# Patient Record
Sex: FEMALE | Race: WHITE | Employment: OTHER | URBAN - METROPOLITAN AREA
[De-identification: names, ages, dates, MRNs, and addresses within clinical notes are randomized per-mention and may not be internally consistent; named-entity substitution may affect disease eponyms.]

---

## 2017-01-03 DIAGNOSIS — J44.1 COPD EXACERBATION (HCC): ICD-10-CM

## 2017-01-03 DIAGNOSIS — R05.9 COUGH: ICD-10-CM

## 2017-01-03 RX ORDER — BENZONATATE 100 MG/1
CAPSULE ORAL
Qty: 60 CAPSULE | Refills: 0 | Status: SHIPPED | OUTPATIENT
Start: 2017-01-03 | End: 2017-02-23 | Stop reason: SDUPTHER

## 2017-01-03 RX ORDER — HYDROXYZINE HYDROCHLORIDE 25 MG/1
TABLET, FILM COATED ORAL
Qty: 45 TABLET | Refills: 2 | Status: SHIPPED | OUTPATIENT
Start: 2017-01-03 | End: 2017-01-23 | Stop reason: ALTCHOICE

## 2017-01-23 ENCOUNTER — OFFICE VISIT (OUTPATIENT)
Dept: FAMILY MEDICINE CLINIC | Age: 47
End: 2017-01-23

## 2017-01-23 VITALS
BODY MASS INDEX: 34.39 KG/M2 | WEIGHT: 188 LBS | SYSTOLIC BLOOD PRESSURE: 110 MMHG | HEART RATE: 106 BPM | DIASTOLIC BLOOD PRESSURE: 68 MMHG | OXYGEN SATURATION: 94 %

## 2017-01-23 DIAGNOSIS — G43.111 INTRACTABLE MIGRAINE WITH AURA WITH STATUS MIGRAINOSUS: ICD-10-CM

## 2017-01-23 DIAGNOSIS — G47.419 PRIMARY NARCOLEPSY WITHOUT CATAPLEXY: ICD-10-CM

## 2017-01-23 DIAGNOSIS — F31.9 BIPOLAR 1 DISORDER (HCC): Primary | ICD-10-CM

## 2017-01-23 DIAGNOSIS — J43.9 PULMONARY EMPHYSEMA, UNSPECIFIED EMPHYSEMA TYPE (HCC): ICD-10-CM

## 2017-01-23 DIAGNOSIS — F41.9 ANXIETY: ICD-10-CM

## 2017-01-23 PROCEDURE — 99214 OFFICE O/P EST MOD 30 MIN: CPT | Performed by: FAMILY MEDICINE

## 2017-01-23 RX ORDER — BUTALBITAL, ACETAMINOPHEN AND CAFFEINE 50; 325; 40 MG/1; MG/1; MG/1
TABLET ORAL
Qty: 30 TABLET | Refills: 5 | Status: SHIPPED | OUTPATIENT
Start: 2017-01-23 | End: 2017-07-15 | Stop reason: SDUPTHER

## 2017-01-23 RX ORDER — CLONAZEPAM 1 MG/1
TABLET ORAL
Qty: 30 TABLET | Refills: 0 | Status: SHIPPED | OUTPATIENT
Start: 2017-01-23 | End: 2017-02-23 | Stop reason: SDUPTHER

## 2017-01-23 RX ORDER — TIOTROPIUM BROMIDE INHALATION SPRAY 3.12 UG/1
SPRAY, METERED RESPIRATORY (INHALATION)
Qty: 1 INHALER | Refills: 5 | Status: SHIPPED | OUTPATIENT
Start: 2017-01-23 | End: 2017-04-26

## 2017-01-23 RX ORDER — MIRTAZAPINE 45 MG/1
TABLET, FILM COATED ORAL
Qty: 30 TABLET | Refills: 5 | Status: SHIPPED | OUTPATIENT
Start: 2017-01-23 | End: 2017-07-15 | Stop reason: SDUPTHER

## 2017-01-23 RX ORDER — LORATADINE, PSEUDOEPHEDRINE SULFATE 5; 120 MG/1; MG/1
TABLET, FILM COATED, EXTENDED RELEASE ORAL
Qty: 30 TABLET | Refills: 5 | Status: SHIPPED | OUTPATIENT
Start: 2017-01-23 | End: 2017-07-15 | Stop reason: SDUPTHER

## 2017-02-20 ENCOUNTER — OFFICE VISIT (OUTPATIENT)
Dept: PULMONOLOGY | Age: 47
End: 2017-02-20

## 2017-02-20 VITALS
RESPIRATION RATE: 22 BRPM | SYSTOLIC BLOOD PRESSURE: 108 MMHG | DIASTOLIC BLOOD PRESSURE: 74 MMHG | HEART RATE: 84 BPM | HEIGHT: 62 IN | OXYGEN SATURATION: 97 % | WEIGHT: 188 LBS | TEMPERATURE: 98 F | BODY MASS INDEX: 34.6 KG/M2

## 2017-02-20 DIAGNOSIS — J96.11 CHRONIC RESPIRATORY FAILURE WITH HYPOXIA (HCC): ICD-10-CM

## 2017-02-20 DIAGNOSIS — Z72.0 TOBACCO ABUSE: ICD-10-CM

## 2017-02-20 DIAGNOSIS — J44.9 CHRONIC OBSTRUCTIVE PULMONARY DISEASE, UNSPECIFIED COPD TYPE (HCC): Primary | ICD-10-CM

## 2017-02-20 PROCEDURE — 99214 OFFICE O/P EST MOD 30 MIN: CPT | Performed by: INTERNAL MEDICINE

## 2017-02-23 DIAGNOSIS — R05.9 COUGH: ICD-10-CM

## 2017-02-23 DIAGNOSIS — J44.1 COPD EXACERBATION (HCC): ICD-10-CM

## 2017-02-23 DIAGNOSIS — F31.81 BIPOLAR 2 DISORDER (HCC): ICD-10-CM

## 2017-02-23 RX ORDER — BUPROPION HYDROCHLORIDE 300 MG/1
300 TABLET ORAL EVERY MORNING
Qty: 30 TABLET | Refills: 5 | Status: SHIPPED | OUTPATIENT
Start: 2017-02-23 | End: 2017-08-10 | Stop reason: SDUPTHER

## 2017-02-23 RX ORDER — LAMOTRIGINE 25 MG/1
TABLET ORAL
Qty: 120 TABLET | Refills: 2 | Status: SHIPPED | OUTPATIENT
Start: 2017-02-23 | End: 2017-05-24 | Stop reason: SDUPTHER

## 2017-02-23 RX ORDER — BENZONATATE 100 MG/1
CAPSULE ORAL
Qty: 60 CAPSULE | Refills: 2 | Status: SHIPPED | OUTPATIENT
Start: 2017-02-23 | End: 2017-05-16 | Stop reason: SDUPTHER

## 2017-02-23 RX ORDER — BUPROPION HYDROCHLORIDE 150 MG/1
TABLET ORAL
Qty: 60 TABLET | Refills: 2 | OUTPATIENT
Start: 2017-02-23

## 2017-02-23 RX ORDER — CLONAZEPAM 1 MG/1
TABLET ORAL
Qty: 30 TABLET | Refills: 0 | Status: SHIPPED | OUTPATIENT
Start: 2017-02-23 | End: 2017-03-20 | Stop reason: SDUPTHER

## 2017-02-23 RX ORDER — MONTELUKAST SODIUM 10 MG/1
TABLET ORAL
Qty: 30 TABLET | Refills: 2 | Status: SHIPPED | OUTPATIENT
Start: 2017-02-23 | End: 2017-05-16 | Stop reason: SDUPTHER

## 2017-02-24 ENCOUNTER — HOSPITAL ENCOUNTER (OUTPATIENT)
Dept: PULMONOLOGY | Age: 47
Discharge: HOME | End: 2017-02-24
Attending: INTERNAL MEDICINE | Admitting: INTERNAL MEDICINE

## 2017-03-20 RX ORDER — HYDROXYZINE HYDROCHLORIDE 25 MG/1
TABLET, FILM COATED ORAL
Qty: 45 TABLET | OUTPATIENT
Start: 2017-03-20

## 2017-03-20 RX ORDER — CLONAZEPAM 1 MG/1
TABLET ORAL
Qty: 30 TABLET | Refills: 0 | Status: SHIPPED | OUTPATIENT
Start: 2017-03-25 | End: 2017-04-24 | Stop reason: SDUPTHER

## 2017-03-27 ENCOUNTER — OFFICE VISIT (OUTPATIENT)
Dept: FAMILY MEDICINE CLINIC | Age: 47
End: 2017-03-27

## 2017-03-27 VITALS
DIASTOLIC BLOOD PRESSURE: 78 MMHG | HEIGHT: 62 IN | HEART RATE: 63 BPM | WEIGHT: 187 LBS | BODY MASS INDEX: 34.41 KG/M2 | OXYGEN SATURATION: 98 % | SYSTOLIC BLOOD PRESSURE: 110 MMHG

## 2017-03-27 DIAGNOSIS — F43.0 STRESS REACTION: ICD-10-CM

## 2017-03-27 DIAGNOSIS — J44.1 COPD EXACERBATION (HCC): ICD-10-CM

## 2017-03-27 DIAGNOSIS — M75.82 ROTATOR CUFF TENDONITIS, LEFT: Primary | ICD-10-CM

## 2017-03-27 PROCEDURE — 99214 OFFICE O/P EST MOD 30 MIN: CPT | Performed by: FAMILY MEDICINE

## 2017-04-05 ENCOUNTER — OFFICE VISIT (OUTPATIENT)
Dept: ORTHOPEDIC SURGERY | Age: 47
End: 2017-04-05

## 2017-04-05 VITALS
DIASTOLIC BLOOD PRESSURE: 75 MMHG | WEIGHT: 187 LBS | HEIGHT: 62 IN | HEART RATE: 86 BPM | BODY MASS INDEX: 34.41 KG/M2 | SYSTOLIC BLOOD PRESSURE: 111 MMHG

## 2017-04-05 DIAGNOSIS — M75.02 ADHESIVE CAPSULITIS OF LEFT SHOULDER: Primary | ICD-10-CM

## 2017-04-05 DIAGNOSIS — M25.512 LEFT SHOULDER PAIN, UNSPECIFIED CHRONICITY: ICD-10-CM

## 2017-04-05 PROCEDURE — 99203 OFFICE O/P NEW LOW 30 MIN: CPT | Performed by: ORTHOPAEDIC SURGERY

## 2017-04-05 PROCEDURE — 73030 X-RAY EXAM OF SHOULDER: CPT | Performed by: ORTHOPAEDIC SURGERY

## 2017-04-05 RX ORDER — MELOXICAM 15 MG/1
15 TABLET ORAL DAILY
Qty: 30 TABLET | Refills: 2 | Status: SHIPPED | OUTPATIENT
Start: 2017-04-05 | End: 2017-06-14 | Stop reason: ALTCHOICE

## 2017-04-18 RX ORDER — HYDROCHLOROTHIAZIDE 25 MG/1
TABLET ORAL
Qty: 30 TABLET | Refills: 5 | Status: SHIPPED | OUTPATIENT
Start: 2017-04-18 | End: 2017-06-29

## 2017-04-24 RX ORDER — CLONAZEPAM 1 MG/1
TABLET ORAL
Qty: 30 TABLET | Refills: 0 | Status: SHIPPED | OUTPATIENT
Start: 2017-04-24 | End: 2017-05-24 | Stop reason: SDUPTHER

## 2017-04-25 DIAGNOSIS — J44.1 COPD EXACERBATION (HCC): ICD-10-CM

## 2017-04-25 DIAGNOSIS — R11.0 NAUSEA: ICD-10-CM

## 2017-04-25 RX ORDER — BUDESONIDE AND FORMOTEROL FUMARATE DIHYDRATE 160; 4.5 UG/1; UG/1
AEROSOL RESPIRATORY (INHALATION)
Qty: 1 INHALER | Refills: 5 | Status: SHIPPED | OUTPATIENT
Start: 2017-04-25 | End: 2017-06-14 | Stop reason: ALTCHOICE

## 2017-04-25 RX ORDER — PROMETHAZINE HYDROCHLORIDE 25 MG/1
TABLET ORAL
Qty: 30 TABLET | Refills: 5 | Status: SHIPPED | OUTPATIENT
Start: 2017-04-25 | End: 2017-09-11 | Stop reason: SDUPTHER

## 2017-04-26 ENCOUNTER — TELEPHONE (OUTPATIENT)
Dept: PULMONOLOGY | Age: 47
End: 2017-04-26

## 2017-04-26 ENCOUNTER — TELEPHONE (OUTPATIENT)
Dept: FAMILY MEDICINE CLINIC | Age: 47
End: 2017-04-26

## 2017-04-26 DIAGNOSIS — J44.9 CHRONIC OBSTRUCTIVE PULMONARY DISEASE, UNSPECIFIED COPD TYPE (HCC): Primary | ICD-10-CM

## 2017-05-04 ENCOUNTER — HOSPITAL ENCOUNTER (OUTPATIENT)
Dept: PULMONOLOGY | Age: 47
Discharge: HOME | End: 2017-05-04
Attending: INTERNAL MEDICINE | Admitting: INTERNAL MEDICINE

## 2017-05-04 ENCOUNTER — OFFICE VISIT (OUTPATIENT)
Dept: PULMONOLOGY | Age: 47
End: 2017-05-04

## 2017-05-04 VITALS
RESPIRATION RATE: 20 BRPM | HEART RATE: 107 BPM | HEIGHT: 62 IN | SYSTOLIC BLOOD PRESSURE: 108 MMHG | DIASTOLIC BLOOD PRESSURE: 78 MMHG | TEMPERATURE: 98.8 F | OXYGEN SATURATION: 97 % | WEIGHT: 182.8 LBS | BODY MASS INDEX: 33.64 KG/M2

## 2017-05-04 DIAGNOSIS — J96.11 CHRONIC RESPIRATORY FAILURE WITH HYPOXIA (HCC): ICD-10-CM

## 2017-05-04 DIAGNOSIS — J44.9 CHRONIC OBSTRUCTIVE PULMONARY DISEASE (HCC): ICD-10-CM

## 2017-05-04 DIAGNOSIS — Z72.0 TOBACCO ABUSE: ICD-10-CM

## 2017-05-04 DIAGNOSIS — J44.9 CHRONIC OBSTRUCTIVE PULMONARY DISEASE, UNSPECIFIED COPD TYPE (HCC): Primary | ICD-10-CM

## 2017-05-04 PROCEDURE — G8926 SPIRO NO PERF OR DOC: HCPCS | Performed by: INTERNAL MEDICINE

## 2017-05-04 PROCEDURE — 3023F SPIROM DOC REV: CPT | Performed by: INTERNAL MEDICINE

## 2017-05-04 PROCEDURE — G8427 DOCREV CUR MEDS BY ELIG CLIN: HCPCS | Performed by: INTERNAL MEDICINE

## 2017-05-04 PROCEDURE — 99214 OFFICE O/P EST MOD 30 MIN: CPT | Performed by: INTERNAL MEDICINE

## 2017-05-04 PROCEDURE — G8417 CALC BMI ABV UP PARAM F/U: HCPCS | Performed by: INTERNAL MEDICINE

## 2017-05-04 PROCEDURE — 4004F PT TOBACCO SCREEN RCVD TLK: CPT | Performed by: INTERNAL MEDICINE

## 2017-05-05 ENCOUNTER — TELEPHONE (OUTPATIENT)
Dept: PULMONOLOGY | Age: 47
End: 2017-05-05

## 2017-05-10 ENCOUNTER — TELEPHONE (OUTPATIENT)
Dept: FAMILY MEDICINE CLINIC | Age: 47
End: 2017-05-10

## 2017-05-12 ENCOUNTER — TELEPHONE (OUTPATIENT)
Dept: FAMILY MEDICINE CLINIC | Age: 47
End: 2017-05-12

## 2017-05-16 DIAGNOSIS — J44.1 COPD EXACERBATION (HCC): ICD-10-CM

## 2017-05-16 DIAGNOSIS — G43.119 INTRACTABLE MIGRAINE WITH AURA WITHOUT STATUS MIGRAINOSUS: ICD-10-CM

## 2017-05-16 DIAGNOSIS — R05.9 COUGH: ICD-10-CM

## 2017-05-16 RX ORDER — RIZATRIPTAN BENZOATE 10 MG/1
10 TABLET ORAL
Qty: 18 TABLET | Refills: 5 | Status: SHIPPED | OUTPATIENT
Start: 2017-05-16 | End: 2018-09-14 | Stop reason: SDUPTHER

## 2017-05-17 ENCOUNTER — NURSE ONLY (OUTPATIENT)
Dept: FAMILY MEDICINE CLINIC | Age: 47
End: 2017-05-17

## 2017-05-17 DIAGNOSIS — Z79.899 ENCOUNTER FOR LONG-TERM (CURRENT) USE OF MEDICATIONS: Primary | ICD-10-CM

## 2017-05-17 RX ORDER — TIOTROPIUM BROMIDE 18 UG/1
CAPSULE ORAL; RESPIRATORY (INHALATION)
Qty: 30 CAPSULE | Refills: 5 | Status: SHIPPED | OUTPATIENT
Start: 2017-05-17 | End: 2017-06-20

## 2017-05-17 RX ORDER — BENZONATATE 100 MG/1
CAPSULE ORAL
Qty: 60 CAPSULE | Refills: 5 | Status: SHIPPED | OUTPATIENT
Start: 2017-05-17 | End: 2017-10-23 | Stop reason: SDUPTHER

## 2017-05-17 RX ORDER — TOPIRAMATE 50 MG/1
TABLET, FILM COATED ORAL
Qty: 60 TABLET | Refills: 5 | Status: SHIPPED | OUTPATIENT
Start: 2017-05-17 | End: 2017-11-07 | Stop reason: SDUPTHER

## 2017-05-17 RX ORDER — MONTELUKAST SODIUM 10 MG/1
TABLET ORAL
Qty: 30 TABLET | Refills: 5 | Status: SHIPPED | OUTPATIENT
Start: 2017-05-17 | End: 2017-11-21 | Stop reason: SDUPTHER

## 2017-05-24 DIAGNOSIS — F31.81 BIPOLAR 2 DISORDER (HCC): ICD-10-CM

## 2017-05-25 RX ORDER — LAMOTRIGINE 25 MG/1
TABLET ORAL
Qty: 120 TABLET | Refills: 2 | Status: SHIPPED | OUTPATIENT
Start: 2017-05-25 | End: 2017-08-10 | Stop reason: SDUPTHER

## 2017-05-25 RX ORDER — CLONAZEPAM 1 MG/1
TABLET ORAL
Qty: 30 TABLET | Refills: 0 | OUTPATIENT
Start: 2017-05-25 | End: 2017-06-21 | Stop reason: SDUPTHER

## 2017-06-02 ENCOUNTER — TELEPHONE (OUTPATIENT)
Dept: FAMILY MEDICINE CLINIC | Age: 47
End: 2017-06-02

## 2017-06-05 ENCOUNTER — TELEPHONE (OUTPATIENT)
Dept: FAMILY MEDICINE CLINIC | Age: 47
End: 2017-06-05

## 2017-06-21 ENCOUNTER — OFFICE VISIT (OUTPATIENT)
Dept: FAMILY MEDICINE CLINIC | Age: 47
End: 2017-06-21

## 2017-06-21 VITALS
SYSTOLIC BLOOD PRESSURE: 126 MMHG | BODY MASS INDEX: 33.47 KG/M2 | HEART RATE: 99 BPM | RESPIRATION RATE: 18 BRPM | OXYGEN SATURATION: 99 % | WEIGHT: 183 LBS | DIASTOLIC BLOOD PRESSURE: 70 MMHG

## 2017-06-21 DIAGNOSIS — F41.9 ANXIETY AND DEPRESSION: ICD-10-CM

## 2017-06-21 DIAGNOSIS — F32.A ANXIETY AND DEPRESSION: ICD-10-CM

## 2017-06-21 DIAGNOSIS — Z72.0 TOBACCO USE: ICD-10-CM

## 2017-06-21 DIAGNOSIS — J44.1 COPD EXACERBATION (HCC): Primary | ICD-10-CM

## 2017-06-21 DIAGNOSIS — F43.0 STRESS REACTION: ICD-10-CM

## 2017-06-21 PROCEDURE — 96372 THER/PROPH/DIAG INJ SC/IM: CPT | Performed by: FAMILY MEDICINE

## 2017-06-21 PROCEDURE — 4004F PT TOBACCO SCREEN RCVD TLK: CPT | Performed by: FAMILY MEDICINE

## 2017-06-21 PROCEDURE — G8427 DOCREV CUR MEDS BY ELIG CLIN: HCPCS | Performed by: FAMILY MEDICINE

## 2017-06-21 PROCEDURE — G8926 SPIRO NO PERF OR DOC: HCPCS | Performed by: FAMILY MEDICINE

## 2017-06-21 PROCEDURE — G8417 CALC BMI ABV UP PARAM F/U: HCPCS | Performed by: FAMILY MEDICINE

## 2017-06-21 PROCEDURE — 3023F SPIROM DOC REV: CPT | Performed by: FAMILY MEDICINE

## 2017-06-21 PROCEDURE — 99215 OFFICE O/P EST HI 40 MIN: CPT | Performed by: FAMILY MEDICINE

## 2017-06-21 RX ORDER — CLONAZEPAM 1 MG/1
TABLET ORAL
Qty: 30 TABLET | Refills: 0 | Status: SHIPPED | OUTPATIENT
Start: 2017-06-21 | End: 2017-07-17 | Stop reason: SDUPTHER

## 2017-06-21 RX ORDER — DEXTROAMPHETAMINE SACCHARATE, AMPHETAMINE ASPARTATE MONOHYDRATE, DEXTROAMPHETAMINE SULFATE AND AMPHETAMINE SULFATE 2.5; 2.5; 2.5; 2.5 MG/1; MG/1; MG/1; MG/1
CAPSULE, EXTENDED RELEASE ORAL
COMMUNITY
Start: 2017-06-06 | End: 2018-02-15

## 2017-06-21 RX ORDER — PREDNISONE 20 MG/1
TABLET ORAL
Qty: 20 TABLET | Refills: 0 | Status: SHIPPED | OUTPATIENT
Start: 2017-06-21 | End: 2018-02-06 | Stop reason: SDUPTHER

## 2017-06-21 RX ORDER — METHYLPREDNISOLONE ACETATE 80 MG/ML
80 INJECTION, SUSPENSION INTRA-ARTICULAR; INTRALESIONAL; INTRAMUSCULAR; SOFT TISSUE ONCE
Status: COMPLETED | OUTPATIENT
Start: 2017-06-21 | End: 2017-06-21

## 2017-06-21 RX ADMIN — METHYLPREDNISOLONE ACETATE 80 MG: 80 INJECTION, SUSPENSION INTRA-ARTICULAR; INTRALESIONAL; INTRAMUSCULAR; SOFT TISSUE at 14:44

## 2017-06-22 DIAGNOSIS — J44.1 COPD EXACERBATION (HCC): Primary | ICD-10-CM

## 2017-06-22 RX ORDER — ALBUTEROL SULFATE 2.5 MG/3ML
SOLUTION RESPIRATORY (INHALATION)
Qty: 120 ML | Refills: 5 | Status: SHIPPED | OUTPATIENT
Start: 2017-06-22 | End: 2017-07-17

## 2017-06-22 RX ORDER — ALBUTEROL SULFATE 2.5 MG/3ML
SOLUTION RESPIRATORY (INHALATION)
Qty: 120 ML | Refills: 5 | Status: SHIPPED | OUTPATIENT
Start: 2017-06-22 | End: 2017-06-22 | Stop reason: SDUPTHER

## 2017-06-29 ENCOUNTER — TELEPHONE (OUTPATIENT)
Dept: FAMILY MEDICINE CLINIC | Age: 47
End: 2017-06-29

## 2017-06-29 RX ORDER — FUROSEMIDE 40 MG/1
TABLET ORAL
Qty: 60 TABLET | Refills: 3 | Status: SHIPPED | OUTPATIENT
Start: 2017-06-29 | End: 2017-10-23 | Stop reason: SDUPTHER

## 2017-06-29 RX ORDER — POTASSIUM CHLORIDE 750 MG/1
10 TABLET, FILM COATED, EXTENDED RELEASE ORAL DAILY
Qty: 30 TABLET | Refills: 3 | Status: SHIPPED | OUTPATIENT
Start: 2017-06-29 | End: 2017-10-23 | Stop reason: SDUPTHER

## 2017-07-05 ENCOUNTER — OFFICE VISIT (OUTPATIENT)
Dept: PSYCHOLOGY | Age: 47
End: 2017-07-05

## 2017-07-05 DIAGNOSIS — F32.A DEPRESSION, UNSPECIFIED DEPRESSION TYPE: ICD-10-CM

## 2017-07-05 DIAGNOSIS — F41.9 ANXIETY: Primary | ICD-10-CM

## 2017-07-05 PROCEDURE — 90791 PSYCH DIAGNOSTIC EVALUATION: CPT | Performed by: PSYCHOLOGIST

## 2017-07-14 ENCOUNTER — OFFICE VISIT (OUTPATIENT)
Dept: PSYCHOLOGY | Age: 47
End: 2017-07-14

## 2017-07-14 DIAGNOSIS — F32.A DEPRESSION, UNSPECIFIED DEPRESSION TYPE: Primary | ICD-10-CM

## 2017-07-14 DIAGNOSIS — F41.9 ANXIETY: ICD-10-CM

## 2017-07-14 PROCEDURE — 90832 PSYTX W PT 30 MINUTES: CPT | Performed by: PSYCHOLOGIST

## 2017-07-15 DIAGNOSIS — G43.111 INTRACTABLE MIGRAINE WITH AURA WITH STATUS MIGRAINOSUS: ICD-10-CM

## 2017-07-17 RX ORDER — MIRTAZAPINE 45 MG/1
TABLET, FILM COATED ORAL
Qty: 30 TABLET | Refills: 5 | Status: SHIPPED | OUTPATIENT
Start: 2017-07-17 | End: 2017-07-17

## 2017-07-17 RX ORDER — MIRTAZAPINE 45 MG/1
TABLET, FILM COATED ORAL
Qty: 30 TABLET | Refills: 5 | Status: SHIPPED | OUTPATIENT
Start: 2017-07-17 | End: 2018-01-02

## 2017-07-17 RX ORDER — LORATADINE, PSEUDOEPHEDRINE SULFATE 5; 120 MG/1; MG/1
TABLET, FILM COATED, EXTENDED RELEASE ORAL
Qty: 30 TABLET | Refills: 5 | Status: SHIPPED | OUTPATIENT
Start: 2017-07-17 | End: 2021-03-24 | Stop reason: SDUPTHER

## 2017-07-17 RX ORDER — BUTALBITAL, ACETAMINOPHEN AND CAFFEINE 50; 325; 40 MG/1; MG/1; MG/1
TABLET ORAL
Qty: 30 TABLET | Refills: 5 | Status: SHIPPED | OUTPATIENT
Start: 2017-07-17 | End: 2018-01-22 | Stop reason: SDUPTHER

## 2017-07-18 RX ORDER — MELOXICAM 15 MG/1
TABLET ORAL
Qty: 30 TABLET | OUTPATIENT
Start: 2017-07-18

## 2017-07-18 RX ORDER — CLONAZEPAM 1 MG/1
TABLET ORAL
Qty: 30 TABLET | Refills: 0 | OUTPATIENT
Start: 2017-07-18 | End: 2017-08-21 | Stop reason: SDUPTHER

## 2017-07-19 ENCOUNTER — OFFICE VISIT (OUTPATIENT)
Dept: PSYCHOLOGY | Age: 47
End: 2017-07-19

## 2017-07-19 DIAGNOSIS — F41.9 ANXIETY: ICD-10-CM

## 2017-07-19 DIAGNOSIS — F32.A DEPRESSION, UNSPECIFIED DEPRESSION TYPE: Primary | ICD-10-CM

## 2017-07-19 PROCEDURE — 90834 PSYTX W PT 45 MINUTES: CPT | Performed by: PSYCHOLOGIST

## 2017-07-19 RX ORDER — MELOXICAM 15 MG/1
15 TABLET ORAL DAILY
Qty: 30 TABLET | Refills: 3 | Status: SHIPPED | OUTPATIENT
Start: 2017-07-19 | End: 2017-11-07 | Stop reason: SDUPTHER

## 2017-07-19 ASSESSMENT — ANXIETY QUESTIONNAIRES
1. FEELING NERVOUS, ANXIOUS, OR ON EDGE: 3-NEARLY EVERY DAY
2. NOT BEING ABLE TO STOP OR CONTROL WORRYING: 3-NEARLY EVERY DAY
7. FEELING AFRAID AS IF SOMETHING AWFUL MIGHT HAPPEN: 3-NEARLY EVERY DAY
5. BEING SO RESTLESS THAT IT IS HARD TO SIT STILL: 3-NEARLY EVERY DAY
6. BECOMING EASILY ANNOYED OR IRRITABLE: 3-NEARLY EVERY DAY
3. WORRYING TOO MUCH ABOUT DIFFERENT THINGS: 3-NEARLY EVERY DAY
GAD7 TOTAL SCORE: 21
4. TROUBLE RELAXING: 3-NEARLY EVERY DAY

## 2017-07-19 ASSESSMENT — PATIENT HEALTH QUESTIONNAIRE - PHQ9
6. FEELING BAD ABOUT YOURSELF - OR THAT YOU ARE A FAILURE OR HAVE LET YOURSELF OR YOUR FAMILY DOWN: 3
SUM OF ALL RESPONSES TO PHQ QUESTIONS 1-9: 23
7. TROUBLE CONCENTRATING ON THINGS, SUCH AS READING THE NEWSPAPER OR WATCHING TELEVISION: 3
8. MOVING OR SPEAKING SO SLOWLY THAT OTHER PEOPLE COULD HAVE NOTICED. OR THE OPPOSITE, BEING SO FIGETY OR RESTLESS THAT YOU HAVE BEEN MOVING AROUND A LOT MORE THAN USUAL: 3
4. FEELING TIRED OR HAVING LITTLE ENERGY: 3
1. LITTLE INTEREST OR PLEASURE IN DOING THINGS: 2
SUM OF ALL RESPONSES TO PHQ9 QUESTIONS 1 & 2: 5
5. POOR APPETITE OR OVEREATING: 3
9. THOUGHTS THAT YOU WOULD BE BETTER OFF DEAD, OR OF HURTING YOURSELF: 0
2. FEELING DOWN, DEPRESSED OR HOPELESS: 3
3. TROUBLE FALLING OR STAYING ASLEEP: 3

## 2017-07-26 ENCOUNTER — OFFICE VISIT (OUTPATIENT)
Dept: FAMILY MEDICINE CLINIC | Age: 47
End: 2017-07-26

## 2017-07-26 VITALS
BODY MASS INDEX: 33.29 KG/M2 | SYSTOLIC BLOOD PRESSURE: 126 MMHG | RESPIRATION RATE: 18 BRPM | HEART RATE: 96 BPM | OXYGEN SATURATION: 99 % | DIASTOLIC BLOOD PRESSURE: 80 MMHG | WEIGHT: 182 LBS

## 2017-07-26 DIAGNOSIS — F32.A ANXIETY AND DEPRESSION: Primary | ICD-10-CM

## 2017-07-26 DIAGNOSIS — N62 MACROMASTIA: ICD-10-CM

## 2017-07-26 DIAGNOSIS — F41.9 ANXIETY AND DEPRESSION: Primary | ICD-10-CM

## 2017-07-26 PROCEDURE — 99214 OFFICE O/P EST MOD 30 MIN: CPT | Performed by: FAMILY MEDICINE

## 2017-07-26 RX ORDER — FLUOXETINE HYDROCHLORIDE 20 MG/1
20 CAPSULE ORAL DAILY
Qty: 30 CAPSULE | Refills: 3 | Status: SHIPPED | OUTPATIENT
Start: 2017-07-26 | End: 2017-11-21 | Stop reason: SDUPTHER

## 2017-07-28 ENCOUNTER — OFFICE VISIT (OUTPATIENT)
Dept: PSYCHOLOGY | Age: 47
End: 2017-07-28

## 2017-07-28 DIAGNOSIS — F41.9 ANXIETY: ICD-10-CM

## 2017-07-28 DIAGNOSIS — F32.A DEPRESSION, UNSPECIFIED DEPRESSION TYPE: Primary | ICD-10-CM

## 2017-07-28 PROCEDURE — 90834 PSYTX W PT 45 MINUTES: CPT | Performed by: PSYCHOLOGIST

## 2017-08-10 DIAGNOSIS — F31.81 BIPOLAR 2 DISORDER (HCC): ICD-10-CM

## 2017-08-10 RX ORDER — BUPROPION HYDROCHLORIDE 300 MG/1
TABLET ORAL
Qty: 30 TABLET | Refills: 5 | Status: SHIPPED | OUTPATIENT
Start: 2017-08-10 | End: 2017-11-29

## 2017-08-10 RX ORDER — LAMOTRIGINE 25 MG/1
TABLET ORAL
Qty: 120 TABLET | Refills: 5 | Status: SHIPPED | OUTPATIENT
Start: 2017-08-10 | End: 2017-11-29

## 2017-08-21 ENCOUNTER — OFFICE VISIT (OUTPATIENT)
Dept: PULMONOLOGY | Age: 47
End: 2017-08-21

## 2017-08-21 VITALS
OXYGEN SATURATION: 95 % | HEART RATE: 111 BPM | BODY MASS INDEX: 33.82 KG/M2 | HEIGHT: 62 IN | RESPIRATION RATE: 18 BRPM | DIASTOLIC BLOOD PRESSURE: 68 MMHG | WEIGHT: 183.8 LBS | SYSTOLIC BLOOD PRESSURE: 108 MMHG | TEMPERATURE: 98.4 F

## 2017-08-21 DIAGNOSIS — R05.9 COUGH: ICD-10-CM

## 2017-08-21 DIAGNOSIS — Z72.0 TOBACCO ABUSE: ICD-10-CM

## 2017-08-21 DIAGNOSIS — J43.2 CENTRILOBULAR EMPHYSEMA (HCC): Primary | ICD-10-CM

## 2017-08-21 DIAGNOSIS — J96.11 CHRONIC RESPIRATORY FAILURE WITH HYPOXIA (HCC): ICD-10-CM

## 2017-08-21 PROCEDURE — 99214 OFFICE O/P EST MOD 30 MIN: CPT | Performed by: INTERNAL MEDICINE

## 2017-08-21 RX ORDER — CLONAZEPAM 1 MG/1
TABLET ORAL
Qty: 30 TABLET | Refills: 0 | Status: SHIPPED | OUTPATIENT
Start: 2017-08-21 | End: 2017-09-25 | Stop reason: SDUPTHER

## 2017-08-21 RX ORDER — BUDESONIDE AND FORMOTEROL FUMARATE DIHYDRATE 160; 4.5 UG/1; UG/1
2 AEROSOL RESPIRATORY (INHALATION) 2 TIMES DAILY
COMMUNITY
End: 2017-08-21 | Stop reason: SDUPTHER

## 2017-08-21 RX ORDER — PREDNISONE 20 MG/1
20 TABLET ORAL DAILY
Qty: 5 TABLET | Refills: 0 | Status: SHIPPED | OUTPATIENT
Start: 2017-08-21 | End: 2017-08-26

## 2017-08-21 RX ORDER — ALBUTEROL SULFATE 90 UG/1
AEROSOL, METERED RESPIRATORY (INHALATION)
Qty: 18 G | Refills: 5 | Status: SHIPPED | OUTPATIENT
Start: 2017-08-21 | End: 2018-01-02

## 2017-08-21 RX ORDER — BUDESONIDE AND FORMOTEROL FUMARATE DIHYDRATE 160; 4.5 UG/1; UG/1
2 AEROSOL RESPIRATORY (INHALATION) 2 TIMES DAILY
Qty: 10.2 G | Refills: 5 | Status: SHIPPED | OUTPATIENT
Start: 2017-08-21 | End: 2017-10-13

## 2017-08-22 RX ORDER — CLONAZEPAM 1 MG/1
TABLET ORAL
Qty: 30 TABLET | Refills: 0 | OUTPATIENT
Start: 2017-08-22

## 2017-09-06 ENCOUNTER — OFFICE VISIT (OUTPATIENT)
Dept: SURGERY | Age: 47
End: 2017-09-06

## 2017-09-06 VITALS
DIASTOLIC BLOOD PRESSURE: 68 MMHG | BODY MASS INDEX: 33.82 KG/M2 | TEMPERATURE: 97.7 F | HEIGHT: 62 IN | OXYGEN SATURATION: 96 % | SYSTOLIC BLOOD PRESSURE: 104 MMHG | WEIGHT: 183.8 LBS | HEART RATE: 98 BPM

## 2017-09-06 DIAGNOSIS — N62 MACROMASTIA: Primary | ICD-10-CM

## 2017-09-06 PROCEDURE — 99204 OFFICE O/P NEW MOD 45 MIN: CPT | Performed by: SURGERY

## 2017-09-06 ASSESSMENT — ENCOUNTER SYMPTOMS
WHEEZING: 1
ABDOMINAL PAIN: 1
COUGH: 1
DIARRHEA: 0
SHORTNESS OF BREATH: 1
DOUBLE VISION: 0
BACK PAIN: 1
VOMITING: 1
BLURRED VISION: 0
NAUSEA: 1
HEARTBURN: 1

## 2017-09-18 ENCOUNTER — HOSPITAL ENCOUNTER (OUTPATIENT)
Dept: MAMMOGRAPHY | Age: 47
Discharge: HOME | End: 2017-09-18
Attending: FAMILY MEDICINE | Admitting: FAMILY MEDICINE

## 2017-09-18 DIAGNOSIS — Z12.31 SCREENING MAMMOGRAM, ENCOUNTER FOR: ICD-10-CM

## 2017-09-25 ENCOUNTER — TELEPHONE (OUTPATIENT)
Dept: FAMILY MEDICINE CLINIC | Age: 47
End: 2017-09-25

## 2017-09-25 RX ORDER — CLONAZEPAM 1 MG/1
TABLET ORAL
Qty: 30 TABLET | Refills: 0 | Status: SHIPPED | OUTPATIENT
Start: 2017-09-25 | End: 2017-10-23 | Stop reason: SDUPTHER

## 2017-10-12 DIAGNOSIS — J44.1 COPD EXACERBATION (HCC): ICD-10-CM

## 2017-10-13 RX ORDER — BUDESONIDE AND FORMOTEROL FUMARATE DIHYDRATE 160; 4.5 UG/1; UG/1
AEROSOL RESPIRATORY (INHALATION)
Qty: 1 INHALER | Refills: 5 | Status: SHIPPED | OUTPATIENT
Start: 2017-10-13 | End: 2018-04-18 | Stop reason: SDUPTHER

## 2017-10-13 RX ORDER — HYDROCHLOROTHIAZIDE 25 MG/1
TABLET ORAL
Qty: 30 TABLET | Refills: 5 | Status: SHIPPED | OUTPATIENT
Start: 2017-10-13 | End: 2018-02-15

## 2017-10-13 NOTE — TELEPHONE ENCOUNTER
Last office visit 7/26/2017      Next office visit scheduled Visit date not found    Requested Prescriptions     Pending Prescriptions Disp Refills    SYMBICORT 160-4.5 MCG/ACT AERO 1 Inhaler      Sig: INHALE 2 PUFFS INTO THE LUNGS 2 TIMES DAILY (RINSE MOUTH AFTER EACH USE)    hydrochlorothiazide (HYDRODIURIL) 25 MG tablet 30 tablet      Sig: TAKE 1 TABLET BY MOUTH DAILY AS NEEDED

## 2017-10-23 ENCOUNTER — TELEPHONE (OUTPATIENT)
Dept: FAMILY MEDICINE CLINIC | Age: 47
End: 2017-10-23

## 2017-10-23 DIAGNOSIS — J44.1 COPD EXACERBATION (HCC): ICD-10-CM

## 2017-10-23 DIAGNOSIS — R05.9 COUGH: ICD-10-CM

## 2017-10-23 DIAGNOSIS — J44.9 COPD WITH CHRONIC BRONCHITIS AND EMPHYSEMA (HCC): Primary | ICD-10-CM

## 2017-10-23 RX ORDER — DOXYCYCLINE HYCLATE 100 MG
100 TABLET ORAL 2 TIMES DAILY
Qty: 20 TABLET | Refills: 0 | Status: SHIPPED | OUTPATIENT
Start: 2017-10-23 | End: 2017-11-02

## 2017-10-23 RX ORDER — CLONAZEPAM 1 MG/1
TABLET ORAL
Qty: 30 TABLET | Refills: 0 | Status: SHIPPED | OUTPATIENT
Start: 2017-10-23 | End: 2017-11-21 | Stop reason: SDUPTHER

## 2017-10-23 RX ORDER — FUROSEMIDE 40 MG/1
TABLET ORAL
Qty: 60 TABLET | Refills: 5 | Status: SHIPPED | OUTPATIENT
Start: 2017-10-23 | End: 2018-08-10 | Stop reason: SDUPTHER

## 2017-10-23 RX ORDER — BENZONATATE 100 MG/1
CAPSULE ORAL
Qty: 60 CAPSULE | Refills: 1 | Status: SHIPPED | OUTPATIENT
Start: 2017-10-23 | End: 2018-04-18 | Stop reason: SDUPTHER

## 2017-10-23 RX ORDER — POTASSIUM CHLORIDE 750 MG/1
10 TABLET, FILM COATED, EXTENDED RELEASE ORAL DAILY
Qty: 30 TABLET | Refills: 5 | Status: SHIPPED | OUTPATIENT
Start: 2017-10-23 | End: 2018-05-17 | Stop reason: SDUPTHER

## 2017-11-21 DIAGNOSIS — F32.A ANXIETY AND DEPRESSION: ICD-10-CM

## 2017-11-21 DIAGNOSIS — F41.9 ANXIETY AND DEPRESSION: ICD-10-CM

## 2017-11-21 RX ORDER — FLUOXETINE HYDROCHLORIDE 20 MG/1
20 CAPSULE ORAL DAILY
Qty: 30 CAPSULE | Refills: 5 | Status: SHIPPED | OUTPATIENT
Start: 2017-11-21 | End: 2018-07-23 | Stop reason: SDUPTHER

## 2017-11-21 RX ORDER — MONTELUKAST SODIUM 10 MG/1
TABLET ORAL
Qty: 30 TABLET | Refills: 5 | Status: SHIPPED | OUTPATIENT
Start: 2017-11-21 | End: 2020-08-12

## 2017-11-21 RX ORDER — CLONAZEPAM 1 MG/1
TABLET ORAL
Qty: 30 TABLET | Refills: 0 | OUTPATIENT
Start: 2017-11-21 | End: 2017-12-21 | Stop reason: SDUPTHER

## 2017-11-24 DIAGNOSIS — R11.0 NAUSEA: ICD-10-CM

## 2017-11-27 ENCOUNTER — TELEPHONE (OUTPATIENT)
Dept: PULMONOLOGY | Age: 47
End: 2017-11-27

## 2017-11-27 ENCOUNTER — TELEPHONE (OUTPATIENT)
Dept: FAMILY MEDICINE CLINIC | Age: 47
End: 2017-11-27

## 2017-11-27 RX ORDER — PROMETHAZINE HYDROCHLORIDE 25 MG/1
TABLET ORAL
Qty: 30 TABLET | Refills: 0 | Status: SHIPPED | OUTPATIENT
Start: 2017-11-27 | End: 2017-12-18 | Stop reason: SDUPTHER

## 2017-11-29 ENCOUNTER — HOSPITAL ENCOUNTER (OUTPATIENT)
Dept: OTHER | Age: 47
Discharge: HOME | End: 2017-11-29
Attending: INTERNAL MEDICINE | Admitting: INTERNAL MEDICINE

## 2017-11-29 ENCOUNTER — OFFICE VISIT (OUTPATIENT)
Dept: PULMONOLOGY | Age: 47
End: 2017-11-29

## 2017-11-29 VITALS
BODY MASS INDEX: 33.49 KG/M2 | HEIGHT: 62 IN | DIASTOLIC BLOOD PRESSURE: 76 MMHG | TEMPERATURE: 98.7 F | HEART RATE: 110 BPM | RESPIRATION RATE: 22 BRPM | WEIGHT: 182 LBS | SYSTOLIC BLOOD PRESSURE: 112 MMHG | OXYGEN SATURATION: 96 %

## 2017-11-29 DIAGNOSIS — R05.9 COUGH: ICD-10-CM

## 2017-11-29 DIAGNOSIS — R06.00 DYSPNEA, UNSPECIFIED TYPE: Primary | ICD-10-CM

## 2017-11-29 DIAGNOSIS — Z72.0 TOBACCO ABUSE: ICD-10-CM

## 2017-11-29 DIAGNOSIS — R06.00 DYSPNEA, UNSPECIFIED TYPE: ICD-10-CM

## 2017-11-29 DIAGNOSIS — J44.1 COPD EXACERBATION (HCC): ICD-10-CM

## 2017-11-29 DIAGNOSIS — J96.11 CHRONIC RESPIRATORY FAILURE WITH HYPOXIA (HCC): ICD-10-CM

## 2017-11-29 PROCEDURE — 3023F SPIROM DOC REV: CPT | Performed by: INTERNAL MEDICINE

## 2017-11-29 PROCEDURE — G8484 FLU IMMUNIZE NO ADMIN: HCPCS | Performed by: INTERNAL MEDICINE

## 2017-11-29 PROCEDURE — 1036F TOBACCO NON-USER: CPT | Performed by: INTERNAL MEDICINE

## 2017-11-29 PROCEDURE — G8417 CALC BMI ABV UP PARAM F/U: HCPCS | Performed by: INTERNAL MEDICINE

## 2017-11-29 PROCEDURE — G8926 SPIRO NO PERF OR DOC: HCPCS | Performed by: INTERNAL MEDICINE

## 2017-11-29 PROCEDURE — 99214 OFFICE O/P EST MOD 30 MIN: CPT | Performed by: INTERNAL MEDICINE

## 2017-11-29 PROCEDURE — G8427 DOCREV CUR MEDS BY ELIG CLIN: HCPCS | Performed by: INTERNAL MEDICINE

## 2017-11-29 RX ORDER — CEFUROXIME AXETIL 500 MG/1
500 TABLET ORAL 2 TIMES DAILY
Qty: 14 TABLET | Refills: 0 | Status: SHIPPED | OUTPATIENT
Start: 2017-11-29 | End: 2017-12-06

## 2017-11-29 NOTE — PROGRESS NOTES
promethazine (PHENERGAN) 25 MG tablet, TAKE 1 TABLET BY MOUTH EVERY 6 HOURS AS NEEDED FOR NAUSEA, Disp: 30 tablet, Rfl: 0    Umeclidinium Bromide (INCRUSE ELLIPTA) 62.5 MCG/INH AEPB, Inhale 1 puff into the lungs daily .  To replace Spiriva, Disp: 1 each, Rfl: 5    clonazePAM (KLONOPIN) 1 MG tablet, TAKE 1 TABLET BY MOUTH TWICE DAILY AS NEEDED FOR ANXIETY MUST LAST 30 DAYS, Disp: 30 tablet, Rfl: 0    FLUoxetine (PROZAC) 20 MG capsule, Take 1 capsule by mouth daily, Disp: 30 capsule, Rfl: 5    montelukast (SINGULAIR) 10 MG tablet, TAKE 1 TABLET BY MOUTH EVERY DAY, Disp: 30 tablet, Rfl: 5    meloxicam (MOBIC) 15 MG tablet, Take 1 tablet by mouth daily, Disp: 30 tablet, Rfl: 3    topiramate (TOPAMAX) 50 MG tablet, TAKE 1 TABLET BY MOUTH 2 TIMES DAILY, Disp: 60 tablet, Rfl: 5    furosemide (LASIX) 40 MG tablet, 1 po qd-bid prn swelling, Disp: 60 tablet, Rfl: 5    potassium chloride (KLOR-CON) 10 MEQ extended release tablet, Take 1 tablet by mouth daily, Disp: 30 tablet, Rfl: 5    benzonatate (TESSALON) 100 MG capsule, TAKE 1-2 CAPSULES BY MOUTH 3 TIMES DAILY AS NEEDED COUGH, Disp: 60 capsule, Rfl: 1    SYMBICORT 160-4.5 MCG/ACT AERO, INHALE 2 PUFFS INTO THE LUNGS 2 TIMES DAILY (RINSE MOUTH AFTER EACH USE), Disp: 1 Inhaler, Rfl: 5    hydrochlorothiazide (HYDRODIURIL) 25 MG tablet, TAKE 1 TABLET BY MOUTH DAILY AS NEEDED, Disp: 30 tablet, Rfl: 5    albuterol sulfate HFA (VENTOLIN HFA) 108 (90 Base) MCG/ACT inhaler, INHALE 2 PUFFS INTO THE LUNGS 4 TIMES DAILY AS NEEDED., Disp: 18 g, Rfl: 5    LORATADINE-D 12HR 5-120 MG per extended release tablet, TAKE 1 TABLET BY MOUTH EVERY MORNING AS NEEDED FOR ALLERGY, Disp: 30 tablet, Rfl: 5    butalbital-acetaminophen-caffeine (FIORICET, ESGIC) -40 MG per tablet, TAKE 1 TABLET BY MOUTH 3 TIMES DAILY AS NEEDED FOR MIGRAINE, Disp: 30 tablet, Rfl: 5    mirtazapine (REMERON) 45 MG tablet, TAKE 1 TABLET BY MOUTH NIGHTLY, Disp: 30 tablet, Rfl: 5   ratio 57%   TLC 3.48 (68%) DLCO 6.93 (28%)   6MWT 720 feet, low sat 86%; required 2L nc to keep >88%      6mat 5/4/17: 1050 feet, low sat 86%, improved with 2L   IMAGING:      I personally reviewed and interpreted the following today in the office :     CXR (dated 8/20/14): The lungs are currently clear. No nodule is seen in the left perihilar area. The heart size and pulmonary vessels are normal.      ASSESSMENT AND PLAN:      COPD (chronic obstructive pulmonary disease) with AE  - ceftin x 7 days; hold off on prednisone per patient preference  -  pfts to confirm dx; stage severe  - Continue inhaled bronchodilator therapy  albuterol prn; symbicort, Spiriva-> Incruse per formulary); albuterol nebs, should not use duonebs  -  up to date with Pneumococcal vaccine and Influenza vaccines. - Pulmonary rehab done  - Advised to quit smoking again  - Counseled regarding the prognosis of this disease    Chronic respiratory failure with hypoxemia  - continue 2l Supplemental oxygen with exertion;  light weight tanks and use OCD        Cough  The etiology of the patient's cough is likely smoking related chronic bronchitis. - I recommended smoking cessation again      Tobacco abuse  We discussed smoking cessation again.    - on wellbutrin per Dr. Yee Garcia  - consider Chantix but I will defer to Dr. Yee Garcia

## 2017-11-30 ENCOUNTER — TELEPHONE (OUTPATIENT)
Dept: PULMONOLOGY | Age: 47
End: 2017-11-30

## 2017-11-30 NOTE — TELEPHONE ENCOUNTER
Patient called requesting results of CXR completed 11/29/17, to comparison to CXR completed in June. Please advise. ASSESSMENT AND PLAN:  11/29/17        COPD (chronic obstructive pulmonary disease) with AE  - ceftin x 7 days; hold off on prednisone per patient preference  -  pfts to confirm dx; stage severe  - Continue inhaled bronchodilator therapy  albuterol prn; symbicort, Spiriva-> Incruse per formulary); albuterol nebs, should not use duonebs  -  up to date with Pneumococcal vaccine and Influenza vaccines. - Pulmonary rehab done  - Advised to quit smoking again  - Counseled regarding the prognosis of this disease     Chronic respiratory failure with hypoxemia  - continue 2l Supplemental oxygen with exertion;  light weight tanks and use OCD           Cough  The etiology of the patient's cough is likely smoking related chronic bronchitis. - I recommended smoking cessation again        Tobacco abuse  We discussed smoking cessation again.    - on wellbutrin per Dr. Dallas Woody  - consider Chantix but I will defer to Dr. María Elena Roper

## 2017-12-21 RX ORDER — CLONAZEPAM 1 MG/1
TABLET ORAL
Qty: 30 TABLET | Refills: 0 | Status: SHIPPED | OUTPATIENT
Start: 2017-12-21 | End: 2018-01-22 | Stop reason: SDUPTHER

## 2018-01-08 RX ORDER — MIRTAZAPINE 45 MG/1
TABLET, FILM COATED ORAL
Qty: 30 TABLET | OUTPATIENT
Start: 2018-01-08

## 2018-01-10 ENCOUNTER — OFFICE VISIT (OUTPATIENT)
Dept: FAMILY MEDICINE CLINIC | Age: 48
End: 2018-01-10

## 2018-01-10 VITALS
DIASTOLIC BLOOD PRESSURE: 80 MMHG | RESPIRATION RATE: 16 BRPM | OXYGEN SATURATION: 97 % | SYSTOLIC BLOOD PRESSURE: 136 MMHG | HEART RATE: 112 BPM | WEIGHT: 184 LBS | BODY MASS INDEX: 33.65 KG/M2

## 2018-01-10 DIAGNOSIS — G43.109 MIGRAINE WITH AURA AND WITHOUT STATUS MIGRAINOSUS, NOT INTRACTABLE: Primary | ICD-10-CM

## 2018-01-10 DIAGNOSIS — R73.01 IFG (IMPAIRED FASTING GLUCOSE): ICD-10-CM

## 2018-01-10 DIAGNOSIS — Z72.0 TOBACCO USE: ICD-10-CM

## 2018-01-10 DIAGNOSIS — M25.50 ARTHRALGIA, UNSPECIFIED JOINT: ICD-10-CM

## 2018-01-10 DIAGNOSIS — F41.9 ANXIETY: ICD-10-CM

## 2018-01-10 DIAGNOSIS — N62 MACROMASTIA: ICD-10-CM

## 2018-01-10 DIAGNOSIS — Z72.0 TOBACCO ABUSE: ICD-10-CM

## 2018-01-10 DIAGNOSIS — J41.1 MUCOPURULENT CHRONIC BRONCHITIS (HCC): ICD-10-CM

## 2018-01-10 DIAGNOSIS — F31.9 BIPOLAR DEPRESSION (HCC): ICD-10-CM

## 2018-01-10 DIAGNOSIS — J43.9 PULMONARY EMPHYSEMA, UNSPECIFIED EMPHYSEMA TYPE (HCC): ICD-10-CM

## 2018-01-10 DIAGNOSIS — R25.2 SPASMS OF THE HANDS OR FEET: ICD-10-CM

## 2018-01-10 DIAGNOSIS — Z99.81 OXYGEN DEPENDENT: ICD-10-CM

## 2018-01-10 LAB
A/G RATIO: 1.5 (ref 1.1–2.2)
ALBUMIN SERPL-MCNC: 4.6 G/DL (ref 3.4–5)
ALP BLD-CCNC: 88 U/L (ref 40–129)
ALT SERPL-CCNC: 16 U/L (ref 10–40)
ANION GAP SERPL CALCULATED.3IONS-SCNC: 14 MMOL/L (ref 3–16)
AST SERPL-CCNC: 14 U/L (ref 15–37)
BILIRUB SERPL-MCNC: <0.2 MG/DL (ref 0–1)
BUN BLDV-MCNC: 10 MG/DL (ref 7–20)
C-REACTIVE PROTEIN: 7 MG/L (ref 0–5.1)
CALCIUM SERPL-MCNC: 9.6 MG/DL (ref 8.3–10.6)
CHLORIDE BLD-SCNC: 101 MMOL/L (ref 99–110)
CO2: 26 MMOL/L (ref 21–32)
CREAT SERPL-MCNC: 0.8 MG/DL (ref 0.6–1.1)
GFR AFRICAN AMERICAN: >60
GFR NON-AFRICAN AMERICAN: >60
GLOBULIN: 3.1 G/DL
GLUCOSE BLD-MCNC: 79 MG/DL (ref 70–99)
HCT VFR BLD CALC: 43.9 % (ref 36–48)
HEMOGLOBIN: 14.9 G/DL (ref 12–16)
MAGNESIUM: 2.3 MG/DL (ref 1.8–2.4)
MCH RBC QN AUTO: 34.3 PG (ref 26–34)
MCHC RBC AUTO-ENTMCNC: 34 G/DL (ref 31–36)
MCV RBC AUTO: 100.8 FL (ref 80–100)
PDW BLD-RTO: 13.6 % (ref 12.4–15.4)
PLATELET # BLD: 277 K/UL (ref 135–450)
PMV BLD AUTO: 9.3 FL (ref 5–10.5)
POTASSIUM SERPL-SCNC: 4.2 MMOL/L (ref 3.5–5.1)
RBC # BLD: 4.36 M/UL (ref 4–5.2)
RHEUMATOID FACTOR: <10 IU/ML
SEDIMENTATION RATE, ERYTHROCYTE: 22 MM/HR (ref 0–20)
SODIUM BLD-SCNC: 141 MMOL/L (ref 136–145)
TOTAL PROTEIN: 7.7 G/DL (ref 6.4–8.2)
TSH REFLEX FT4: 2.51 UIU/ML (ref 0.27–4.2)
WBC # BLD: 10.5 K/UL (ref 4–11)

## 2018-01-10 PROCEDURE — G8417 CALC BMI ABV UP PARAM F/U: HCPCS | Performed by: FAMILY MEDICINE

## 2018-01-10 PROCEDURE — G8484 FLU IMMUNIZE NO ADMIN: HCPCS | Performed by: FAMILY MEDICINE

## 2018-01-10 PROCEDURE — 1036F TOBACCO NON-USER: CPT | Performed by: FAMILY MEDICINE

## 2018-01-10 PROCEDURE — 99215 OFFICE O/P EST HI 40 MIN: CPT | Performed by: FAMILY MEDICINE

## 2018-01-10 PROCEDURE — G8427 DOCREV CUR MEDS BY ELIG CLIN: HCPCS | Performed by: FAMILY MEDICINE

## 2018-01-10 PROCEDURE — G8926 SPIRO NO PERF OR DOC: HCPCS | Performed by: FAMILY MEDICINE

## 2018-01-10 PROCEDURE — 3023F SPIROM DOC REV: CPT | Performed by: FAMILY MEDICINE

## 2018-01-10 PROCEDURE — 36415 COLL VENOUS BLD VENIPUNCTURE: CPT | Performed by: FAMILY MEDICINE

## 2018-01-10 RX ORDER — VARENICLINE TARTRATE 25 MG
KIT ORAL
Qty: 1 EACH | Refills: 0 | Status: SHIPPED | OUTPATIENT
Start: 2018-01-10 | End: 2018-02-06

## 2018-01-10 RX ORDER — TOPIRAMATE 100 MG/1
100 TABLET, FILM COATED ORAL 2 TIMES DAILY
Qty: 60 TABLET | Refills: 5 | Status: SHIPPED | OUTPATIENT
Start: 2018-01-10 | End: 2018-09-29 | Stop reason: SDUPTHER

## 2018-01-10 RX ORDER — CEFUROXIME AXETIL 500 MG/1
500 TABLET ORAL 2 TIMES DAILY
Qty: 14 TABLET | Refills: 0 | Status: SHIPPED | OUTPATIENT
Start: 2018-01-10 | End: 2018-01-17

## 2018-01-10 NOTE — PROGRESS NOTES
Subjective:      Patient ID: Magui Baumann is a 41 y.o. female. HPI  Chief Complaint   Patient presents with    Follow-up   Migraines worse x 2 mo, lasting up to 5 days, occurring almost daily. Is on topamax 50 1 bid. Maxalt and/or fioricet help but migraine returns w/in 24 hr or less. 16year old son has returned home from his father's as father has reportedly left the state. Pt is engaged to on/off boyfriend of 12 yrs. Younger son is graduating high school, is entering drug rehab. Other son was granted level 1 waiver for developmental delay, all good news. Feels stress is low at this time, so doesn't understand why HAs are worse. Saw plastic surgeon for breast reduction. Surgery cannot be scheduled until pt is tobacco free. Pt saw Dr Louis Storm who is agreeable to chantix as long as I am.  Pt has to pass 2 blood tests neg for nicotene w/in 2 wks of ea other prior to surgery. Pt feels committed to cessation. Pt weaned herself off lamictal and wellbutrin in past 1-2 mo's, felt she didn't need and did not like how she felt on them. Pt cont's on prozac, feels she is emotionally stable. Was put on prozac and xanax 17 yrs ago after son was born. Is using mirtazipine and klonopin at hs and also uses otc melatonin. Pt and son deny any sx's of anamika. Feels better since off lamictal and wellbutrin. Kierra Ordoñez Has episodes of hands and feet cramping. Hands turn into claw like configuration, painful. Sx's present on/off x few mo's, painful, can last minutes to all day, not asso'd with rest or activity. No new activities with hands. Last klonopin taken yesterday and just prior to appt today. Pt is concerned that twice she did uds that did not show klonopin. She does acknowledge that she had xanax show up x 1. Pt understands that another abnl uds may terminate her care at [facility]. Was seen at [facility] by Dr Joanna Moran 3/2017 for L shoulder pain, was referred to PT but could not commit to tid as she cares for son. Objective:   Physical Exam  /80 (Site: Left Arm, Position: Sitting, Cuff Size: Medium Adult)   Pulse 112   Resp 16   Wt 184 lb (83.5 kg)   SpO2 97%   BMI 33.65 kg/m²   HEENT nl, Neck supple, no lad or tmg, no bruit  CV RRR, Lungs few scattered wheeze  macromastis  Abd soft, NT, no masses or hsm, BS nl, no ascites  Ext with strong pedal pulses, no edema though hands puffy, rings snug  No synovitis   CN intact, periph n/m exam nl  Mood nl thought slightly pressured speech and thought process  Assessment:            Plan:      Vanessa was seen today for follow-up. Diagnoses and all orders for this visit:    Migraine with aura and without status migrainosus, not intractable  -     topiramate (TOPAMAX) 100 MG tablet; Take 1 tablet by mouth 2 times daily, increase from 50 mg bid    IFG (impaired fasting glucose)  -     Comprehensive Metabolic Panel  -     Hemoglobin A1C    Tobacco use  -     varenicline (CHANTIX STARTING MONTH PAK) 0.5 MG X 11 & 1 MG X 42 tablet; Take by mouth. I agree to rx as tob cessation is imperative for this O2 dependent pt with copd. Pt has bipolar and is not willing to resume a mood stablizer, though she and family will monitor her mood with chantix, is aware of risks. Pt appears motivated to quit tob. Mucopurulent chronic bronchitis (HCC)  -     varenicline (CHANTIX STARTING MONTH PAK) 0.5 MG X 11 & 1 MG X 42 tablet; Take by mouth, to start after abx  -     cefUROXime (CEFTIN) 500 MG tablet; Take 1 tablet by mouth 2 times daily for 7 days   Flu shot when sx's resolve.     Arthralgia, unspecified joint  -     CBC  -     TSH WITH REFLEX TO FT4  -     C-Reactive Protein  -     DOUGLAS Titer  -     Rheumatoid Factor  -     Sedimentation rate, automated  -     DOUGLAS    Spasms of the hands or feet, muscular  -     Comprehensive Metabolic Panel  -     TSH WITH REFLEX TO FT4  -     Magnesium   Increase water consumption    Mastodynia   Eventual reduction once she is smoke free.    Anxiety   It is unclear why klonopin has not shown up twice, though I believe that is taking it. Await uds. Bipolar depression ([address]   No longer on lamictal and wellbutrin, though she is on prozac. Pt and family to monitor for anamika and monitor mood while on chantix. Controlled Substances Monitoring:     Attestation: The Prescription Monitoring Report for this patient was reviewed today. Jessica Roche MD)  Documentation: Possible medication side effects, risk of tolerance and/or dependence, and alternative treatments discussed., No signs of potential drug abuse or diversion identified. , Random urine drug screen sent today. Jessica Roche MD)  Medication Contracts: Existing medication contract.  Jessica Roche MD)    50 minute appt

## 2018-01-11 ENCOUNTER — TELEPHONE (OUTPATIENT)
Dept: FAMILY MEDICINE CLINIC | Age: 48
End: 2018-01-11

## 2018-01-11 DIAGNOSIS — D75.89 MACROCYTOSIS: Primary | ICD-10-CM

## 2018-01-11 PROBLEM — N64.4 MASTODYNIA: Status: RESOLVED | Noted: 2018-01-11 | Resolved: 2018-01-11

## 2018-01-11 PROBLEM — Z99.81 OXYGEN DEPENDENT: Status: ACTIVE | Noted: 2018-01-11

## 2018-01-11 PROBLEM — F31.9 BIPOLAR DEPRESSION (HCC): Status: ACTIVE | Noted: 2018-01-11

## 2018-01-11 PROBLEM — N62 MACROMASTIA: Status: ACTIVE | Noted: 2018-01-11

## 2018-01-11 PROBLEM — N64.4 MASTODYNIA: Status: ACTIVE | Noted: 2018-01-11

## 2018-01-11 LAB
ANA INTERPRETATION: NORMAL
ANTI-NUCLEAR ANTIBODY (ANA): NEGATIVE
ESTIMATED AVERAGE GLUCOSE: 93.9 MG/DL
FOLATE: 3.7 NG/ML (ref 4.78–24.2)
HBA1C MFR BLD: 4.9 %
VITAMIN B-12: 326 PG/ML (ref 211–911)

## 2018-01-11 ASSESSMENT — ENCOUNTER SYMPTOMS
WHEEZING: 1
SINUS PRESSURE: 0
SINUS PAIN: 0
CHEST TIGHTNESS: 1
SORE THROAT: 1
ABDOMINAL PAIN: 0
RHINORRHEA: 0
SHORTNESS OF BREATH: 1
FACIAL SWELLING: 0
COUGH: 1
ALLERGIC/IMMUNOLOGIC NEGATIVE: 1

## 2018-01-15 RX ORDER — DICLOFENAC SODIUM 75 MG/1
75 TABLET, DELAYED RELEASE ORAL 2 TIMES DAILY
Qty: 60 TABLET | Refills: 5 | Status: SHIPPED | OUTPATIENT
Start: 2018-01-15 | End: 2018-06-28

## 2018-01-22 DIAGNOSIS — G43.111 INTRACTABLE MIGRAINE WITH AURA WITH STATUS MIGRAINOSUS: ICD-10-CM

## 2018-01-22 DIAGNOSIS — R11.0 NAUSEA: ICD-10-CM

## 2018-01-22 RX ORDER — CLONAZEPAM 1 MG/1
TABLET ORAL
Qty: 30 TABLET | Refills: 0 | Status: SHIPPED | OUTPATIENT
Start: 2018-01-22 | End: 2018-02-21 | Stop reason: SDUPTHER

## 2018-01-23 RX ORDER — PROMETHAZINE HYDROCHLORIDE 25 MG/1
TABLET ORAL
Qty: 30 TABLET | Refills: 0 | Status: SHIPPED | OUTPATIENT
Start: 2018-01-23 | End: 2018-05-17 | Stop reason: SDUPTHER

## 2018-01-23 RX ORDER — BUTALBITAL, ACETAMINOPHEN AND CAFFEINE 50; 325; 40 MG/1; MG/1; MG/1
TABLET ORAL
Qty: 30 TABLET | Refills: 0 | Status: SHIPPED | OUTPATIENT
Start: 2018-01-23 | End: 2018-03-16 | Stop reason: SDUPTHER

## 2018-02-06 ENCOUNTER — TELEPHONE (OUTPATIENT)
Dept: FAMILY MEDICINE CLINIC | Age: 48
End: 2018-02-06

## 2018-02-06 DIAGNOSIS — J44.1 COPD EXACERBATION (HCC): ICD-10-CM

## 2018-02-06 RX ORDER — VARENICLINE TARTRATE 1 MG/1
TABLET, FILM COATED ORAL
Qty: 56 TABLET | Refills: 1 | Status: SHIPPED | OUTPATIENT
Start: 2018-02-06 | End: 2018-02-15

## 2018-02-06 RX ORDER — PREDNISONE 20 MG/1
TABLET ORAL
Qty: 20 TABLET | Refills: 0 | Status: SHIPPED | OUTPATIENT
Start: 2018-02-06 | End: 2018-04-18 | Stop reason: ALTCHOICE

## 2018-02-06 RX ORDER — AMOXICILLIN AND CLAVULANATE POTASSIUM 875; 125 MG/1; MG/1
1 TABLET, FILM COATED ORAL EVERY 12 HOURS
Qty: 14 TABLET | Refills: 0 | Status: SHIPPED | OUTPATIENT
Start: 2018-02-06 | End: 2018-02-13

## 2018-02-07 ENCOUNTER — TELEPHONE (OUTPATIENT)
Dept: FAMILY MEDICINE CLINIC | Age: 48
End: 2018-02-07

## 2018-02-15 ENCOUNTER — OFFICE VISIT (OUTPATIENT)
Dept: FAMILY MEDICINE CLINIC | Age: 48
End: 2018-02-15

## 2018-02-15 VITALS
WEIGHT: 183 LBS | HEART RATE: 74 BPM | DIASTOLIC BLOOD PRESSURE: 70 MMHG | BODY MASS INDEX: 33.47 KG/M2 | OXYGEN SATURATION: 97 % | SYSTOLIC BLOOD PRESSURE: 114 MMHG

## 2018-02-15 DIAGNOSIS — R05.9 COUGH: ICD-10-CM

## 2018-02-15 DIAGNOSIS — J44.1 COPD EXACERBATION (HCC): Primary | ICD-10-CM

## 2018-02-15 DIAGNOSIS — G43.109 MIGRAINE WITH AURA AND WITHOUT STATUS MIGRAINOSUS, NOT INTRACTABLE: ICD-10-CM

## 2018-02-15 DIAGNOSIS — Z72.0 TOBACCO USE: ICD-10-CM

## 2018-02-15 DIAGNOSIS — Z99.81 OXYGEN DEPENDENT: ICD-10-CM

## 2018-02-15 DIAGNOSIS — J96.11 HYPOXEMIC RESPIRATORY FAILURE, CHRONIC (HCC): ICD-10-CM

## 2018-02-15 DIAGNOSIS — N62 MACROMASTIA: ICD-10-CM

## 2018-02-15 PROCEDURE — 3023F SPIROM DOC REV: CPT | Performed by: FAMILY MEDICINE

## 2018-02-15 PROCEDURE — G8417 CALC BMI ABV UP PARAM F/U: HCPCS | Performed by: FAMILY MEDICINE

## 2018-02-15 PROCEDURE — 1036F TOBACCO NON-USER: CPT | Performed by: FAMILY MEDICINE

## 2018-02-15 PROCEDURE — G8427 DOCREV CUR MEDS BY ELIG CLIN: HCPCS | Performed by: FAMILY MEDICINE

## 2018-02-15 PROCEDURE — 99214 OFFICE O/P EST MOD 30 MIN: CPT | Performed by: FAMILY MEDICINE

## 2018-02-15 PROCEDURE — G8926 SPIRO NO PERF OR DOC: HCPCS | Performed by: FAMILY MEDICINE

## 2018-02-15 PROCEDURE — G8484 FLU IMMUNIZE NO ADMIN: HCPCS | Performed by: FAMILY MEDICINE

## 2018-02-15 RX ORDER — METHYLPHENIDATE HYDROCHLORIDE 30 MG/1
20 CAPSULE, EXTENDED RELEASE ORAL 3 TIMES DAILY
COMMUNITY
End: 2020-11-04

## 2018-02-15 RX ORDER — GUAIFENESIN 600 MG/1
600 TABLET, EXTENDED RELEASE ORAL 2 TIMES DAILY PRN
Qty: 60 TABLET | Refills: 1 | Status: SHIPPED | OUTPATIENT
Start: 2018-02-15 | End: 2018-06-28

## 2018-02-15 RX ORDER — PROMETHAZINE HYDROCHLORIDE AND CODEINE PHOSPHATE 6.25; 1 MG/5ML; MG/5ML
SYRUP ORAL
Qty: 120 ML | Refills: 0 | Status: SHIPPED | OUTPATIENT
Start: 2018-02-15 | End: 2018-03-15

## 2018-02-15 RX ORDER — VARENICLINE TARTRATE 1 MG/1
1 TABLET, FILM COATED ORAL 2 TIMES DAILY
Qty: 60 TABLET | Refills: 2 | Status: SHIPPED | OUTPATIENT
Start: 2018-02-15 | End: 2018-05-22 | Stop reason: SDUPTHER

## 2018-02-21 RX ORDER — CLONAZEPAM 1 MG/1
TABLET ORAL
Qty: 30 TABLET | Refills: 0 | Status: SHIPPED | OUTPATIENT
Start: 2018-02-21 | End: 2018-03-21 | Stop reason: SDUPTHER

## 2018-03-16 DIAGNOSIS — G43.111 INTRACTABLE MIGRAINE WITH AURA WITH STATUS MIGRAINOSUS: ICD-10-CM

## 2018-03-16 RX ORDER — BUTALBITAL, ACETAMINOPHEN AND CAFFEINE 50; 325; 40 MG/1; MG/1; MG/1
TABLET ORAL
Qty: 30 TABLET | Refills: 0 | Status: SHIPPED | OUTPATIENT
Start: 2018-03-16 | End: 2018-04-17 | Stop reason: SDUPTHER

## 2018-03-21 RX ORDER — CLONAZEPAM 1 MG/1
TABLET ORAL
Qty: 30 TABLET | Refills: 0 | Status: SHIPPED | OUTPATIENT
Start: 2018-03-23 | End: 2018-04-17 | Stop reason: SDUPTHER

## 2018-04-17 DIAGNOSIS — G43.111 INTRACTABLE MIGRAINE WITH AURA WITH STATUS MIGRAINOSUS: ICD-10-CM

## 2018-04-18 ENCOUNTER — OFFICE VISIT (OUTPATIENT)
Dept: PULMONOLOGY | Age: 48
End: 2018-04-18

## 2018-04-18 VITALS
RESPIRATION RATE: 16 BRPM | OXYGEN SATURATION: 96 % | HEART RATE: 78 BPM | WEIGHT: 192.4 LBS | BODY MASS INDEX: 34.09 KG/M2 | SYSTOLIC BLOOD PRESSURE: 114 MMHG | HEIGHT: 63 IN | DIASTOLIC BLOOD PRESSURE: 76 MMHG | TEMPERATURE: 97.9 F

## 2018-04-18 DIAGNOSIS — J96.11 CHRONIC RESPIRATORY FAILURE WITH HYPOXIA (HCC): ICD-10-CM

## 2018-04-18 DIAGNOSIS — J44.1 COPD EXACERBATION (HCC): ICD-10-CM

## 2018-04-18 DIAGNOSIS — J44.9 CHRONIC OBSTRUCTIVE PULMONARY DISEASE, UNSPECIFIED COPD TYPE (HCC): Primary | ICD-10-CM

## 2018-04-18 DIAGNOSIS — Z72.0 TOBACCO ABUSE: ICD-10-CM

## 2018-04-18 DIAGNOSIS — R05.9 COUGH: ICD-10-CM

## 2018-04-18 PROCEDURE — 1036F TOBACCO NON-USER: CPT | Performed by: INTERNAL MEDICINE

## 2018-04-18 PROCEDURE — 3023F SPIROM DOC REV: CPT | Performed by: INTERNAL MEDICINE

## 2018-04-18 PROCEDURE — G8417 CALC BMI ABV UP PARAM F/U: HCPCS | Performed by: INTERNAL MEDICINE

## 2018-04-18 PROCEDURE — G8427 DOCREV CUR MEDS BY ELIG CLIN: HCPCS | Performed by: INTERNAL MEDICINE

## 2018-04-18 PROCEDURE — 99214 OFFICE O/P EST MOD 30 MIN: CPT | Performed by: INTERNAL MEDICINE

## 2018-04-18 PROCEDURE — G8926 SPIRO NO PERF OR DOC: HCPCS | Performed by: INTERNAL MEDICINE

## 2018-04-18 RX ORDER — BUTALBITAL, ACETAMINOPHEN AND CAFFEINE 50; 325; 40 MG/1; MG/1; MG/1
TABLET ORAL
Qty: 30 TABLET | Refills: 0 | Status: SHIPPED | OUTPATIENT
Start: 2018-04-18 | End: 2018-05-22 | Stop reason: SDUPTHER

## 2018-04-18 RX ORDER — ALBUTEROL SULFATE 90 UG/1
AEROSOL, METERED RESPIRATORY (INHALATION)
Qty: 1 INHALER | Refills: 5 | Status: SHIPPED | OUTPATIENT
Start: 2018-04-18 | End: 2019-05-07 | Stop reason: SDUPTHER

## 2018-04-18 RX ORDER — BENZONATATE 100 MG/1
CAPSULE ORAL
Qty: 60 CAPSULE | Refills: 3 | Status: SHIPPED | OUTPATIENT
Start: 2018-04-18 | End: 2020-01-27 | Stop reason: SDUPTHER

## 2018-04-18 RX ORDER — BUDESONIDE AND FORMOTEROL FUMARATE DIHYDRATE 160; 4.5 UG/1; UG/1
AEROSOL RESPIRATORY (INHALATION)
Qty: 1 INHALER | Refills: 5 | Status: SHIPPED | OUTPATIENT
Start: 2018-04-18 | End: 2018-06-28

## 2018-04-18 RX ORDER — CLONAZEPAM 1 MG/1
TABLET ORAL
Qty: 30 TABLET | Refills: 0 | Status: SHIPPED | OUTPATIENT
Start: 2018-04-22 | End: 2018-05-22 | Stop reason: SDUPTHER

## 2018-05-14 ENCOUNTER — TELEPHONE (OUTPATIENT)
Dept: FAMILY MEDICINE CLINIC | Age: 48
End: 2018-05-14

## 2018-05-17 DIAGNOSIS — R11.0 NAUSEA: ICD-10-CM

## 2018-05-18 RX ORDER — PROMETHAZINE HYDROCHLORIDE 25 MG/1
TABLET ORAL
Qty: 30 TABLET | Refills: 0 | Status: SHIPPED | OUTPATIENT
Start: 2018-05-18 | End: 2018-06-22 | Stop reason: SDUPTHER

## 2018-05-18 RX ORDER — POTASSIUM CHLORIDE 750 MG/1
10 TABLET, FILM COATED, EXTENDED RELEASE ORAL DAILY
Qty: 30 TABLET | Refills: 5 | Status: SHIPPED | OUTPATIENT
Start: 2018-05-18 | End: 2018-08-27 | Stop reason: SDUPTHER

## 2018-05-22 DIAGNOSIS — G43.111 INTRACTABLE MIGRAINE WITH AURA WITH STATUS MIGRAINOSUS: ICD-10-CM

## 2018-05-22 DIAGNOSIS — Z72.0 TOBACCO USE: ICD-10-CM

## 2018-05-22 RX ORDER — BUTALBITAL, ACETAMINOPHEN AND CAFFEINE 50; 325; 40 MG/1; MG/1; MG/1
TABLET ORAL
Qty: 30 TABLET | Refills: 0 | Status: SHIPPED | OUTPATIENT
Start: 2018-05-22 | End: 2018-06-01 | Stop reason: SDUPTHER

## 2018-05-22 RX ORDER — CLONAZEPAM 1 MG/1
TABLET ORAL
Qty: 30 TABLET | Refills: 0 | OUTPATIENT
Start: 2018-05-22 | End: 2018-06-22 | Stop reason: SDUPTHER

## 2018-05-22 RX ORDER — VARENICLINE TARTRATE 1 MG/1
1 TABLET, FILM COATED ORAL 2 TIMES DAILY
Qty: 60 TABLET | Refills: 0 | Status: SHIPPED | OUTPATIENT
Start: 2018-05-22 | End: 2018-06-22 | Stop reason: SDUPTHER

## 2018-06-01 DIAGNOSIS — G43.111 INTRACTABLE MIGRAINE WITH AURA WITH STATUS MIGRAINOSUS: ICD-10-CM

## 2018-06-04 RX ORDER — BUTALBITAL, ACETAMINOPHEN AND CAFFEINE 50; 325; 40 MG/1; MG/1; MG/1
TABLET ORAL
Qty: 30 TABLET | Refills: 0 | Status: SHIPPED | OUTPATIENT
Start: 2018-06-04 | End: 2019-08-05 | Stop reason: SDUPTHER

## 2018-06-26 DIAGNOSIS — F41.9 ANXIETY: ICD-10-CM

## 2018-06-26 RX ORDER — CLONAZEPAM 1 MG/1
TABLET ORAL
Qty: 30 TABLET | OUTPATIENT
Start: 2018-06-26 | End: 2018-07-26

## 2018-06-28 ENCOUNTER — OFFICE VISIT (OUTPATIENT)
Dept: FAMILY MEDICINE CLINIC | Age: 48
End: 2018-06-28

## 2018-06-28 VITALS
BODY MASS INDEX: 35.07 KG/M2 | DIASTOLIC BLOOD PRESSURE: 80 MMHG | HEART RATE: 105 BPM | SYSTOLIC BLOOD PRESSURE: 132 MMHG | OXYGEN SATURATION: 98 % | WEIGHT: 198 LBS

## 2018-06-28 DIAGNOSIS — M15.9 PRIMARY OSTEOARTHRITIS INVOLVING MULTIPLE JOINTS: ICD-10-CM

## 2018-06-28 DIAGNOSIS — R60.1 GENERALIZED EDEMA: Primary | ICD-10-CM

## 2018-06-28 DIAGNOSIS — F51.01 PRIMARY INSOMNIA: ICD-10-CM

## 2018-06-28 DIAGNOSIS — N62 MACROMASTIA: ICD-10-CM

## 2018-06-28 DIAGNOSIS — Z87.891 HISTORY OF TOBACCO USE: ICD-10-CM

## 2018-06-28 PROCEDURE — G8427 DOCREV CUR MEDS BY ELIG CLIN: HCPCS | Performed by: FAMILY MEDICINE

## 2018-06-28 PROCEDURE — 1036F TOBACCO NON-USER: CPT | Performed by: FAMILY MEDICINE

## 2018-06-28 PROCEDURE — G8417 CALC BMI ABV UP PARAM F/U: HCPCS | Performed by: FAMILY MEDICINE

## 2018-06-28 PROCEDURE — 99214 OFFICE O/P EST MOD 30 MIN: CPT | Performed by: FAMILY MEDICINE

## 2018-06-28 RX ORDER — AMITRIPTYLINE HYDROCHLORIDE 10 MG/1
TABLET, FILM COATED ORAL
Qty: 60 TABLET | Refills: 5 | Status: SHIPPED | OUTPATIENT
Start: 2018-06-28 | End: 2019-01-11 | Stop reason: SDUPTHER

## 2018-06-28 RX ORDER — NABUMETONE 500 MG/1
500 TABLET, FILM COATED ORAL 2 TIMES DAILY PRN
Qty: 60 TABLET | Refills: 3 | Status: SHIPPED | OUTPATIENT
Start: 2018-06-28 | End: 2019-06-24

## 2018-06-28 ASSESSMENT — ENCOUNTER SYMPTOMS
ABDOMINAL DISTENTION: 1
CHEST TIGHTNESS: 0
SHORTNESS OF BREATH: 1
ABDOMINAL PAIN: 0
BACK PAIN: 1
COUGH: 0
WHEEZING: 1

## 2018-07-10 ENCOUNTER — NURSE ONLY (OUTPATIENT)
Dept: FAMILY MEDICINE CLINIC | Age: 48
End: 2018-07-10

## 2018-07-10 DIAGNOSIS — Z87.891 HISTORY OF TOBACCO USE: ICD-10-CM

## 2018-07-10 DIAGNOSIS — N62 MACROMASTIA: ICD-10-CM

## 2018-07-10 DIAGNOSIS — N62 MACROMASTIA: Primary | ICD-10-CM

## 2018-07-14 LAB
3-OH-COTININE: <2 NG/ML
COTININE: <2 NG/ML
NICOTINE: <2 NG/ML

## 2018-07-15 ENCOUNTER — TELEPHONE (OUTPATIENT)
Dept: FAMILY MEDICINE CLINIC | Age: 48
End: 2018-07-15

## 2018-07-16 NOTE — TELEPHONE ENCOUNTER
Good news. No nicotine was detected on the lab test. The next time she is in, we'll do another nicotine test in preparation for her breast reduction surgery.

## 2018-07-23 DIAGNOSIS — F41.9 ANXIETY: ICD-10-CM

## 2018-07-23 DIAGNOSIS — F32.A ANXIETY AND DEPRESSION: ICD-10-CM

## 2018-07-23 DIAGNOSIS — F41.9 ANXIETY AND DEPRESSION: ICD-10-CM

## 2018-07-24 RX ORDER — CLONAZEPAM 1 MG/1
TABLET ORAL
Qty: 30 TABLET | Refills: 0 | Status: SHIPPED | OUTPATIENT
Start: 2018-07-24 | End: 2018-08-22 | Stop reason: SDUPTHER

## 2018-07-24 RX ORDER — MIRTAZAPINE 45 MG/1
TABLET, FILM COATED ORAL
Qty: 30 TABLET | Refills: 5 | Status: SHIPPED | OUTPATIENT
Start: 2018-07-24 | End: 2019-06-24

## 2018-07-24 RX ORDER — FLUOXETINE HYDROCHLORIDE 20 MG/1
20 CAPSULE ORAL DAILY
Qty: 30 CAPSULE | Refills: 5 | Status: SHIPPED | OUTPATIENT
Start: 2018-07-24 | End: 2018-08-27 | Stop reason: SDUPTHER

## 2018-08-10 DIAGNOSIS — R11.0 NAUSEA: ICD-10-CM

## 2018-08-12 RX ORDER — PROMETHAZINE HYDROCHLORIDE 25 MG/1
TABLET ORAL
Qty: 30 TABLET | Refills: 0 | Status: SHIPPED | OUTPATIENT
Start: 2018-08-12 | End: 2018-10-22 | Stop reason: SDUPTHER

## 2018-08-12 RX ORDER — FUROSEMIDE 40 MG/1
TABLET ORAL
Qty: 60 TABLET | Refills: 0 | Status: SHIPPED | OUTPATIENT
Start: 2018-08-12 | End: 2018-08-13

## 2018-08-13 RX ORDER — FUROSEMIDE 40 MG/1
TABLET ORAL
Qty: 60 TABLET | Refills: 5 | Status: SHIPPED | OUTPATIENT
Start: 2018-08-13 | End: 2019-05-22 | Stop reason: SDUPTHER

## 2018-08-20 ENCOUNTER — APPOINTMENT (OUTPATIENT)
Dept: CT IMAGING | Age: 48
End: 2018-08-20
Payer: MEDICARE

## 2018-08-20 ENCOUNTER — APPOINTMENT (OUTPATIENT)
Dept: GENERAL RADIOLOGY | Age: 48
End: 2018-08-20
Payer: MEDICARE

## 2018-08-20 ENCOUNTER — TELEPHONE (OUTPATIENT)
Dept: FAMILY MEDICINE CLINIC | Age: 48
End: 2018-08-20

## 2018-08-20 ENCOUNTER — HOSPITAL ENCOUNTER (EMERGENCY)
Age: 48
Discharge: HOME | End: 2018-08-20
Attending: EMERGENCY MEDICINE
Payer: MEDICARE

## 2018-08-20 VITALS
TEMPERATURE: 98.5 F | WEIGHT: 203.8 LBS | HEART RATE: 80 BPM | DIASTOLIC BLOOD PRESSURE: 69 MMHG | OXYGEN SATURATION: 94 % | BODY MASS INDEX: 37.5 KG/M2 | HEIGHT: 62 IN | RESPIRATION RATE: 16 BRPM | SYSTOLIC BLOOD PRESSURE: 105 MMHG

## 2018-08-20 DIAGNOSIS — A59.9 TRICHOMONAS INFECTION: ICD-10-CM

## 2018-08-20 DIAGNOSIS — E87.6 HYPOKALEMIA: ICD-10-CM

## 2018-08-20 DIAGNOSIS — J44.1 COPD EXACERBATION (HCC): Primary | ICD-10-CM

## 2018-08-20 DIAGNOSIS — R60.0 EDEMA EXTREMITIES: ICD-10-CM

## 2018-08-20 LAB
A/G RATIO: 1.3 (ref 1.1–2.2)
ALBUMIN SERPL-MCNC: 3.9 G/DL (ref 3.4–5)
ALP BLD-CCNC: 79 U/L (ref 40–129)
ALT SERPL-CCNC: 15 U/L (ref 10–40)
ANION GAP SERPL CALCULATED.3IONS-SCNC: 12 MMOL/L (ref 3–16)
APTT: 31.6 SEC (ref 26–36)
AST SERPL-CCNC: 21 U/L (ref 15–37)
BACTERIA: ABNORMAL /HPF
BASOPHILS ABSOLUTE: 0 K/UL (ref 0–0.2)
BASOPHILS RELATIVE PERCENT: 0.6 %
BILIRUB SERPL-MCNC: <0.2 MG/DL (ref 0–1)
BILIRUBIN URINE: NEGATIVE
BLOOD, URINE: NEGATIVE
BUN BLDV-MCNC: 14 MG/DL (ref 7–20)
CALCIUM SERPL-MCNC: 8.8 MG/DL (ref 8.3–10.6)
CASTS: ABNORMAL /LPF
CHLORIDE BLD-SCNC: 98 MMOL/L (ref 99–110)
CLARITY: CLEAR
CO2: 32 MMOL/L (ref 21–32)
COLOR: YELLOW
CREAT SERPL-MCNC: 1 MG/DL (ref 0.6–1.1)
D DIMER: 232 NG/ML DDU (ref 0–229)
EOSINOPHILS ABSOLUTE: 0.1 K/UL (ref 0–0.6)
EOSINOPHILS RELATIVE PERCENT: 1.8 %
EPITHELIAL CELLS, UA: ABNORMAL /HPF
GFR AFRICAN AMERICAN: >60
GFR NON-AFRICAN AMERICAN: 60
GLOBULIN: 2.9 G/DL
GLUCOSE BLD-MCNC: 89 MG/DL (ref 70–99)
GLUCOSE URINE: NEGATIVE MG/DL
HCT VFR BLD CALC: 38.7 % (ref 36–48)
HEMOGLOBIN: 13.6 G/DL (ref 12–16)
INR BLD: 0.96 (ref 0.86–1.14)
KETONES, URINE: NEGATIVE MG/DL
LACTIC ACID: 1.1 MMOL/L (ref 0.4–2)
LEUKOCYTE ESTERASE, URINE: ABNORMAL
LYMPHOCYTES ABSOLUTE: 3.2 K/UL (ref 1–5.1)
LYMPHOCYTES RELATIVE PERCENT: 39.8 %
MAGNESIUM: 1.8 MG/DL (ref 1.8–2.4)
MCH RBC QN AUTO: 33.5 PG (ref 26–34)
MCHC RBC AUTO-ENTMCNC: 35.3 G/DL (ref 31–36)
MCV RBC AUTO: 94.9 FL (ref 80–100)
MICROSCOPIC EXAMINATION: YES
MONOCYTES ABSOLUTE: 0.7 K/UL (ref 0–1.3)
MONOCYTES RELATIVE PERCENT: 8.3 %
NEUTROPHILS ABSOLUTE: 4 K/UL (ref 1.7–7.7)
NEUTROPHILS RELATIVE PERCENT: 49.5 %
NITRITE, URINE: NEGATIVE
PDW BLD-RTO: 12.8 % (ref 12.4–15.4)
PH UA: 7.5
PHOSPHORUS: 2.2 MG/DL (ref 2.5–4.9)
PLATELET # BLD: 239 K/UL (ref 135–450)
PMV BLD AUTO: 8.1 FL (ref 5–10.5)
POTASSIUM SERPL-SCNC: 3 MMOL/L (ref 3.5–5.1)
PRO-BNP: 894 PG/ML (ref 0–124)
PROTEIN UA: NEGATIVE MG/DL
PROTHROMBIN TIME: 11 SEC (ref 9.8–13)
RBC # BLD: 4.08 M/UL (ref 4–5.2)
RBC UA: ABNORMAL /HPF (ref 0–2)
SEDIMENTATION RATE, ERYTHROCYTE: 24 MM/HR (ref 0–20)
SODIUM BLD-SCNC: 142 MMOL/L (ref 136–145)
SPECIFIC GRAVITY UA: 1.01
TOTAL PROTEIN: 6.8 G/DL (ref 6.4–8.2)
TRICHOMONAS: PRESENT /HPF
TROPONIN: <0.01 NG/ML
URINE REFLEX TO CULTURE: YES
URINE TYPE: ABNORMAL
UROBILINOGEN, URINE: 0.2 E.U./DL
WBC # BLD: 8 K/UL (ref 4–11)
WBC UA: ABNORMAL /HPF (ref 0–5)

## 2018-08-20 PROCEDURE — 83735 ASSAY OF MAGNESIUM: CPT

## 2018-08-20 PROCEDURE — 83880 ASSAY OF NATRIURETIC PEPTIDE: CPT

## 2018-08-20 PROCEDURE — 85610 PROTHROMBIN TIME: CPT

## 2018-08-20 PROCEDURE — 84100 ASSAY OF PHOSPHORUS: CPT

## 2018-08-20 PROCEDURE — 84484 ASSAY OF TROPONIN QUANT: CPT

## 2018-08-20 PROCEDURE — 6370000000 HC RX 637 (ALT 250 FOR IP): Performed by: PHYSICIAN ASSISTANT

## 2018-08-20 PROCEDURE — 96375 TX/PRO/DX INJ NEW DRUG ADDON: CPT

## 2018-08-20 PROCEDURE — 93005 ELECTROCARDIOGRAM TRACING: CPT | Performed by: EMERGENCY MEDICINE

## 2018-08-20 PROCEDURE — 96374 THER/PROPH/DIAG INJ IV PUSH: CPT

## 2018-08-20 PROCEDURE — 87086 URINE CULTURE/COLONY COUNT: CPT

## 2018-08-20 PROCEDURE — 81001 URINALYSIS AUTO W/SCOPE: CPT

## 2018-08-20 PROCEDURE — 85025 COMPLETE CBC W/AUTO DIFF WBC: CPT

## 2018-08-20 PROCEDURE — 83605 ASSAY OF LACTIC ACID: CPT

## 2018-08-20 PROCEDURE — 6360000002 HC RX W HCPCS: Performed by: PHYSICIAN ASSISTANT

## 2018-08-20 PROCEDURE — 85379 FIBRIN DEGRADATION QUANT: CPT

## 2018-08-20 PROCEDURE — 85652 RBC SED RATE AUTOMATED: CPT

## 2018-08-20 PROCEDURE — 74018 RADEX ABDOMEN 1 VIEW: CPT

## 2018-08-20 PROCEDURE — 71046 X-RAY EXAM CHEST 2 VIEWS: CPT

## 2018-08-20 PROCEDURE — 2500000003 HC RX 250 WO HCPCS: Performed by: PHYSICIAN ASSISTANT

## 2018-08-20 PROCEDURE — 99285 EMERGENCY DEPT VISIT HI MDM: CPT

## 2018-08-20 PROCEDURE — 84134 ASSAY OF PREALBUMIN: CPT

## 2018-08-20 PROCEDURE — 85730 THROMBOPLASTIN TIME PARTIAL: CPT

## 2018-08-20 PROCEDURE — 70450 CT HEAD/BRAIN W/O DYE: CPT

## 2018-08-20 PROCEDURE — 80053 COMPREHEN METABOLIC PANEL: CPT

## 2018-08-20 RX ORDER — BUMETANIDE 0.25 MG/ML
0.5 INJECTION, SOLUTION INTRAMUSCULAR; INTRAVENOUS ONCE
Status: COMPLETED | OUTPATIENT
Start: 2018-08-20 | End: 2018-08-20

## 2018-08-20 RX ORDER — METRONIDAZOLE 500 MG/1
500 TABLET ORAL 2 TIMES DAILY
Qty: 14 TABLET | Refills: 0 | Status: SHIPPED | OUTPATIENT
Start: 2018-08-20 | End: 2018-08-27

## 2018-08-20 RX ORDER — LORAZEPAM 2 MG/ML
1 INJECTION INTRAMUSCULAR ONCE
Status: COMPLETED | OUTPATIENT
Start: 2018-08-20 | End: 2018-08-20

## 2018-08-20 RX ORDER — POTASSIUM CHLORIDE 20 MEQ/1
40 TABLET, EXTENDED RELEASE ORAL ONCE
Status: COMPLETED | OUTPATIENT
Start: 2018-08-20 | End: 2018-08-20

## 2018-08-20 RX ADMIN — LORAZEPAM 1 MG: 2 INJECTION INTRAMUSCULAR; INTRAVENOUS at 18:12

## 2018-08-20 RX ADMIN — POTASSIUM CHLORIDE 40 MEQ: 20 TABLET, EXTENDED RELEASE ORAL at 18:32

## 2018-08-20 RX ADMIN — BUMETANIDE 0.5 MG: 0.25 INJECTION INTRAMUSCULAR; INTRAVENOUS at 17:58

## 2018-08-20 NOTE — ED NOTES
Bed: 20  Expected date:   Expected time:   Means of arrival:   Comments:  Natasha Sánchez  08/20/18 8178

## 2018-08-20 NOTE — ED NOTES
Pt to CT at this time. Pt stable upon transport. Will continue to monitor.       Seble Patel RN  08/20/18 5845

## 2018-08-20 NOTE — ED PROVIDER NOTES
[phone]   SEDIMENTATION RATE - Abnormal; Notable for the following:     Sed Rate 24 (*)     All other components within normal limits    Narrative:     Performed at:  [facility],  [place]   Phone [phone]   BRAIN NATRIURETIC PEPTIDE - Abnormal; Notable for the following:     Pro- (*)     All other components within normal limits    Narrative:     Performed at:  [place] [address],  [place], [facility]   Phone [phone]   PHOSPHORUS - Abnormal; Notable for the following:     Phosphorus 2.2 (*)     All other components within normal limits    Narrative:     Performed at:  [place] [address],  [place], [facility]   Phone [phone]   D-DIMER, QUANTITATIVE - Abnormal; Notable for the following:     D-Dimer, Quant 232 (*)     All other components within normal limits    Narrative:     Performed at:  [place] [address],  [place] [facility]   Phone [phone]   MICROSCOPIC URINALYSIS - Abnormal; Notable for the following:     Casts 10-20 Hyaline (*)     WBC, UA 6-10 (*)     RBC, UA 3-5 (*)     Bacteria, UA 1+ (*)     Trichomonas, UA Present (*)     All other components within normal limits    Narrative:     Performed at:  [facility],  [place] [facility]   Phone [phone]   URINE CULTURE    Narrative:     ORDER#: [id number]                          ORDERED BY: Tia Dinero  SOURCE: Urine Clean Catch                  COLLECTED:  08/20/18 17:30  ANTIBIOTICS AT RAFAEL.:                      RECEIVED :  08/21/18 06:59  Performed at:  [facility]  [address] [place], [place] [address]   Phone [phone]   CBC WITH AUTO DIFFERENTIAL    Narrative:     Performed at:  [facility]  [address],  [place]   Phone [phone]   PREALBUMIN    Narrative:     Performed at:  [facility]  [address] [place] [address]   Phone [phone]   MAGNESIUM    Narrative:     Performed at:  [facility]  [address],  [place] [facility]   Phone [phone]   LACTIC ACID, PLASMA    Narrative:     Performed at:  [facility],  [place], [facility]   Phone [phone]   TROPONIN    Narrative:     Performed at:  [facility],  [place], [facility]   Phone [phone]   PROTIME-INR    Narrative:     Performed at:  [facility],  [place] [facility]   Phone [phone]   APTT    Narrative:     Performed at:  [facility]  [address],  [place], [facility]   Phone [phone]       All other labs were within normal range or not returned as of this dictation. EKG: All EKG's are interpreted by the Emergency Department Physician who either signs or Co-signs this chart in the absence of a cardiologist.  Please see their note for interpretation of EKG. RADIOLOGY:   Non-plain film images such as CT, Ultrasound and MRI are read by the radiologist. Plain radiographic images are visualized and preliminarily interpreted by the  ED Provider with the below findings:    CT scan head shows no intracranial abnormalities. X-ray abdomen/KUB obtained with concern of fluid accumulation/ascites. This is a normal study. Chest x-ray obtained showed no acute cardiopulmonary process. Interpretation per the Radiologist below, if available at the time of this note:    CT Head WO Contrast   Final Result   No acute intracranial abnormality.          XR ABDOMEN (KUB) (SINGLE AP VIEW)   Final Result   No acute findings         XR CHEST STANDARD (2 VW)   Final Result   No acute process. Xr Chest Standard (2 Vw)    Result Date: 8/20/2018  EXAMINATION: TWO VIEWS OF THE CHEST 8/20/2018 4:53 pm COMPARISON: CT chest and chest radiograph May 14, 2018 and priors. HISTORY: ORDERING SYSTEM PROVIDED HISTORY: shortness of breath TECHNOLOGIST PROVIDED HISTORY: Reason for exam:->shortness of breath Ordering Physician Provided Reason for Exam: shortness of breath Acuity: Acute Type of Exam: Initial FINDINGS: The lungs are without acute focal process. There is no effusion or pneumothorax. The cardiomediastinal silhouette is without acute process. The osseous structures are without acute process. No significant change compared to prior. No acute process. Xr Abdomen (kub) (single Ap View)    Result Date: 8/20/2018  EXAMINATION: SINGLE SUPINE XRAY VIEW(S) OF THE ABDOMEN 8/20/2018 5:34 pm COMPARISON: None. HISTORY: ORDERING SYSTEM PROVIDED HISTORY: Abdominal swelling, fluid retention TECHNOLOGIST PROVIDED HISTORY: Reason for exam:->Abdominal swelling, fluid retention Ordering Physician Provided Reason for Exam: Abdominal swelling, fluid retention Acuity: Acute Type of Exam: Initial FINDINGS: No air-filled dilated loops of bowel. No acute bony abnormality. No calcification in the region of the kidneys to suggest stone. No acute findings     Ct Head Wo Contrast    Result Date: 8/20/2018  EXAMINATION: CT OF THE HEAD WITHOUT CONTRAST  8/20/2018 5:46 pm TECHNIQUE: CT of the head was performed without the administration of intravenous contrast. Dose modulation, iterative reconstruction, and/or weight based adjustment of the mA/kV was utilized to reduce the radiation dose to as low as reasonably achievable. COMPARISON: CT head March 18, 2006.  HISTORY: ORDERING SYSTEM PROVIDED HISTORY: Headache, tremor, paresthesia TECHNOLOGIST PROVIDED HISTORY: If patient is on cardiac monitor and/or pulse ox, they may be taken off cardiac monitor and pulse ox, left on O2 if currently on. All monitors reattached when patient returns to room. Ordering Physician Provided Reason for Exam: Headache, tremor, paresthesia Acuity: Acute Type of Exam: Initial Relevant Medical/Surgical History: migraines FINDINGS: BRAIN/VENTRICLES: There is no acute intracranial hemorrhage, mass effect or midline shift. No abnormal extra-axial fluid collection. The gray-white differentiation is maintained without evidence of an acute infarct. There is no evidence of hydrocephalus. No significant change in brain parenchyma compared to prior. ORBITS: The visualized portion of the orbits demonstrate no acute abnormality. SINUSES: The visualized paranasal sinuses and mastoid air cells demonstrate no acute abnormality. SOFT TISSUES/SKULL:  No acute abnormality of the visualized skull or soft tissues. No acute intracranial abnormality. PROCEDURES   Unless otherwise noted below, none     Procedures    CRITICAL CARE TIME   N/A    CONSULTS:  None      EMERGENCY DEPARTMENT COURSE and DIFFERENTIAL DIAGNOSIS/MDM:   Vitals:    Vitals:    08/20/18 2009 08/20/18 2039 08/20/18 2115 08/20/18 2117   BP: 99/69 108/81 105/69    Pulse: 85 82 80 80   Resp: 20 16 16    Temp:       TempSrc:       SpO2: 91% 92% 94%    Weight:       Height:           Patient was given the following medications:  Medications   bumetanide (BUMEX) injection 0.5 mg (0.5 mg Intravenous Given 8/20/18 1758)   LORazepam (ATIVAN) injection 1 mg (1 mg Intravenous Given 8/20/18 1812)   potassium chloride (KLOR-CON M) extended release tablet 40 mEq (40 mEq Oral Given 8/20/18 1832)       Patient presenting with shortness of breath and anxiety. Patient has COPD exacerbation. Breathing treatment given with good results. The patient with anxiety issues was given Ativan 1 mg IV. Patient relaxed and felt overall improved. Patient's potassium low at 3.0. Potassium 4 mg once administered.   Patient having had Lasix 40 mg ×3 with small plan.        FINAL IMPRESSION      1. COPD exacerbation ([address]    2. Trichomonas infection    3. Edema extremities    4. Hypokalemia          DISPOSITION/PLAN   DISPOSITION Decision To Discharge 08/20/2018 09:03:17 PM      PATIENT REFERRED TO:  Kendy Levine MD  [address] [place] [address]  [phone]            DISCHARGE MEDICATIONS:  Discharge Medication List as of 8/20/2018  9:10 PM      START taking these medications    Details   metroNIDAZOLE (FLAGYL) 500 MG tablet Take 1 tablet by mouth 2 times daily for 7 days, Disp-14 tablet, R-0Print             DISCONTINUED MEDICATIONS:  Discharge Medication List as of 8/20/2018  9:10 PM                 (Please note that portions of this note were completed with a voice recognition program.  Efforts were made to edit the dictations but occasionally words are mis-transcribed. )    Elizabeth Corley PA-C (electronically signed)            Elizabeth Corley PA-C  08/21/18 4476

## 2018-08-21 ENCOUNTER — APPOINTMENT (OUTPATIENT)
Dept: CT IMAGING | Age: 48
End: 2018-08-21
Payer: MEDICARE

## 2018-08-21 ENCOUNTER — HOSPITAL ENCOUNTER (EMERGENCY)
Age: 48
Discharge: HOME | End: 2018-08-21
Attending: EMERGENCY MEDICINE
Payer: MEDICARE

## 2018-08-21 VITALS
DIASTOLIC BLOOD PRESSURE: 75 MMHG | TEMPERATURE: 98.4 F | WEIGHT: 203 LBS | SYSTOLIC BLOOD PRESSURE: 112 MMHG | HEART RATE: 110 BPM | OXYGEN SATURATION: 95 % | HEIGHT: 62 IN | BODY MASS INDEX: 37.36 KG/M2 | RESPIRATION RATE: 26 BRPM

## 2018-08-21 DIAGNOSIS — R60.9 PERIPHERAL EDEMA: ICD-10-CM

## 2018-08-21 DIAGNOSIS — F41.1 ANXIETY STATE: ICD-10-CM

## 2018-08-21 DIAGNOSIS — E87.6 HYPOKALEMIA: ICD-10-CM

## 2018-08-21 DIAGNOSIS — J44.1 CHRONIC OBSTRUCTIVE PULMONARY DISEASE WITH ACUTE EXACERBATION (HCC): Primary | ICD-10-CM

## 2018-08-21 LAB
ANION GAP SERPL CALCULATED.3IONS-SCNC: 14 MMOL/L (ref 3–16)
BASOPHILS ABSOLUTE: 0.1 K/UL (ref 0–0.2)
BASOPHILS RELATIVE PERCENT: 0.6 %
BUN BLDV-MCNC: 14 MG/DL (ref 7–20)
CALCIUM SERPL-MCNC: 9.5 MG/DL (ref 8.3–10.6)
CHLORIDE BLD-SCNC: 100 MMOL/L (ref 99–110)
CO2: 27 MMOL/L (ref 21–32)
CREAT SERPL-MCNC: 0.9 MG/DL (ref 0.6–1.1)
EKG ATRIAL RATE: 94 BPM
EKG DIAGNOSIS: NORMAL
EKG P AXIS: 69 DEGREES
EKG P-R INTERVAL: 144 MS
EKG Q-T INTERVAL: 404 MS
EKG QRS DURATION: 80 MS
EKG QTC CALCULATION (BAZETT): 505 MS
EKG R AXIS: 42 DEGREES
EKG T AXIS: 43 DEGREES
EKG VENTRICULAR RATE: 94 BPM
EOSINOPHILS ABSOLUTE: 0.1 K/UL (ref 0–0.6)
EOSINOPHILS RELATIVE PERCENT: 1 %
GFR AFRICAN AMERICAN: >60
GFR NON-AFRICAN AMERICAN: >60
GLUCOSE BLD-MCNC: 97 MG/DL (ref 70–99)
HCG QUALITATIVE: NEGATIVE
HCT VFR BLD CALC: 40.2 % (ref 36–48)
HEMOGLOBIN: 13.6 G/DL (ref 12–16)
LYMPHOCYTES ABSOLUTE: 2.5 K/UL (ref 1–5.1)
LYMPHOCYTES RELATIVE PERCENT: 25.4 %
MAGNESIUM: 1.8 MG/DL (ref 1.8–2.4)
MCH RBC QN AUTO: 32.7 PG (ref 26–34)
MCHC RBC AUTO-ENTMCNC: 33.9 G/DL (ref 31–36)
MCV RBC AUTO: 96.5 FL (ref 80–100)
MONOCYTES ABSOLUTE: 0.7 K/UL (ref 0–1.3)
MONOCYTES RELATIVE PERCENT: 6.9 %
NEUTROPHILS ABSOLUTE: 6.5 K/UL (ref 1.7–7.7)
NEUTROPHILS RELATIVE PERCENT: 66.1 %
PDW BLD-RTO: 12.5 % (ref 12.4–15.4)
PLATELET # BLD: 256 K/UL (ref 135–450)
PMV BLD AUTO: 8 FL (ref 5–10.5)
POTASSIUM SERPL-SCNC: 3.3 MMOL/L (ref 3.5–5.1)
PREALBUMIN: 20.2 MG/DL (ref 20–40)
PRO-BNP: 1007 PG/ML (ref 0–124)
RBC # BLD: 4.16 M/UL (ref 4–5.2)
SODIUM BLD-SCNC: 141 MMOL/L (ref 136–145)
TROPONIN: <0.01 NG/ML
WBC # BLD: 9.8 K/UL (ref 4–11)

## 2018-08-21 PROCEDURE — 80048 BASIC METABOLIC PNL TOTAL CA: CPT

## 2018-08-21 PROCEDURE — 6370000000 HC RX 637 (ALT 250 FOR IP): Performed by: EMERGENCY MEDICINE

## 2018-08-21 PROCEDURE — 6360000002 HC RX W HCPCS: Performed by: EMERGENCY MEDICINE

## 2018-08-21 PROCEDURE — 93010 ELECTROCARDIOGRAM REPORT: CPT | Performed by: INTERNAL MEDICINE

## 2018-08-21 PROCEDURE — 83735 ASSAY OF MAGNESIUM: CPT

## 2018-08-21 PROCEDURE — 93005 ELECTROCARDIOGRAM TRACING: CPT | Performed by: EMERGENCY MEDICINE

## 2018-08-21 PROCEDURE — 84703 CHORIONIC GONADOTROPIN ASSAY: CPT

## 2018-08-21 PROCEDURE — 36415 COLL VENOUS BLD VENIPUNCTURE: CPT

## 2018-08-21 PROCEDURE — 84484 ASSAY OF TROPONIN QUANT: CPT

## 2018-08-21 PROCEDURE — 96375 TX/PRO/DX INJ NEW DRUG ADDON: CPT

## 2018-08-21 PROCEDURE — 71275 CT ANGIOGRAPHY CHEST: CPT

## 2018-08-21 PROCEDURE — 83880 ASSAY OF NATRIURETIC PEPTIDE: CPT

## 2018-08-21 PROCEDURE — 96374 THER/PROPH/DIAG INJ IV PUSH: CPT

## 2018-08-21 PROCEDURE — 6360000004 HC RX CONTRAST MEDICATION: Performed by: EMERGENCY MEDICINE

## 2018-08-21 PROCEDURE — 99285 EMERGENCY DEPT VISIT HI MDM: CPT

## 2018-08-21 PROCEDURE — 85025 COMPLETE CBC W/AUTO DIFF WBC: CPT

## 2018-08-21 RX ORDER — IPRATROPIUM BROMIDE AND ALBUTEROL SULFATE 2.5; .5 MG/3ML; MG/3ML
1 SOLUTION RESPIRATORY (INHALATION) ONCE
Status: COMPLETED | OUTPATIENT
Start: 2018-08-21 | End: 2018-08-21

## 2018-08-21 RX ORDER — LORAZEPAM 2 MG/ML
1 INJECTION INTRAMUSCULAR ONCE
Status: COMPLETED | OUTPATIENT
Start: 2018-08-21 | End: 2018-08-21

## 2018-08-21 RX ORDER — PREDNISONE 10 MG/1
TABLET ORAL
Qty: 20 TABLET | Refills: 0 | Status: SHIPPED | OUTPATIENT
Start: 2018-08-21 | End: 2018-08-31

## 2018-08-21 RX ORDER — ONDANSETRON 2 MG/ML
4 INJECTION INTRAMUSCULAR; INTRAVENOUS ONCE
Status: COMPLETED | OUTPATIENT
Start: 2018-08-21 | End: 2018-08-21

## 2018-08-21 RX ORDER — ACETAMINOPHEN 500 MG
1000 TABLET ORAL ONCE
Status: COMPLETED | OUTPATIENT
Start: 2018-08-21 | End: 2018-08-21

## 2018-08-21 RX ORDER — DEXAMETHASONE SODIUM PHOSPHATE 10 MG/ML
10 INJECTION INTRAMUSCULAR; INTRAVENOUS ONCE
Status: COMPLETED | OUTPATIENT
Start: 2018-08-21 | End: 2018-08-21

## 2018-08-21 RX ORDER — POTASSIUM CHLORIDE 750 MG/1
40 TABLET, EXTENDED RELEASE ORAL ONCE
Status: COMPLETED | OUTPATIENT
Start: 2018-08-21 | End: 2018-08-21

## 2018-08-21 RX ADMIN — LORAZEPAM 1 MG: 2 INJECTION INTRAMUSCULAR; INTRAVENOUS at 15:43

## 2018-08-21 RX ADMIN — IOPAMIDOL 75 ML: 755 INJECTION, SOLUTION INTRAVENOUS at 16:07

## 2018-08-21 RX ADMIN — ONDANSETRON HYDROCHLORIDE 4 MG: 2 INJECTION, SOLUTION INTRAMUSCULAR; INTRAVENOUS at 15:43

## 2018-08-21 RX ADMIN — IPRATROPIUM BROMIDE AND ALBUTEROL SULFATE 1 AMPULE: .5; 3 SOLUTION RESPIRATORY (INHALATION) at 15:42

## 2018-08-21 RX ADMIN — POTASSIUM CHLORIDE 40 MEQ: 750 TABLET, EXTENDED RELEASE ORAL at 17:05

## 2018-08-21 RX ADMIN — DEXAMETHASONE SODIUM PHOSPHATE 10 MG: 10 INJECTION, SOLUTION INTRAMUSCULAR; INTRAVENOUS at 15:43

## 2018-08-21 RX ADMIN — ACETAMINOPHEN 1000 MG: 500 TABLET ORAL at 15:42

## 2018-08-21 RX ADMIN — IPRATROPIUM BROMIDE AND ALBUTEROL SULFATE 1 AMPULE: .5; 3 SOLUTION RESPIRATORY (INHALATION) at 15:44

## 2018-08-21 ASSESSMENT — PAIN DESCRIPTION - ONSET: ONSET: SUDDEN

## 2018-08-21 ASSESSMENT — PAIN DESCRIPTION - PAIN TYPE: TYPE: ACUTE PAIN

## 2018-08-21 ASSESSMENT — PAIN DESCRIPTION - LOCATION: LOCATION: CHEST

## 2018-08-21 ASSESSMENT — PAIN DESCRIPTION - FREQUENCY: FREQUENCY: CONTINUOUS

## 2018-08-21 ASSESSMENT — PAIN SCALES - GENERAL: PAINLEVEL_OUTOF10: 4

## 2018-08-21 ASSESSMENT — PAIN DESCRIPTION - DESCRIPTORS: DESCRIPTORS: THROBBING

## 2018-08-21 NOTE — ED PROVIDER NOTES
[facility]  eMERGENCY dEPARTMENT eNCOUnter        Pt Name: Michelle Thao  MRN: [id number]  [place] 1970  Date of evaluation: 8/21/2018  Provider: Harvey Hays DO  PCP: Benjamin Alfaro MD      [address] [place] [address]       Chief Complaint   Patient presents with    Chest Pain     pt states she had CP last pm and was taken to [facility] ED, states she cant feel both of her legs, cannot get her words out and is having increased SOB that started yesterday        HISTORY OF PRESENT ILLNESS   (Location/Symptom, Timing/Onset, Context/Setting, Quality, Duration, Modifying Factors, Severity)  Note limiting factors. Michelle Thao is a 41 y.o. female  with a history of known asthma, bipolar disorder, COPD with 2 L oxygen dependency at home, tobacco abuse, sleep apnea without the ability to tolerate CPAP who presents her today because of shortness of breath. She is here with her family member. She been short of breath off [place]. Review of records do show she went to [facility] yesterday, she had thorough evaluation including CT had x-rays of my believe her abdomen, she had basic labs performed which are unremarkable, except for a slightly elevated d-dimer. She was diagnosed with a COPD exacerbation the patient tells me that she is not discharged home with any kind of steroids, or antibiotics. She was diagnosed also trichomoniasis emesis has her totally anxious and upset over this diagnoses as she is this time's elevated. She does have a nephrology consultation because of chronic edema to her arms and legs, this been a long-standing problem, is progressively getting worse. She wasn't coughing, wheezing and shortness of breath, impressive edema, she short of breath anxious nervous, she's got numbness and tingling to her arms and legs, she is very upset, and she has difficulty speaking because of just being very upset and anxious.   She feels that that she is going R-5Normal      promethazine (PHENERGAN) 25 MG tablet TAKE 1 TABLET BY MOUTH EVERY 6 HOURS AS NEEDED FOR NAUSEA, Disp-30 tablet, R-0Normal      clonazePAM (KLONOPIN) 1 MG tablet TAKE 1 TABLET BY MOUTH TWICE DAILY AS NEEDED FOR ANXIETY MUST LAST 30 DAYS, Disp-30 tablet, R-0Print      mirtazapine (REMERON) 45 MG tablet TAKE 1 TABLET BY MOUTH NIGHTLY, Disp-30 tablet, R-5Normal      FLUoxetine (PROZAC) 20 MG capsule Take 1 capsule by mouth daily, Disp-30 capsule, R-5Normal      nabumetone (RELAFEN) 500 MG tablet Take 1 tablet by mouth 2 times daily as needed for Pain (arthritis pain), Disp-60 tablet, R-3Normal      amitriptyline (ELAVIL) 10 MG tablet 1 or 2 po qhs, Disp-60 tablet, R-5Normal      CHANTIX CONTINUING MONTH COLUMBA 1 MG tablet Take 1 tablet by mouth 2 times daily, Disp-60 tablet, R-1Normal      butalbital-acetaminophen-caffeine (FIORICET, ESGIC) -40 MG per tablet TAKE 1 TABLET BY MOUTH 3 TIMES DAILY AS NEEDED FOR MIGRAINE, Disp-30 tablet, R-0Normal      potassium chloride (KLOR-CON) 10 MEQ extended release tablet Take 1 tablet by mouth daily, Disp-30 tablet, R-5Normal      benzonatate (TESSALON) 100 MG capsule TAKE 1-2 CAPSULES BY MOUTH 3 TIMES DAILY AS NEEDED COUGH, Disp-60 capsule, R-3Normal      albuterol sulfate HFA (VENTOLIN HFA) 108 (90 Base) MCG/ACT inhaler INHALE 2 PUFFS INTO THE LUNGS 4 TIMES DAILY AS NEEDED., Disp-1 Inhaler, R-5Normal      methylphenidate (RITALIN LA) 30 MG extended release capsule Take 20 mg by mouth 3 times daily. Harish Counter Historical Med      topiramate (TOPAMAX) 100 MG tablet Take 1 tablet by mouth 2 times daily, Disp-60 tablet, R-5Normal      montelukast (SINGULAIR) 10 MG tablet TAKE 1 TABLET BY MOUTH EVERY DAY, Disp-30 tablet, R-5Normal      LORATADINE-D 12HR 5-120 MG per extended release tablet TAKE 1 TABLET BY MOUTH EVERY MORNING AS NEEDED FOR ALLERGY, Disp-30 tablet, R-5Normal      rizatriptan (MAXALT) 10 MG tablet Take 1 tablet by mouth once as needed for Migraine May repeat in 2 hours if needed. Max 2/24 hrs., Disp-18 tablet, R-5Normal      Respiratory Therapy Supplies (NEBULIZER/TUBING/MOUTHPIECE) KIT DAILY PRN Starting 10/21/2015, Until Discontinued, Disp-1 kit, R-0, Print      OXYGEN Inhale 2 L/min into the lungs continuous. Regulate with portability at home, Disp-1 Container, R-0             ALLERGIES     Ambien [zolpidem tartrate]; Dilaudid [hydromorphone hcl];  Fentanyl; Imitrex [sumatriptan]; and Morphine    FAMILY HISTORY       Family History   Problem Relation Age of Onset    Depression Mother     Breast Cancer Mother     Stroke Mother         cva x 3    Coronary Art Dis Father         mi  age 43    Schizophrenia Father     Birth Defects Son    [employer] Other Son         odd, autism   [employer] Asthma Son     Diabetes Son     Other Brother         colitis    Mental Illness Sister     Heart Failure Paternal Grandmother         CHF    Emphysema Maternal Grandfather     Cancer Paternal Grandfather     Hypertension Neg Hx           SOCIAL HISTORY       Social History     Social History    Marital status:      Spouse name: N/A    Number of children: 2    Years of education: N/A     Occupational History    unemployed      Social History Main Topics    Smoking status: Current Some Day Smoker     Packs/day: 0.25     Years: 22.00     Types: Cigarettes     Last attempt to quit: 2018    Smokeless tobacco: Never Used    Alcohol use 3.6 oz/week     6 Cans of beer per week      Comment: weekly    Drug use: No      Comment: Quit cocaine and meth     Sexual activity: Yes     Partners: Male     Birth control/ protection: Surgical      Comment: hysterectomy     Other Topics Concern    None     Social History Narrative    None       SCREENINGS    Raul Coma Scale  Eye Opening: Spontaneous  Best Verbal Response: Oriented  Best Motor Response: Obeys commands  Cecil Coma Scale Score: 15        PHYSICAL EXAM    (up to 7 for level 4, 8 or more for level 5)     [facility]  [address] [place] [address]. Benjamin, [facility] [place] [address]   Phone [phone]   CBC WITH AUTO DIFFERENTIAL    Narrative:     Performed at:  [facility]. [facility]  [address] [place] [address]. Benjamin, [facility] [place] [address]   Phone [phone]   TROPONIN    Narrative:     Performed at:  [facility]. [facility]  [address] [place] [address]. [place] [address] [address]   Phone [phone]   MAGNESIUM    Narrative:     Performed at:  [facility]. [facility]  [address] [place] [address]. Benjamin [facility] [place] [address]   Phone [phone]   HCG, SERUM, QUALITATIVE    Narrative:     Performed at:  [facility]. [facility]  [address] [place] [address]. Benjamin, [facility] [place] [address]   Phone [phone]       All other labs were within normal range or not returned as of this dictation. EKG: All EKG's are interpreted by the Emergency Department Physician who either signs or Co-signs this chart in the absence of a cardiologist.    EKG performed interpreted by myself at 1521 shows sinus tachycardia 110 beats a minute. Axis normal, R-wave progression is normal.  No pathological ST or T-wave changes noted, intervals within normal limits, no other pattern of injury identified. Do not have an old available for review at this time    RADIOLOGY:   Non-plain film images such as CT, Ultrasound and MRI are read by the radiologist. Plain radiographic images are visualized and preliminarily interpreted by the  ED Provider with the below findings:        Interpretation per the Radiologist below, if available at the time of this note:    CTA PULMONARY W CONTRAST   Preliminary Result   1. No CT evidence of a pulmonary embolism. 2. No acute abnormality of the thoracic aorta. 3. No acute intrapulmonary findings.                PROCEDURES   Unless otherwise noted below, none     Procedures    CRITICAL CARE TIME   N/A    CONSULTS:  None    EMERGENCY DEPARTMENT COURSE and DIFFERENTIAL DIAGNOSIS/MDM:   Vitals:    Vitals:    08/21/18 1517 08/21/18 1546 08/21/18 1659   BP: 120/85 112/75    Pulse: 119 108 110   Resp: (!) 32 26 26   Temp: 98.4 °F (36.9 °C)     TempSrc: Oral     SpO2: 97% 97% 95%   Weight: 203 lb (92.1 kg)     Height: 5' 2\" (1.575 m)         Patient was given the following medications:  Medications   dexamethasone (DECADRON) injection 10 mg (10 mg Intravenous Given 8/21/18 1543)   ipratropium-albuterol (DUONEB) nebulizer solution 1 ampule (1 ampule Inhalation Given 8/21/18 1544)   ipratropium-albuterol (DUONEB) nebulizer solution 1 ampule (1 ampule Inhalation Given 8/21/18 1542)   LORazepam (ATIVAN) injection 1 mg (1 mg Intravenous Given 8/21/18 1543)   ondansetron (ZOFRAN) injection 4 mg (4 mg Intravenous Given 8/21/18 1543)   acetaminophen (TYLENOL) tablet 1,000 mg (1,000 mg Oral Given 8/21/18 1542)   iopamidol (ISOVUE-370) 76 % injection 75 mL (75 mLs Intravenous Given 8/21/18 1607)   potassium chloride (KLOR-CON M) extended release tablet 40 mEq (40 mEq Oral Given 8/21/18 1705)       This is a 27-year-old female presents with coughing, wheezing and shortness of breath with a history as stated. Differential would include upper versus lower respiratory tract emergencies including bronchitis, pneumonia, pharyngitis, sinusitis, COPD exacerbation, pleural or pericardial disease whether acute processes. IV established placing the monitor. Kate Damon She was given a DuoNeb treatments as well as some steroids here today. Basic laboratory studies performed. CBC and chemistries are unremarkable except for a chronically low potassium at 3.3. This was orally replace her today, magnesium unremarkable. Her troponin cardiac markers are unremarkable except for slightly elevated BNP at 1007. This may reflect represent a degree of heart failure. She's not pregnant, CTA chest demonstrates no acute findings.   This point time her lungs are clear from any fluid, I believe she is likely suffering a COPD exacerbation, visiting the coughing and wheezing and shortness of breath. She is also noncompliant with her CPAP which maybe contributing to her leg edema. She also is extremely anxious required some Ativan for anxiolysis. She is feeling much better, she does take Klonopin was under strict precautions only takes certain amount per day. At this time I see no other life threat, I do think she can be discharged home with some steroids, she is already a potassium I would not give her any additional Lasix stitches are ready taking 80 mg a day. She has nephrology follow-up, and she can also call her family doctor follow-up in one to 2 days' time as needed for recheck and also instructed to come to the emergency room immediately with other concerns she may have. She understands and agrees, she is otherwise discharged home in stable and improved condition    The patient tolerated their visit well. The patient and / or the family were informed of the results of any tests, a time was given to answer questions. FINAL IMPRESSION      1. Chronic obstructive pulmonary disease with acute exacerbation ([address]    2. Peripheral edema    3. Hypokalemia    4. Anxiety state          DISPOSITION/PLAN   DISPOSITION Decision To Discharge 08/21/2018 05:37:18 PM      PATIENT REFERRED TO:  Chelsey Kang MD  [address] [facility]  [phone]    Schedule an appointment as soon as possible for a visit       [place] [address].  [facility] Emergency Department  [address] [place] [address] [address] [facility] [address]  In 1 day  As needed, If symptoms worsen      DISCHARGE MEDICATIONS:  Discharge Medication List as of 8/21/2018  5:38 PM      START taking these medications    Details   predniSONE (DELTASONE) 10 MG tablet Take 4 tablets by mouth once daily for 5 days, Disp-20 tablet, R-0Print             DISCONTINUED MEDICATIONS:  Discharge Medication List as of 8/21/2018  5:38 PM (Please note that portions of this note were completed with a voice recognition program.  Efforts were made to edit the dictations but occasionally words are mis-transcribed.)    Bogdan Blue DO (electronically signed)            Prince Timothy DO  08/21/18 9106

## 2018-08-21 NOTE — ED PROVIDER NOTES
Emergency Department Provider Note     Location: [place] [address] ED  8/20/2018    I independently performed a history and physical on Vanessa Garcia. All diagnostic, treatment, and disposition decisions were made by myself in conjunction with the mid-level provider. Briefly, this is a 41 y.o. female here for chief complaint of shortness of breath and lower show any swelling. Patient states over the past few days she's been having some increasing shorts of breath, but has a history of COPD and states it feels somewhat like that. Patient also notes some mild swelling in her bilateral lower extremities. She denies any other significant symptoms such as fever, headache, cough, chest pain, abdominal pain, nausea, vomiting, changes in bowel movements or urinary symptoms. .      ED Triage Vitals [08/20/18 1640]   BP Temp Temp Source Pulse Resp SpO2 Height Weight   121/72 98.5 °F (36.9 °C) Oral 88 18 92 % 5' 2\" (1.575 m) 203 lb 12.8 oz (92.4 kg)        Exam showed Well-appearing 68-year-old male sitting up in bed, talking normally and appropriate. She does not appear to be in acute discomfort or distress. Abdomen is soft, nontender with no rebound or guarding. She has no significant lower extremity swelling or pitting edema. Chest x-ray is nonacute, EKG is unremarkable with no signs of ischemic changes, and chest x-ray does not show any signs of pneumonia. Patient's shortness breath likely secondary to COPD, and she is at her baseline O2 status. I have low suspicion for pneumonia or other significant cardiothoracic process. I have low suspicion for PE or ACS. Patient does have Trichomonas noted on her UA, and we will treat her for this. D-dimer was just barely positive, however her history and physical exam do not fit with a PE at this time and she had a CT PE study performed recently for the same symptoms.   I do feel that she is more of a COPD exacerbation in general.  I discussed results

## 2018-08-22 ENCOUNTER — TELEPHONE (OUTPATIENT)
Dept: FAMILY MEDICINE CLINIC | Age: 48
End: 2018-08-22

## 2018-08-22 DIAGNOSIS — F41.9 ANXIETY: ICD-10-CM

## 2018-08-22 LAB
EKG ATRIAL RATE: 110 BPM
EKG DIAGNOSIS: NORMAL
EKG P AXIS: 72 DEGREES
EKG P-R INTERVAL: 138 MS
EKG Q-T INTERVAL: 370 MS
EKG QRS DURATION: 78 MS
EKG QTC CALCULATION (BAZETT): 500 MS
EKG R AXIS: 48 DEGREES
EKG T AXIS: 46 DEGREES
EKG VENTRICULAR RATE: 110 BPM
URINE CULTURE, ROUTINE: NORMAL

## 2018-08-23 RX ORDER — CLONAZEPAM 1 MG/1
TABLET ORAL
Qty: 30 TABLET | Refills: 0 | Status: SHIPPED | OUTPATIENT
Start: 2018-08-23 | End: 2018-09-20 | Stop reason: SDUPTHER

## 2018-08-23 NOTE — TELEPHONE ENCOUNTER
Patient returned Va's call.  Patient was scheduled with Catbassem on 08.31.18 at [facility]

## 2018-08-24 RX ORDER — DOXYCYCLINE HYCLATE 100 MG/1
100 CAPSULE ORAL 2 TIMES DAILY
Qty: 14 CAPSULE | Refills: 0 | Status: SHIPPED | OUTPATIENT
Start: 2018-08-24 | End: 2018-11-08 | Stop reason: SDUPTHER

## 2018-08-27 ENCOUNTER — OFFICE VISIT (OUTPATIENT)
Dept: FAMILY MEDICINE CLINIC | Age: 48
End: 2018-08-27

## 2018-08-27 ENCOUNTER — TELEPHONE (OUTPATIENT)
Dept: FAMILY MEDICINE CLINIC | Age: 48
End: 2018-08-27

## 2018-08-27 VITALS
SYSTOLIC BLOOD PRESSURE: 102 MMHG | BODY MASS INDEX: 35.67 KG/M2 | DIASTOLIC BLOOD PRESSURE: 60 MMHG | WEIGHT: 195 LBS | HEART RATE: 101 BPM | OXYGEN SATURATION: 97 % | TEMPERATURE: 98.2 F

## 2018-08-27 DIAGNOSIS — F32.A ANXIETY AND DEPRESSION: ICD-10-CM

## 2018-08-27 DIAGNOSIS — J44.1 COPD EXACERBATION (HCC): Primary | ICD-10-CM

## 2018-08-27 DIAGNOSIS — F41.9 ANXIETY AND DEPRESSION: ICD-10-CM

## 2018-08-27 PROCEDURE — G8926 SPIRO NO PERF OR DOC: HCPCS | Performed by: NURSE PRACTITIONER

## 2018-08-27 PROCEDURE — G8427 DOCREV CUR MEDS BY ELIG CLIN: HCPCS | Performed by: NURSE PRACTITIONER

## 2018-08-27 PROCEDURE — 36415 COLL VENOUS BLD VENIPUNCTURE: CPT | Performed by: NURSE PRACTITIONER

## 2018-08-27 PROCEDURE — 3023F SPIROM DOC REV: CPT | Performed by: NURSE PRACTITIONER

## 2018-08-27 PROCEDURE — 4004F PT TOBACCO SCREEN RCVD TLK: CPT | Performed by: NURSE PRACTITIONER

## 2018-08-27 PROCEDURE — 99214 OFFICE O/P EST MOD 30 MIN: CPT | Performed by: NURSE PRACTITIONER

## 2018-08-27 PROCEDURE — G8417 CALC BMI ABV UP PARAM F/U: HCPCS | Performed by: NURSE PRACTITIONER

## 2018-08-27 RX ORDER — VARENICLINE TARTRATE 25 MG
KIT ORAL
Qty: 53 EACH | Refills: 0 | Status: SHIPPED | OUTPATIENT
Start: 2018-08-27 | End: 2018-10-01

## 2018-08-27 RX ORDER — POTASSIUM CHLORIDE 1500 MG/1
20 TABLET, FILM COATED, EXTENDED RELEASE ORAL DAILY
Qty: 90 TABLET | Refills: 0 | Status: SHIPPED | OUTPATIENT
Start: 2018-08-27 | End: 2018-11-26 | Stop reason: SDUPTHER

## 2018-08-27 RX ORDER — FLUOXETINE HYDROCHLORIDE 40 MG/1
40 CAPSULE ORAL DAILY
Qty: 30 CAPSULE | Refills: 1 | Status: SHIPPED | OUTPATIENT
Start: 2018-08-27 | End: 2018-11-08 | Stop reason: SDUPTHER

## 2018-08-27 ASSESSMENT — ENCOUNTER SYMPTOMS: COUGH: 1

## 2018-08-27 NOTE — PROGRESS NOTES
Subjective:      Patient ID: Jayden Ghosh is a 41 y.o. female. HPI: Patient seen in ED twice this week for anxiety attacks. Diagnosed with trichomonas and prescribed flagyl during ED visit. Potassium found to be 3 in ED per patient, given potassium supplement and taking at home. Patient states she had to apply her home oxygen last night for oxygen saturation of 92%. Cough is improving and sputum becoming more clear since starting on antibiotics. Review of Systems   Constitutional: Negative for chills and fever. Respiratory: Positive for cough. Cardiovascular: Negative for chest pain. Psychiatric/Behavioral: The patient is nervous/anxious. Objective:   Physical Exam   Constitutional: She is oriented to person, place, and time. She appears well-developed and well-nourished. Cardiovascular: Normal rate, regular rhythm and normal heart sounds. No murmur heard. Pulmonary/Chest: Effort normal and breath sounds normal. She has no wheezes. She has no rales. Neurological: She is alert and oriented to person, place, and time. Skin: Skin is warm and dry. Psychiatric: She has a normal mood and affect. Her behavior is normal. Judgment and thought content normal.   Very anxious, constant tremor, denies wanting to hurt self or others. Vitals reviewed.  : Lung sounds clear to ascultation bilaterally. No wheezing, crackles, or respiratory distress noted. Assessment:        Diagnosis Orders   1. COPD exacerbation ([address]  BASIC METABOLIC PANEL   2. Anxiety and depression  FLUoxetine (PROZAC) 40 MG capsule           Plan:   Vanessa was seen today for follow-up from hospital.    Diagnoses and all orders for this visit:    COPD exacerbation ([address] BMP panel to be collected in office due to her having low potassium, pt did increase to 20 meq will see if enough with lasix. Pt is not having swelling today, will wait for pt to see nephrology, pt feels like she is still having swelling with lasix.

## 2018-08-28 LAB
ANION GAP SERPL CALCULATED.3IONS-SCNC: 14 MMOL/L (ref 3–16)
BUN BLDV-MCNC: 20 MG/DL (ref 7–20)
CALCIUM SERPL-MCNC: 9.7 MG/DL (ref 8.3–10.6)
CHLORIDE BLD-SCNC: 100 MMOL/L (ref 99–110)
CO2: 26 MMOL/L (ref 21–32)
CREAT SERPL-MCNC: 0.9 MG/DL (ref 0.6–1.1)
GFR AFRICAN AMERICAN: >60
GFR NON-AFRICAN AMERICAN: >60
GLUCOSE BLD-MCNC: 97 MG/DL (ref 70–99)
POTASSIUM SERPL-SCNC: 4.3 MMOL/L (ref 3.5–5.1)
SODIUM BLD-SCNC: 140 MMOL/L (ref 136–145)

## 2018-09-05 ENCOUNTER — HOSPITAL ENCOUNTER (OUTPATIENT)
Dept: NON INVASIVE DIAGNOSTICS | Age: 48
Discharge: HOME | End: 2018-09-05
Payer: MEDICARE

## 2018-09-05 ENCOUNTER — HOSPITAL ENCOUNTER (OUTPATIENT)
Dept: ULTRASOUND IMAGING | Age: 48
Discharge: HOME | End: 2018-09-05
Payer: MEDICARE

## 2018-09-05 DIAGNOSIS — R60.9 EDEMA, UNSPECIFIED TYPE: ICD-10-CM

## 2018-09-05 LAB
BILIRUBIN URINE: NEGATIVE
BLOOD, URINE: NEGATIVE
CLARITY: CLEAR
COLOR: YELLOW
CREATININE URINE: 64.4 MG/DL (ref 28–259)
GLUCOSE URINE: NEGATIVE MG/DL
KETONES, URINE: NEGATIVE MG/DL
LEUKOCYTE ESTERASE, URINE: NEGATIVE
LV EF: 55 %
LVEF MODALITY: NORMAL
MICROALBUMIN UR-MCNC: <1.2 MG/DL
MICROALBUMIN/CREAT UR-RTO: NORMAL MG/G (ref 0–30)
MICROSCOPIC EXAMINATION: NORMAL
NITRITE, URINE: NEGATIVE
PH UA: 6
PROTEIN PROTEIN: 8 MG/DL
PROTEIN UA: NEGATIVE MG/DL
SPECIFIC GRAVITY UA: <=1.005
URINE TYPE: NORMAL
UROBILINOGEN, URINE: 0.2 E.U./DL

## 2018-09-05 PROCEDURE — 84156 ASSAY OF PROTEIN URINE: CPT

## 2018-09-05 PROCEDURE — 82043 UR ALBUMIN QUANTITATIVE: CPT

## 2018-09-05 PROCEDURE — 81003 URINALYSIS AUTO W/O SCOPE: CPT

## 2018-09-05 PROCEDURE — 82570 ASSAY OF URINE CREATININE: CPT

## 2018-09-05 PROCEDURE — 93306 TTE W/DOPPLER COMPLETE: CPT

## 2018-09-05 PROCEDURE — 76770 US EXAM ABDO BACK WALL COMP: CPT

## 2018-10-22 ENCOUNTER — TELEPHONE (OUTPATIENT)
Dept: SURGERY | Age: 48
End: 2018-10-22

## 2018-10-22 DIAGNOSIS — R11.0 NAUSEA: ICD-10-CM

## 2018-10-22 DIAGNOSIS — F41.9 ANXIETY: ICD-10-CM

## 2018-10-23 ENCOUNTER — TELEPHONE (OUTPATIENT)
Dept: PULMONOLOGY | Age: 48
End: 2018-10-23

## 2018-10-23 RX ORDER — CLONAZEPAM 1 MG/1
TABLET ORAL
Qty: 30 TABLET | Refills: 0 | Status: SHIPPED | OUTPATIENT
Start: 2018-10-23 | End: 2018-11-20 | Stop reason: SDUPTHER

## 2018-10-23 RX ORDER — PROMETHAZINE HYDROCHLORIDE 25 MG/1
TABLET ORAL
Qty: 30 TABLET | Refills: 0 | Status: SHIPPED | OUTPATIENT
Start: 2018-10-23 | End: 2018-11-26 | Stop reason: SDUPTHER

## 2018-11-08 ENCOUNTER — TELEPHONE (OUTPATIENT)
Dept: FAMILY MEDICINE CLINIC | Age: 48
End: 2018-11-08

## 2018-11-08 DIAGNOSIS — R05.9 COUGH: ICD-10-CM

## 2018-11-08 DIAGNOSIS — J44.1 COPD EXACERBATION (HCC): Primary | ICD-10-CM

## 2018-11-08 RX ORDER — DOXYCYCLINE HYCLATE 100 MG/1
100 CAPSULE ORAL 2 TIMES DAILY
Qty: 14 CAPSULE | Refills: 0 | Status: SHIPPED | OUTPATIENT
Start: 2018-11-08 | End: 2018-11-15

## 2018-11-08 RX ORDER — PROMETHAZINE HYDROCHLORIDE AND CODEINE PHOSPHATE 6.25; 1 MG/5ML; MG/5ML
5 SYRUP ORAL EVERY 4 HOURS PRN
Qty: 120 ML | Refills: 0 | Status: SHIPPED | OUTPATIENT
Start: 2018-11-08 | End: 2018-11-15

## 2018-11-20 DIAGNOSIS — F41.9 ANXIETY: ICD-10-CM

## 2018-11-21 RX ORDER — CLONAZEPAM 1 MG/1
TABLET ORAL
Qty: 30 TABLET | Refills: 0 | Status: SHIPPED | OUTPATIENT
Start: 2018-11-21 | End: 2018-12-21 | Stop reason: SDUPTHER

## 2018-11-26 DIAGNOSIS — R11.0 NAUSEA: ICD-10-CM

## 2018-11-27 RX ORDER — PROMETHAZINE HYDROCHLORIDE 25 MG/1
TABLET ORAL
Qty: 30 TABLET | Refills: 0 | Status: SHIPPED | OUTPATIENT
Start: 2018-11-27 | End: 2019-03-07 | Stop reason: SDUPTHER

## 2018-12-21 DIAGNOSIS — F41.9 ANXIETY: ICD-10-CM

## 2018-12-24 RX ORDER — CLONAZEPAM 1 MG/1
TABLET ORAL
Qty: 30 TABLET | Refills: 0 | Status: SHIPPED | OUTPATIENT
Start: 2018-12-24 | End: 2019-01-30 | Stop reason: SDUPTHER

## 2019-01-07 ENCOUNTER — OFFICE VISIT (OUTPATIENT)
Dept: PULMONOLOGY | Age: 49
End: 2019-01-07
Payer: MEDICARE

## 2019-01-07 VITALS
DIASTOLIC BLOOD PRESSURE: 70 MMHG | OXYGEN SATURATION: 97 % | TEMPERATURE: 97.8 F | BODY MASS INDEX: 32.96 KG/M2 | SYSTOLIC BLOOD PRESSURE: 112 MMHG | HEIGHT: 63 IN | RESPIRATION RATE: 18 BRPM | WEIGHT: 186 LBS | HEART RATE: 77 BPM

## 2019-01-07 DIAGNOSIS — J96.11 CHRONIC RESPIRATORY FAILURE WITH HYPOXIA (HCC): ICD-10-CM

## 2019-01-07 DIAGNOSIS — R04.2 HEMOPTYSIS: ICD-10-CM

## 2019-01-07 DIAGNOSIS — J44.9 CHRONIC OBSTRUCTIVE PULMONARY DISEASE, UNSPECIFIED COPD TYPE (HCC): Primary | ICD-10-CM

## 2019-01-07 PROCEDURE — G8417 CALC BMI ABV UP PARAM F/U: HCPCS | Performed by: INTERNAL MEDICINE

## 2019-01-07 PROCEDURE — G8926 SPIRO NO PERF OR DOC: HCPCS | Performed by: INTERNAL MEDICINE

## 2019-01-07 PROCEDURE — G8484 FLU IMMUNIZE NO ADMIN: HCPCS | Performed by: INTERNAL MEDICINE

## 2019-01-07 PROCEDURE — 3023F SPIROM DOC REV: CPT | Performed by: INTERNAL MEDICINE

## 2019-01-07 PROCEDURE — 1036F TOBACCO NON-USER: CPT | Performed by: INTERNAL MEDICINE

## 2019-01-07 PROCEDURE — G8427 DOCREV CUR MEDS BY ELIG CLIN: HCPCS | Performed by: INTERNAL MEDICINE

## 2019-01-07 PROCEDURE — 99214 OFFICE O/P EST MOD 30 MIN: CPT | Performed by: INTERNAL MEDICINE

## 2019-01-10 ENCOUNTER — HOSPITAL ENCOUNTER (OUTPATIENT)
Dept: GENERAL RADIOLOGY | Age: 49
Discharge: HOME | End: 2019-01-10
Payer: MEDICARE

## 2019-01-10 ENCOUNTER — HOSPITAL ENCOUNTER (OUTPATIENT)
Age: 49
Discharge: HOME | End: 2019-01-10
Payer: MEDICARE

## 2019-01-10 DIAGNOSIS — J44.9 CHRONIC OBSTRUCTIVE PULMONARY DISEASE, UNSPECIFIED COPD TYPE (HCC): ICD-10-CM

## 2019-01-10 DIAGNOSIS — R04.2 HEMOPTYSIS: ICD-10-CM

## 2019-01-10 PROCEDURE — 71046 X-RAY EXAM CHEST 2 VIEWS: CPT

## 2019-01-30 DIAGNOSIS — F41.9 ANXIETY: ICD-10-CM

## 2019-01-31 RX ORDER — CLONAZEPAM 1 MG/1
TABLET ORAL
Qty: 30 TABLET | Refills: 0 | Status: SHIPPED | OUTPATIENT
Start: 2019-01-31 | End: 2019-03-07 | Stop reason: SDUPTHER

## 2019-03-07 DIAGNOSIS — R11.0 NAUSEA: ICD-10-CM

## 2019-03-07 DIAGNOSIS — F41.9 ANXIETY: ICD-10-CM

## 2019-03-08 RX ORDER — CLONAZEPAM 1 MG/1
TABLET ORAL
Qty: 30 TABLET | Refills: 0 | Status: SHIPPED | OUTPATIENT
Start: 2019-03-08 | End: 2019-05-03 | Stop reason: SDUPTHER

## 2019-03-08 RX ORDER — PROMETHAZINE HYDROCHLORIDE 25 MG/1
TABLET ORAL
Qty: 30 TABLET | Refills: 0 | Status: SHIPPED | OUTPATIENT
Start: 2019-03-08 | End: 2019-04-11

## 2019-05-03 DIAGNOSIS — F41.9 ANXIETY: ICD-10-CM

## 2019-05-03 RX ORDER — CLONAZEPAM 1 MG/1
TABLET ORAL
Qty: 30 TABLET | Refills: 0 | Status: SHIPPED | OUTPATIENT
Start: 2019-05-03 | End: 2019-06-24 | Stop reason: SDUPTHER

## 2019-05-07 DIAGNOSIS — R11.0 NAUSEA: ICD-10-CM

## 2019-05-08 RX ORDER — ALBUTEROL SULFATE 90 UG/1
AEROSOL, METERED RESPIRATORY (INHALATION)
Qty: 1 INHALER | Refills: 5 | Status: SHIPPED | OUTPATIENT
Start: 2019-05-08 | End: 2019-06-24 | Stop reason: SDUPTHER

## 2019-05-08 RX ORDER — PROMETHAZINE HYDROCHLORIDE 25 MG/1
TABLET ORAL
Qty: 30 TABLET | Refills: 0 | Status: SHIPPED | OUTPATIENT
Start: 2019-05-08 | End: 2019-07-03 | Stop reason: SDUPTHER

## 2019-05-09 RX ORDER — ALBUTEROL SULFATE 90 UG/1
AEROSOL, METERED RESPIRATORY (INHALATION)
Qty: 1 INHALER | Refills: 5 | Status: SHIPPED | OUTPATIENT
Start: 2019-05-09 | End: 2020-05-15

## 2019-05-31 ENCOUNTER — TELEPHONE (OUTPATIENT)
Dept: PULMONOLOGY | Age: 49
End: 2019-05-31

## 2019-05-31 NOTE — TELEPHONE ENCOUNTER
CX'd appointment on 5/28/19 with Dr. Quita Mark for 4 month COPD f/u. Reason: provider emergency    Will offer N/A. Patient called with message left for patient to call back to office. Last OV 1/7/19:    ASSESSMENT AND PLAN:  Hemoptysis  - etiology is unclear, possibly URI  - check CXR     COPD (chronic obstructive pulmonary disease)   -  pfts to confirm dx; stage severe  - Continue inhaled bronchodilator therapy  albuterol prn; symbicort, Spiriva-> Incruse per formulary); albuterol nebs, should not use duonebs  -  up to date with Pneumococcal vaccine and Influenza vaccines. - Pulmonary rehab completed  - Advised to avoid smoking again     Chronic respiratory failure with hypoxemia  - continue 2l Supplemental oxygen with exertion;  light weight tanks and use OCD     Cough  The etiology of the patient's cough is likely smoking related chronic bronchitis.   - I recommended smoking avoidance  - Tessalon Perles as needed     Tobacco abuse  - encouraged complete cessation  - on Chantix per Dr. Monie Cui

## 2019-06-10 RX ORDER — POTASSIUM CHLORIDE 1500 MG/1
20 TABLET, FILM COATED, EXTENDED RELEASE ORAL DAILY
Qty: 90 TABLET | Refills: 1 | Status: SHIPPED | OUTPATIENT
Start: 2019-06-10 | End: 2020-05-09

## 2019-06-10 NOTE — TELEPHONE ENCOUNTER
Pls make sure pt is on 30 min list. She has appt 6/24 for 15 min. Pls hold one of the acute spots later that morning for catch up time.

## 2019-06-10 NOTE — TELEPHONE ENCOUNTER
LOV 6/28/2018    Future Appointments   Date Time Provider Mariam Patton   6/24/2019  9:45 AM MD YAIR Rivera   9/18/2019  9:45 AM Missy Kulkarni MD [facility]

## 2019-06-24 ENCOUNTER — OFFICE VISIT (OUTPATIENT)
Dept: FAMILY MEDICINE CLINIC | Age: 49
End: 2019-06-24
Payer: MEDICARE

## 2019-06-24 VITALS
WEIGHT: 184 LBS | DIASTOLIC BLOOD PRESSURE: 60 MMHG | SYSTOLIC BLOOD PRESSURE: 112 MMHG | HEART RATE: 87 BPM | BODY MASS INDEX: 32.59 KG/M2 | OXYGEN SATURATION: 97 %

## 2019-06-24 DIAGNOSIS — G25.3 MYOCLONIC JERKING: ICD-10-CM

## 2019-06-24 DIAGNOSIS — Z72.0 TOBACCO USE: ICD-10-CM

## 2019-06-24 DIAGNOSIS — F41.9 ANXIETY: ICD-10-CM

## 2019-06-24 DIAGNOSIS — F31.9 BIPOLAR DEPRESSION (HCC): ICD-10-CM

## 2019-06-24 DIAGNOSIS — F51.01 PRIMARY INSOMNIA: Primary | ICD-10-CM

## 2019-06-24 DIAGNOSIS — J43.9 PULMONARY EMPHYSEMA, UNSPECIFIED EMPHYSEMA TYPE (HCC): ICD-10-CM

## 2019-06-24 LAB
A/G RATIO: 1.6 (ref 1.1–2.2)
ALBUMIN SERPL-MCNC: 4.5 G/DL (ref 3.4–5)
ALP BLD-CCNC: 80 U/L (ref 40–129)
ALT SERPL-CCNC: 14 U/L (ref 10–40)
ANION GAP SERPL CALCULATED.3IONS-SCNC: 13 MMOL/L (ref 3–16)
AST SERPL-CCNC: 15 U/L (ref 15–37)
BILIRUB SERPL-MCNC: <0.2 MG/DL (ref 0–1)
BUN BLDV-MCNC: 13 MG/DL (ref 7–20)
CALCIUM SERPL-MCNC: 9.4 MG/DL (ref 8.3–10.6)
CHLORIDE BLD-SCNC: 105 MMOL/L (ref 99–110)
CO2: 23 MMOL/L (ref 21–32)
CREAT SERPL-MCNC: 0.9 MG/DL (ref 0.6–1.1)
GFR AFRICAN AMERICAN: >60
GFR NON-AFRICAN AMERICAN: >60
GLOBULIN: 2.8 G/DL
GLUCOSE BLD-MCNC: 84 MG/DL (ref 70–99)
HCT VFR BLD CALC: 43.7 % (ref 36–48)
HEMOGLOBIN: 15 G/DL (ref 12–16)
MCH RBC QN AUTO: 33.7 PG (ref 26–34)
MCHC RBC AUTO-ENTMCNC: 34.2 G/DL (ref 31–36)
MCV RBC AUTO: 98.5 FL (ref 80–100)
PDW BLD-RTO: 13.1 % (ref 12.4–15.4)
PLATELET # BLD: 251 K/UL (ref 135–450)
PMV BLD AUTO: 9 FL (ref 5–10.5)
POTASSIUM SERPL-SCNC: 4.3 MMOL/L (ref 3.5–5.1)
RBC # BLD: 4.43 M/UL (ref 4–5.2)
SODIUM BLD-SCNC: 141 MMOL/L (ref 136–145)
TOTAL PROTEIN: 7.3 G/DL (ref 6.4–8.2)
WBC # BLD: 7.1 K/UL (ref 4–11)

## 2019-06-24 PROCEDURE — 3023F SPIROM DOC REV: CPT | Performed by: FAMILY MEDICINE

## 2019-06-24 PROCEDURE — 1036F TOBACCO NON-USER: CPT | Performed by: FAMILY MEDICINE

## 2019-06-24 PROCEDURE — G8926 SPIRO NO PERF OR DOC: HCPCS | Performed by: FAMILY MEDICINE

## 2019-06-24 PROCEDURE — G8417 CALC BMI ABV UP PARAM F/U: HCPCS | Performed by: FAMILY MEDICINE

## 2019-06-24 PROCEDURE — 99214 OFFICE O/P EST MOD 30 MIN: CPT | Performed by: FAMILY MEDICINE

## 2019-06-24 PROCEDURE — 36415 COLL VENOUS BLD VENIPUNCTURE: CPT | Performed by: FAMILY MEDICINE

## 2019-06-24 PROCEDURE — G8427 DOCREV CUR MEDS BY ELIG CLIN: HCPCS | Performed by: FAMILY MEDICINE

## 2019-06-24 RX ORDER — DEXTROAMPHETAMINE SACCHARATE, AMPHETAMINE ASPARTATE MONOHYDRATE, DEXTROAMPHETAMINE SULFATE AND AMPHETAMINE SULFATE 5; 5; 5; 5 MG/1; MG/1; MG/1; MG/1
CAPSULE, EXTENDED RELEASE ORAL
Refills: 0 | COMMUNITY
Start: 2019-06-14

## 2019-06-24 RX ORDER — AMITRIPTYLINE HYDROCHLORIDE 50 MG/1
50 TABLET, FILM COATED ORAL NIGHTLY
Qty: 30 TABLET | Refills: 0 | Status: SHIPPED | OUTPATIENT
Start: 2019-06-24 | End: 2019-07-23 | Stop reason: SDUPTHER

## 2019-06-24 RX ORDER — BUDESONIDE AND FORMOTEROL FUMARATE DIHYDRATE 160; 4.5 UG/1; UG/1
2 AEROSOL RESPIRATORY (INHALATION) 2 TIMES DAILY
Qty: 10.2 G | Refills: 5 | Status: SHIPPED | OUTPATIENT
Start: 2019-06-24 | End: 2019-09-26 | Stop reason: ALTCHOICE

## 2019-06-24 RX ORDER — CLONAZEPAM 1 MG/1
TABLET ORAL
Qty: 30 TABLET | Refills: 0 | Status: SHIPPED | OUTPATIENT
Start: 2019-06-24 | End: 2019-07-23 | Stop reason: SDUPTHER

## 2019-06-24 NOTE — PROGRESS NOTES
Patient: Serafin Gupta is a 57 y. o.female who presents today with the following Chief Complaint(s):  Chief Complaint   Patient presents with    Medication Check       HPI: Pt with severe copd/chronic bronchitis followed by Dr Iglesia Pacheco and narcolepsy followed by Dr Marry Seaman also has CATHY, intolerant to cpap. Uses 2 L O2 prn exertion, if sick or at hs. Smokes 1-1.5 ppd tob x 25 yrs. Was nonsmoker x 6 mo (chantix) until 1 mo ago when pt was anxious, resumed tob 1/2 ppd. Started chantix again 1 wk ago and notices less desire to smoke. [employer] requires 25 ft distance from house to smoke, helpful for pt to cut down. Has gotten away from symbicort, needs rf. Also has migraines, anx and dep, prim insomnia. Tried seroquel in past, had wt gain. Tried traz in past, felt more anxious, had hangover. Has trouble falling asleep despite elavil 10 2 qhs. Melatonin, Tyl PM no help. Son sees Dr Codey Morgan, psychiatrist. Pt agrees she needs specialist, but cannot afford copay. Last clonopin x wks ago, has been overdue for 6 mo appt and rx was denied. One son is living on his own, managing [employer], doing well. Other son who has dev delay is having struggles with loss of 2 cousins, great grandmother, great aunt. This in turn causes pt stress. Pt and son have moved to 1 level, 3 bedroom [employer] home. Son has his own private space in 3rd bedroom. Saw Dr Rena Sever, plastic surgeon for macromastia. Was to have 2 neg nicotine serum tests before proceeding with surgery. Pt financially is not able to proceed at this time. Saw Dr Lynette Zhao, nephrology b/c of edema in 8/2018. 2 gm Na+ diet was rec'd and pt was asked to avoid nsaids. Echo and renal u/s nl. Swelling is since under control. Was seen in ED 8/20/10 and [facility] ED 8/21/19 for swollen and numb mouth and spont jerking of L leg. L leg still jerks at times, up to 5-6 times per day. Feet feel cold though pulses are strong.      L>R toes spasm and curl at times, sometimes awakens pt at hs. Current Outpatient Medications   Medication Sig Dispense Refill    budesonide-formoterol (SYMBICORT) 160-4.5 MCG/ACT AERO Inhale 2 puffs into the lungs 2 times daily 10.2 g 5    amitriptyline (ELAVIL) 50 MG tablet Take 1 tablet by mouth nightly 30 tablet 0    clonazePAM (KLONOPIN) 1 MG tablet TAKE 1 TABLET BY MOUTH TWICE DAILY AS NEEDED FOR ANXIETY 30 tablet 0    potassium chloride (KLOR-CON M) 20 MEQ TBCR extended release tablet Take 1 tablet by mouth daily 90 tablet 1    FLUoxetine (PROZAC) 40 MG capsule Take 1 capsule by mouth daily 30 capsule 5    furosemide (LASIX) 40 MG tablet 1 po qd-bid prn swelling 60 tablet 5    topiramate (TOPAMAX) 100 MG tablet Take 1 tablet by mouth 2 times daily 60 tablet 5    albuterol sulfate HFA (VENTOLIN HFA) 108 (90 Base) MCG/ACT inhaler INHALE 2 PUFFS INTO THE LUNGS 4 TIMES DAILY AS NEEDED. 1 Inhaler 5    promethazine (PHENERGAN) 25 MG tablet TAKE 1 TABLET BY MOUTH EVERY 6 HOURS AS NEEDED FOR NAUSEA 30 tablet 0    CHANTIX CONTINUING MONTH COLUMBA 1 MG tablet TAKE BY MOUTH. TAKE 1 TABLET BY MOUTH TWICE DAILY 56 tablet 2    rizatriptan (MAXALT) 10 MG tablet Take 1 tablet by mouth once as needed for Migraine May repeat in 2 hours if needed. Max 2/24 hrs. 18 tablet 3    butalbital-acetaminophen-caffeine (FIORICET, ESGIC) -40 MG per tablet TAKE 1 TABLET BY MOUTH 3 TIMES DAILY AS NEEDED FOR MIGRAINE 30 tablet 0    benzonatate (TESSALON) 100 MG capsule TAKE 1-2 CAPSULES BY MOUTH 3 TIMES DAILY AS NEEDED COUGH 60 capsule 3    methylphenidate (RITALIN LA) 30 MG extended release capsule Take 20 mg by mouth 3 times daily.  .      montelukast (SINGULAIR) 10 MG tablet TAKE 1 TABLET BY MOUTH EVERY DAY 30 tablet 5    LORATADINE-D 12HR 5-120 MG per extended release tablet TAKE 1 TABLET BY MOUTH EVERY MORNING AS NEEDED FOR ALLERGY 30 tablet 5    Respiratory Therapy Supplies (NEBULIZER/TUBING/MOUTHPIECE) KIT 1 kit by Does not apply route daily as needed 1 kit 0    OXYGEN Inhale 2 L/min into the lungs continuous. Regulate with portability at home 1 Container 0    amphetamine-dextroamphetamine (ADDERALL XR) 20 MG extended release capsule TAKE 1 CAPSULE BY MOUTH EVERY DAY  0     No current facility-administered medications for this visit. Patient's past medical history,surgical history, family history, medications,  and allergies  were all reviewed and updated as appropriate today. Review of Systems   Constitutional: Negative for activity change, appetite change, chills, fatigue, fever and unexpected weight change. HENT: Negative. Eyes: Negative. Respiratory: Negative for cough, chest tightness, shortness of breath and wheezing. Cardiovascular: Negative for chest pain, palpitations and leg swelling. Gastrointestinal: Negative for abdominal distention, abdominal pain, blood in stool, constipation, diarrhea, nausea and vomiting. Genitourinary: Negative for difficulty urinating and dysuria. Musculoskeletal: Negative for arthralgias. Skin: Negative for color change, pallor and rash. Neurological: Positive for tremors. Negative for dizziness, syncope, weakness, light-headedness and headaches. Hematological: Negative. Psychiatric/Behavioral: Positive for sleep disturbance. Negative for dysphoric mood. The patient is nervous/anxious. Physical Exam   Constitutional: She is oriented to person, place, and time. She appears well-developed and well-nourished. HENT:   Head: Normocephalic and atraumatic. Right Ear: External ear normal.   Left Ear: External ear normal.   Mouth/Throat: Oropharynx is clear and moist. No oropharyngeal exudate. Nl R and L ear canal and TM   Eyes: Pupils are equal, round, and reactive to light. Conjunctivae and EOM are normal. No scleral icterus. Neck: Normal range of motion. Neck supple.    Cardiovascular: Normal rate, regular rhythm, normal heart sounds and intact distal

## 2019-06-25 ASSESSMENT — ENCOUNTER SYMPTOMS
EYES NEGATIVE: 1
CHEST TIGHTNESS: 0
BLOOD IN STOOL: 0
WHEEZING: 0
SHORTNESS OF BREATH: 0
VOMITING: 0
CONSTIPATION: 0
ABDOMINAL DISTENTION: 0
NAUSEA: 0
COUGH: 0
ABDOMINAL PAIN: 0
COLOR CHANGE: 0
DIARRHEA: 0

## 2019-07-03 DIAGNOSIS — R11.0 NAUSEA: ICD-10-CM

## 2019-07-05 RX ORDER — PROMETHAZINE HYDROCHLORIDE 25 MG/1
TABLET ORAL
Qty: 30 TABLET | Refills: 0 | Status: SHIPPED | OUTPATIENT
Start: 2019-07-05 | End: 2019-07-23 | Stop reason: SDUPTHER

## 2019-07-16 NOTE — TELEPHONE ENCOUNTER
Health Maintenance Due   Topic Date Due   • Medicare Wellness 65+  10/02/2019       Patient is up to date, no discussion needed.         Last seen 6/28/18  rx pending

## 2019-07-23 DIAGNOSIS — R11.0 NAUSEA: ICD-10-CM

## 2019-07-23 DIAGNOSIS — F41.9 ANXIETY: ICD-10-CM

## 2019-07-23 DIAGNOSIS — F51.01 PRIMARY INSOMNIA: ICD-10-CM

## 2019-07-23 RX ORDER — PROMETHAZINE HYDROCHLORIDE 25 MG/1
TABLET ORAL
Qty: 30 TABLET | Refills: 0 | Status: SHIPPED | OUTPATIENT
Start: 2019-07-23 | End: 2019-08-26 | Stop reason: SDUPTHER

## 2019-07-23 RX ORDER — AMITRIPTYLINE HYDROCHLORIDE 50 MG/1
50 TABLET, FILM COATED ORAL NIGHTLY
Qty: 30 TABLET | Refills: 0 | Status: SHIPPED | OUTPATIENT
Start: 2019-07-23 | End: 2019-08-26 | Stop reason: SDUPTHER

## 2019-07-23 RX ORDER — CLONAZEPAM 1 MG/1
TABLET ORAL
Qty: 30 TABLET | Refills: 0 | Status: SHIPPED | OUTPATIENT
Start: 2019-07-24 | End: 2019-08-26 | Stop reason: SDUPTHER

## 2019-07-27 DIAGNOSIS — R11.0 NAUSEA: ICD-10-CM

## 2019-07-29 RX ORDER — PROMETHAZINE HYDROCHLORIDE 25 MG/1
TABLET ORAL
Qty: 30 TABLET | Refills: 0 | Status: SHIPPED | OUTPATIENT
Start: 2019-07-29 | End: 2019-08-26

## 2019-07-29 RX ORDER — VARENICLINE TARTRATE 1 MG/1
TABLET, FILM COATED ORAL
Qty: 56 TABLET | Refills: 2 | Status: SHIPPED | OUTPATIENT
Start: 2019-07-29 | End: 2019-09-26

## 2019-08-05 DIAGNOSIS — G43.111 INTRACTABLE MIGRAINE WITH AURA WITH STATUS MIGRAINOSUS: ICD-10-CM

## 2019-08-05 RX ORDER — BUTALBITAL, ACETAMINOPHEN AND CAFFEINE 50; 325; 40 MG/1; MG/1; MG/1
TABLET ORAL
Qty: 30 TABLET | Refills: 0 | Status: SHIPPED | OUTPATIENT
Start: 2019-08-05 | End: 2019-09-23 | Stop reason: SDUPTHER

## 2019-08-26 DIAGNOSIS — F41.9 ANXIETY: ICD-10-CM

## 2019-08-26 DIAGNOSIS — R11.0 NAUSEA: ICD-10-CM

## 2019-08-26 DIAGNOSIS — F51.01 PRIMARY INSOMNIA: ICD-10-CM

## 2019-08-26 RX ORDER — PROMETHAZINE HYDROCHLORIDE 25 MG/1
TABLET ORAL
Qty: 30 TABLET | Refills: 0 | Status: SHIPPED | OUTPATIENT
Start: 2019-08-26 | End: 2019-09-23 | Stop reason: SDUPTHER

## 2019-08-26 RX ORDER — CLONAZEPAM 1 MG/1
TABLET ORAL
Qty: 30 TABLET | Refills: 0 | Status: SHIPPED | OUTPATIENT
Start: 2019-08-26 | End: 2019-09-23 | Stop reason: SDUPTHER

## 2019-08-26 RX ORDER — AMITRIPTYLINE HYDROCHLORIDE 50 MG/1
50 TABLET, FILM COATED ORAL NIGHTLY
Qty: 30 TABLET | Refills: 0 | Status: SHIPPED | OUTPATIENT
Start: 2019-08-26 | End: 2019-09-23 | Stop reason: SDUPTHER

## 2019-09-23 DIAGNOSIS — R11.0 NAUSEA: ICD-10-CM

## 2019-09-23 DIAGNOSIS — F51.01 PRIMARY INSOMNIA: ICD-10-CM

## 2019-09-23 DIAGNOSIS — G43.111 INTRACTABLE MIGRAINE WITH AURA WITH STATUS MIGRAINOSUS: ICD-10-CM

## 2019-09-23 DIAGNOSIS — F41.9 ANXIETY: ICD-10-CM

## 2019-09-24 RX ORDER — PROMETHAZINE HYDROCHLORIDE 25 MG/1
TABLET ORAL
Qty: 30 TABLET | Refills: 0 | Status: SHIPPED | OUTPATIENT
Start: 2019-09-24 | End: 2019-10-24 | Stop reason: SDUPTHER

## 2019-09-24 RX ORDER — AMITRIPTYLINE HYDROCHLORIDE 50 MG/1
50 TABLET, FILM COATED ORAL NIGHTLY
Qty: 30 TABLET | Refills: 0 | Status: SHIPPED | OUTPATIENT
Start: 2019-09-24 | End: 2019-10-24 | Stop reason: SDUPTHER

## 2019-09-24 RX ORDER — BUTALBITAL, ACETAMINOPHEN AND CAFFEINE 50; 325; 40 MG/1; MG/1; MG/1
TABLET ORAL
Qty: 30 TABLET | Refills: 0 | Status: SHIPPED | OUTPATIENT
Start: 2019-09-24 | End: 2019-10-24 | Stop reason: SDUPTHER

## 2019-09-24 RX ORDER — CLONAZEPAM 1 MG/1
TABLET ORAL
Qty: 30 TABLET | Refills: 0 | Status: SHIPPED | OUTPATIENT
Start: 2019-09-24 | End: 2019-10-24 | Stop reason: SDUPTHER

## 2019-09-26 ENCOUNTER — OFFICE VISIT (OUTPATIENT)
Dept: PULMONOLOGY | Age: 49
End: 2019-09-26
Payer: MEDICARE

## 2019-09-26 VITALS
HEART RATE: 83 BPM | DIASTOLIC BLOOD PRESSURE: 68 MMHG | SYSTOLIC BLOOD PRESSURE: 112 MMHG | OXYGEN SATURATION: 97 % | RESPIRATION RATE: 22 BRPM | HEIGHT: 62 IN | WEIGHT: 193 LBS | BODY MASS INDEX: 35.51 KG/M2

## 2019-09-26 DIAGNOSIS — Z72.0 TOBACCO ABUSE: ICD-10-CM

## 2019-09-26 DIAGNOSIS — J43.9 PULMONARY EMPHYSEMA, UNSPECIFIED EMPHYSEMA TYPE (HCC): ICD-10-CM

## 2019-09-26 DIAGNOSIS — J44.9 CHRONIC OBSTRUCTIVE PULMONARY DISEASE, UNSPECIFIED COPD TYPE (HCC): Primary | ICD-10-CM

## 2019-09-26 DIAGNOSIS — R04.2 HEMOPTYSIS: ICD-10-CM

## 2019-09-26 DIAGNOSIS — J96.11 CHRONIC RESPIRATORY FAILURE WITH HYPOXIA (HCC): ICD-10-CM

## 2019-09-26 PROCEDURE — G8427 DOCREV CUR MEDS BY ELIG CLIN: HCPCS | Performed by: INTERNAL MEDICINE

## 2019-09-26 PROCEDURE — 3023F SPIROM DOC REV: CPT | Performed by: INTERNAL MEDICINE

## 2019-09-26 PROCEDURE — 99214 OFFICE O/P EST MOD 30 MIN: CPT | Performed by: INTERNAL MEDICINE

## 2019-09-26 PROCEDURE — 1036F TOBACCO NON-USER: CPT | Performed by: INTERNAL MEDICINE

## 2019-09-26 PROCEDURE — G8417 CALC BMI ABV UP PARAM F/U: HCPCS | Performed by: INTERNAL MEDICINE

## 2019-09-26 PROCEDURE — G8926 SPIRO NO PERF OR DOC: HCPCS | Performed by: INTERNAL MEDICINE

## 2019-09-26 RX ORDER — DOXYCYCLINE HYCLATE 100 MG
100 TABLET ORAL 2 TIMES DAILY
Qty: 10 TABLET | Refills: 0 | Status: SHIPPED | OUTPATIENT
Start: 2019-09-26 | End: 2019-10-01

## 2019-09-26 RX ORDER — GABAPENTIN 300 MG/1
CAPSULE ORAL
Refills: 5 | COMMUNITY
Start: 2019-09-25 | End: 2022-07-11

## 2019-09-26 NOTE — PROGRESS NOTES
Bipolar 1 disorder ([facility]     Pt is willing to see psych after 7/15 for confirmation of dx    Chronic bronchitis ([facility]     COPD (chronic obstructive pulmonary disease) ([facility]     Dr Billy Hamilton Depression     Emphysema of lung ([facility]     Migraine     Migraines     MRSA infection within last 3 months     axillary    Narcolepsy 2004    Dr Patsy Casas    Obesity     Pneumonia     Sleep apnea     did not tolerate cpap    Tobacco abuse     Tobacco use disorder 2011    Urinary incontinence        Past Surgical History:        Procedure Laterality Date    ABSCESS DRAINAGE      L axilla MRSA, hosp for 7 days    ADENOIDECTOMY      BLADDER SUSPENSION  5/10    Mesh removed 1/9/15 in [address]      lumpectomy, ducts removed    FINGER SURGERY      right thumb    HYSTERECTOMY   dysplasia, endometriosis, cysts, MYLENE BSO    NASAL SEPTUM SURGERY      PELVIC LAPAROSCOPY      endometriosis    MYLENE AND BSO      Dysplasia, endometriosis    TONSILLECTOMY         Allergies:  is allergic to [facility] [zolpidem tartrate]; dilaudid [hydromorphone hcl]; fentanyl; imitrex [sumatriptan]; and morphine. Social History:    TOBACCO:   reports that she quit smoking about 19 months ago. Her smoking use included cigarettes. She has a 5.50 pack-year smoking history. She has never used smokeless tobacco.  ETOH:   reports that she drinks about 6.0 standard drinks of alcohol per week. Patient currently lives independently  .     Family History:       Problem Relation Age of Onset    Depression Mother     Breast Cancer Mother     Stroke Mother         cva x 3    Coronary Art Dis Father         mi  age 43    Schizophrenia Father     Birth Defects Son     Other Son         odd, autism   [employer] Asthma Son     Diabetes Son     Other Brother         colitis    Mental Illness Sister     Heart Failure Paternal Grandmother         CHF    Emphysema Maternal Grandfather     Cancer Paternal Grandfather     Hypertension Neg Hx        Current Medications:    Current Outpatient Medications:     promethazine (PHENERGAN) 25 MG tablet, TAKE 1 TABLET BY MOUTH EVERY 6 HOURS AS NEEDED FOR NAUSEA, Disp: 30 tablet, Rfl: 0    amitriptyline (ELAVIL) 50 MG tablet, TAKE 1 TABLET BY MOUTH NIGHTLY, Disp: 30 tablet, Rfl: 0    clonazePAM (KLONOPIN) 1 MG tablet, TAKE 1 TABLET BY MOUTH TWICE DAILY AS NEEDED FOR ANXIETY, Disp: 30 tablet, Rfl: 0    butalbital-acetaminophen-caffeine (FIORICET, ESGIC) -40 MG per tablet, TAKE 1 TABLET BY MOUTH 3 TIMES DAILY AS NEEDED FOR MIGRAINE, Disp: 30 tablet, Rfl: 0    amphetamine-dextroamphetamine (ADDERALL XR) 20 MG extended release capsule, TAKE 1 CAPSULE BY MOUTH EVERY DAY, Disp: , Rfl: 0    budesonide-formoterol (SYMBICORT) 160-4.5 MCG/ACT AERO, Inhale 2 puffs into the lungs 2 times daily, Disp: 10.2 g, Rfl: 5    potassium chloride (KLOR-CON M) 20 MEQ TBCR extended release tablet, Take 1 tablet by mouth daily, Disp: 90 tablet, Rfl: 1    FLUoxetine (PROZAC) 40 MG capsule, Take 1 capsule by mouth daily, Disp: 30 capsule, Rfl: 5    furosemide (LASIX) 40 MG tablet, 1 po qd-bid prn swelling, Disp: 60 tablet, Rfl: 5    topiramate (TOPAMAX) 100 MG tablet, Take 1 tablet by mouth 2 times daily, Disp: 60 tablet, Rfl: 5    albuterol sulfate HFA (VENTOLIN HFA) 108 (90 Base) MCG/ACT inhaler, INHALE 2 PUFFS INTO THE LUNGS 4 TIMES DAILY AS NEEDED., Disp: 1 Inhaler, Rfl: 5    benzonatate (TESSALON) 100 MG capsule, TAKE 1-2 CAPSULES BY MOUTH 3 TIMES DAILY AS NEEDED COUGH, Disp: 60 capsule, Rfl: 3    methylphenidate (RITALIN LA) 30 MG extended release capsule, Take 20 mg by mouth 3 times daily.  ., Disp: , Rfl:     montelukast (SINGULAIR) 10 MG tablet, TAKE 1 TABLET BY MOUTH EVERY DAY, Disp: 30 tablet, Rfl: 5    LORATADINE-D 12HR 5-120 MG per extended release tablet, TAKE 1 TABLET BY MOUTH EVERY MORNING AS NEEDED FOR ALLERGY, Disp: 30 tablet, Rfl: 5    Respiratory Therapy Supplies

## 2019-09-30 ENCOUNTER — TELEPHONE (OUTPATIENT)
Dept: PULMONOLOGY | Age: 49
End: 2019-09-30

## 2019-09-30 RX ORDER — RIZATRIPTAN BENZOATE 10 MG/1
10 TABLET ORAL
Qty: 18 TABLET | Refills: 3 | Status: SHIPPED | OUTPATIENT
Start: 2019-09-30 | End: 2020-03-02 | Stop reason: SDUPTHER

## 2019-09-30 NOTE — TELEPHONE ENCOUNTER
Patient called stating that symptoms have not improved. Do you have the following symptoms? Shortness of Breath  yes  Wheezing  yes  Cough  yes                  Cough Characteristics:                           Productive    yes                           Sputum Color    Dark brown                           Hemoptysis   no                           Consistency of sputum   thick     Fever:    unsure  Temp:na  Chills/Sweats:  some  What other symptoms are you having?:  no    How long have you had these symptoms? 9/19/19     Pharmacy: [address] [place] [address]          Review medications and allergies: Allergies? Allergies   Allergen Reactions    Ambien [Zolpidem Tartrate] Other (See Comments)     Shakey, anxious    Dilaudid [Hydromorphone Hcl] Other (See Comments)     Feels like skin is on fire.  Fentanyl Itching    Imitrex [Sumatriptan]      Face hot, felt sob    Morphine Other (See Comments)     Feels like skin is on fire. Tolerates Percocet. Currently on Antibiotics? (Drug/Dose/Frequency and how long on?) finished doxy today given 9/26/19                   Systemic Steroids? (Drug/Dose/Frequency and how long on?) no       ASSESSMENT AND PLAN:9/26/19  Hemoptysis  - etiology is unclear, possibly URI-none currently  - CXR was ok     COPD (chronic obstructive pulmonary disease)   -  pfts to confirm dx; stage severe  - Continue inhaled bronchodilator therapy  albuterol prn; change Symbicort to Spiriva Respimat per patient preference  -  up to date with Pneumococcal vaccine and Influenza vaccines. - Pulmonary rehab completed  - Advised to avoid smoking again        Chronic respiratory failure with hypoxemia  - continue 2l Supplemental oxygen with exertion;  light weight tanks and use OCD           Cough  The etiology of the patient's cough is likely smoking related chronic bronchitis.   - I recommended smoking avoidance  - Tessalon Perles as needed  -Doxycycline for 5 days     Tobacco abuse  - encouraged continued complete cessation  -Previously on Chantix per Dr. Naomi Da Silva

## 2019-10-02 ENCOUNTER — OFFICE VISIT (OUTPATIENT)
Dept: PULMONOLOGY | Age: 49
End: 2019-10-02
Payer: MEDICARE

## 2019-10-02 VITALS
HEART RATE: 91 BPM | TEMPERATURE: 98.5 F | SYSTOLIC BLOOD PRESSURE: 118 MMHG | WEIGHT: 189 LBS | RESPIRATION RATE: 16 BRPM | BODY MASS INDEX: 34.78 KG/M2 | DIASTOLIC BLOOD PRESSURE: 70 MMHG | HEIGHT: 62 IN | OXYGEN SATURATION: 95 %

## 2019-10-02 DIAGNOSIS — Z72.0 TOBACCO ABUSE: ICD-10-CM

## 2019-10-02 DIAGNOSIS — J96.11 CHRONIC RESPIRATORY FAILURE WITH HYPOXIA (HCC): ICD-10-CM

## 2019-10-02 DIAGNOSIS — J44.1 COPD EXACERBATION (HCC): Primary | ICD-10-CM

## 2019-10-02 PROCEDURE — G8926 SPIRO NO PERF OR DOC: HCPCS | Performed by: INTERNAL MEDICINE

## 2019-10-02 PROCEDURE — 99214 OFFICE O/P EST MOD 30 MIN: CPT | Performed by: INTERNAL MEDICINE

## 2019-10-02 PROCEDURE — G8484 FLU IMMUNIZE NO ADMIN: HCPCS | Performed by: INTERNAL MEDICINE

## 2019-10-02 PROCEDURE — G8417 CALC BMI ABV UP PARAM F/U: HCPCS | Performed by: INTERNAL MEDICINE

## 2019-10-02 PROCEDURE — 1036F TOBACCO NON-USER: CPT | Performed by: INTERNAL MEDICINE

## 2019-10-02 PROCEDURE — 3023F SPIROM DOC REV: CPT | Performed by: INTERNAL MEDICINE

## 2019-10-02 PROCEDURE — G8427 DOCREV CUR MEDS BY ELIG CLIN: HCPCS | Performed by: INTERNAL MEDICINE

## 2019-10-02 RX ORDER — PREDNISONE 10 MG/1
TABLET ORAL
Qty: 30 TABLET | Refills: 0 | Status: SHIPPED | OUTPATIENT
Start: 2019-10-02 | End: 2019-10-14

## 2019-10-02 RX ORDER — CEFUROXIME AXETIL 500 MG/1
500 TABLET ORAL 2 TIMES DAILY
Qty: 20 TABLET | Refills: 0 | Status: SHIPPED | OUTPATIENT
Start: 2019-10-02 | End: 2019-10-12

## 2019-11-22 ENCOUNTER — APPOINTMENT (OUTPATIENT)
Dept: GENERAL RADIOLOGY | Age: 49
End: 2019-11-22
Payer: MEDICARE

## 2019-11-22 ENCOUNTER — HOSPITAL ENCOUNTER (EMERGENCY)
Age: 49
Discharge: HOME | End: 2019-11-22
Attending: EMERGENCY MEDICINE
Payer: MEDICARE

## 2019-11-22 VITALS
RESPIRATION RATE: 16 BRPM | SYSTOLIC BLOOD PRESSURE: 111 MMHG | HEART RATE: 87 BPM | BODY MASS INDEX: 36.25 KG/M2 | DIASTOLIC BLOOD PRESSURE: 81 MMHG | OXYGEN SATURATION: 99 % | TEMPERATURE: 97.9 F | HEIGHT: 62 IN | WEIGHT: 197 LBS

## 2019-11-22 DIAGNOSIS — S86.911A STRAIN OF RIGHT KNEE, INITIAL ENCOUNTER: Primary | ICD-10-CM

## 2019-11-22 DIAGNOSIS — M79.671 ACUTE FOOT PAIN, RIGHT: ICD-10-CM

## 2019-11-22 LAB
A/G RATIO: 1.2 (ref 1.1–2.2)
ALBUMIN SERPL-MCNC: 4 G/DL (ref 3.4–5)
ALP BLD-CCNC: 82 U/L (ref 40–129)
ALT SERPL-CCNC: 19 U/L (ref 10–40)
ANION GAP SERPL CALCULATED.3IONS-SCNC: 12 MMOL/L (ref 3–16)
AST SERPL-CCNC: 19 U/L (ref 15–37)
BASOPHILS ABSOLUTE: 0 K/UL (ref 0–0.2)
BASOPHILS RELATIVE PERCENT: 0.6 %
BILIRUB SERPL-MCNC: 0.3 MG/DL (ref 0–1)
BUN BLDV-MCNC: 14 MG/DL (ref 7–20)
CALCIUM SERPL-MCNC: 9.5 MG/DL (ref 8.3–10.6)
CHLORIDE BLD-SCNC: 101 MMOL/L (ref 99–110)
CO2: 26 MMOL/L (ref 21–32)
CREAT SERPL-MCNC: 0.9 MG/DL (ref 0.6–1.1)
EOSINOPHILS ABSOLUTE: 0.1 K/UL (ref 0–0.6)
EOSINOPHILS RELATIVE PERCENT: 1.8 %
GFR AFRICAN AMERICAN: >60
GFR NON-AFRICAN AMERICAN: >60
GLOBULIN: 3.3 G/DL
GLUCOSE BLD-MCNC: 93 MG/DL (ref 70–99)
HCT VFR BLD CALC: 41.1 % (ref 36–48)
HEMOGLOBIN: 13.7 G/DL (ref 12–16)
LYMPHOCYTES ABSOLUTE: 2.5 K/UL (ref 1–5.1)
LYMPHOCYTES RELATIVE PERCENT: 31.1 %
MCH RBC QN AUTO: 32.2 PG (ref 26–34)
MCHC RBC AUTO-ENTMCNC: 33.3 G/DL (ref 31–36)
MCV RBC AUTO: 96.7 FL (ref 80–100)
MONOCYTES ABSOLUTE: 0.6 K/UL (ref 0–1.3)
MONOCYTES RELATIVE PERCENT: 7.9 %
NEUTROPHILS ABSOLUTE: 4.8 K/UL (ref 1.7–7.7)
NEUTROPHILS RELATIVE PERCENT: 58.6 %
PDW BLD-RTO: 12.5 % (ref 12.4–15.4)
PLATELET # BLD: 248 K/UL (ref 135–450)
PMV BLD AUTO: 7.6 FL (ref 5–10.5)
POTASSIUM SERPL-SCNC: 4.4 MMOL/L (ref 3.5–5.1)
RBC # BLD: 4.25 M/UL (ref 4–5.2)
SEDIMENTATION RATE, ERYTHROCYTE: 14 MM/HR (ref 0–20)
SODIUM BLD-SCNC: 139 MMOL/L (ref 136–145)
TOTAL PROTEIN: 7.3 G/DL (ref 6.4–8.2)
URIC ACID, SERUM: 4.5 MG/DL (ref 2.6–6)
WBC # BLD: 8.2 K/UL (ref 4–11)

## 2019-11-22 PROCEDURE — 85652 RBC SED RATE AUTOMATED: CPT

## 2019-11-22 PROCEDURE — 73630 X-RAY EXAM OF FOOT: CPT

## 2019-11-22 PROCEDURE — 84550 ASSAY OF BLOOD/URIC ACID: CPT

## 2019-11-22 PROCEDURE — 99283 EMERGENCY DEPT VISIT LOW MDM: CPT

## 2019-11-22 PROCEDURE — 73560 X-RAY EXAM OF KNEE 1 OR 2: CPT

## 2019-11-22 PROCEDURE — 36415 COLL VENOUS BLD VENIPUNCTURE: CPT

## 2019-11-22 PROCEDURE — 80053 COMPREHEN METABOLIC PANEL: CPT

## 2019-11-22 PROCEDURE — 85025 COMPLETE CBC W/AUTO DIFF WBC: CPT

## 2019-11-22 ASSESSMENT — ENCOUNTER SYMPTOMS
ABDOMINAL DISTENTION: 0
RECTAL PAIN: 0
SHORTNESS OF BREATH: 0
ABDOMINAL PAIN: 0
BACK PAIN: 0

## 2019-11-22 ASSESSMENT — PAIN SCALES - GENERAL: PAINLEVEL_OUTOF10: 7

## 2019-11-25 DIAGNOSIS — R11.0 NAUSEA: ICD-10-CM

## 2019-11-25 DIAGNOSIS — F41.9 ANXIETY: ICD-10-CM

## 2019-11-25 DIAGNOSIS — G43.111 INTRACTABLE MIGRAINE WITH AURA WITH STATUS MIGRAINOSUS: ICD-10-CM

## 2019-11-26 RX ORDER — BUTALBITAL, ACETAMINOPHEN AND CAFFEINE 50; 325; 40 MG/1; MG/1; MG/1
TABLET ORAL
Qty: 30 TABLET | Refills: 0 | Status: SHIPPED | OUTPATIENT
Start: 2019-11-26 | End: 2021-03-22

## 2019-11-26 RX ORDER — CLONAZEPAM 1 MG/1
TABLET ORAL
Qty: 30 TABLET | Refills: 0 | Status: SHIPPED | OUTPATIENT
Start: 2019-11-26 | End: 2020-01-27 | Stop reason: SDUPTHER

## 2019-11-26 RX ORDER — PROMETHAZINE HYDROCHLORIDE 25 MG/1
TABLET ORAL
Qty: 30 TABLET | Refills: 0 | Status: SHIPPED | OUTPATIENT
Start: 2019-11-26 | End: 2019-12-26

## 2019-12-26 RX ORDER — CLONAZEPAM 1 MG/1
TABLET ORAL
Qty: 30 TABLET | OUTPATIENT
Start: 2019-12-26

## 2019-12-26 RX ORDER — FUROSEMIDE 40 MG/1
TABLET ORAL
Qty: 60 TABLET | Refills: 5 | Status: SHIPPED | OUTPATIENT
Start: 2019-12-26 | End: 2020-08-12

## 2019-12-26 RX ORDER — BUTALBITAL, ACETAMINOPHEN AND CAFFEINE 50; 325; 40 MG/1; MG/1; MG/1
TABLET ORAL
Qty: 30 TABLET | Refills: 0 | OUTPATIENT
Start: 2019-12-26

## 2019-12-26 RX ORDER — PROMETHAZINE HYDROCHLORIDE 25 MG/1
TABLET ORAL
Qty: 30 TABLET | Refills: 0 | Status: SHIPPED | OUTPATIENT
Start: 2019-12-26 | End: 2020-11-19

## 2020-01-27 ENCOUNTER — OFFICE VISIT (OUTPATIENT)
Dept: FAMILY MEDICINE CLINIC | Age: 50
End: 2020-01-27
Payer: MEDICARE

## 2020-01-27 VITALS
BODY MASS INDEX: 38.04 KG/M2 | DIASTOLIC BLOOD PRESSURE: 84 MMHG | WEIGHT: 208 LBS | OXYGEN SATURATION: 98 % | HEART RATE: 100 BPM | SYSTOLIC BLOOD PRESSURE: 126 MMHG

## 2020-01-27 PROCEDURE — 3023F SPIROM DOC REV: CPT | Performed by: FAMILY MEDICINE

## 2020-01-27 PROCEDURE — 4004F PT TOBACCO SCREEN RCVD TLK: CPT | Performed by: FAMILY MEDICINE

## 2020-01-27 PROCEDURE — G8417 CALC BMI ABV UP PARAM F/U: HCPCS | Performed by: FAMILY MEDICINE

## 2020-01-27 PROCEDURE — G8427 DOCREV CUR MEDS BY ELIG CLIN: HCPCS | Performed by: FAMILY MEDICINE

## 2020-01-27 PROCEDURE — 99214 OFFICE O/P EST MOD 30 MIN: CPT | Performed by: FAMILY MEDICINE

## 2020-01-27 PROCEDURE — G8926 SPIRO NO PERF OR DOC: HCPCS | Performed by: FAMILY MEDICINE

## 2020-01-27 PROCEDURE — G8484 FLU IMMUNIZE NO ADMIN: HCPCS | Performed by: FAMILY MEDICINE

## 2020-01-27 RX ORDER — CLONAZEPAM 1 MG/1
TABLET ORAL
Qty: 30 TABLET | Refills: 0 | Status: SHIPPED | OUTPATIENT
Start: 2020-01-27 | End: 2020-02-21

## 2020-01-27 RX ORDER — BENZONATATE 100 MG/1
CAPSULE ORAL
Qty: 60 CAPSULE | Refills: 3 | Status: SHIPPED | OUTPATIENT
Start: 2020-01-27 | End: 2020-11-04

## 2020-01-27 RX ORDER — FLUOXETINE HYDROCHLORIDE 20 MG/1
60 CAPSULE ORAL DAILY
Qty: 90 CAPSULE | Refills: 5 | Status: SHIPPED | OUTPATIENT
Start: 2020-01-27 | End: 2020-07-09

## 2020-01-27 ASSESSMENT — ENCOUNTER SYMPTOMS
NAUSEA: 0
CHEST TIGHTNESS: 0
SHORTNESS OF BREATH: 0
DIARRHEA: 0

## 2020-01-27 NOTE — PROGRESS NOTES
Patient: Mynor Musa is a 54 y. o.female who presents today with the following Chief Complaint(s):  Chief Complaint   Patient presents with    Medication Check     insomnia, edema, COPD, anxiety, depression (wants to up the fluoxetine)         HPI: The patient is a 56 yo woman with hx of narcolepsy (on adderral, f/b Dr. Meagan Barron), CATHY but did not tolerate CPAP, tobacco abuse, COPD (f/b Dr Karin Weaver), anxiety and dep, bpd, migraines, insomnia. Dr Heriberto Zapien was to perform breast reduction but pt has had insurance trouble. With stress, has resumed tob, unable to have surgery unless nonsmoker. Resumed tob 1 ppd 3 wk ago. Had rf on chantix, resumed it 4 days ago. Does not feel Chantix is affecting mental health. Has felt anxious, has insomnia, feels exhausted that she doesn't want to get out of bed. Feels down and blue, doesn't want to leave home. Feels like she wants to say F-it to the world x last 2 mo's. Denies SI/HI. Despite prozac, feels she is in middle of room screaming and no one hears her. Was on lamictal in past, felt it did not help. Denies sx's of anamika. Had seen Dr Ofelia Rodriges until 2 yrs ago. Is frustrated by wt gain despite minimal intake. Son confirms she may eat only a block of cheese in a day. Was on abx in Nov 2019 by Dr Karin Weaver. Had f/u 2 wk later, had gained 10 lb during that time. Was not on steroids. On 10/31/19, slipped and hurt R knee. [facility] to [facility] ED, was told she tore R meniscus. Has not seen ortho 2/2 financial concerns. Has been told she does not have Medicaid, though told by [facility] today she has both Medicaid and Medicare. Hands are numb often, daytime and mid of noc, has L radial wrist OA. Drops things b/c of weak .      Current Outpatient Medications   Medication Sig Dispense Refill    benzonatate (TESSALON) 100 MG capsule TAKE 1-2 CAPSULES BY MOUTH 3 TIMES DAILY AS NEEDED COUGH 60 capsule 3    clonazePAM (KLONOPIN) 1 MG tablet 1 po bid prn anxiety 30 tablet 0   

## 2020-01-28 ENCOUNTER — CARE COORDINATION (OUTPATIENT)
Dept: FAMILY MEDICINE CLINIC | Age: 50
End: 2020-01-28

## 2020-02-04 ENCOUNTER — CARE COORDINATION (OUTPATIENT)
Dept: FAMILY MEDICINE CLINIC | Age: 50
End: 2020-02-04

## 2020-02-26 ENCOUNTER — OFFICE VISIT (OUTPATIENT)
Dept: FAMILY MEDICINE CLINIC | Age: 50
End: 2020-02-26
Payer: MEDICARE

## 2020-02-26 VITALS
HEIGHT: 62 IN | DIASTOLIC BLOOD PRESSURE: 80 MMHG | WEIGHT: 208 LBS | OXYGEN SATURATION: 96 % | SYSTOLIC BLOOD PRESSURE: 110 MMHG | HEART RATE: 107 BPM | BODY MASS INDEX: 38.28 KG/M2

## 2020-02-26 PROCEDURE — G8417 CALC BMI ABV UP PARAM F/U: HCPCS | Performed by: FAMILY MEDICINE

## 2020-02-26 PROCEDURE — 4004F PT TOBACCO SCREEN RCVD TLK: CPT | Performed by: FAMILY MEDICINE

## 2020-02-26 PROCEDURE — G8484 FLU IMMUNIZE NO ADMIN: HCPCS | Performed by: FAMILY MEDICINE

## 2020-02-26 PROCEDURE — 99214 OFFICE O/P EST MOD 30 MIN: CPT | Performed by: FAMILY MEDICINE

## 2020-02-26 PROCEDURE — G8427 DOCREV CUR MEDS BY ELIG CLIN: HCPCS | Performed by: FAMILY MEDICINE

## 2020-02-26 RX ORDER — RIZATRIPTAN BENZOATE 10 MG/1
10 TABLET ORAL
COMMUNITY
End: 2020-09-03

## 2020-02-26 RX ORDER — DEXTROMETHORPHAN HYDROBROMIDE AND PROMETHAZINE HYDROCHLORIDE 15; 6.25 MG/5ML; MG/5ML
SYRUP ORAL
Qty: 120 ML | Refills: 2 | Status: SHIPPED | OUTPATIENT
Start: 2020-02-26 | End: 2020-08-12

## 2020-02-26 ASSESSMENT — ENCOUNTER SYMPTOMS
DIARRHEA: 0
ABDOMINAL PAIN: 0
CHEST TIGHTNESS: 0
WHEEZING: 1
COUGH: 1
NAUSEA: 0
SHORTNESS OF BREATH: 1

## 2020-02-26 NOTE — PROGRESS NOTES
Patient: Molly Koch is a 54 y. o.female who presents today with the following Chief Complaint(s):  Chief Complaint   Patient presents with    Depression         HPI: Proz 40 was increased to 60 mg qd 1 mo ago for anxiety, insomnia, exhaustion, not wanting to get OOB. No anamika sx's. Is F cup. Would like to return to see Dr Juanito Gipson for macromastia. Had seen him 1 yr ago to discuss breast reduction, did not f/u with 2/2 insur issues. Resumed chantix 1 mo ago, has smoked 3-5 cig/ past 1 wk. Is aware of need to be tob free for surgery. Smoked 1 cig today as feels \"on edge\" 2/2 driving in rain, having to fill out [employer] paperwork. Insur is no longer covering tessalon perles. Uses 1-2 qhs prn cough during bronchitis exacerbation. Pt wonders about wt loss surgery. Has tried many diets, has not been able to sustain wt loss. Has been on many steroid tapers. Exercise is limited 2/2 copd, O2 dependent. Was seen in Ed for R knee pain after fall oct 2019.  Pain has persisted, is ready for ortho eval.    Current Outpatient Medications   Medication Sig Dispense Refill    rizatriptan (MAXALT) 10 MG tablet Take 10 mg by mouth once as needed for Migraine (PT continues to take however chart states that she was to dc) May repeat in 2 hours if needed      promethazine-dextromethorphan (PROMETHAZINE-DM) 6.25-15 MG/5ML syrup 1-2 tsp po qhs prn cough 120 mL 2    clonazePAM (KLONOPIN) 1 MG tablet ONE BY MOUTH TWICE DAILY AS NEEDED ANXIETY 30 tablet 0    FLUoxetine (PROZAC) 20 MG capsule Take 3 capsules by mouth daily 90 capsule 5    furosemide (LASIX) 40 MG tablet TAKE 1 TABLET BY MOUTH EVERY DAY TO TWICE DAILY AS NEEDED SWELLING 60 tablet 5    promethazine (PHENERGAN) 25 MG tablet TAKE 1 TABLET BY MOUTH EVERY 6 HOURS AS NEEDED FOR NAUSEA 30 tablet 0    butalbital-acetaminophen-caffeine (FIORICET, ESGIC) -40 MG per tablet TAKE 1 TABLET BY MOUTH 3 TIMES DAILY AS NEEDED FOR MIGRAINE 30 tablet 0    amitriptyline (ELAVIL) 50 MG tablet TAKE 1 TABLET (50 MG) BY MOUTH NIGHTLY 30 tablet 5    gabapentin (NEURONTIN) 300 MG capsule TAKE ONE CAPSULE BY MOUTH ONE HOUR BEFORE BEDTIME  5    tiotropium (SPIRIVA RESPIMAT) 2.5 MCG/ACT AERS inhaler Inhale 2 puffs into the lungs daily 4 g 5    amphetamine-dextroamphetamine (ADDERALL XR) 20 MG extended release capsule TAKE 1 CAPSULE BY MOUTH EVERY DAY  0    potassium chloride (KLOR-CON M) 20 MEQ TBCR extended release tablet Take 1 tablet by mouth daily 90 tablet 1    topiramate (TOPAMAX) 100 MG tablet Take 1 tablet by mouth 2 times daily 60 tablet 5    albuterol sulfate HFA (VENTOLIN HFA) 108 (90 Base) MCG/ACT inhaler INHALE 2 PUFFS INTO THE LUNGS 4 TIMES DAILY AS NEEDED. 1 Inhaler 5    methylphenidate (RITALIN LA) 30 MG extended release capsule Take 20 mg by mouth 3 times daily. .      montelukast (SINGULAIR) 10 MG tablet TAKE 1 TABLET BY MOUTH EVERY DAY 30 tablet 5    LORATADINE-D 12HR 5-120 MG per extended release tablet TAKE 1 TABLET BY MOUTH EVERY MORNING AS NEEDED FOR ALLERGY 30 tablet 5    Respiratory Therapy Supplies (NEBULIZER/TUBING/MOUTHPIECE) KIT 1 kit by Does not apply route daily as needed 1 kit 0    OXYGEN Inhale 2 L/min into the lungs continuous. Regulate with portability at home 1 Container 0    benzonatate (TESSALON) 100 MG capsule TAKE 1-2 CAPSULES BY MOUTH 3 TIMES DAILY AS NEEDED COUGH (Patient not taking: Reported on 2/26/2020) 60 capsule 3    rizatriptan (MAXALT) 10 MG tablet TAKE 1 TABLET BY MOUTH ONCE AS NEEDED FOR MIGRAINE MAY REPEAT IN 2 HOURS IF NEEDED. MAX 2/24 HRS. 18 tablet 3     No current facility-administered medications for this visit. Patient's past medical history,surgical history, family history, medications,  and allergies  were all reviewed and updated as appropriate today. Review of Systems   Constitutional: Positive for fatigue. Negative for activity change, appetite change and unexpected weight change.

## 2020-03-02 ENCOUNTER — HOSPITAL ENCOUNTER (OUTPATIENT)
Dept: MRI IMAGING | Age: 50
Discharge: HOME | End: 2020-03-02
Payer: MEDICARE

## 2020-03-02 ENCOUNTER — OFFICE VISIT (OUTPATIENT)
Dept: ORTHOPEDIC SURGERY | Age: 50
End: 2020-03-02
Payer: MEDICARE

## 2020-03-02 VITALS — HEIGHT: 62 IN | BODY MASS INDEX: 38.26 KG/M2 | WEIGHT: 207.89 LBS

## 2020-03-02 PROCEDURE — 99203 OFFICE O/P NEW LOW 30 MIN: CPT | Performed by: PHYSICIAN ASSISTANT

## 2020-03-02 PROCEDURE — G8484 FLU IMMUNIZE NO ADMIN: HCPCS | Performed by: PHYSICIAN ASSISTANT

## 2020-03-02 PROCEDURE — 4004F PT TOBACCO SCREEN RCVD TLK: CPT | Performed by: PHYSICIAN ASSISTANT

## 2020-03-02 PROCEDURE — G8417 CALC BMI ABV UP PARAM F/U: HCPCS | Performed by: PHYSICIAN ASSISTANT

## 2020-03-02 PROCEDURE — 73721 MRI JNT OF LWR EXTRE W/O DYE: CPT

## 2020-03-02 PROCEDURE — G8427 DOCREV CUR MEDS BY ELIG CLIN: HCPCS | Performed by: PHYSICIAN ASSISTANT

## 2020-03-02 RX ORDER — DICLOFENAC SODIUM 75 MG/1
75 TABLET, DELAYED RELEASE ORAL 2 TIMES DAILY
Qty: 60 TABLET | Refills: 3 | Status: SHIPPED | OUTPATIENT
Start: 2020-03-02 | End: 2020-07-06 | Stop reason: ALTCHOICE

## 2020-03-02 NOTE — PROGRESS NOTES
Obesity     Pneumonia     Sleep apnea     did not tolerate cpap    Tobacco abuse     Tobacco use disorder 1/14/2011    Urinary incontinence      Patient Active Problem List    Diagnosis Date Noted    CAP (community acquired pneumonia) 08/04/2014     Priority: High     Class: Acute    COPD exacerbation ([facility] 08/04/2014     Priority: High     Class: Acute    Hemoptysis 01/07/2019    History of tobacco use 06/28/2018    Primary insomnia 06/28/2018    Bipolar depression ([facility] 01/11/2018    Macromastia 01/11/2018    Oxygen dependent 01/11/2018    Shortness of breath 12/21/2015    History of total hysterectomy with removal of both tubes and ovaries 04/11/2015    Chronic respiratory failure with hypoxia (HCC) 10/02/2014    Tobacco abuse 11/05/2013    Urinary incontinence     COPD [facility]ch[facility] obstructive [facility] disease[facility]     Migraine with aura and without status migrainosus, not intractable     Anxiety     Depression      Current Outpatient Medications   Medication Sig Dispense Refill    rizatriptan (MAXALT) 10 MG tablet Take 10 mg by mouth once as needed for Migraine (PT continues to take however chart states that she was to dc) May repeat in 2 hours if needed      promethazine-dextromethorphan (PROMETHAZINE-DM) 6.25-15 MG/5ML syrup 1-2 tsp po qhs prn cough 120 mL 2    clonazePAM (KLONOPIN) 1 MG tablet ONE BY MOUTH TWICE DAILY AS NEEDED ANXIETY 30 tablet 0    benzonatate (TESSALON) 100 MG capsule TAKE 1-2 CAPSULES BY MOUTH 3 TIMES DAILY AS NEEDED COUGH 60 capsule 3    FLUoxetine (PROZAC) 20 MG capsule Take 3 capsules by mouth daily 90 capsule 5    furosemide (LASIX) 40 MG tablet TAKE 1 TABLET BY MOUTH EVERY DAY TO TWICE DAILY AS NEEDED SWELLING 60 tablet 5    promethazine (PHENERGAN) 25 MG tablet TAKE 1 TABLET BY MOUTH EVERY 6 HOURS AS NEEDED FOR NAUSEA 30 tablet 0    butalbital-acetaminophen-caffeine (FIORICET, ESGIC) -40 MG per tablet TAKE 1 TABLET BY MOUTH 3 TIMES DAILY AS NEEDED

## 2020-03-04 ENCOUNTER — TELEPHONE (OUTPATIENT)
Dept: SURGERY | Age: 50
End: 2020-03-04

## 2020-03-09 ENCOUNTER — OFFICE VISIT (OUTPATIENT)
Dept: ORTHOPEDIC SURGERY | Age: 50
End: 2020-03-09
Payer: MEDICARE

## 2020-03-09 PROCEDURE — 4004F PT TOBACCO SCREEN RCVD TLK: CPT | Performed by: PHYSICIAN ASSISTANT

## 2020-03-09 PROCEDURE — G8428 CUR MEDS NOT DOCUMENT: HCPCS | Performed by: PHYSICIAN ASSISTANT

## 2020-03-09 PROCEDURE — G8417 CALC BMI ABV UP PARAM F/U: HCPCS | Performed by: PHYSICIAN ASSISTANT

## 2020-03-09 PROCEDURE — G8484 FLU IMMUNIZE NO ADMIN: HCPCS | Performed by: PHYSICIAN ASSISTANT

## 2020-03-09 PROCEDURE — 99213 OFFICE O/P EST LOW 20 MIN: CPT | Performed by: PHYSICIAN ASSISTANT

## 2020-03-09 NOTE — PATIENT INSTRUCTIONS
Management discussed and patient voiced understanding. They were instructed to follow up with their PCP regarding any abnormal vitals reading. Patient Education        Knee: Exercises  Introduction  Here are some examples of exercises for you to try. The exercises may be suggested for a condition or for rehabilitation. Start each exercise slowly. Ease off the exercises if you start to have pain. You will be told when to start these exercises and which ones will work best for you. How to do the exercises  Quad sets   1. Sit with your leg straight and supported on the floor or a firm bed. (If you feel discomfort in the front or back of your knee, place a small towel roll under your knee.)  2. Tighten the muscles on top of your thigh by pressing the back of your knee flat down to the floor. (If you feel discomfort under your kneecap, place a small towel roll under your knee.)  3. Hold for about 6 seconds, then rest for up to 10 seconds. 4. Do 8 to 12 repetitions several times a day. Straight-leg raises to the front   1. Lie on your back with your good knee bent so that your foot rests flat on the floor. Your injured leg should be straight. Make sure that your low back has a normal curve. You should be able to slip your flat hand in between the floor and the small of your back, with your palm touching the floor and your back touching the back of your hand. 2. Tighten the thigh muscles in the injured leg by pressing the back of your knee flat down to the floor. Hold your knee straight. 3. Keeping the thigh muscles tight, lift your injured leg up so that your heel is about 12 inches off the floor. Hold for about 6 seconds and then lower slowly. 4. Do 8 to 12 repetitions, 3 times a day. Straight-leg raises to the outside   1. Lie on your side, with your injured leg on top. 2. Tighten the front thigh muscles of your injured leg to keep your knee straight.   3. Keep your hip and your leg straight in line with motion. 3. Slowly lower your leg back to the floor. 4. Do 8 to 12 repetitions. 5. With permission from your doctor or physical therapist, you may also want to add a cuff weight to your ankle (not more than 5 pounds). With weight, you do not have to lift your leg more than 12 inches to get a hamstring workout. Shallow standing knee bends   1. Stand with your hands lightly resting on a counter or chair in front of you. Put your feet shoulder-width apart. 2. Slowly bend your knees so that you squat down like you are going to sit in a chair. Make sure your knees do not go in front of your toes. 3. Lower yourself about 6 inches. Your heels should remain on the floor at all times. 4. Rise slowly to a standing position. Heel raises   1. Stand with your feet a few inches apart, with your hands lightly resting on a counter or chair in front of you. 2. Slowly raise your heels off the floor while keeping your knees straight. 3. Hold for about 6 seconds, then slowly lower your heels to the floor. 4. Do 8 to 12 repetitions several times during the day. Follow-up care is a key part of your treatment and safety. Be sure to make and go to all appointments, and call your doctor if you are having problems. It's also a good idea to know your test results and keep a list of the medicines you take. Where can you learn more? Go to https://[facility].[employer]. org and sign in to your [facility] account. Enter X590 in the [employer] box to learn more about \"Knee: Exercises. \"     If you do not have an account, please click on the \"Sign Up Now\" link. Current as of: June 26, 2019  Content Version: 12.3  © 9589-5795 [employer]. Care instructions adapted under license by [facility] ([facility]). If you have questions about a medical condition or this instruction, always ask your healthcare professional. [address] any warranty or liability for your use of this information.

## 2020-03-09 NOTE — PROGRESS NOTES
Chief Complaint    Results (mri rt knee)      History of Present Illness:  Vanessa Veliz is a 54 y.o. female who comes in today for evaluation and treatment of right knee pain. Patient reports an injury that she sustained to her right knee back in October. She states that she was out camping when she was walking out of the camper and fell on the anterior aspect of her right knee and foot. She had pain and swelling in her right knee following the injury and eventually was seen in the emergency room where x-rays were taken. Nothing was broken on x-rays but she did spend the next couple of weeks icing taking oral anti-inflammatories. Pain is continue to be persistent mostly over the anterior medial aspect of the knee with intermittent posterior knee pain. Walking, squatting, twisting, and stairs increases her pain and discomfort. She is tried conservative treatment with no improvement. Patient continues to complain of most of her symptoms over the medial patellofemoral joint. She continues to have increased pain with activities and improvement with rest.  Patient is here for MRI results. No real change in symptoms.            Medical History:  Past Medical History:   Diagnosis Date    Anxiety     Asthma     Bipolar 1 disorder ([address]     Pt is willing to see psych after 7/15 for confirmation of dx    [facility]     COPD (chronic obstructive pulmonary disease) ([facility])     Dr Lynn Public Depression     Emphysema of lung ([facility] [address] [facility]     Migraine     Migraines     MRSA infection within last 3 months 2012    axillary    Narcolepsy 2004    Dr Mona Ohara    Obesity     Pneumonia     Sleep apnea     did not tolerate cpap    Tobacco abuse     Tobacco use disorder 1/14/2011    Urinary incontinence      Patient Active Problem List    Diagnosis Date Noted    CAP (community acquired pneumonia) 08/04/2014     Priority: High     Class: Acute    COPD exacerbation ([address] 08/04/2014     Priority: High Class:  Acute    Hemoptysis 01/07/2019    History of tobacco use 06/28/2018    Primary insomnia 06/28/2018    Bipolar depression ([facility] [address] 01/11/2018    Macromastia 01/11/2018    Oxygen dependent 01/11/2018    Shortness of breath 12/21/2015    History of total hysterectomy with removal of both tubes and ovaries 04/11/2015    Chronic respiratory failure with hypoxia (HCC) 10/02/2014    Tobacco abuse 11/05/2013    Urinary incontinence     [facility]     Migraine with aura and without status migrainosus, not intractable     Anxiety     Depression      Current Outpatient Medications   Medication Sig Dispense Refill    diclofenac (VOLTAREN) 75 MG EC tablet Take 1 tablet by mouth 2 times daily 60 tablet 3    rizatriptan (MAXALT) 10 MG tablet Take 10 mg by mouth once as needed for Migraine (PT continues to take however chart states that she was to dc) May repeat in 2 hours if needed      promethazine-dextromethorphan (PROMETHAZINE-DM) 6.25-15 MG/5ML syrup 1-2 tsp po qhs prn cough 120 mL 2    clonazePAM (KLONOPIN) 1 MG tablet ONE BY MOUTH TWICE DAILY AS NEEDED ANXIETY 30 tablet 0    benzonatate (TESSALON) 100 MG capsule TAKE 1-2 CAPSULES BY MOUTH 3 TIMES DAILY AS NEEDED COUGH 60 capsule 3    FLUoxetine (PROZAC) 20 MG capsule Take 3 capsules by mouth daily 90 capsule 5    furosemide (LASIX) 40 MG tablet TAKE 1 TABLET BY MOUTH EVERY DAY TO TWICE DAILY AS NEEDED SWELLING 60 tablet 5    promethazine (PHENERGAN) 25 MG tablet TAKE 1 TABLET BY MOUTH EVERY 6 HOURS AS NEEDED FOR NAUSEA 30 tablet 0    butalbital-acetaminophen-caffeine (FIORICET, ESGIC) -40 MG per tablet TAKE 1 TABLET BY MOUTH 3 TIMES DAILY AS NEEDED FOR MIGRAINE 30 tablet 0    amitriptyline (ELAVIL) 50 MG tablet TAKE 1 TABLET (50 MG) BY MOUTH NIGHTLY 30 tablet 5    gabapentin (NEURONTIN) 300 MG capsule TAKE ONE CAPSULE BY MOUTH ONE HOUR BEFORE BEDTIME  5    tiotropium (SPIRIVA RESPIMAT) 2.5 deformity    Palpation:  Tenderness to palpation directly over the medial joint line along with some patellofemoral pain. Continued tenderness over the medial retinaculum. Range of Motion:  Well-preserved range of motion, 0-120°. Strength:  4+/5 quad and hamstring strength    Special Tests: Negative Yasmany's examination. Stable to varus and valgus stress testing. Negative Lachman's exam.  Positive patellofemoral grind test  Skin: There are no rashes, ulcerations or lesions. Gait: Mild antalgic gait    Reflex normal deep tendon reflexes    Additional Comments:       Additional Examinations:         Contralateral Exam: Examination of the left knee reveals intact skin. There is no focal tenderness. The patient demonstrates full painless range of motion with regard to flexion and extension. Strength is 5/5 throughout all planes. Ligamentous stability is grossly intact. Examination of the right hip reveals intact skin. The patient demonstrates full painless range of motion with regards to flexion, abduction, internal and external rotation. There is no tenderness about the greater trochanter. There is a negative straight leg raise against resistance. Strength is 5/5 throughout all planes.     Radiology:     X-rays obtained previously and reviewed in office:  Views 4 views including AP weightbearing, PA flexed weightbearing, lateral, and skyline  Location right knee  Impression well-maintained joint space of all 3 compartments of the knee without any evidence of any high-grade arthritic changes, fractures, etc.         EXAMINATION:   MRI OF THE RIGHT KNEE WITHOUT CONTRAST, 3/2/2020 3:03 pm       TECHNIQUE:   Multiplanar multisequence MRI of the right knee was performed without the   administration of intravenous contrast.       COMPARISON:   None.       HISTORY:   ORDERING SYSTEM PROVIDED HISTORY: Right knee pain, unspecified chronicity   TECHNOLOGIST PROVIDED HISTORY:   Is the patient pregnant?->No

## 2020-03-20 RX ORDER — VARENICLINE TARTRATE 1 MG/1
TABLET, FILM COATED ORAL
Qty: 56 TABLET | Refills: 2 | Status: SHIPPED | OUTPATIENT
Start: 2020-03-20 | End: 2020-06-05

## 2020-03-26 ENCOUNTER — TELEPHONE (OUTPATIENT)
Dept: BARIATRICS/WEIGHT MGMT | Age: 50
End: 2020-03-26

## 2020-03-26 NOTE — TELEPHONE ENCOUNTER
Seminar Date: 3/12/20    Presenter: Dr Leah Ryan Type: Medicare    BWLS Covered: Y    Medically Supervised Diet Req:N    Length of time:     Has patient had prev bariatric or gastric surgeries : N    Is patient's BMI lower than 35 : N    If yes, BMI :    Does patient have dx of diabetes :    *If previous bariatric or gastric surgeries, please do not schedule until speaking with the provider*         appt 5/21/20

## 2020-03-30 ENCOUNTER — VIRTUAL VISIT (OUTPATIENT)
Dept: PULMONOLOGY | Age: 50
End: 2020-03-30
Payer: MEDICARE

## 2020-03-30 ENCOUNTER — TELEPHONE (OUTPATIENT)
Dept: PULMONOLOGY | Age: 50
End: 2020-03-30

## 2020-03-30 PROCEDURE — 99442 PR PHYS/QHP TELEPHONE EVALUATION 11-20 MIN: CPT | Performed by: INTERNAL MEDICINE

## 2020-03-30 NOTE — TELEPHONE ENCOUNTER
the terms described in the Terms of Service and this Telehealth Consent. The patient was read the following statement and has consented to the visit as of 3/30/20. The patient has been scheduled for their first telehealth visit on 3/30/30 with Carlito Diaz

## 2020-04-17 RX ORDER — CLONAZEPAM 1 MG/1
TABLET ORAL
Qty: 30 TABLET | Refills: 0 | Status: SHIPPED | OUTPATIENT
Start: 2020-04-17 | End: 2020-05-15

## 2020-04-20 ENCOUNTER — CLINICAL DOCUMENTATION (OUTPATIENT)
Dept: ORTHOPEDIC SURGERY | Age: 50
End: 2020-04-20

## 2020-05-09 RX ORDER — POTASSIUM CHLORIDE 20 MEQ/1
TABLET, EXTENDED RELEASE ORAL
Qty: 90 TABLET | Refills: 0 | Status: SHIPPED | OUTPATIENT
Start: 2020-05-09 | End: 2020-06-05

## 2020-05-15 RX ORDER — CLONAZEPAM 1 MG/1
TABLET ORAL
Qty: 30 TABLET | Refills: 0 | Status: SHIPPED | OUTPATIENT
Start: 2020-05-15 | End: 2020-06-05

## 2020-06-05 RX ORDER — POTASSIUM CHLORIDE 20 MEQ/1
TABLET, EXTENDED RELEASE ORAL
Qty: 90 TABLET | Refills: 0 | Status: SHIPPED | OUTPATIENT
Start: 2020-06-05 | End: 2020-11-04 | Stop reason: SDUPTHER

## 2020-06-11 ENCOUNTER — OFFICE VISIT (OUTPATIENT)
Dept: BARIATRICS/WEIGHT MGMT | Age: 50
End: 2020-06-11
Payer: MEDICARE

## 2020-06-11 VITALS
WEIGHT: 214.2 LBS | DIASTOLIC BLOOD PRESSURE: 73 MMHG | HEIGHT: 63 IN | OXYGEN SATURATION: 94 % | SYSTOLIC BLOOD PRESSURE: 106 MMHG | BODY MASS INDEX: 37.95 KG/M2 | HEART RATE: 81 BPM | RESPIRATION RATE: 18 BRPM

## 2020-06-11 PROBLEM — G47.33 OBSTRUCTIVE SLEEP APNEA: Status: ACTIVE | Noted: 2020-06-11

## 2020-06-11 PROBLEM — K21.9 CHRONIC GERD: Status: ACTIVE | Noted: 2020-06-11

## 2020-06-11 PROBLEM — E66.01 SEVERE OBESITY (BMI 35.0-39.9) WITH COMORBIDITY (HCC): Status: ACTIVE | Noted: 2020-06-11

## 2020-06-11 PROCEDURE — G8417 CALC BMI ABV UP PARAM F/U: HCPCS | Performed by: SURGERY

## 2020-06-11 PROCEDURE — 1036F TOBACCO NON-USER: CPT | Performed by: SURGERY

## 2020-06-11 PROCEDURE — 99205 OFFICE O/P NEW HI 60 MIN: CPT | Performed by: SURGERY

## 2020-06-11 PROCEDURE — 3023F SPIROM DOC REV: CPT | Performed by: SURGERY

## 2020-06-11 PROCEDURE — G8427 DOCREV CUR MEDS BY ELIG CLIN: HCPCS | Performed by: SURGERY

## 2020-06-11 PROCEDURE — G8926 SPIRO NO PERF OR DOC: HCPCS | Performed by: SURGERY

## 2020-06-11 NOTE — PROGRESS NOTES
apnea    Severe obesity (BMI 35.0-39. 9) with comorbidity (HCC)    Chronic GERD           Pain Assessment   Denies any abdominal pain     Past Medical History:   Diagnosis Date    Anxiety     Asthma     B[facility] disorder [facility]     Pt is willing to see psych after 7/15 for confirmation of dx    Chronic bronchitis ([facility] [facility]     Chronic GERD 2020    COPD (chronic obstructive pulmonary disease) ([facility])     Dr Luan [place] Depression     Emphysema of lung ([facility] [address] [facility]     Migraine     Migraines     MRSA infection within last 3 months     axillary    Narcolepsy     Dr Chiquis Reyes    Obesity     Pneumonia     Restless leg syndrome     Sleep apnea     did not tolerate cpap    Tobacco abuse     Tobacco use disorder 2011    Urinary incontinence      Past Surgical History:   Procedure Laterality Date    ABSCESS DRAINAGE      L axilla MRSA, hosp for 7 days    ADENOIDECTOMY      BLADDER SUSPENSION  5/10    Mesh removed 1/9/15 in [address]      lumpectomy, ducts removed    FINGER SURGERY      right thumb    HYSTERECTOMY   dysplasia, endometriosis, cysts, MYLENE BSO    NASAL SEPTUM SURGERY      PELVIC LAPAROSCOPY      endometriosis    MYLENE AND BSO      Dysplasia, endometriosis    TONSILLECTOMY       Family History   Problem Relation Age of Onset    Depression Mother     Breast Cancer Mother     Stroke Mother         cva x 3    Hypertension Mother     Elevated Lipids Mother     Diabetes Mother     Coronary Art Dis Father         mi  age 43    Schizophrenia Father     Hypertension Father     Elevated Lipids Father     Diabetes Father     Birth Defects Son     Other Son         odd, autism    Asthma Son     Diabetes Son     Other Brother         colitis    Mental Illness Sister     Heart Failure Paternal Grandmother         CHF    Emphysema Maternal Grandfather     Cancer Paternal Grandfather      Social History     Tobacco Use    Smoking rigidity, no rebound, no guarding and no CVA tenderness. No hernia. Hernia confirmed negative in the ventral area. Musculoskeletal: Normal range of motion. Patient exhibits no edema or tenderness. Lymphadenopathy:        Head (right side): No submental, no submandibular, no preauricular, no posterior auricular and no occipital adenopathy present. Head (left side): No submental, no submandibular, no preauricular, no posterior auricular and no occipital adenopathy present. Patient  has no cervical adenopathy. Right: No supraclavicular adenopathy present. Left: No supraclavicular adenopathy present. Neurological: Patient is alert and oriented to person, place, and time. Patient has normal strength. Coordination and gait normal. GCS eye subscore is 4. GCS verbal subscore is 5. GCS motor subscore is 6. Skin: Skin is warm and dry. No abrasion and no rash noted. Patient  is not diaphoretic. No cyanosis or erythema. Psychiatric: Patient has a normal mood and affect. speech is normal and behavior is normal. Cognition and memory are normal.         Vanessa was seen today for bariatric, initial visit. Diagnoses and all orders for this visit:    Moderate COPD (chronic obstructive pulmonary disease) ([facility] [place] [address]  -     Ambulatory referral to Cardiology  -     CBC Auto Differential; Future  -     Comprehensive Metabolic Panel; Future  -     Hemoglobin A1C; Future  -     Iron and TIBC; Future  -     Lipid Panel; Future  -     TSH with Reflex; Future  -     Vitamin B12 & Folate; Future  -     Vitamin A; Future  -     Vitamin B1, Whole Blood; Future  -     Vitamin D 25 Hydroxy; Future  -     Vitamin E; Future  -     Protime-INR; Future  -     Dede Camargo MD, Pulmonary, [facility]    Obstructive sleep apnea  -     Ambulatory referral to Cardiology  -     CBC Auto Differential; Future  -     Comprehensive Metabolic Panel; Future  -     Hemoglobin A1C; Future  -     Iron and TIBC;  Future  - mass index is 38.55 kg/m². and multiple obesity related co-morbidities. Jared Chambers is very motivated to lose weight and being more healthy. We discussed how her weight affects her overall health including:  Patient Active Problem List   Diagnosis    Urinary incontinence    Moderate COPD (chronic obstructive pulmonary disease) (HCC)    Migraine with aura and without status migrainosus, not intractable    Anxiety    Depression    Tobacco abuse    CAP (community acquired pneumonia)    COPD exacerbation ([address]    Chronic respiratory failure with hypoxia ([address]    History of total hysterectomy with removal of both tubes and ovaries    Shortness of breath    Bipolar depression ([address]    Macromastia    Oxygen dependent    History of tobacco use    Primary insomnia    Hemoptysis    Obstructive sleep apnea    Severe obesity (BMI 35.0-39. 9) with comorbidity ([address]    Chronic GERD      The patient underwent extensive dietary counseling. I have reviewed, discussed and agree with the dietary plan. Medical weight loss and different surgical options were discussed in details with patient. Heaven Walker is interested in surgical weight loss for future weight loss. Patient is interested in Laparoscopic Sleeve Gastrectomy, which I believe is an excellent option. We will proceed with pre-operative work up labs and studies. Will also petition patient's  insurance for approval for this procedure. I advised the patient that we can't guarantee final insurance approval.    Patient received dietary handouts and education. Patient advised that its their responsibility to follow up for studies, referrals and/or labs ordered today. Also discussed in details the importance of follow up, as well following the recommendations and completing the whole program to improve outcomes when it comes to healthier lifestyle as well weight loss.    Patient also advised about risks and benefits being on a strict dietary regimen as

## 2020-06-15 ENCOUNTER — TELEPHONE (OUTPATIENT)
Dept: FAMILY MEDICINE CLINIC | Age: 50
End: 2020-06-15

## 2020-06-24 ENCOUNTER — HOSPITAL ENCOUNTER (OUTPATIENT)
Age: 50
Discharge: HOME | End: 2020-06-24
Payer: MEDICARE

## 2020-06-24 LAB
A/G RATIO: 1.4 (ref 1.1–2.2)
ALBUMIN SERPL-MCNC: 4.2 G/DL (ref 3.4–5)
ALP BLD-CCNC: 85 U/L (ref 40–129)
ALT SERPL-CCNC: 30 U/L (ref 10–40)
ANION GAP SERPL CALCULATED.3IONS-SCNC: 13 MMOL/L (ref 3–16)
AST SERPL-CCNC: 27 U/L (ref 15–37)
BASOPHILS ABSOLUTE: 0.1 K/UL (ref 0–0.2)
BASOPHILS RELATIVE PERCENT: 0.6 %
BILIRUB SERPL-MCNC: <0.2 MG/DL (ref 0–1)
BUN BLDV-MCNC: 17 MG/DL (ref 7–20)
CALCIUM SERPL-MCNC: 9.1 MG/DL (ref 8.3–10.6)
CHLORIDE BLD-SCNC: 101 MMOL/L (ref 99–110)
CHOLESTEROL, TOTAL: 285 MG/DL (ref 0–199)
CO2: 21 MMOL/L (ref 21–32)
CREAT SERPL-MCNC: 0.7 MG/DL (ref 0.6–1.1)
EOSINOPHILS ABSOLUTE: 0.1 K/UL (ref 0–0.6)
EOSINOPHILS RELATIVE PERCENT: 1.7 %
FOLATE: 11.58 NG/ML (ref 4.78–24.2)
GFR AFRICAN AMERICAN: >60
GFR NON-AFRICAN AMERICAN: >60
GLOBULIN: 3.1 G/DL
GLUCOSE BLD-MCNC: 98 MG/DL (ref 70–99)
HCT VFR BLD CALC: 41.4 % (ref 36–48)
HDLC SERPL-MCNC: 55 MG/DL (ref 40–60)
HEMOGLOBIN: 14.1 G/DL (ref 12–16)
INR BLD: 0.98 (ref 0.86–1.14)
IRON SATURATION: 22 % (ref 15–50)
IRON: 70 UG/DL (ref 37–145)
LDL CHOLESTEROL CALCULATED: 206 MG/DL
LYMPHOCYTES ABSOLUTE: 2.3 K/UL (ref 1–5.1)
LYMPHOCYTES RELATIVE PERCENT: 27.6 %
MCH RBC QN AUTO: 33.2 PG (ref 26–34)
MCHC RBC AUTO-ENTMCNC: 34.1 G/DL (ref 31–36)
MCV RBC AUTO: 97.2 FL (ref 80–100)
MONOCYTES ABSOLUTE: 0.5 K/UL (ref 0–1.3)
MONOCYTES RELATIVE PERCENT: 6.1 %
NEUTROPHILS ABSOLUTE: 5.4 K/UL (ref 1.7–7.7)
NEUTROPHILS RELATIVE PERCENT: 64 %
PDW BLD-RTO: 13.3 % (ref 12.4–15.4)
PLATELET # BLD: 253 K/UL (ref 135–450)
PMV BLD AUTO: 8.1 FL (ref 5–10.5)
POTASSIUM SERPL-SCNC: 3.9 MMOL/L (ref 3.5–5.1)
PROTHROMBIN TIME: 11.4 SEC (ref 10–13.2)
RBC # BLD: 4.26 M/UL (ref 4–5.2)
SODIUM BLD-SCNC: 135 MMOL/L (ref 136–145)
TOTAL IRON BINDING CAPACITY: 324 UG/DL (ref 260–445)
TOTAL PROTEIN: 7.3 G/DL (ref 6.4–8.2)
TRIGL SERPL-MCNC: 122 MG/DL (ref 0–150)
TSH REFLEX: 2.53 UIU/ML (ref 0.27–4.2)
VITAMIN B-12: 527 PG/ML (ref 211–911)
VITAMIN D 25-HYDROXY: 21.5 NG/ML
VLDLC SERPL CALC-MCNC: 24 MG/DL
WBC # BLD: 8.4 K/UL (ref 4–11)

## 2020-06-24 PROCEDURE — 82306 VITAMIN D 25 HYDROXY: CPT

## 2020-06-24 PROCEDURE — 82746 ASSAY OF FOLIC ACID SERUM: CPT

## 2020-06-24 PROCEDURE — 84443 ASSAY THYROID STIM HORMONE: CPT

## 2020-06-24 PROCEDURE — 83540 ASSAY OF IRON: CPT

## 2020-06-24 PROCEDURE — 80053 COMPREHEN METABOLIC PANEL: CPT

## 2020-06-24 PROCEDURE — 84590 ASSAY OF VITAMIN A: CPT

## 2020-06-24 PROCEDURE — 80061 LIPID PANEL: CPT

## 2020-06-24 PROCEDURE — 85610 PROTHROMBIN TIME: CPT

## 2020-06-24 PROCEDURE — 85025 COMPLETE CBC W/AUTO DIFF WBC: CPT

## 2020-06-24 PROCEDURE — 84446 ASSAY OF VITAMIN E: CPT

## 2020-06-24 PROCEDURE — 83036 HEMOGLOBIN GLYCOSYLATED A1C: CPT

## 2020-06-24 PROCEDURE — 84425 ASSAY OF VITAMIN B-1: CPT

## 2020-06-24 PROCEDURE — 36415 COLL VENOUS BLD VENIPUNCTURE: CPT

## 2020-06-24 PROCEDURE — 83550 IRON BINDING TEST: CPT

## 2020-06-24 PROCEDURE — 82607 VITAMIN B-12: CPT

## 2020-06-25 PROBLEM — E78.2 MIXED HYPERLIPIDEMIA: Status: ACTIVE | Noted: 2020-06-25

## 2020-06-25 PROBLEM — E55.9 VITAMIN D DEFICIENCY: Status: ACTIVE | Noted: 2020-06-25

## 2020-06-25 LAB
ESTIMATED AVERAGE GLUCOSE: 91.1 MG/DL
HBA1C MFR BLD: 4.8 %

## 2020-06-27 LAB
ALPHA-TOCOPHEROL: 12.1 MG/L (ref 5.5–18)
GAMMA-TOCOPHEROL: 2.2 MG/L (ref 0–6)
RETINYL PALMITATE: 0.04 MG/L (ref 0–0.1)
VITAMIN A LEVEL: 0.53 MG/L (ref 0.3–1.2)
VITAMIN A, INTERP: NORMAL
VITAMIN B1 WHOLE BLOOD: 137 NMOL/L (ref 70–180)

## 2020-07-06 ENCOUNTER — OFFICE VISIT (OUTPATIENT)
Dept: PRIMARY CARE CLINIC | Age: 50
End: 2020-07-06
Payer: MEDICARE

## 2020-07-06 PROCEDURE — G8428 CUR MEDS NOT DOCUMENT: HCPCS | Performed by: NURSE PRACTITIONER

## 2020-07-06 PROCEDURE — G8417 CALC BMI ABV UP PARAM F/U: HCPCS | Performed by: NURSE PRACTITIONER

## 2020-07-06 PROCEDURE — 99211 OFF/OP EST MAY X REQ PHY/QHP: CPT | Performed by: NURSE PRACTITIONER

## 2020-07-06 NOTE — PROGRESS NOTES
Name_______________________________________Printed:____________________  Date and time of surgery___7/10/2020  1020_____________________Arrival Time:_0820  hosp-endo_______________   1. The instructions given regarding when and if a patient needs to stop oral intake prior to surgery varies. Follow the specific instructions you were given                  XXX___Nothing to eat or to drink after Midnight the night before.                             ____Endoscopy patient follow your DRS instructions-generally you will be doing a part of the prep after Midnight                   ____Carbo loading or ERAS instructions will be given to select patients-if you have been given those instructions -please do the following                           The evening before your surgery after dinner before midnight drink 40 ounces of gatorade. If you are diabetic use sugar free. The morning of surgery drink 40 ounces of water. This needs to be finished 3 hours prior to your surgery start time. 2. Take the following pills with a small sip of water on the morning of surgery________spiriva, ventolin prn_________________________________________                  Do not take blood pressure medications ending in pril or sartan the fidel prior to surgery or the morning of surgery_   3. Aspirin, Ibuprofen, Advil, Naproxen, Vitamin E and other Anti-inflammatory products and supplements should be stopped for 5 -7days before surgery or as directed by your physician. 4. Check with your Doctor regarding stopping Plavix, Coumadin,Eliquis, Lovenox,Effient,Pradaxa,Xarelto, Fragmin or other blood thinners and follow their instructions. 5. Do not smoke, and do not drink any alcoholic beverages 24 hours prior to surgery. This includes NA Beer. Refrain from the usage of any recreational drugs. 6. You may brush your teeth and gargle the morning of surgery. DO NOT SWALLOW WATER   7.  You MUST make arrangements for a responsible adult to stay on site while you are here and take you home after your surgery. You will not be allowed to leave alone or drive yourself home. It is strongly suggested someone stay with you the first 24 hrs. Your surgery will be cancelled if you do not have a ride home. 8. A parent/legal guardian must accompany a child scheduled for surgery and plan to stay at the hospital until the child is discharged. Please do not bring other children with you. 9. Please wear simple, loose fitting clothing to the hospital.  Edwin Vazquez not bring valuables (money, credit cards, checkbooks, etc.) Do not wear any makeup (including no eye makeup) or nail polish on your fingers or toes. 10. DO NOT wear any jewelry or piercings on day of surgery. All body piercing jewelry must be removed. 11. If you have ___dentures, they will be removed before going to the OR; we will provide you a container. If you wear ___contact lenses or ___glasses, they will be removed; please bring a case for them. 12. Please see your family doctor/pediatrician for a history & physical and/or concerning medications. Bring any test results/reports from your physician's office. PCP__________________Phone___________H&P Appt. Date________             13 If you  have a Living Will and Durable Power of  for Healthcare, please bring in a copy. 15. Notify your Surgeon if you develop any illness between now and surgery  time, cough, cold, fever, sore throat, nausea, vomiting, etc.  Please notify your surgeon if you experience dizziness, shortness of breath or blurred vision between now & the time of your surgery             15. DO NOT shave your operative site 96 hours prior to surgery. For face & neck surgery, men may use an electric razor 48 hours prior to surgery. 16. Shower the night before or morning of surgery using an antibacterial soap or as you have been instructed.              17. To provide excellent care visitors will be limited to one in the room at any given time. 18.  Please bring picture ID and insurance card. 19.  Visit our web site for additional information:  [email or web address]              20.During flu season no children under the age of 15 are permitted in the hospital for the safety of all patients. 21. If you take a long acting insulin in the evening only  take half of your usual  dose the night  before your procedure              22. If you use a c-pap please bring DOS if staying overnight,             23.For your convenience [facility] has a pharmacy on site to fill your prescriptions. 24. If you use oxygen and have a portable tank please bring it  with you the DOS             25. Bring a complete list of all your medications with name and dose include any supplements. 26. Other__________________________________________   *Please call pre admission testing if you any further questions   [facility] [place]   [address]    [phone]  [phone]   [address] [place] [address]       All above information reviewed with patient in person or by phone. Patient verbalizes understanding. All questions and concerns addressed. Patient/Rep____________________                                                                                                                                    There is a one visitor policy at [facility] for the pre-op phase only for surgery and endoscopy cases. The visitor is expected to leave the facility after the patient is taken back for the procedure. Pain management is NO VISITOR policyThe patients ride is expected to remain in the car with a cell phone for communication. If the ride is leaving the hospital grounds please make sure they are back in time for pickup.  Have the patient inform the staff on arrival what their rides plans are while the patient is in the facility. At the MAIN there is one visitor allowed. Please note that the visitor policy is subject to change.

## 2020-07-06 NOTE — PROGRESS NOTES
Mane Garcia received a viral test for COVID-19. They were educated on isolation and quarantine as appropriate. For any symptoms, they were directed to seek care from their PCP, given contact information to establish with a doctor, directed to an urgent care or the emergency room.

## 2020-07-09 RX ORDER — FLUOXETINE HYDROCHLORIDE 20 MG/1
60 CAPSULE ORAL DAILY
Qty: 90 CAPSULE | Refills: 5 | Status: SHIPPED | OUTPATIENT
Start: 2020-07-09 | End: 2020-12-31

## 2020-07-10 ENCOUNTER — ANESTHESIA (OUTPATIENT)
Dept: ENDOSCOPY | Age: 50
End: 2020-07-10
Payer: MEDICARE

## 2020-07-10 ENCOUNTER — HOSPITAL ENCOUNTER (OUTPATIENT)
Age: 50
Setting detail: OUTPATIENT SURGERY
Discharge: HOME | End: 2020-07-10
Attending: SURGERY | Admitting: SURGERY
Payer: MEDICARE

## 2020-07-10 ENCOUNTER — ANESTHESIA EVENT (OUTPATIENT)
Dept: ENDOSCOPY | Age: 50
End: 2020-07-10
Payer: MEDICARE

## 2020-07-10 VITALS
BODY MASS INDEX: 37.94 KG/M2 | WEIGHT: 206.2 LBS | HEIGHT: 62 IN | DIASTOLIC BLOOD PRESSURE: 81 MMHG | SYSTOLIC BLOOD PRESSURE: 108 MMHG | TEMPERATURE: 97.2 F | OXYGEN SATURATION: 97 % | HEART RATE: 82 BPM | RESPIRATION RATE: 16 BRPM

## 2020-07-10 VITALS
RESPIRATION RATE: 20 BRPM | DIASTOLIC BLOOD PRESSURE: 68 MMHG | SYSTOLIC BLOOD PRESSURE: 94 MMHG | OXYGEN SATURATION: 93 %

## 2020-07-10 LAB
SARS-COV-2, NAAT: NOT DETECTED
SARS-COV-2: NOT DETECTED
SOURCE: NORMAL

## 2020-07-10 PROCEDURE — 3700000000 HC ANESTHESIA ATTENDED CARE: Performed by: SURGERY

## 2020-07-10 PROCEDURE — 43239 EGD BIOPSY SINGLE/MULTIPLE: CPT | Performed by: SURGERY

## 2020-07-10 PROCEDURE — 7100000011 HC PHASE II RECOVERY - ADDTL 15 MIN: Performed by: SURGERY

## 2020-07-10 PROCEDURE — 2500000003 HC RX 250 WO HCPCS: Performed by: NURSE ANESTHETIST, CERTIFIED REGISTERED

## 2020-07-10 PROCEDURE — 2580000003 HC RX 258: Performed by: SURGERY

## 2020-07-10 PROCEDURE — 2709999900 HC NON-CHARGEABLE SUPPLY: Performed by: SURGERY

## 2020-07-10 PROCEDURE — 7100000010 HC PHASE II RECOVERY - FIRST 15 MIN: Performed by: SURGERY

## 2020-07-10 PROCEDURE — 3609012400 HC EGD TRANSORAL BIOPSY SINGLE/MULTIPLE: Performed by: SURGERY

## 2020-07-10 PROCEDURE — 6360000002 HC RX W HCPCS: Performed by: NURSE ANESTHETIST, CERTIFIED REGISTERED

## 2020-07-10 PROCEDURE — 88305 TISSUE EXAM BY PATHOLOGIST: CPT

## 2020-07-10 PROCEDURE — U0002 COVID-19 LAB TEST NON-CDC: HCPCS

## 2020-07-10 RX ORDER — DICLOFENAC SODIUM 75 MG/1
75 TABLET, DELAYED RELEASE ORAL 2 TIMES DAILY
Qty: 60 TABLET | Refills: 3 | Status: SHIPPED | OUTPATIENT
Start: 2020-07-10 | End: 2020-08-12

## 2020-07-10 RX ORDER — PROPOFOL 10 MG/ML
INJECTION, EMULSION INTRAVENOUS PRN
Status: DISCONTINUED | OUTPATIENT
Start: 2020-07-10 | End: 2020-07-10 | Stop reason: SDUPTHER

## 2020-07-10 RX ORDER — OMEPRAZOLE 20 MG/1
20 CAPSULE, DELAYED RELEASE ORAL DAILY
Qty: 30 CAPSULE | Refills: 3 | Status: SHIPPED | OUTPATIENT
Start: 2020-07-10 | End: 2021-01-06 | Stop reason: SDUPTHER

## 2020-07-10 RX ORDER — SODIUM CHLORIDE 9 MG/ML
INJECTION, SOLUTION INTRAVENOUS CONTINUOUS
Status: DISCONTINUED | OUTPATIENT
Start: 2020-07-10 | End: 2020-07-10 | Stop reason: HOSPADM

## 2020-07-10 RX ORDER — LIDOCAINE HYDROCHLORIDE 20 MG/ML
INJECTION, SOLUTION INFILTRATION; PERINEURAL PRN
Status: DISCONTINUED | OUTPATIENT
Start: 2020-07-10 | End: 2020-07-10 | Stop reason: SDUPTHER

## 2020-07-10 RX ADMIN — SODIUM CHLORIDE: 9 INJECTION, SOLUTION INTRAVENOUS at 08:59

## 2020-07-10 RX ADMIN — LIDOCAINE HYDROCHLORIDE 100 MG: 20 INJECTION, SOLUTION INFILTRATION; PERINEURAL at 09:48

## 2020-07-10 RX ADMIN — PROPOFOL 40 MG: 10 INJECTION, EMULSION INTRAVENOUS at 09:51

## 2020-07-10 RX ADMIN — PROPOFOL 120 MG: 10 INJECTION, EMULSION INTRAVENOUS at 09:49

## 2020-07-10 ASSESSMENT — PAIN SCALES - GENERAL
PAINLEVEL_OUTOF10: 0

## 2020-07-10 ASSESSMENT — PULMONARY FUNCTION TESTS
PIF_VALUE: 0
PIF_VALUE: 1
PIF_VALUE: 0

## 2020-07-10 ASSESSMENT — PAIN - FUNCTIONAL ASSESSMENT: PAIN_FUNCTIONAL_ASSESSMENT: 0-10

## 2020-07-10 ASSESSMENT — LIFESTYLE VARIABLES: SMOKING_STATUS: 1

## 2020-07-10 NOTE — ANESTHESIA PRE PROCEDURE
Department of Anesthesiology  Preprocedure Note       Name:  Radu Draper   Age:  54 y.o.  :  1970                                          MRN:  [id number]         Date:  7/10/2020      Surgeon: Betsy Ohara): Alicia Gamboa MD    Procedure: Procedure(s):  EGD BIOPSY    Medications prior to admission:   Prior to Admission medications    Medication Sig Start Date End Date Taking? Authorizing Provider   clonazePAM (KLONOPIN) 1 MG tablet TAKE ONE BY MOUTH TWICE DAILY AS NEEDED ANXIETY 7/11/20 8/10/20 Yes Mounika Hopper MD   VENTOLIN  (90 Base) MCG/ACT inhaler INHALE 2 PUFFS INTO THE LUNGS 4 TIMES DAILY AS NEEDED. 5/15/20  Yes Sujata Cordero MD   amitriptyline (ELAVIL) 50 MG tablet TAKE 1 TABLET (50 MG) BY MOUTH NIGHTLY 20  Yes Rebbecca JENNIFER Kim - CNP   gabapentin (NEURONTIN) 300 MG capsule TAKE ONE CAPSULE BY MOUTH ONE HOUR BEFORE BEDTIME 19  Yes Historical Provider, MD   OXYGEN Inhale 2 L/min into the lungs continuous.  Regulate with portability at home  Patient taking differently: Inhale 2 L/min into the lungs as needed  14  Yes Floridalma Reddy MD   FLUoxetine (PROZAC) 20 MG capsule TAKE 3 CAPSULES BY MOUTH DAILY 20   Keyonna Segura MD   CHANTIX CONTINUING MONTH COLUMBA 1 MG tablet TAKE 1 TABLET BY MOUTH TWICE DAILY 20   Rebbecca Julio APRN - CNP   potassium chloride (KLOR-CON M) 20 MEQ extended release tablet TAKE 1 TABLET BY MOUTH DAILY 20   JENNIFER Elena CNP   topiramate (TOPAMAX) 100 MG tablet TAKE 1 TABLET BY MOUTH 2 TIMES DAILY 20   Rebbecca Binder APRN - CNP   rizatriptan (MAXALT) 10 MG tablet Take 10 mg by mouth once as needed for Migraine (PT continues to take however chart states that she was to dc) May repeat in 2 hours if needed    Historical Provider, MD   promethazine-dextromethorphan (PROMETHAZINE-DM) 6.25-15 MG/5ML syrup 1-2 tsp po qhs prn cough 20   Keyonna Segura MD   benzonatate (TESSALON) 100 MG capsule TAKE 1-2 CAPSULES BY MOUTH 3 TIMES DAILY AS NEEDED COUGH 1/27/20   Taty Guthrie MD   furosemide (LASIX) 40 MG tablet TAKE 1 TABLET BY MOUTH EVERY DAY TO TWICE DAILY AS NEEDED SWELLING 12/26/19   Taty Guthrie MD   promethazine (PHENERGAN) 25 MG tablet TAKE 1 TABLET BY MOUTH EVERY 6 HOURS AS NEEDED FOR NAUSEA 12/26/19   Mounika Hopper MD   butalbital-acetaminophen-caffeine (FIORICET, ESGIC) -40 MG per tablet TAKE 1 TABLET BY MOUTH 3 TIMES DAILY AS NEEDED FOR MIGRAINE 11/26/19   Taty Guthrie MD   tiotropium (SPIRIVA RESPIMAT) 2.5 MCG/ACT AERS inhaler Inhale 2 puffs into the lungs daily 9/26/19   Tabby Carrero MD   amphetamine-dextroamphetamine (ADDERALL XR) 20 MG extended release capsule TAKE 1 CAPSULE BY MOUTH EVERY DAY 6/14/19   Historical Provider, MD   methylphenidate (RITALIN LA) 30 MG extended release capsule Take 20 mg by mouth 3 times daily. Minal Malik Historical Provider, MD   montelukast (SINGULAIR) 10 MG tablet TAKE 1 TABLET BY MOUTH EVERY DAY  Patient taking differently: as needed  11/21/17   Taty Guthrie MD   LORATADINE-D 12HR 5-120 MG per extended release tablet TAKE 1 TABLET BY MOUTH EVERY MORNING AS NEEDED FOR ALLERGY 7/17/17   Taty Guthrie MD   Respiratory Therapy Supplies (NEBULIZER/TUBING/MOUTHPIECE) KIT 1 kit by Does not apply route daily as needed 10/21/15   Taty Guthrie MD       Current medications:    Current Facility-Administered Medications   Medication Dose Route Frequency Provider Last Rate Last Dose    0.9 % sodium chloride infusion   Intravenous Continuous Vidal Bonilla  mL/hr at 07/10/20 0859         Allergies: Allergies   Allergen Reactions    Ambien [Zolpidem Tartrate] Other (See Comments)     Shakey, anxious    Dilaudid [Hydromorphone Hcl] Other (See Comments)     Feels like skin is on fire.  Fentanyl Itching    Imitrex [Sumatriptan]      Face hot, felt sob    Morphine Other (See Comments)     Feels like skin is on fire.   Tolerates Percocet. Problem List:    Patient Active Problem List   Diagnosis Code    Urinary incontinence R32    Moderate COPD (chronic obstructive pulmonary disease) (formerly [facility]) J44.9    Migraine with aura and without status migrainosus, not intractable G43. 109    Anxiety F41.9    Depression F32.9    Tobacco abuse Z72.0    CAP (community acquired pneumonia) J18.9    COPD exacerbation (formerly [facility]) J44.1    Chronic respiratory failure with hypoxia (formerly [facility]) J96.11    History of total hysterectomy with removal of both tubes and ovaries Z90.710, Z90.722, Z90.79    Shortness of breath R06.02    Bipolar depression (formerly [facility]) F31.9    Macromastia N62    Oxygen dependent Z99.81    History of tobacco use Z87.891    Primary insomnia F51.01    Hemoptysis R04.2    Obstructive sleep apnea G47.33    Severe obesity (BMI 35.0-39. 9) with comorbidity ([facility] E66.01    Chronic GERD K21.9    Mixed hyperlipidemia E78.2    Vitamin D deficiency E55.9       Past Medical History:        Diagnosis Date    Anxiety     Asthma     Bipolar 1 disorder ([address]     Pt is willing to see psych after 7/15 for confirmation of dx    Chronic bronchitis ([address]     Chronic GERD 6/11/2020    COPD (chronic obstructive pulmonary disease) ([facility]     Dr Jania Moore Depression     Emphysema of lung ([facility]     Migraine     Migraines     Mixed hyperlipidemia 6/25/2020    MRSA infection within last 3 months 2012    axillary    Narcolepsy 2004    Dr Eobni Keller    Obesity     Pneumonia     Restless leg syndrome     Sleep apnea     did not tolerate cpap    Tobacco abuse     Tobacco use disorder 1/14/2011    Urinary incontinence        Past Surgical History:        Procedure Laterality Date    ABSCESS DRAINAGE  2012    L axilla MRSA, hosp for 7 days    ADENOIDECTOMY      BLADDER SUSPENSION  5/10    Mesh removed 1/9/[address] in [address]  2/09    lumpectomy, ducts removed    FINGER SURGERY      right thumb    HYSTERECTOMY  2001 2/2 dysplasia, BILITOT <0.2 06/24/2020    ALKPHOS 85 06/24/2020    AST 27 06/24/2020    ALT 30 06/24/2020       POC Tests: No results for input(s): POCGLU, POCNA, POCK, POCCL, POCBUN, POCHEMO, POCHCT in the last 72 hours. Coags:   Lab Results   Component Value Date    PROTIME 11.4 06/24/2020    INR 0.98 06/24/2020    APTT 31.6 08/20/2018       HCG (If Applicable):   Lab Results   Component Value Date    PREGTESTUR negative 06/19/2011        ABGs: No results found for: PHART, PO2ART, LYD5NCQ, XSL0JON, BEART, K7QHZCNX     Type & Screen (If Applicable):  No results found for: LABABO, LABRH    Drug/Infectious Status (If Applicable):  No results found for: HIV, HEPCAB    COVID-19 Screening (If Applicable):   Lab Results   Component Value Date    COVID19 Not Detected 07/10/2020         Anesthesia Evaluation  Patient summary reviewed and Nursing notes reviewed  Airway: Mallampati: III        Dental: normal exam         Pulmonary:normal exam    (+) COPD:  sleep apnea:  current smoker                           Cardiovascular:  Exercise tolerance: good (>4 METS),   (+) hyperlipidemia        Rhythm: regular                      Neuro/Psych:               GI/Hepatic/Renal:   (+) GERD:, morbid obesity          Endo/Other:                     Abdominal:   (+) obese,         Vascular:                                        Anesthesia Plan      MAC     ASA 4       Induction: intravenous. Anesthetic plan and risks discussed with patient. Plan discussed with CRNA.                   Clara Oconnor MD   7/10/2020

## 2020-07-10 NOTE — PROGRESS NOTES
Reviewed patient's medical and surgical history in electronic record and with patient at the bedside. All questions regarding procedure answered. Scope number and equipment verified using a two person system. Family in waiting room.     Electronically signed by Gabby Barrientos RN on 7/10/2020 at 9:48 AM

## 2020-07-10 NOTE — H&P
Department of [address] [place] [address] Physicians   [employer]  Attending Pre-operative History and Physical      DIAGNOSIS:  Obesity    INDICATION:  Pre-op    PROCEDURE:  EGD    CHIEF COMPLAINT:  Obesity    History Obtained From:  patient    HISTORY OF PRESENT ILLNESS:    The patient is a 54 y.o. female with significant past medical history of   Patient Active Problem List   Diagnosis    Urinary incontinence    Moderate COPD (chronic obstructive pulmonary disease) ([facility])    Migraine with aura and without status migrainosus, not intractable    Anxiety    Depression    Tobacco abuse    CAP (community acquired pneumonia)    COPD exacerbation ([facility])    Chronic respiratory failure with hypoxia ([address]    History of total hysterectomy with removal of both tubes and ovaries    Shortness of breath    Bipolar depression ([address]    Macromastia    Oxygen dependent    History of tobacco use    Primary insomnia    Hemoptysis    Obstructive sleep apnea    Severe obesity (BMI 35.0-39. 9) with comorbidity ([address]    Chronic GERD    Mixed hyperlipidemia    Vitamin D deficiency      who presents for pre-op EGD    Past Medical History:        Diagnosis Date    Anxiety     Asthma     Bipolar 1 disorder ([facility])     Pt is willing to see psych after 7/15 for confirmation of dx    Chronic bronchitis ([address]     Chronic GERD 6/11/2020    COPD (chronic obstructive pulmonary disease) ([address]     Dr Kasey Haines Depression     Emphysema of lung ([address]     Migraine     Migraines     Mixed hyperlipidemia 6/25/2020    MRSA infection within last 3 months 2012    axillary    Narcolepsy 2004    Dr Miguel Khan    Obesity     Pneumonia     Restless leg syndrome     Sleep apnea     did not tolerate cpap    Tobacco abuse     Tobacco use disorder 1/14/2011    Urinary incontinence      Past Surgical History:        Procedure Laterality Date    ABSCESS DRAINAGE  2012    L axilla MRSA, hosp for 7 days    ADENOIDECTOMY      BLADDER SUSPENSION  5/10    Mesh removed 1/9/15 in [place], [address] [place] [address]  2/09    lumpectomy, ducts removed    FINGER SURGERY      right thumb    HYSTERECTOMY  2001 2/2 dysplasia, endometriosis, cysts, MYLENE BSO    NASAL SEPTUM SURGERY      PELVIC LAPAROSCOPY      endometriosis    MYLENE AND BSO  2001    Dysplasia, endometriosis    TONSILLECTOMY       Medications Prior to Admission:   Medications Prior to Admission: [START ON 7/11/2020] clonazePAM (KLONOPIN) 1 MG tablet, TAKE ONE BY MOUTH TWICE DAILY AS NEEDED ANXIETY  VENTOLIN  (90 Base) MCG/ACT inhaler, INHALE 2 PUFFS INTO THE LUNGS 4 TIMES DAILY AS NEEDED. amitriptyline (ELAVIL) 50 MG tablet, TAKE 1 TABLET (50 MG) BY MOUTH NIGHTLY  gabapentin (NEURONTIN) 300 MG capsule, TAKE ONE CAPSULE BY MOUTH ONE HOUR BEFORE BEDTIME  OXYGEN, Inhale 2 L/min into the lungs continuous.  Regulate with portability at home (Patient taking differently: Inhale 2 L/min into the lungs as needed )  FLUoxetine (PROZAC) 20 MG capsule, TAKE 3 CAPSULES BY MOUTH DAILY  CHANTIX CONTINUING MONTH COLUMBA 1 MG tablet, TAKE 1 TABLET BY MOUTH TWICE DAILY  potassium chloride (KLOR-CON M) 20 MEQ extended release tablet, TAKE 1 TABLET BY MOUTH DAILY  topiramate (TOPAMAX) 100 MG tablet, TAKE 1 TABLET BY MOUTH 2 TIMES DAILY  rizatriptan (MAXALT) 10 MG tablet, Take 10 mg by mouth once as needed for Migraine (PT continues to take however chart states that she was to dc) May repeat in 2 hours if needed  promethazine-dextromethorphan (PROMETHAZINE-DM) 6.25-15 MG/5ML syrup, 1-2 tsp po qhs prn cough  benzonatate (TESSALON) 100 MG capsule, TAKE 1-2 CAPSULES BY MOUTH 3 TIMES DAILY AS NEEDED COUGH  furosemide (LASIX) 40 MG tablet, TAKE 1 TABLET BY MOUTH EVERY DAY TO TWICE DAILY AS NEEDED SWELLING  promethazine (PHENERGAN) 25 MG tablet, TAKE 1 TABLET BY MOUTH EVERY 6 HOURS AS NEEDED FOR NAUSEA  butalbital-acetaminophen-caffeine (FIORICET, ESGIC) -40 MG per tablet, TAKE 1 TABLET BY MOUTH 3 TIMES DAILY AS NEEDED FOR MIGRAINE  tiotropium (SPIRIVA RESPIMAT) 2.5 MCG/ACT AERS inhaler, Inhale 2 puffs into the lungs daily  amphetamine-dextroamphetamine (ADDERALL XR) 20 MG extended release capsule, TAKE 1 CAPSULE BY MOUTH EVERY DAY  methylphenidate (RITALIN LA) 30 MG extended release capsule, Take 20 mg by mouth 3 times daily. .  montelukast (SINGULAIR) 10 MG tablet, TAKE 1 TABLET BY MOUTH EVERY DAY (Patient taking differently: as needed )  LORATADINE-D 12HR 5-120 MG per extended release tablet, TAKE 1 TABLET BY MOUTH EVERY MORNING AS NEEDED FOR ALLERGY  Respiratory Therapy Supplies (NEBULIZER/TUBING/MOUTHPIECE) KIT, 1 kit by Does not apply route daily as needed    Allergies:  Ambien [zolpidem tartrate]; Dilaudid [hydromorphone hcl]; Fentanyl; Imitrex [sumatriptan]; and Morphine    Social History:   TOBACCO:   reports that she quit smoking about 5 months ago. Her smoking use included cigarettes. She has a 5.50 pack-year smoking history. She has never used smokeless tobacco.  ETOH:   reports previous alcohol use.   Family History:       Problem Relation Age of Onset    Depression Mother     Breast Cancer Mother     Stroke Mother         cva x 3    Hypertension Mother     Elevated Lipids Mother     Diabetes Mother     Coronary Art Dis Father         mi  age 43    Schizophrenia Father     Hypertension Father     Elevated Lipids Father     Diabetes Father     Birth Defects Son     Other Son         odd, autism    Asthma Son     Diabetes Son     Other Brother         colitis    Mental Illness Sister     Heart Failure Paternal Grandmother         CHF    Emphysema Maternal Grandfather     Cancer Paternal Grandfather          REVIEW OF SYSTEMS:    Review of Systems - History obtained from the patient  General ROS: negative  Psychological ROS: negative  Ophthalmic ROS: negative  Neurological ROS: negative  ENT ROS: negative  Allergy and Immunology ROS: negative  Hematological and Lymphatic ROS: negative  Endocrine ROS: negative  Breast ROS: negative  Respiratory ROS: negative  Cardiovascular ROS: negative  Gastrointestinal ROS:negative  Genito-Urinary ROS: negative  Musculoskeletal ROS: negative   Skin ROS: negative      PHYSICAL EXAM:      BP (!) 104/90   Pulse 91   Temp 97.4 °F (36.3 °C) (Temporal)   Resp 18   Ht 5' 1.5\" (1.562 m)   Wt 206 lb 3.2 oz (93.5 kg)   LMP  (LMP Unknown)   SpO2 92%   BMI 38.33 kg/m²  I      Physical Exam   Vitals Reviewed   Constitutional: Patient is oriented to person, place, and time. Patient appears well-developed and well-nourished. Patient is active and cooperative. Non-toxic appearance. No distress. HENT:   Head: Normocephalic and atraumatic. Head is without abrasion and without laceration. Hair is normal.   Right Ear: External ear normal. No lacerations. No drainage, swelling . Left Ear: External ear normal. No lacerations. No drainage, swelling. Nose: Nose normal. No nose lacerations or nasal deformity. Eyes: Conjunctivae, EOM and lids are normal. Right eye exhibits no discharge. No foreign body present in the right eye. Left eye exhibits no discharge. No foreign body present in the left eye. No scleral icterus. Neck: Trachea normal and normal range of motion. No JVD present. Pulmonary/Chest: Effort normal. No accessory muscle usage or stridor. No apnea. No respiratory distress. Cardiovascular: Normal rate and no JVD. Abdominal: Normal appearance. Patient exhibits no distension. Musculoskeletal: Normal range of motion. Patient exhibits no edema. Neurological: Patient is alert and oriented to person, place, and time. Patient has normal strength. GCS eye subscore is 4. GCS verbal subscore is 5. GCS motor subscore is 6. Skin: Skin is warm and dry. No abrasion and no rash noted. Patient is not diaphoretic. No cyanosis or erythema. Psychiatric: Patient has a normal mood and affect.  Speech is normal and behavior is normal. Cognition and memory are normal.       DATA:  CBC:   Lab Results   Component Value Date    WBC 8.4 06/24/2020    RBC 4.26 06/24/2020    HGB 14.1 06/24/2020    HCT 41.4 06/24/2020    MCV 97.2 06/24/2020    MCH 33.2 06/24/2020    MCHC 34.1 06/24/2020    RDW 13.3 06/24/2020     06/24/2020    MPV 8.1 06/24/2020     CMP:    Lab Results   Component Value Date     06/24/2020    K 3.9 06/24/2020     06/24/2020    CO2 21 06/24/2020    BUN 17 06/24/2020    CREATININE 0.7 06/24/2020    GFRAA >60 06/24/2020    AGRATIO 1.4 06/24/2020    LABGLOM >60 06/24/2020    GLUCOSE 98 06/24/2020    PROT 7.3 06/24/2020    LABALBU 4.2 06/24/2020    CALCIUM 9.1 06/24/2020    BILITOT <0.2 06/24/2020    ALKPHOS 85 06/24/2020    AST 27 06/24/2020    ALT 30 06/24/2020       ASSESSMENT AND PLAN:    1. Patient is a 54 y.o. female with above specified procedure planned EGD with deep sedation  2. Procedure options, risks and benefits reviewed with patient. Patient expresses understanding.

## 2020-07-10 NOTE — ANESTHESIA POSTPROCEDURE EVALUATION
Department of Anesthesiology  Postprocedure Note    Patient: Maci Jean-Baptiste  MRN: [id number]  YOB: 1970  Date of evaluation: 7/10/2020  Time:  1:29 PM     Procedure Summary     Date:  07/10/20 Room / Location:  [address]    Anesthesia Start:  Howie Squire Anesthesia Stop:  7064    Procedure:  EGD BIOPSY (N/A ) Diagnosis:  (GERD K21.9)    Surgeon:  Feliz Mckeon MD Responsible Provider:  Garret Medrano MD    Anesthesia Type:  MAC ASA Status:  4          Anesthesia Type: No value filed. Niranjan Phase I: Niranjan Score: 10    Niranjan Phase II: Niranjan Score: 10    Last vitals: Reviewed and per EMR flowsheets.        Anesthesia Post Evaluation    Patient location during evaluation: PACU  Complications: no  Cardiovascular status: blood pressure returned to baseline  Respiratory status: acceptable

## 2020-07-13 ENCOUNTER — TELEPHONE (OUTPATIENT)
Dept: BARIATRICS/WEIGHT MGMT | Age: 50
End: 2020-07-13

## 2020-07-27 ENCOUNTER — TELEPHONE (OUTPATIENT)
Dept: FAMILY MEDICINE CLINIC | Age: 50
End: 2020-07-27

## 2020-07-27 NOTE — TELEPHONE ENCOUNTER
----- Message from Cristy Restrepo sent at 7/27/2020  1:41 PM EDT -----  Subject: Message to Provider    QUESTIONS  Information for Provider? dropped off papers needing filled out by pcp   over a month ago   needing them filled out and sent to Dr. Amado Barrett. said they havent been   received yet.  ---------------------------------------------------------------------------  --------------  [address]  What is the best way for the office to contact you? OK to leave message on   voicemail  Preferred Call Back Phone Number? [phone]  ---------------------------------------------------------------------------  --------------  SCRIPT ANSWERS  Relationship to Patient?  Self

## 2020-07-29 ENCOUNTER — TELEPHONE (OUTPATIENT)
Dept: FAMILY MEDICINE CLINIC | Age: 50
End: 2020-07-29

## 2020-07-29 NOTE — TELEPHONE ENCOUNTER
Pls tell pt that we received a request for a letter to review what attempts at wt loss she has made, what her current exercise plan is, etc. Pls ask her to make a VV appt with me so that we can discuss and I can write a letter on her behalf. Pls scan the sample letter into her chart under media.  Thx.

## 2020-08-11 NOTE — PROGRESS NOTES
[address] [facility]   Cardiovascular Evaluation    PATIENT: Bella Cook  DATE: 2020  MRN: [id number]  CSN: [id number]  : 1970      Primary Care Doctor: Rockford Lesch, MD  Reason for evaluation:   New Patient (No cardiac complaints, surgery clearance is needed)      Subjective:   History of present illness on initial date of evaluation:   Bella Cook is a 54 y.o. patient who presents for cardiac clearance for weight loss surgery with Dr. Sarthak Acosta. She denies any cardiac issues or history. She states she has emphysema. She states she wears oxygen as needed, but mainly at night. She states over the past year her breathing has gotten a little worse. She states she is not very active due to the emphysema. She states she does get chest \"heaviness\" when her breathing acts up due to her emphysema. She states she quit smoking earlier this year. She follows with pulmonology for the narcolepsy. Patient Active Problem List   Diagnosis    Urinary incontinence    Moderate COPD (chronic obstructive pulmonary disease) (HCC)    Migraine with aura and without status migrainosus, not intractable    Anxiety    Depression    Tobacco abuse    CAP (community acquired pneumonia)    COPD exacerbation ([address]    Chronic respiratory failure with hypoxia ([address]    History of total hysterectomy with removal of both tubes and ovaries    Shortness of breath    Bipolar depression ([address]    Macromastia    Oxygen dependent    History of tobacco use    Primary insomnia    Hemoptysis    Obstructive sleep apnea    Severe obesity (BMI 35.0-39. 9) with comorbidity ([address]    Chronic GERD    Mixed hyperlipidemia    Vitamin D deficiency    Preop cardiovascular exam    Narcolepsy    Family history of early CAD         Past Medical History:   has a past medical history of Anxiety, Asthma, Bipolar 1 disorder ([address] Chronic bronchitis ([address] Chronic GERD, COPD (chronic obstructive pulmonary disease) ([facility] Depression, Emphysema of lung ([facility] Migraine, Migraines, Mixed hyperlipidemia, MRSA infection within last 3 months, Narcolepsy, Obesity, Pneumonia, Restless leg syndrome, Sleep apnea, Tobacco abuse, Tobacco use disorder, and Urinary incontinence. Surgical History:   has a past surgical history that includes amarilys and bso (cervix removed) (); bladder suspension (5/10); Nasal septum surgery; Finger surgery; Tonsillectomy; Adenoidectomy; Abscess Drainage (); Breast surgery (); Hysterectomy (); pelvic laparoscopy; and Upper gastrointestinal endoscopy (N/A, 7/10/2020). Social History:   reports that she quit smoking about 6 months ago. Her smoking use included cigarettes. She has a 5.50 pack-year smoking history. She has never used smokeless tobacco. She reports previous alcohol use. She reports that she does not use drugs. Family History:  Father  age 43 of MI     Home Medications:  Reviewed and are listed in nursing record. and/or listed below  Current Outpatient Medications   Medication Sig Dispense Refill    clonazePAM (KLONOPIN) 1 MG tablet TAKE 1 TABLET BY MOUTH TWICE DAILY AS NEEDED ANXIETY 30 tablet 0    omeprazole (PRILOSEC) 20 MG delayed release capsule Take 1 capsule by mouth Daily 30 capsule 3    FLUoxetine (PROZAC) 20 MG capsule TAKE 3 CAPSULES BY MOUTH DAILY 90 capsule 5    potassium chloride (KLOR-CON M) 20 MEQ extended release tablet TAKE 1 TABLET BY MOUTH DAILY 90 tablet 0    VENTOLIN  (90 Base) MCG/ACT inhaler INHALE 2 PUFFS INTO THE LUNGS 4 TIMES DAILY AS NEEDED.  18 g 5    topiramate (TOPAMAX) 100 MG tablet TAKE 1 TABLET BY MOUTH 2 TIMES DAILY 180 tablet 1    amitriptyline (ELAVIL) 50 MG tablet TAKE 1 TABLET (50 MG) BY MOUTH NIGHTLY 90 tablet 1    rizatriptan (MAXALT) 10 MG tablet Take 10 mg by mouth once as needed for Migraine (PT continues to take however chart states that she was to dc) May repeat in 2 hours if needed Readings from Last 3 Encounters:   08/12/20 209 lb (94.8 kg)   07/10/20 206 lb 3.2 oz (93.5 kg)   06/11/20 214 lb 3.2 oz (97.2 kg)         General Appearance:  Alert, cooperative, no distress, appears stated age, morbdily obese   Head:  Normocephalic, atraumatic   Eyes:  PERRL, conjunctiva/corneas clear   Nose: Nares normal, no drainage or sinus tenderness   Throat: Lips, mucosa, and tongue normal   Neck: Supple, symmetrical, trachea midline, NL thyroid no carotid bruit or JVD   Lungs:   CTAB, respirations unlabored   Chest Wall:  No tenderness or deformity   Heart:  Regular rhythm and normal rate; S1, S2 are normal;   no murmur noted; no rub or gallop   Abdomen:   Soft, non-tender, +BS x 4, no masses, no organomegaly   Extremities: Extremities normal, atraumatic, no cyanosis or edema   Pulses: 2+ and symmetric   Skin: Skin color, texture, turgor normal, no rashes or lesions   Pysch: Normal mood and affect   Neurologic: Normal gross motor and sensory exam.         LABS   CBC:      Lab Results   Component Value Date    WBC 8.4 06/24/2020    RBC 4.26 06/24/2020    HGB 14.1 06/24/2020    HCT 41.4 06/24/2020    MCV 97.2 06/24/2020    RDW 13.3 06/24/2020     06/24/2020     CMP:  Lab Results   Component Value Date     06/24/2020    K 3.9 06/24/2020     06/24/2020    CO2 21 06/24/2020    BUN 17 06/24/2020    CREATININE 0.7 06/24/2020    GFRAA >60 06/24/2020    AGRATIO 1.4 06/24/2020    LABGLOM >60 06/24/2020    GLUCOSE 98 06/24/2020    PROT 7.3 06/24/2020    CALCIUM 9.1 06/24/2020    BILITOT <0.2 06/24/2020    ALKPHOS 85 06/24/2020    AST 27 06/24/2020    ALT 30 06/24/2020     PT/INR:   No results found for: PTINR  Liver:  No components found for: CHLPL  Lab Results   Component Value Date    ALT 30 06/24/2020    AST 27 06/24/2020    ALKPHOS 85 06/24/2020    BILITOT <0.2 06/24/2020     Lab Results   Component Value Date    LABA1C 4.8 06/24/2020     Lipids:         Lab Results   Component Value Date

## 2020-08-12 ENCOUNTER — OFFICE VISIT (OUTPATIENT)
Dept: CARDIOLOGY CLINIC | Age: 50
End: 2020-08-12
Payer: MEDICARE

## 2020-08-12 VITALS
DIASTOLIC BLOOD PRESSURE: 63 MMHG | BODY MASS INDEX: 38.46 KG/M2 | HEIGHT: 62 IN | SYSTOLIC BLOOD PRESSURE: 100 MMHG | WEIGHT: 209 LBS | HEART RATE: 84 BPM | OXYGEN SATURATION: 97 %

## 2020-08-12 PROBLEM — Z82.49 FAMILY HISTORY OF EARLY CAD: Status: ACTIVE | Noted: 2020-08-12

## 2020-08-12 PROBLEM — Z01.810 PREOP CARDIOVASCULAR EXAM: Status: ACTIVE | Noted: 2020-08-12

## 2020-08-12 PROBLEM — G47.419 NARCOLEPSY: Status: ACTIVE | Noted: 2020-08-12

## 2020-08-12 PROCEDURE — 1036F TOBACCO NON-USER: CPT | Performed by: INTERNAL MEDICINE

## 2020-08-12 PROCEDURE — 93000 ELECTROCARDIOGRAM COMPLETE: CPT | Performed by: INTERNAL MEDICINE

## 2020-08-12 PROCEDURE — 99204 OFFICE O/P NEW MOD 45 MIN: CPT | Performed by: INTERNAL MEDICINE

## 2020-08-12 PROCEDURE — G8926 SPIRO NO PERF OR DOC: HCPCS | Performed by: INTERNAL MEDICINE

## 2020-08-12 PROCEDURE — G8427 DOCREV CUR MEDS BY ELIG CLIN: HCPCS | Performed by: INTERNAL MEDICINE

## 2020-08-12 PROCEDURE — 3023F SPIROM DOC REV: CPT | Performed by: INTERNAL MEDICINE

## 2020-08-12 PROCEDURE — G8417 CALC BMI ABV UP PARAM F/U: HCPCS | Performed by: INTERNAL MEDICINE

## 2020-08-12 RX ORDER — FUROSEMIDE 40 MG/1
TABLET ORAL
Qty: 60 TABLET | Refills: 5 | Status: SHIPPED | OUTPATIENT
Start: 2020-08-12 | End: 2021-08-30

## 2020-08-12 NOTE — PATIENT INSTRUCTIONS
Patient Plan:  1. Dobutamine stress/echo to risk stratify   ~we will call you with the results  2. Given family history a Cardiac CT would be beneficial    ~let us know if you want to proceed and we will give you an order     Your provider has ordered testing for further evaluation. An order/prescription has been included in your paper work.  To schedule outpatient testing, contact Central Scheduling by calling [address] [place] [address] ([phone]).

## 2020-08-14 ENCOUNTER — TELEPHONE (OUTPATIENT)
Dept: FAMILY MEDICINE CLINIC | Age: 50
End: 2020-08-14

## 2020-08-19 ENCOUNTER — OFFICE VISIT (OUTPATIENT)
Dept: FAMILY MEDICINE CLINIC | Age: 50
End: 2020-08-19
Payer: MEDICARE

## 2020-08-19 VITALS
HEART RATE: 118 BPM | OXYGEN SATURATION: 94 % | TEMPERATURE: 98.2 F | SYSTOLIC BLOOD PRESSURE: 134 MMHG | BODY MASS INDEX: 38.23 KG/M2 | WEIGHT: 209 LBS | DIASTOLIC BLOOD PRESSURE: 78 MMHG

## 2020-08-19 PROCEDURE — 1036F TOBACCO NON-USER: CPT | Performed by: FAMILY MEDICINE

## 2020-08-19 PROCEDURE — G8427 DOCREV CUR MEDS BY ELIG CLIN: HCPCS | Performed by: FAMILY MEDICINE

## 2020-08-19 PROCEDURE — G8417 CALC BMI ABV UP PARAM F/U: HCPCS | Performed by: FAMILY MEDICINE

## 2020-08-19 PROCEDURE — 99213 OFFICE O/P EST LOW 20 MIN: CPT | Performed by: FAMILY MEDICINE

## 2020-08-19 RX ORDER — AMOXICILLIN 500 MG/1
500 CAPSULE ORAL 2 TIMES DAILY
Qty: 14 CAPSULE | Refills: 0 | Status: SHIPPED | OUTPATIENT
Start: 2020-08-19 | End: 2020-08-26

## 2020-08-19 ASSESSMENT — ENCOUNTER SYMPTOMS
ABDOMINAL PAIN: 1
SHORTNESS OF BREATH: 0
BACK PAIN: 1

## 2020-08-19 NOTE — PROGRESS NOTES
Patient: Taurus King is a 54 y. o.female who presents today with the following Chief Complaint(s):  Chief Complaint   Patient presents with    Letter for School/Work     Weight Management Letter         HPI: Broke L upper canine. Has UC appt 9/23/20 for dental surgery, willl need to have dental procedure in hosp. Fears L canine is infected, has pain, severe when eating. Can't tolerate dental floss. Irreg tooth cuts tongue. Pt requests pre op clearance and letter to support bariatric surgery. Has never had rxn to anesthesia. Has 0-1 etoh beverages per months. Pt is to have a stress test prior to gastric sleeve. Surgery date will then be planned. Current Outpatient Medications   Medication Sig Dispense Refill    furosemide (LASIX) 40 MG tablet TAKE 1 TABLET BY MOUTH EVERY DAY TO TWICE DAILY AS NEEDED SWELLING 60 tablet 5    clonazePAM (KLONOPIN) 1 MG tablet TAKE 1 TABLET BY MOUTH TWICE DAILY AS NEEDED ANXIETY 30 tablet 0    omeprazole (PRILOSEC) 20 MG delayed release capsule Take 1 capsule by mouth Daily 30 capsule 3    FLUoxetine (PROZAC) 20 MG capsule TAKE 3 CAPSULES BY MOUTH DAILY 90 capsule 5    CHANTIX CONTINUING MONTH COLUMBA 1 MG tablet TAKE 1 TABLET BY MOUTH TWICE DAILY 56 tablet 2    potassium chloride (KLOR-CON M) 20 MEQ extended release tablet TAKE 1 TABLET BY MOUTH DAILY 90 tablet 0    VENTOLIN  (90 Base) MCG/ACT inhaler INHALE 2 PUFFS INTO THE LUNGS 4 TIMES DAILY AS NEEDED.  18 g 5    topiramate (TOPAMAX) 100 MG tablet TAKE 1 TABLET BY MOUTH 2 TIMES DAILY 180 tablet 1    amitriptyline (ELAVIL) 50 MG tablet TAKE 1 TABLET (50 MG) BY MOUTH NIGHTLY 90 tablet 1    rizatriptan (MAXALT) 10 MG tablet Take 10 mg by mouth once as needed for Migraine (PT continues to take however chart states that she was to MD) May repeat in 2 hours if needed      benzonatate (TESSALON) 100 MG capsule TAKE 1-2 CAPSULES BY MOUTH 3 TIMES DAILY AS NEEDED COUGH 60 capsule 3    promethazine Musculoskeletal: No muscular tenderness. Cardiovascular:      Rate and Rhythm: Normal rate. Heart sounds: Normal heart sounds. Pulmonary:      Effort: Pulmonary effort is normal.      Breath sounds: Normal breath sounds. Skin:     General: Skin is warm and dry. Neurological:      Mental Status: She is alert. Psychiatric:         Mood and Affect: Mood normal.           /78   Pulse 118   Temp 98.2 °F (36.8 °C) (Oral)   Wt 209 lb (94.8 kg)   LMP  (LMP Unknown)   SpO2 94%   BMI 38.23 kg/m²     Assessment/Plan:    Vanessa was seen today for letter for school/work. Diagnoses and all orders for this visit:    Gingivitis/gum infx   Amoxil 500 bid x 7d    Severe obesity (BMI 35.0-39. 9) with comorbidity [facility])   See letter written today supporting that pt is good candidate for gastric sleeve. Pt has appt in 2 wk for routine care. If surgery date is known at that time, will do pre op then. Letter is faxed and note is routed to Dr Amado Barrett.

## 2020-08-20 ASSESSMENT — ENCOUNTER SYMPTOMS
RESPIRATORY NEGATIVE: 1
ALLERGIC/IMMUNOLOGIC NEGATIVE: 1
EYES NEGATIVE: 1
GASTROINTESTINAL NEGATIVE: 1

## 2020-08-20 NOTE — PROGRESS NOTES
[employer]   [employer]    8/28/2020    TELEHEALTH EVALUATION -- Audio/Visual (During NUCVN-92 public health emergency)    Subjective:      Patient ID: Cullen Souza is a 54 y.o. female has requested an audio/video evaluation. HPI    Due to the COVID-19 restrictions on close contact interactions the patient's monthly presurgical visit was conducted via audio/video in jasper of a face to face visit. Patient has consented to have this visit conducted via audio/video and I am conducting it from the office. The patient is here through telemedicine for their bariatric surgery presurgical visit for future weight loss. She has made several attempts at weight loss in the past without success and now wishes to pursue bariatric surgery. She is working to change her dietary behaviors and lose weight to improve comorbid conditions such as hyperlipidemia, obstructive sleep apnea and GERD. Cullen Souza is a 54 y.o. female with Body mass index is 37.26 kg/m².     Past Medical History:   Diagnosis Date    Anxiety     Asthma     Bipolar 1 disorder ([address]     Pt is willing to see psych after 7/15 for confirmation of dx    Chronic bronchitis ([address]     Chronic GERD 6/11/2020    COPD (chronic obstructive pulmonary disease) ([address]     Dr Rashmi De La Garza Depression     Emphysema of lung ([facility]     Migraine     Migraines     Mixed hyperlipidemia 6/25/2020    MRSA infection within last 3 months 2012    axillary    Narcolepsy 2004    Dr Tim Benavides    Obesity     Pneumonia     Restless leg syndrome     Sleep apnea     did not tolerate cpap    Tobacco abuse     Tobacco use disorder 1/14/2011    Urinary incontinence      Past Surgical History:   Procedure Laterality Date    ABSCESS DRAINAGE  2012    L axilla MRSA, hosp for 7 days    ADENOIDECTOMY      BLADDER SUSPENSION  5/10    Mesh removed 1/9/15 in [address]  2/09    lumpectomy, ducts removed    FINGER SURGERY right thumb    HYSTERECTOMY      2/2 dysplasia, endometriosis, cysts, MYLENE BSO    NASAL SEPTUM SURGERY      PELVIC LAPAROSCOPY      endometriosis    MYLENE AND BSO      Dysplasia, endometriosis    TONSILLECTOMY      UPPER GASTROINTESTINAL ENDOSCOPY N/A 7/10/2020    EGD BIOPSY performed by Ravin Chilel MD at [address] [place] [address]     Family History   Problem Relation Age of Onset    Depression Mother     Breast Cancer Mother     Stroke Mother         cva x 3    Hypertension Mother     Elevated Lipids Mother     Diabetes Mother     Coronary Art Dis Father         mi  age 43    Schizophrenia Father     Hypertension Father     Elevated Lipids Father     Diabetes Father     Birth Defects Son     Other Son         odd, autism   Teresa Vela Asthma Son     Diabetes Son     Other Brother         colitis    Mental Illness Sister     Heart Failure Paternal Grandmother         CHF    Emphysema Maternal Grandfather     Cancer Paternal Grandfather      Social History     Tobacco Use    Smoking status: Former Smoker     Packs/day: 0.25     Years: 22.00     Pack years: 5.50     Types: Cigarettes     Last attempt to quit: 2020     Years since quittin.5    Smokeless tobacco: Never Used   Substance Use Topics    Alcohol use: Not Currently     I counseled the patient on the importance of not smoking and risks of ETOH. Allergies   Allergen Reactions    Ambien [Zolpidem Tartrate] Other (See Comments)     Shakey, anxious    Dilaudid [Hydromorphone Hcl] Other (See Comments)     Feels like skin is on fire.  Fentanyl Itching    Imitrex [Sumatriptan]      Face hot, felt sob    Morphine Other (See Comments)     Feels like skin is on fire. Tolerates Percocet. Vitals:    20 1013   Weight: 207 lb (93.9 kg)   Height: 5' 2.5\" (1.588 m)     Body mass index is 37.26 kg/m².     Current Outpatient Medications:     Cholecalciferol 50 MCG ( UT) TABS, Take 1 tablet by mouth daily Take 1 tablet by mouth daily. , Disp: 90 tablet, Rfl: 2    furosemide (LASIX) 40 MG tablet, TAKE 1 TABLET BY MOUTH EVERY DAY TO TWICE DAILY AS NEEDED SWELLING, Disp: 60 tablet, Rfl: 5    clonazePAM (KLONOPIN) 1 MG tablet, TAKE 1 TABLET BY MOUTH TWICE DAILY AS NEEDED ANXIETY, Disp: 30 tablet, Rfl: 0    omeprazole (PRILOSEC) 20 MG delayed release capsule, Take 1 capsule by mouth Daily, Disp: 30 capsule, Rfl: 3    FLUoxetine (PROZAC) 20 MG capsule, TAKE 3 CAPSULES BY MOUTH DAILY, Disp: 90 capsule, Rfl: 5    CHANTIX CONTINUING MONTH COLUMBA 1 MG tablet, TAKE 1 TABLET BY MOUTH TWICE DAILY, Disp: 56 tablet, Rfl: 2    potassium chloride (KLOR-CON M) 20 MEQ extended release tablet, TAKE 1 TABLET BY MOUTH DAILY, Disp: 90 tablet, Rfl: 0    VENTOLIN  (90 Base) MCG/ACT inhaler, INHALE 2 PUFFS INTO THE LUNGS 4 TIMES DAILY AS NEEDED., Disp: 18 g, Rfl: 5    topiramate (TOPAMAX) 100 MG tablet, TAKE 1 TABLET BY MOUTH 2 TIMES DAILY, Disp: 180 tablet, Rfl: 1    amitriptyline (ELAVIL) 50 MG tablet, TAKE 1 TABLET (50 MG) BY MOUTH NIGHTLY, Disp: 90 tablet, Rfl: 1    rizatriptan (MAXALT) 10 MG tablet, Take 10 mg by mouth once as needed for Migraine (PT continues to take however chart states that she was to dc) May repeat in 2 hours if needed, Disp: , Rfl:     benzonatate (TESSALON) 100 MG capsule, TAKE 1-2 CAPSULES BY MOUTH 3 TIMES DAILY AS NEEDED COUGH, Disp: 60 capsule, Rfl: 3    promethazine (PHENERGAN) 25 MG tablet, TAKE 1 TABLET BY MOUTH EVERY 6 HOURS AS NEEDED FOR NAUSEA, Disp: 30 tablet, Rfl: 0    butalbital-acetaminophen-caffeine (FIORICET, ESGIC) -40 MG per tablet, TAKE 1 TABLET BY MOUTH 3 TIMES DAILY AS NEEDED FOR MIGRAINE, Disp: 30 tablet, Rfl: 0    gabapentin (NEURONTIN) 300 MG capsule, TAKE ONE CAPSULE BY MOUTH ONE HOUR BEFORE BEDTIME, Disp: , Rfl: 5    tiotropium (SPIRIVA RESPIMAT) 2.5 MCG/ACT AERS inhaler, Inhale 2 puffs into the lungs daily, Disp: 4 g, Rfl: 5    amphetamine-dextroamphetamine (ADDERALL XR) 20 MG extended release capsule, TAKE 1 CAPSULE BY MOUTH EVERY DAY, Disp: , Rfl: 0    methylphenidate (RITALIN LA) 30 MG extended release capsule, Take 20 mg by mouth 3 times daily. ., Disp: , Rfl:     LORATADINE-D 12HR 5-120 MG per extended release tablet, TAKE 1 TABLET BY MOUTH EVERY MORNING AS NEEDED FOR ALLERGY, Disp: 30 tablet, Rfl: 5    Respiratory Therapy Supplies (NEBULIZER/TUBING/MOUTHPIECE) KIT, 1 kit by Does not apply route daily as needed, Disp: 1 kit, Rfl: 0    OXYGEN, Inhale 2 L/min into the lungs continuous.  Regulate with portability at home (Patient taking differently: Inhale 2 L/min into the lungs as needed ), Disp: 1 Container, Rfl: 0    Lab Results   Component Value Date    WBC 8.4 06/24/2020    RBC 4.26 06/24/2020    HGB 14.1 06/24/2020    HCT 41.4 06/24/2020    MCV 97.2 06/24/2020    MCH 33.2 06/24/2020    MCHC 34.1 06/24/2020    MPV 8.1 06/24/2020    NEUTOPHILPCT 64.0 06/24/2020    LYMPHOPCT 27.6 06/24/2020    MONOPCT 6.1 06/24/2020    EOSRELPCT 1.7 06/24/2020    BASOPCT 0.6 06/24/2020    NEUTROABS 5.4 06/24/2020    LYMPHSABS 2.3 06/24/2020    MONOSABS 0.5 06/24/2020    EOSABS 0.1 06/24/2020     Lab Results   Component Value Date     06/24/2020    K 3.9 06/24/2020     06/24/2020    CO2 21 06/24/2020    ANIONGAP 13 06/24/2020    GLUCOSE 98 06/24/2020    BUN 17 06/24/2020    CREATININE 0.7 06/24/2020    LABGLOM >60 06/24/2020    GFRAA >60 06/24/2020    CALCIUM 9.1 06/24/2020    PROT 7.3 06/24/2020    LABALBU 4.2 06/24/2020    AGRATIO 1.4 06/24/2020    BILITOT <0.2 06/24/2020    ALKPHOS 85 06/24/2020    ALT 30 06/24/2020    AST 27 06/24/2020    GLOB 3.1 06/24/2020     Lab Results   Component Value Date    CHOL 285 06/24/2020    TRIG 122 06/24/2020    HDL 55 06/24/2020    LDLCALC 206 06/24/2020    LABVLDL 24 06/24/2020     Lab Results   Component Value Date    TSHREFLEX 2.53 06/24/2020     Lab Results   Component Value Date    IRON 70 06/24/2020    TIBC 324 06/24/2020    LABIRON 22 06/24/2020     Lab Results   Component Value Date    NHCSHAJG77 647 06/24/2020    FOLATE 11.58 06/24/2020     Lab Results   Component Value Date    VITD25 21.5 06/24/2020     Lab Results   Component Value Date    LABA1C 4.8 06/24/2020    EAG 91.1 06/24/2020     Review of Systems   Constitutional: Negative. Negative for chills, fatigue and fever. HENT: Negative. Eyes: Negative. Respiratory: Negative. Negative for cough and shortness of breath. Cardiovascular: Negative. Gastrointestinal: Negative. Endocrine: Negative. Genitourinary: Negative. Musculoskeletal: Negative. Skin: Negative. Allergic/Immunologic: Negative. Neurological: Negative. Hematological: Negative. Psychiatric/Behavioral: Negative. PHYSICAL EXAMINATION:    Constitutional: [x] Appears well-developed and well-nourished [x] No apparent distress      [] Abnormal-   Mental status  [x] Alert and awake  [x] Oriented to person/place/time [x]Able to follow commands      Eyes:  EOM    [x]  Normal  [] Abnormal-  Sclera  [x]  Normal  [] Abnormal -         Discharge [x]  None visible  [] Abnormal -    HENT:   [x] Normocephalic, atraumatic.   [] Abnormal     Neck: [x] No visualized mass     Pulmonary/Chest: [x] Respiratory effort normal.  [x] No visualized signs of difficulty breathing or respiratory distress        [] Abnormal-      Musculoskeletal:   [] Normal gait with no signs of ataxia         [x] Normal range of motion of neck        [] Abnormal-     Neurological:        [x] No Facial Asymmetry (Cranial nerve 7 motor function) (limited exam to video visit)          [x] No gaze palsy        [] Abnormal-         Skin:        [x] No significant exanthematous lesions or discoloration noted on facial skin         [] Abnormal-            Psychiatric:       [x] Normal Affect [x] No Hallucinations        [] Abnormal-     Other pertinent observable physical exam findings-     Due to this being a TeleHealth encounter, evaluation of the following organ systems is limited: Vitals/Constitutional/EENT/Resp/CV/GI//MS/Neuro/Skin/Heme-Lymph-Imm. Assessment and Plan:   Patient is here for their 2nd presurgery visit (last seen 6/11/2020) for sleeve via telemedicine, down 7.2 lbs. The patient's current Body mass index is 37.26 kg/m². (8/28/20). She is making dietary and behavior modifications. She talked with the registered dietitian for continued follow up. I agree with recommendations and plan. She is exercising with small amount of strength exercises as she is limited with SOB and wearing oxygen. Encouraged physical activity as tolerated. Patient with h/o hysterectomy so did not need to receive instruction that it is recommended to avoid pregnancy following bariatric surgery for at least 2 years to allow them to have stable weight loss and to help avoid increased risk of vitamin deficiencies and malnutrition. Reviewed recent labs with patient and explained she should see improvements in her lipid panel with dietary changes and weight loss. Will also start patient on Vitamin D supplements. Spoke with patient regarding recent EGD biopsy results which showed GERD, but negative for H-Pylori. Patient will continue her Prilosec as prescribed. Discussed preop work up which still needs cardiac clearance (needs dobutamine stress test), pulmonary clearance/sleep clearance (scheduled) and protein sample. We will see her back in 1 month for continued follow up or through telemedicine. A total of 15 minutes was spent conversing with the patient and over half of that time was spent counseling the patient on proper dietary behaviors, exercise and preoperative work-up. An electronic signature was used to authenticate this note.      Pursuant to the emergency declaration under the [facility], [address] [place] [address] authority and the [employer] Act, this Virtual Visit was conducted, with patient's consent, to reduce the patient's risk of exposure to COVID-19 and provide continuity of care for an established patient. Services were provided through a video synchronous discussion virtually to substitute for in-person clinic visit. Obesity, as a disease, is considered high risk to a patients overall health and should therefore be considered a high risk disease state. Now with Covid-19 pandemic, CDC and health authorities do classify obese patients as vulnerable and high risk as well.

## 2020-08-26 ENCOUNTER — OFFICE VISIT (OUTPATIENT)
Dept: FAMILY MEDICINE CLINIC | Age: 50
End: 2020-08-26
Payer: MEDICARE

## 2020-08-26 VITALS
BODY MASS INDEX: 37.86 KG/M2 | WEIGHT: 207 LBS | TEMPERATURE: 98.9 F | HEART RATE: 114 BPM | SYSTOLIC BLOOD PRESSURE: 132 MMHG | OXYGEN SATURATION: 95 % | DIASTOLIC BLOOD PRESSURE: 76 MMHG

## 2020-08-26 PROCEDURE — G8417 CALC BMI ABV UP PARAM F/U: HCPCS | Performed by: FAMILY MEDICINE

## 2020-08-26 PROCEDURE — 1036F TOBACCO NON-USER: CPT | Performed by: FAMILY MEDICINE

## 2020-08-26 PROCEDURE — G8926 SPIRO NO PERF OR DOC: HCPCS | Performed by: FAMILY MEDICINE

## 2020-08-26 PROCEDURE — 99214 OFFICE O/P EST MOD 30 MIN: CPT | Performed by: FAMILY MEDICINE

## 2020-08-26 PROCEDURE — G8427 DOCREV CUR MEDS BY ELIG CLIN: HCPCS | Performed by: FAMILY MEDICINE

## 2020-08-26 PROCEDURE — 3023F SPIROM DOC REV: CPT | Performed by: FAMILY MEDICINE

## 2020-08-26 ASSESSMENT — ENCOUNTER SYMPTOMS
ABDOMINAL PAIN: 0
SHORTNESS OF BREATH: 1

## 2020-08-26 NOTE — PROGRESS NOTES
Patient: Donna Mei is a 54 y. o.female who presents today with the following Chief Complaint(s):  Chief Complaint   Patient presents with    6 Month Follow-Up         HPI: Has VV with bariatric surgeon in few days and hopes to get surgery date though is still waiting for stress test to be scheduled. Well need preop for gastric sleeve. Uses O2 at hs about 5 nights per wk, based upon qhs pulse Ox. If spending time outside, SaO2 may drop to 84%, then puts on O2 for prn use in daytime. Remains nonsmoker 2/2020. Gum abscess is less bothersome since on Amoxil as rx'd 8/19/20. 1 mo ago was on canoe trip and had 2 bug bites mid abd, slow to heal. Has been using topical abx, notes healing. No d/c, pain, odor. Has noted surrounding skin is pink, less so that prior to amoxil. Has been able to approach son's ex girlfriend with maturity, feels calmer about situation. Current Outpatient Medications   Medication Sig Dispense Refill    amoxicillin (AMOXIL) 500 MG capsule Take 1 capsule by mouth 2 times daily for 7 days 14 capsule 0    furosemide (LASIX) 40 MG tablet TAKE 1 TABLET BY MOUTH EVERY DAY TO TWICE DAILY AS NEEDED SWELLING 60 tablet 5    clonazePAM (KLONOPIN) 1 MG tablet TAKE 1 TABLET BY MOUTH TWICE DAILY AS NEEDED ANXIETY 30 tablet 0    omeprazole (PRILOSEC) 20 MG delayed release capsule Take 1 capsule by mouth Daily 30 capsule 3    FLUoxetine (PROZAC) 20 MG capsule TAKE 3 CAPSULES BY MOUTH DAILY 90 capsule 5    CHANTIX CONTINUING MONTH COLUMBA 1 MG tablet TAKE 1 TABLET BY MOUTH TWICE DAILY 56 tablet 2    potassium chloride (KLOR-CON M) 20 MEQ extended release tablet TAKE 1 TABLET BY MOUTH DAILY 90 tablet 0    VENTOLIN  (90 Base) MCG/ACT inhaler INHALE 2 PUFFS INTO THE LUNGS 4 TIMES DAILY AS NEEDED.  18 g 5    topiramate (TOPAMAX) 100 MG tablet TAKE 1 TABLET BY MOUTH 2 TIMES DAILY 180 tablet 1    amitriptyline (ELAVIL) 50 MG tablet TAKE 1 TABLET (50 MG) BY MOUTH NIGHTLY 90 tablet 1  rizatriptan (MAXALT) 10 MG tablet Take 10 mg by mouth once as needed for Migraine (PT continues to take however chart states that she was to dc) May repeat in 2 hours if needed      benzonatate (TESSALON) 100 MG capsule TAKE 1-2 CAPSULES BY MOUTH 3 TIMES DAILY AS NEEDED COUGH 60 capsule 3    promethazine (PHENERGAN) 25 MG tablet TAKE 1 TABLET BY MOUTH EVERY 6 HOURS AS NEEDED FOR NAUSEA 30 tablet 0    butalbital-acetaminophen-caffeine (FIORICET, ESGIC) -40 MG per tablet TAKE 1 TABLET BY MOUTH 3 TIMES DAILY AS NEEDED FOR MIGRAINE 30 tablet 0    gabapentin (NEURONTIN) 300 MG capsule TAKE ONE CAPSULE BY MOUTH ONE HOUR BEFORE BEDTIME  5    tiotropium (SPIRIVA RESPIMAT) 2.5 MCG/ACT AERS inhaler Inhale 2 puffs into the lungs daily 4 g 5    amphetamine-dextroamphetamine (ADDERALL XR) 20 MG extended release capsule TAKE 1 CAPSULE BY MOUTH EVERY DAY  0    methylphenidate (RITALIN LA) 30 MG extended release capsule Take 20 mg by mouth 3 times daily. .      LORATADINE-D 12HR 5-120 MG per extended release tablet TAKE 1 TABLET BY MOUTH EVERY MORNING AS NEEDED FOR ALLERGY 30 tablet 5    Respiratory Therapy Supplies (NEBULIZER/TUBING/MOUTHPIECE) KIT 1 kit by Does not apply route daily as needed 1 kit 0    OXYGEN Inhale 2 L/min into the lungs continuous. Regulate with portability at home (Patient taking differently: Inhale 2 L/min into the lungs as needed ) 1 Container 0     No current facility-administered medications for this visit. Patient's past medical history,surgical history, family history, medications,  and allergies  were all reviewed and updated as appropriate today. Review of Systems   Constitutional: Negative for activity change, appetite change, fatigue and fever. HENT: Positive for dental problem. Respiratory: Positive for shortness of breath. Cardiovascular: Negative for chest pain and palpitations. Gastrointestinal: Negative for abdominal pain. Skin: Positive for wound. Neurological: Positive for headaches. Psychiatric/Behavioral: Positive for dysphoric mood. The patient is nervous/anxious. Physical Exam  Constitutional:       General: She is not in acute distress. Appearance: Normal appearance. She is well-developed. She is not ill-appearing. HENT:      Head: Normocephalic and atraumatic. Right Ear: External ear normal.      Left Ear: External ear normal.      Mouth/Throat:      Pharynx: Oropharynx is clear. No oropharyngeal exudate. Eyes:      General: No scleral icterus. Right eye: No discharge. Left eye: No discharge. Extraocular Movements: Extraocular movements intact. Conjunctiva/sclera: Conjunctivae normal.   Neck:      Musculoskeletal: Normal range of motion and neck supple. Vascular: No carotid bruit. Comments: Neg TMG  Cardiovascular:      Rate and Rhythm: Normal rate and regular rhythm. Pulses: Normal pulses. Heart sounds: Normal heart sounds. No murmur. Pulmonary:      Effort: Pulmonary effort is normal. No respiratory distress. Breath sounds: Normal breath sounds. Abdominal:      General: Bowel sounds are normal. There is no distension. Palpations: Abdomen is soft. There is no mass. Tenderness: There is no abdominal tenderness. Musculoskeletal: Normal range of motion. Right lower leg: No edema. Left lower leg: No edema. Lymphadenopathy:      Cervical: No cervical adenopathy. Skin:     General: Skin is warm and dry. Capillary Refill: Capillary refill takes less than 2 seconds. Coloration: Skin is not jaundiced or pale. Findings: No rash. Comments: Mid abd with 2 3-4 mm superficial round bug bites, ea with 1 mm circumferential pink discoloration, no warmth   Neurological:      General: No focal deficit present. Mental Status: She is alert and oriented to person, place, and time. Cranial Nerves: No cranial nerve deficit.       Deep Tendon Reflexes: Reflexes are normal and symmetric. Psychiatric:         Mood and Affect: Mood normal.         Behavior: Behavior normal.         Thought Content: Thought content normal.         Judgment: Judgment normal.           /76   Pulse 114   Temp 98.9 °F (37.2 °C) (Temporal)   Wt 207 lb (93.9 kg)   LMP  (LMP Unknown)   SpO2 95%   BMI 37.86 kg/m²     Assessment/Plan:    Vanessa was seen today for 6 month follow-up. Diagnoses and all orders for this visit:    Severe obesity (BMI 35.0-39. 9) with comorbidity ([address]   If surgery is w/in 30 days, will complete pre op and give clearance pending cardiac w/u. If surgery is w/in 60 days, will do preop by VV. O/w, pt will come for OV pre op. Moderate COPD (chronic obstructive pulmonary disease) (HCC)   Cpm, prn O2    Anxiety   Pt exhibits good insight, mood stability    Controlled Substance Monitoring:    Acute and Chronic Pain Monitoring:   RX Monitoring 8/26/2020   Attestation -   Periodic Controlled Substance Monitoring Possible medication side effects, risk of tolerance/dependence & alternative treatments discussed. ;No signs of potential drug abuse or diversion identified.          preop VV if surg w/in 60 days

## 2020-08-28 ENCOUNTER — TELEMEDICINE (OUTPATIENT)
Dept: BARIATRICS/WEIGHT MGMT | Age: 50
End: 2020-08-28
Payer: MEDICARE

## 2020-08-28 VITALS — WEIGHT: 207 LBS | BODY MASS INDEX: 36.68 KG/M2 | HEIGHT: 63 IN

## 2020-08-28 PROCEDURE — 1036F TOBACCO NON-USER: CPT | Performed by: NURSE PRACTITIONER

## 2020-08-28 PROCEDURE — G8427 DOCREV CUR MEDS BY ELIG CLIN: HCPCS | Performed by: NURSE PRACTITIONER

## 2020-08-28 PROCEDURE — 99213 OFFICE O/P EST LOW 20 MIN: CPT | Performed by: NURSE PRACTITIONER

## 2020-08-28 PROCEDURE — G8417 CALC BMI ABV UP PARAM F/U: HCPCS | Performed by: NURSE PRACTITIONER

## 2020-08-28 ASSESSMENT — ENCOUNTER SYMPTOMS
COUGH: 0
SHORTNESS OF BREATH: 0

## 2020-08-28 NOTE — PROGRESS NOTES
Vanessa Garcia lost 7.2 lbs over past ~ month. Is pt eating at least 4 times everyday? yes - 3 small meals & 2 snacks    Is pt eating a lean protein source with all meals and snacks? yes - chicken / steak   B- protein shake OR protein bar OR oatmeal  S- sometimes a cheese stick  L- grilled chicken w/ salad (no dressing) OR tuna   D- steak OR chicken sometimes w/ vegetables  S- 2 cheese stick    Has pt decreased their portions using the plate method? yes - more mindful    Is pt choosing low fat/sugar free options? yes    Is pt drinking at least 64 oz of clear liquids everyday? yes - water    Has pt stopped drinking carbonation, caffeinated, and sugar sweetened beverages? yes - eliminated caffeine    Has pt sampled Unjury and/or Nectar protein? discussed, to try     Participating in intentional exercise? yes - curls w/ arms / sit-ups / walking is limited d/t oxygen usage    Plan/Recommendations:   - Continue plan  - Try protein powder    Handouts: none    Due to the COVID-19 restrictions on close contact interactions the patient's visit was conducted via telephone in [place] of a face to face visit. The patient is here through telemedicine for their presurgical visit.     Radha Duarte

## 2020-08-28 NOTE — PATIENT INSTRUCTIONS
Patient received dietary handouts and education.     Plan/Recommendations:   - Continue plan  - Try protein powder

## 2020-09-01 ENCOUNTER — HOSPITAL ENCOUNTER (OUTPATIENT)
Dept: NON INVASIVE DIAGNOSTICS | Age: 50
Discharge: HOME | End: 2020-09-01
Payer: MEDICARE

## 2020-09-01 ENCOUNTER — HOSPITAL ENCOUNTER (OUTPATIENT)
Age: 50
Discharge: HOME | End: 2020-09-01
Payer: MEDICARE

## 2020-09-01 LAB
CHOLESTEROL, TOTAL: 198 MG/DL (ref 0–199)
HDLC SERPL-MCNC: 51 MG/DL (ref 40–60)
LDL CHOLESTEROL CALCULATED: 120 MG/DL
LV EF: 58 %
LVEF MODALITY: NORMAL
TRIGL SERPL-MCNC: 133 MG/DL (ref 0–150)
VITAMIN D 25-HYDROXY: 22.7 NG/ML
VLDLC SERPL CALC-MCNC: 27 MG/DL

## 2020-09-01 PROCEDURE — 36415 COLL VENOUS BLD VENIPUNCTURE: CPT

## 2020-09-01 PROCEDURE — 6360000002 HC RX W HCPCS: Performed by: INTERNAL MEDICINE

## 2020-09-01 PROCEDURE — 80061 LIPID PANEL: CPT

## 2020-09-01 PROCEDURE — 93351 STRESS TTE COMPLETE: CPT

## 2020-09-01 PROCEDURE — 82306 VITAMIN D 25 HYDROXY: CPT

## 2020-09-01 PROCEDURE — 2580000003 HC RX 258: Performed by: INTERNAL MEDICINE

## 2020-09-01 PROCEDURE — 81401 MOPATH PROCEDURE LEVEL 2: CPT

## 2020-09-01 RX ORDER — DOBUTAMINE HYDROCHLORIDE 200 MG/100ML
10 INJECTION INTRAVENOUS CONTINUOUS
Status: DISCONTINUED | OUTPATIENT
Start: 2020-09-01 | End: 2020-09-01 | Stop reason: SDUPTHER

## 2020-09-01 RX ADMIN — DOBUTAMINE 10 MCG/KG/MIN: 12.5 INJECTION, SOLUTION, CONCENTRATE INTRAVENOUS at 09:09

## 2020-09-03 RX ORDER — VARENICLINE TARTRATE 1 MG/1
TABLET, FILM COATED ORAL
Qty: 56 TABLET | Refills: 2 | Status: SHIPPED | OUTPATIENT
Start: 2020-09-03 | End: 2020-10-30 | Stop reason: ALTCHOICE

## 2020-09-08 ENCOUNTER — TELEPHONE (OUTPATIENT)
Dept: CARDIOLOGY CLINIC | Age: 50
End: 2020-09-08

## 2020-09-08 LAB
APOLIPOPROTEIN E GENOTYPING: ABNORMAL
APOLIPOPROTEIN E SPECIMEN: ABNORMAL

## 2020-09-08 NOTE — TELEPHONE ENCOUNTER
----- Message from Kerline Zapien MD sent at 9/4/2020 12:04 PM EDT -----  Please inform patient stress test was normal okay to proceed with surgery as low risk. Of note her vitamin D is low normal I would prescribe vitamin D but giving upcoming surgery I would prefer she discuss with Dr. Alfa Delong. Lipids also significantly improved last labs may not have been fasting.   One other test pending but no changes at this time

## 2020-09-08 NOTE — LETTER
[address] [facility] - [address] [place] [address] [facility]  Phone: [phone]  Fax: [phone]    Leslie Rowell MD        September 8, 2020    Shashaelba Jacksonz  [address] [facility] [address] may proceed with upcoming surgery with Dr. Tigist Smith. Patient is at low cardiac risk. If you have any questions or concerns, please don't hesitate to call.     Sincerely,        Leslie Rowell MD

## 2020-09-09 ENCOUNTER — TELEPHONE (OUTPATIENT)
Dept: PULMONOLOGY | Age: 50
End: 2020-09-09

## 2020-09-09 RX ORDER — CLONAZEPAM 1 MG/1
TABLET ORAL
Qty: 30 TABLET | Refills: 0 | Status: SHIPPED | OUTPATIENT
Start: 2020-09-09 | End: 2020-10-07 | Stop reason: SDUPTHER

## 2020-09-11 PROBLEM — Z01.810 PREOP CARDIOVASCULAR EXAM: Status: RESOLVED | Noted: 2020-08-12 | Resolved: 2020-09-11

## 2020-09-16 ENCOUNTER — TELEPHONE (OUTPATIENT)
Dept: FAMILY MEDICINE CLINIC | Age: 50
End: 2020-09-16

## 2020-09-16 ENCOUNTER — NURSE TRIAGE (OUTPATIENT)
Dept: OTHER | Facility: CLINIC | Age: 50
End: 2020-09-16

## 2020-09-16 NOTE — TELEPHONE ENCOUNTER
Ongoing migraine since 3 or 4 of septemer     Reason for Disposition   SEVERE headache, states 'worst headache' of life    Answer Assessment - Initial Assessment Questions  1. LOCATION: \"Where does it hurt? \"       Pt states it is on the top of head and back down to neck  2. ONSET: \"When did the headache start? \" (Minutes, hours or days)       Around 09/3  3. PATTERN: \"Does the pain come and go, or has it been constant since it started? \"      Constant   4. SEVERITY: \"How bad is the pain? \" and \"What does it keep you from doing? \"  (e.g., Scale 1-10; mild, moderate, or severe)    - MILD (1-3): doesn't interfere with normal activities     - MODERATE (4-7): interferes with normal activities or awakens from sleep     - SEVERE (8-10): excruciating pain, unable to do any normal activities         Pt states 10  5. RECURRENT SYMPTOM: \"Have you ever had headaches before? \" If so, ask: \"When was the last time? \" and \"What happened that time? \"      Pt states she has hx of migraines but never had anything like this before   6. CAUSE: \"What do you think is causing the headache? \"      Unknown  7. MIGRAINE: \"Have you been diagnosed with migraine headaches? \" If so, ask: \"Is this headache similar? \"       PT has had migraine   8. HEAD INJURY: \"Has there been any recent injury to the head? \"       Pt denies   9. OTHER SYMPTOMS: \"Do you have any other symptoms? \" (fever, stiff neck, eye pain, sore throat, cold symptoms)      Neck pain  10. PREGNANCY: \"Is there any chance you are pregnant? \" \"When was your last menstrual period? \"        NA    Protocols used: HEADACHE-ADULT-[place]

## 2020-09-17 NOTE — TELEPHONE ENCOUNTER
Pls call pt and see if she is feeling any better. If not, pls offer nurse visit for toradol 60 mg IM x 1 or an acute visit with anyone avail.  Thx.

## 2020-09-18 ENCOUNTER — OFFICE VISIT (OUTPATIENT)
Dept: FAMILY MEDICINE CLINIC | Age: 50
End: 2020-09-18
Payer: MEDICARE

## 2020-09-18 VITALS
TEMPERATURE: 97.3 F | DIASTOLIC BLOOD PRESSURE: 72 MMHG | HEART RATE: 102 BPM | OXYGEN SATURATION: 95 % | BODY MASS INDEX: 37.76 KG/M2 | WEIGHT: 209.8 LBS | SYSTOLIC BLOOD PRESSURE: 128 MMHG

## 2020-09-18 PROCEDURE — 96372 THER/PROPH/DIAG INJ SC/IM: CPT | Performed by: REGISTERED NURSE

## 2020-09-18 PROCEDURE — 99214 OFFICE O/P EST MOD 30 MIN: CPT | Performed by: REGISTERED NURSE

## 2020-09-18 RX ORDER — AMITRIPTYLINE HYDROCHLORIDE 50 MG/1
75 TABLET, FILM COATED ORAL NIGHTLY
Qty: 90 TABLET | Refills: 1 | Status: SHIPPED | OUTPATIENT
Start: 2020-09-18 | End: 2021-01-06

## 2020-09-18 RX ORDER — KETOROLAC TROMETHAMINE 30 MG/ML
60 INJECTION, SOLUTION INTRAMUSCULAR; INTRAVENOUS ONCE
Status: COMPLETED | OUTPATIENT
Start: 2020-09-18 | End: 2020-09-18

## 2020-09-18 RX ADMIN — KETOROLAC TROMETHAMINE 60 MG: 30 INJECTION, SOLUTION INTRAMUSCULAR; INTRAVENOUS at 13:38

## 2020-09-18 ASSESSMENT — ENCOUNTER SYMPTOMS
SINUS PAIN: 0
SHORTNESS OF BREATH: 0
NAUSEA: 1
COUGH: 0
SINUS PRESSURE: 0

## 2020-09-18 NOTE — PATIENT INSTRUCTIONS

## 2020-09-18 NOTE — PROGRESS NOTES
respiratory failure with hypoxia (HCC)    History of total hysterectomy with removal of both tubes and ovaries    Shortness of breath    [facility]ipolar depression [facility]    Macromastia    Oxygen dependent    History of tobacco use    Primary insomnia    Hemoptysis    Obstructive sleep apnea    Severe obesity (BMI 35.0-39. 9) with comorbidity ([facility]    Chronic GERD    Mixed hyperlipidemia    Vitamin D deficiency    Narcolepsy    Family history of early CAD     Past Medical History:   Diagnosis Date    Anxiety     Asthma     Bipolar 1 disorder ([facility]     Pt is willing to see psych after 7/15 for confirmation of dx    Chronic bronchitis ([facility]     Chronic GERD 6/11/2020    COPD (chronic obstructive pulmonary disease) ([facility]     Dr Mahnaz Gilman Depression     Emphysema of lung ([facility]     Migraine     Migraines     Mixed hyperlipidemia 6/25/2020    MRSA infection within last 3 months 2012    axillary    Narcolepsy 2004    Dr Oseas Vo    Obesity     Pneumonia     Restless leg syndrome     Sleep apnea     did not tolerate cpap    Tobacco abuse     Tobacco use disorder 1/14/2011    Urinary incontinence       Past Surgical History:   Procedure Laterality Date    ABSCESS DRAINAGE  2012    L axilla MRSA, hosp for 7 days    ADENOIDECTOMY      BLADDER SUSPENSION  5/10    Mesh removed 1/9/15 in [address]  2/09    lumpectomy, ducts removed    FINGER SURGERY      right thumb    HYSTERECTOMY  2001 2/2 dysplasia, endometriosis, cysts, MYLENE BSO    NASAL SEPTUM SURGERY      PELVIC LAPAROSCOPY      endometriosis    MYLENE AND BSO  2001    Dysplasia, endometriosis    TONSILLECTOMY      UPPER GASTROINTESTINAL ENDOSCOPY N/A 7/10/2020    EGD BIOPSY performed by Monalisa Fairchild MD at [address] [place] [address]       Family History   Problem Relation Age of Onset    Depression Mother     Breast Cancer Mother     Stroke Mother         cva x 3    Hypertension Mother     Elevated Lipids Mother    Maryanne Copeland Diabetes Mother     Coronary Art Dis Father         mi  age 43    Schizophrenia Father     Hypertension Father    Glamayrastkarley Lindsaywer Elevated Lipids Father     Diabetes Father     Birth Defects Son     Other Son         odd, autism    Asthma Son     Diabetes Son     Other Brother         colitis    Mental Illness Sister     Heart Failure Paternal Grandmother         CHF    Emphysema Maternal Grandfather     Cancer Paternal Grandfather       Allergies   Allergen Reactions    Ambien [Zolpidem Tartrate] Other (See Comments)     Shakey, anxious    Dilaudid [Hydromorphone Hcl] Other (See Comments)     Feels like skin is on fire.  Fentanyl Itching    Imitrex [Sumatriptan]      Face hot, felt sob    Morphine Other (See Comments)     Feels like skin is on fire. Tolerates Percocet. Review of Systems   Constitutional: Positive for fatigue. Negative for fever. HENT: Negative for ear pain, sinus pressure and sinus pain. No mouth pain or pain in area where her tooth is broken   Respiratory: Negative for cough and shortness of breath. Cardiovascular: Negative for chest pain. Gastrointestinal: Positive for nausea. Neurological: Positive for dizziness, light-headedness and headaches. Psychiatric/Behavioral: The patient is nervous/anxious. Vitals:    20 1310   BP: 128/72   Pulse: 102   Temp: 97.3 °F (36.3 °C)   TempSrc: Temporal   SpO2: 95%   Weight: 209 lb 12.8 oz (95.2 kg)     Physical Exam  Constitutional:       Appearance: Normal appearance. HENT:      Head: Normocephalic and atraumatic. Right Ear: Tympanic membrane and external ear normal. Tenderness present. No drainage. There is no impacted cerumen. Tympanic membrane is not injected, perforated or erythematous. Left Ear: Tympanic membrane and external ear normal. No drainage or tenderness. There is no impacted cerumen. Tympanic membrane is not injected, perforated or erythematous.       Ears:      Comments: Erythematous ear canals- bilateral      Nose: Nose normal. No congestion. Mouth/Throat:      Mouth: Mucous membranes are moist.      Pharynx: Oropharynx is clear. No oropharyngeal exudate or posterior oropharyngeal erythema. Eyes:      Extraocular Movements: Extraocular movements intact. Conjunctiva/sclera: Conjunctivae normal.      Pupils: Pupils are equal, round, and reactive to light. Neck:      Musculoskeletal: Normal range of motion. Cardiovascular:      Rate and Rhythm: Normal rate and regular rhythm. Pulses: Normal pulses. Heart sounds: Normal heart sounds. No murmur. No friction rub. No gallop. Pulmonary:      Effort: Pulmonary effort is normal.      Breath sounds: Normal breath sounds. No wheezing, rhonchi or rales. Musculoskeletal: Normal range of motion. Lymphadenopathy:      Cervical: No cervical adenopathy. Skin:     General: Skin is warm and dry. Neurological:      General: No focal deficit present. Mental Status: She is alert and oriented to person, place, and time. Psychiatric:         Mood and Affect: Mood normal.         Behavior: Behavior normal.         Thought Content: Thought content normal.         Judgment: Judgment normal.         Assessment/Plan:  1. Primary insomnia    - amitriptyline (ELAVIL) 50 MG tablet; Take 1.5 tablets by mouth nightly  Dispense: 90 tablet; Refill: 1    2. Intractable migraine with aura without status migrainosus    - ketorolac (TORADOL) injection 60 mg    3. Acute otitis externa of both ears, unspecified type    - ciprofloxacin-dexamethasone (CIPRODEX) 0.3-0.1 % otic suspension; Place 4 drops into both ears 2 times daily for 7 days  Dispense: 1 Bottle; Refill: 0    Increased amitriptyline for migraine management. Toradol given in office. Continue current medications, go to ED if migraine does not resolve. Return if symptoms worsen or fail to improve.

## 2020-09-25 ASSESSMENT — ENCOUNTER SYMPTOMS
SHORTNESS OF BREATH: 1
EYES NEGATIVE: 1
COUGH: 0
ALLERGIC/IMMUNOLOGIC NEGATIVE: 1
GASTROINTESTINAL NEGATIVE: 1

## 2020-09-25 NOTE — PROGRESS NOTES
[facility]) Physicians   [employer]    9/30/2020    TELEHEALTH EVALUATION -- Audio/Visual (During GSGMD-97 public health emergency)    Subjective:      Patient ID: Poly Teixeira is a 54 y.o. female has requested an audio/video evaluation. HPI    Due to the COVID-19 restrictions on close contact interactions the patient's monthly presurgical visit was conducted via audio/video in jasper of a face to face visit. Patient has consented to have this visit conducted via audio/video and I am conducting it from the office. The patient is here through telemedicine for their bariatric surgery presurgical visit for future weight loss. She has made several attempts at weight loss in the past without success and now wishes to pursue bariatric surgery. She is working to change her dietary behaviors and lose weight to improve comorbid conditions such as hyperlipidemia, obstructive sleep apnea and GERD. Poly Teixeira is a 54 y.o. female with Body mass index is 38.16 kg/m².     Past Medical History:   Diagnosis Date    Anxiety     Asthma     Bipolar 1 disorder ([address]     Pt is willing to see psych after 7/15 for confirmation of dx    Chronic bronchitis ([address]     Chronic GERD 6/11/2020    COPD (chronic obstructive pulmonary disease) ([address]     Dr Clara Melo Depression     Em[facility]     Migraine     Migraines     Mixed hyperlipidemia 6/25/2020    MRSA infection within last 3 months 2012    axillary    Narcolepsy 2004    Dr Magdaleno Vo    Obesity     Pneumonia     Restless leg syndrome     Sleep apnea     did not tolerate cpap    Tobacco abuse     Tobacco use disorder 1/14/2011    Urinary incontinence      Past Surgical History:   Procedure Laterality Date    ABSCESS DRAINAGE  2012    L axilla MRSA, hosp for 7 days    ADENOIDECTOMY      BLADDER SUSPENSION  5/10    Mesh removed 1/9/15 in [address]  2/09    lumpectomy, ducts removed    FINGER SURGERY right thumb    HYSTERECTOMY      2/2 dysplasia, endometriosis, cysts, MYLENE BSO    NASAL SEPTUM SURGERY      PELVIC LAPAROSCOPY      endometriosis    MYLENE AND BSO      Dysplasia, endometriosis    TONSILLECTOMY      UPPER GASTROINTESTINAL ENDOSCOPY N/A 7/10/2020    EGD BIOPSY performed by Genna Leonard MD at [address]     Family History   Problem Relation Age of Onset    Depression Mother     Breast Cancer Mother     Stroke Mother         cva x 3    Hypertension Mother     Elevated Lipids Mother     Diabetes Mother     Coronary Art Dis Father         mi  age 43    Schizophrenia Father     Hypertension Father     Elevated Lipids Father     Diabetes Father     Birth Defects Son     Other Son         odd, autism   Farley Croak Asthma Son     Diabetes Son     Other Brother         colitis    Mental Illness Sister     Heart Failure Paternal Grandmother         CHF    Emphysema Maternal Grandfather     Cancer Paternal Grandfather      Social History     Tobacco Use    Smoking status: Former Smoker     Packs/day: 0.25     Years: 22.00     Pack years: 5.50     Types: Cigarettes     Last attempt to quit: 2020     Years since quittin.6    Smokeless tobacco: Never Used   Substance Use Topics    Alcohol use: Not Currently     I counseled the patient on the importance of not smoking and risks of ETOH. Allergies   Allergen Reactions    Ambien [Zolpidem Tartrate] Other (See Comments)     Shakey, anxious    Dilaudid [Hydromorphone Hcl] Other (See Comments)     Feels like skin is on fire.  Fentanyl Itching    Imitrex [Sumatriptan]      Face hot, felt sob    Morphine Other (See Comments)     Feels like skin is on fire. Tolerates Percocet. Vitals:    20 1112   Weight: 212 lb (96.2 kg)   Height: 5' 2.5\" (1.588 m)     Body mass index is 38.16 kg/m².     Current Outpatient Medications:     amitriptyline (ELAVIL) 50 MG tablet, Take 1.5 tablets by mouth nightly, Disp: 90 tablet, Rfl: 1    clonazePAM (KLONOPIN) 1 MG tablet, TAKE 1 TABLET BY MOUTH TWICE DAILY AS NEEDED ANXIETY, Disp: 30 tablet, Rfl: 0    CHANTIX CONTINUING MONTH COLUMBA 1 MG tablet, TAKE 1 TABLET BY MOUTH TWICE DAILY, Disp: 56 tablet, Rfl: 2    rizatriptan (MAXALT) 10 MG tablet, TAKE 1 TABLET BY MOUTH ONCE AS NEEDED FOR MIGRAINE MAY REPEAT IN 2 HOURS IF NEEDED. MAX 2 TABS/24 HOURS, Disp: 18 tablet, Rfl: 5    Cholecalciferol 50 MCG (2000 UT) TABS, Take 1 tablet by mouth daily Take 1 tablet by mouth daily. , Disp: 90 tablet, Rfl: 2    furosemide (LASIX) 40 MG tablet, TAKE 1 TABLET BY MOUTH EVERY DAY TO TWICE DAILY AS NEEDED SWELLING, Disp: 60 tablet, Rfl: 5    omeprazole (PRILOSEC) 20 MG delayed release capsule, Take 1 capsule by mouth Daily, Disp: 30 capsule, Rfl: 3    FLUoxetine (PROZAC) 20 MG capsule, TAKE 3 CAPSULES BY MOUTH DAILY, Disp: 90 capsule, Rfl: 5    potassium chloride (KLOR-CON M) 20 MEQ extended release tablet, TAKE 1 TABLET BY MOUTH DAILY, Disp: 90 tablet, Rfl: 0    VENTOLIN  (90 Base) MCG/ACT inhaler, INHALE 2 PUFFS INTO THE LUNGS 4 TIMES DAILY AS NEEDED., Disp: 18 g, Rfl: 5    topiramate (TOPAMAX) 100 MG tablet, TAKE 1 TABLET BY MOUTH 2 TIMES DAILY, Disp: 180 tablet, Rfl: 1    benzonatate (TESSALON) 100 MG capsule, TAKE 1-2 CAPSULES BY MOUTH 3 TIMES DAILY AS NEEDED COUGH, Disp: 60 capsule, Rfl: 3    promethazine (PHENERGAN) 25 MG tablet, TAKE 1 TABLET BY MOUTH EVERY 6 HOURS AS NEEDED FOR NAUSEA, Disp: 30 tablet, Rfl: 0    butalbital-acetaminophen-caffeine (FIORICET, ESGIC) -40 MG per tablet, TAKE 1 TABLET BY MOUTH 3 TIMES DAILY AS NEEDED FOR MIGRAINE, Disp: 30 tablet, Rfl: 0    gabapentin (NEURONTIN) 300 MG capsule, TAKE ONE CAPSULE BY MOUTH ONE HOUR BEFORE BEDTIME, Disp: , Rfl: 5    tiotropium (SPIRIVA RESPIMAT) 2.5 MCG/ACT AERS inhaler, Inhale 2 puffs into the lungs daily, Disp: 4 g, Rfl: 5    amphetamine-dextroamphetamine (ADDERALL XR) 20 MG extended release capsule, TAKE 1 CAPSULE BY MOUTH EVERY DAY, Disp: , Rfl: 0    methylphenidate (RITALIN LA) 30 MG extended release capsule, Take 20 mg by mouth 3 times daily. ., Disp: , Rfl:     LORATADINE-D 12HR 5-120 MG per extended release tablet, TAKE 1 TABLET BY MOUTH EVERY MORNING AS NEEDED FOR ALLERGY, Disp: 30 tablet, Rfl: 5    Respiratory Therapy Supplies (NEBULIZER/TUBING/MOUTHPIECE) KIT, 1 kit by Does not apply route daily as needed, Disp: 1 kit, Rfl: 0    OXYGEN, Inhale 2 L/min into the lungs continuous.  Regulate with portability at home (Patient taking differently: Inhale 2 L/min into the lungs as needed ), Disp: 1 Container, Rfl: 0    Lab Results   Component Value Date    WBC 8.4 06/24/2020    RBC 4.26 06/24/2020    HGB 14.1 06/24/2020    HCT 41.4 06/24/2020    MCV 97.2 06/24/2020    MCH 33.2 06/24/2020    MCHC 34.1 06/24/2020    MPV 8.1 06/24/2020    NEUTOPHILPCT 64.0 06/24/2020    LYMPHOPCT 27.6 06/24/2020    MONOPCT 6.1 06/24/2020    EOSRELPCT 1.7 06/24/2020    BASOPCT 0.6 06/24/2020    NEUTROABS 5.4 06/24/2020    LYMPHSABS 2.3 06/24/2020    MONOSABS 0.5 06/24/2020    EOSABS 0.1 06/24/2020     Lab Results   Component Value Date     06/24/2020    K 3.9 06/24/2020     06/24/2020    CO2 21 06/24/2020    ANIONGAP 13 06/24/2020    GLUCOSE 98 06/24/2020    BUN 17 06/24/2020    CREATININE 0.7 06/24/2020    LABGLOM >60 06/24/2020    GFRAA >60 06/24/2020    CALCIUM 9.1 06/24/2020    PROT 7.3 06/24/2020    LABALBU 4.2 06/24/2020    AGRATIO 1.4 06/24/2020    BILITOT <0.2 06/24/2020    ALKPHOS 85 06/24/2020    ALT 30 06/24/2020    AST 27 06/24/2020    GLOB 3.1 06/24/2020     Lab Results   Component Value Date    CHOL 198 09/01/2020    TRIG 133 09/01/2020    HDL 51 09/01/2020    LDLCALC 120 09/01/2020    LABVLDL 27 09/01/2020     Lab Results   Component Value Date    TSHREFLEX 2.53 06/24/2020     Lab Results   Component Value Date    IRON 70 06/24/2020    TIBC 324 06/24/2020    LABIRON 22 06/24/2020     Lab Results   Component Value Date    AKUQFVBO53 527 06/24/2020    FOLATE 11.58 06/24/2020     Lab Results   Component Value Date    VITD25 22.7 09/01/2020     Lab Results   Component Value Date    LABA1C 4.8 06/24/2020    EAG 91.1 06/24/2020     Review of Systems   Constitutional: Negative. Negative for chills, fatigue and fever. HENT: Negative. Eyes: Negative. Respiratory: Positive for shortness of breath (Baseline with activity). Negative for cough. Cardiovascular: Negative. Gastrointestinal: Negative. Endocrine: Negative. Genitourinary: Negative. Musculoskeletal: Negative. Skin: Negative. Allergic/Immunologic: Negative. Neurological: Negative. Hematological: Negative. Psychiatric/Behavioral: Negative. PHYSICAL EXAMINATION:    Constitutional: [x] Appears well-developed and well-nourished [x] No apparent distress      [] Abnormal-   Mental status  [x] Alert and awake  [x] Oriented to person/place/time [x]Able to follow commands      Eyes:  EOM    [x]  Normal  [] Abnormal-  Sclera  [x]  Normal  [] Abnormal -         Discharge [x]  None visible  [] Abnormal -    HENT:   [x] Normocephalic, atraumatic.   [] Abnormal     Neck: [x] No visualized mass     Pulmonary/Chest: [x] Respiratory effort normal.  [x] No visualized signs of difficulty breathing or respiratory distress        [] Abnormal-      Musculoskeletal:   [] Normal gait with no signs of ataxia         [x] Normal range of motion of neck        [] Abnormal-     Neurological:        [x] No Facial Asymmetry (Cranial nerve 7 motor function) (limited exam to video visit)          [x] No gaze palsy        [] Abnormal-         Skin:        [x] No significant exanthematous lesions or discoloration noted on facial skin         [] Abnormal-            Psychiatric:       [x] Normal Affect [x] No Hallucinations        [] Abnormal-     Other pertinent observable physical exam findings-     Due to this being a TeleHealth encounter, evaluation of the following organ systems is limited: Vitals/Constitutional/EENT/Resp/CV/GI//MS/Neuro/Skin/Heme-Lymph-Imm. Assessment and Plan:   Patient is here for their 3rd presurgery visit for sleeve via telemedicine, up 5 lbs. The patient's current Body mass index is 38.16 kg/m². (9/30/20). She is making dietary and behavior modifications, but has been a little off track with the passing of her step father this month and helping her mother with things. She talked with the registered dietitian for continued follow up. I agree with recommendations and plan. She is limited with exercising due to breathing, but is active throughout the day. Encouraged physical activity as tolerated. Patient with h/o hysterectomy so did not need to receive instruction that it is recommended to avoid pregnancy following bariatric surgery for at least 2 years to allow them to have stable weight loss and to help avoid increased risk of vitamin deficiencies and malnutrition. Discussed preop work up which still needs pulmonary clearance (appointment today), sleep clearance (will call to have them send letter since she saw them recently) and protein sample (will arrange to  from office). If the patient is able to make the necessary changes with her dietary behaviors and has completed the remainder of her preoperative requirements by her next visit she should be ready for a surgery date. We will see her back in 1 month for continued follow up or through telemedicine. A total of 15 minutes was spent conversing with the patient and over half of that time was spent counseling the patient on proper dietary behaviors, exercise and preoperative work-up. An electronic signature was used to authenticate this note.      Pursuant to the emergency declaration under the [facility] waiver authority and the [employer] and [employer] Act, this Virtual  Visit was conducted, with patient's consent, to reduce the patient's risk of exposure to COVID-19 and provide continuity of care for an established patient. Services were provided through a video synchronous discussion virtually to substitute for in-person clinic visit. Obesity, as a disease, is considered high risk to a patients overall health and should therefore be considered a high risk disease state. Now with Covid-19 pandemic, CDC and health authorities do classify obese patients as vulnerable and high risk as well.

## 2020-09-30 ENCOUNTER — TELEMEDICINE (OUTPATIENT)
Dept: BARIATRICS/WEIGHT MGMT | Age: 50
End: 2020-09-30
Payer: MEDICARE

## 2020-09-30 ENCOUNTER — VIRTUAL VISIT (OUTPATIENT)
Dept: PULMONOLOGY | Age: 50
End: 2020-09-30
Payer: MEDICARE

## 2020-09-30 VITALS — BODY MASS INDEX: 37.56 KG/M2 | WEIGHT: 212 LBS | HEIGHT: 63 IN

## 2020-09-30 PROCEDURE — 99213 OFFICE O/P EST LOW 20 MIN: CPT | Performed by: NURSE PRACTITIONER

## 2020-09-30 PROCEDURE — 99442 PR PHYS/QHP TELEPHONE EVALUATION 11-20 MIN: CPT | Performed by: INTERNAL MEDICINE

## 2020-09-30 PROCEDURE — G8427 DOCREV CUR MEDS BY ELIG CLIN: HCPCS | Performed by: NURSE PRACTITIONER

## 2020-09-30 PROCEDURE — 1036F TOBACCO NON-USER: CPT | Performed by: NURSE PRACTITIONER

## 2020-09-30 PROCEDURE — G8417 CALC BMI ABV UP PARAM F/U: HCPCS | Performed by: NURSE PRACTITIONER

## 2020-09-30 RX ORDER — ALBUTEROL SULFATE 2.5 MG/3ML
2.5 SOLUTION RESPIRATORY (INHALATION) EVERY 6 HOURS PRN
COMMUNITY

## 2020-09-30 RX ORDER — METHYLPHENIDATE HYDROCHLORIDE 20 MG/1
20 CAPSULE, EXTENDED RELEASE ORAL EVERY MORNING
COMMUNITY
End: 2020-11-04

## 2020-09-30 NOTE — PROGRESS NOTES
Tray Brenner is a 54 y.o. female evaluated via telephone on 9/30/2020. Consent:  She and/or health care decision maker is aware that that she may receive a bill for this telephone service, depending on her insurance coverage, and has provided verbal consent to proceed: Yes      Documentation:  I communicated with the patient and/or health care decision maker about COPD mgmt, Covid. Details of this discussion including any medical advice provided: Inhaled bronchodilators, COPD mgmt      I affirm this is a Patient Initiated Episode with an Established Patient who has not had a related appointment within my department in the past 7 days or scheduled within the next 24 hours. Total Time: minutes: 11-20 minutes    Note: not billable if this call serves to triage the patient into an appointment for the relevant concern      Linda Perez   Since last clinic visit, the patient reports that his dyspnea is slightly better. No smoking since 7 months ago. She is using spiriva and albuterol. She is planing to have bariatric surgery, requiring general anesthesia. ASSESSMENT AND PLAN:    COPD (chronic obstructive pulmonary disease) moderate  -  pfts to confirm dx; stage moderate to severe  - Continue inhaled bronchodilator therapy  albuterol prn; changed Symbicort to Spiriva Respimat per patient preference, continue  -  up to date with Pneumococcal vaccine and Influenza vaccines. - Pulmonary rehab completed  - Advised to avoid smoking again      Chronic respiratory failure with hypoxemia  - continue 2l Supplemental oxygen with exertion;  light weight tanks and use OCD        Cough  The etiology of the patient's cough is likely smoking related chronic bronchitis.   - I recommended smoking avoidance  - Tessalon Perles as needed      Tobacco abuse  - encouraged continued complete cessation  -Previously on Chantix per Dr. Ob[facility] pulm eval  - Patient may proceed to the operating room from pulmonary perspective without further testing. Because of underlying disease, the patient is at increased risk of noah-operative complications, including atelectasis, pneumonia and, when general anesthesia is required, failure to liberate from mechanical ventilation. However, this should not preclude the patient from having the recommended operation. It is my recommendation that the patient's inhaled bronchodilators be continued in the perioperative period. All of this was discussed with the patient today in the office.           Orders  - send note surgeon- Dr. Echols Patient  - f/u in 6 months

## 2020-10-07 ENCOUNTER — TELEPHONE (OUTPATIENT)
Dept: FAMILY MEDICINE CLINIC | Age: 50
End: 2020-10-07

## 2020-10-07 RX ORDER — CLONAZEPAM 1 MG/1
1 TABLET ORAL 2 TIMES DAILY PRN
Qty: 30 TABLET | Refills: 0 | Status: SHIPPED | OUTPATIENT
Start: 2020-10-07 | End: 2020-11-04

## 2020-10-12 RX ORDER — TOPIRAMATE 100 MG/1
100 TABLET, FILM COATED ORAL 2 TIMES DAILY
Qty: 180 TABLET | Refills: 1 | Status: SHIPPED | OUTPATIENT
Start: 2020-10-12 | End: 2021-06-15

## 2020-10-13 ASSESSMENT — ENCOUNTER SYMPTOMS
GASTROINTESTINAL NEGATIVE: 1
EYES NEGATIVE: 1
ALLERGIC/IMMUNOLOGIC NEGATIVE: 1
SHORTNESS OF BREATH: 1
COUGH: 0

## 2020-10-13 NOTE — PROGRESS NOTES
[facility]) Physicians   [facility]    10/29/2020    TELEHEALTH EVALUATION -- Audio/Visual (During ZBQUP-82 public health emergency)    Subjective:      Patient ID: rBeezy Bacon is a 52 y.o. female has requested an audio/video evaluation. HPI    Due to the COVID-19 restrictions on close contact interactions the patient's monthly presurgical visit was conducted via audio/video in jasper of a face to face visit. Patient has consented to have this visit conducted via audio/video and I am conducting it from the office. The patient is here through telemedicine for their bariatric surgery presurgical visit for future weight loss. She has made several attempts at weight loss in the past without success and now wishes to pursue bariatric surgery. She is working to change her dietary behaviors and lose weight to improve comorbid conditions such as hyperlipidemia, obstructive sleep apnea and GERD. Breezy Bacon is a 52 y.o. female with Body mass index is 37.01 kg/m².     Past Medical History:   Diagnosis Date    Anxiety     Asthma     Bipolar 1 disorder ([address]     Pt is willing to see psych after 7/15 for confirmation of dx    Chronic bronchitis ([address]     Chronic GERD 6/11/2020    COPD (chronic obstructive pulmonary disease) ([address]     Dr Adrien Flynn Depression     [facility] lung [facility]     Migraine     Migraines     Mixed hyperlipidemia 6/25/2020    MRSA infection within last 3 months 2012    axillary    Narcolepsy 2004    Dr Irvin Montana    Obesity     Pneumonia     Restless leg syndrome     Sleep apnea     did not tolerate cpap    Tobacco abuse     Tobacco use disorder 1/14/2011    Urinary incontinence      Past Surgical History:   Procedure Laterality Date    ABSCESS DRAINAGE  2012    L axilla MRSA, hosp for 7 days    ADENOIDECTOMY      BLADDER SUSPENSION  5/10    Mesh removed 1/9/15 in [address]  2/09    lumpectomy, ducts removed    FINGER SURGERY right thumb    HYSTERECTOMY      2/2 dysplasia, endometriosis, cysts, MYLENE BSO    NASAL SEPTUM SURGERY      PELVIC LAPAROSCOPY      endometriosis    MYLENE AND BSO      Dysplasia, endometriosis    TONSILLECTOMY      UPPER GASTROINTESTINAL ENDOSCOPY N/A 7/10/2020    EGD BIOPSY performed by Aron Boo MD at [facility]     Family History   Problem Relation Age of Onset    Depression Mother     Breast Cancer Mother     Stroke Mother         cva x 3    Hypertension Mother     Elevated Lipids Mother     Diabetes Mother     Coronary Art Dis Father         mi  age 43    Schizophrenia Father     Hypertension Father     Elevated Lipids Father     Diabetes Father     Birth Defects Son     Other Son         odd, autism   Shaniqua See Asthma Son     Diabetes Son     Other Brother         colitis    Mental Illness Sister     Heart Failure Paternal Grandmother         CHF    Emphysema Maternal Grandfather     Cancer Paternal Grandfather      Social History     Tobacco Use    Smoking status: Former Smoker     Packs/day: 0.25     Years: 22.00     Pack years: 5.50     Types: Cigarettes     Last attempt to quit: 2020     Years since quittin.7    Smokeless tobacco: Never Used   Substance Use Topics    Alcohol use: Not Currently     I counseled the patient on the importance of not smoking and risks of ETOH. Allergies   Allergen Reactions    Ambien [Zolpidem Tartrate] Other (See Comments)     Shakey, anxious    Dilaudid [Hydromorphone Hcl] Other (See Comments)     Feels like skin is on fire.  Fentanyl Itching    Imitrex [Sumatriptan]      Face hot, felt sob    Morphine Other (See Comments)     Feels like skin is on fire. Tolerates Percocet. Vitals:    10/29/20 1328   Weight: 205 lb 9.6 oz (93.3 kg)   Height: 5' 2.5\" (1.588 m)     Body mass index is 37.01 kg/m².     Current Outpatient Medications:     topiramate (TOPAMAX) 100 MG tablet, TAKE 1 TABLET BY MOUTH 2 TIMES DAILY, Disp: 180 tablet, Rfl: 1    clonazePAM (KLONOPIN) 1 MG tablet, Take 1 tablet by mouth 2 times daily as needed for Anxiety for up to 30 days. , Disp: 30 tablet, Rfl: 0    methylphenidate (RITALIN LA) 20 MG extended release capsule, Take 20 mg by mouth every morning., Disp: , Rfl:     albuterol (PROVENTIL) (2.5 MG/3ML) 0.083% nebulizer solution, Take 2.5 mg by nebulization every 6 hours as needed for Wheezing, Disp: , Rfl:     amitriptyline (ELAVIL) 50 MG tablet, Take 1.5 tablets by mouth nightly, Disp: 90 tablet, Rfl: 1    CHANTIX CONTINUING MONTH COLUMBA 1 MG tablet, TAKE 1 TABLET BY MOUTH TWICE DAILY (Patient not taking: Reported on 9/30/2020), Disp: 56 tablet, Rfl: 2    rizatriptan (MAXALT) 10 MG tablet, TAKE 1 TABLET BY MOUTH ONCE AS NEEDED FOR MIGRAINE MAY REPEAT IN 2 HOURS IF NEEDED. MAX 2 TABS/24 HOURS, Disp: 18 tablet, Rfl: 5    Cholecalciferol 50 MCG (2000 UT) TABS, Take 1 tablet by mouth daily Take 1 tablet by mouth daily. , Disp: 90 tablet, Rfl: 2    furosemide (LASIX) 40 MG tablet, TAKE 1 TABLET BY MOUTH EVERY DAY TO TWICE DAILY AS NEEDED SWELLING, Disp: 60 tablet, Rfl: 5    omeprazole (PRILOSEC) 20 MG delayed release capsule, Take 1 capsule by mouth Daily, Disp: 30 capsule, Rfl: 3    FLUoxetine (PROZAC) 20 MG capsule, TAKE 3 CAPSULES BY MOUTH DAILY, Disp: 90 capsule, Rfl: 5    potassium chloride (KLOR-CON M) 20 MEQ extended release tablet, TAKE 1 TABLET BY MOUTH DAILY, Disp: 90 tablet, Rfl: 0    VENTOLIN  (90 Base) MCG/ACT inhaler, INHALE 2 PUFFS INTO THE LUNGS 4 TIMES DAILY AS NEEDED., Disp: 18 g, Rfl: 5    benzonatate (TESSALON) 100 MG capsule, TAKE 1-2 CAPSULES BY MOUTH 3 TIMES DAILY AS NEEDED COUGH, Disp: 60 capsule, Rfl: 3    promethazine (PHENERGAN) 25 MG tablet, TAKE 1 TABLET BY MOUTH EVERY 6 HOURS AS NEEDED FOR NAUSEA, Disp: 30 tablet, Rfl: 0    butalbital-acetaminophen-caffeine (FIORICET, ESGIC) -40 MG per tablet, TAKE 1 TABLET BY MOUTH 3 TIMES DAILY AS NEEDED FOR MIGRAINE, Disp: 30 tablet, Rfl: 0    gabapentin (NEURONTIN) 300 MG capsule, TAKE ONE CAPSULE BY MOUTH ONE HOUR BEFORE BEDTIME, Disp: , Rfl: 5    tiotropium (SPIRIVA RESPIMAT) 2.5 MCG/ACT AERS inhaler, Inhale 2 puffs into the lungs daily, Disp: 4 g, Rfl: 5    amphetamine-dextroamphetamine (ADDERALL XR) 20 MG extended release capsule, TAKE 1 CAPSULE BY MOUTH EVERY DAY, Disp: , Rfl: 0    methylphenidate (RITALIN LA) 30 MG extended release capsule, Take 20 mg by mouth 3 times daily. ., Disp: , Rfl:     LORATADINE-D 12HR 5-120 MG per extended release tablet, TAKE 1 TABLET BY MOUTH EVERY MORNING AS NEEDED FOR ALLERGY, Disp: 30 tablet, Rfl: 5    Respiratory Therapy Supplies (NEBULIZER/TUBING/MOUTHPIECE) KIT, 1 kit by Does not apply route daily as needed, Disp: 1 kit, Rfl: 0    OXYGEN, Inhale 2 L/min into the lungs continuous.  Regulate with portability at home (Patient taking differently: Inhale 2 L/min into the lungs as needed ), Disp: 1 Container, Rfl: 0    Lab Results   Component Value Date    WBC 8.4 06/24/2020    RBC 4.26 06/24/2020    HGB 14.1 06/24/2020    HCT 41.4 06/24/2020    MCV 97.2 06/24/2020    MCH 33.2 06/24/2020    MCHC 34.1 06/24/2020    MPV 8.1 06/24/2020    NEUTOPHILPCT 64.0 06/24/2020    LYMPHOPCT 27.6 06/24/2020    MONOPCT 6.1 06/24/2020    EOSRELPCT 1.7 06/24/2020    BASOPCT 0.6 06/24/2020    NEUTROABS 5.4 06/24/2020    LYMPHSABS 2.3 06/24/2020    MONOSABS 0.5 06/24/2020    EOSABS 0.1 06/24/2020     Lab Results   Component Value Date     06/24/2020    K 3.9 06/24/2020     06/24/2020    CO2 21 06/24/2020    ANIONGAP 13 06/24/2020    GLUCOSE 98 06/24/2020    BUN 17 06/24/2020    CREATININE 0.7 06/24/2020    LABGLOM >60 06/24/2020    GFRAA >60 06/24/2020    CALCIUM 9.1 06/24/2020    PROT 7.3 06/24/2020    LABALBU 4.2 06/24/2020    AGRATIO 1.4 06/24/2020    BILITOT <0.2 06/24/2020    ALKPHOS 85 06/24/2020    ALT 30 06/24/2020    AST 27 06/24/2020    GLOB 3.1 06/24/2020     Lab Results Component Value Date    CHOL 198 09/01/2020    TRIG 133 09/01/2020    HDL 51 09/01/2020    LDLCALC 120 09/01/2020    LABVLDL 27 09/01/2020     Lab Results   Component Value Date    TSHREFLEX 2.53 06/24/2020     Lab Results   Component Value Date    IRON 70 06/24/2020    TIBC 324 06/24/2020    LABIRON 22 06/24/2020     Lab Results   Component Value Date    WLEBETER01 527 06/24/2020    FOLATE 11.58 06/24/2020     Lab Results   Component Value Date    VITD25 22.7 09/01/2020     Lab Results   Component Value Date    LABA1C 4.8 06/24/2020    EAG 91.1 06/24/2020     Review of Systems   Constitutional: Negative. Negative for chills, fatigue and fever. HENT: Negative. Eyes: Negative. Respiratory: Positive for shortness of breath (Baseline with activity. ). Negative for cough. Cardiovascular: Negative. Gastrointestinal: Negative. Endocrine: Negative. Genitourinary: Negative. Musculoskeletal: Negative. Skin: Negative. Allergic/Immunologic: Negative. Neurological: Negative. Hematological: Negative. Psychiatric/Behavioral: Negative. PHYSICAL EXAMINATION:    Constitutional: [x] Appears well-developed and well-nourished [x] No apparent distress      [] Abnormal-   Mental status  [x] Alert and awake  [x] Oriented to person/place/time [x]Able to follow commands      Eyes:  EOM    [x]  Normal  [] Abnormal-  Sclera  [x]  Normal  [] Abnormal -         Discharge [x]  None visible  [] Abnormal -    HENT:   [x] Normocephalic, atraumatic.   [] Abnormal     Neck: [x] No visualized mass     Pulmonary/Chest: [x] Respiratory effort normal.  [x] No visualized signs of difficulty breathing or respiratory distress        [] Abnormal-      Musculoskeletal:   [] Normal gait with no signs of ataxia         [x] Normal range of motion of neck        [] Abnormal-     Neurological:        [x] No Facial Asymmetry (Cranial nerve 7 motor function) (limited exam to video visit)          [x] No gaze palsy Appropriations Act, this Virtual  Visit was conducted, with patient's consent, to reduce the patient's risk of exposure to COVID-19 and provide continuity of care for an established patient. Services were provided through a video synchronous discussion virtually to substitute for in-person clinic visit. Obesity, as a disease, is considered high risk to a patients overall health and should therefore be considered a high risk disease state. Now with Covid-19 pandemic, CDC and health authorities do classify obese patients as vulnerable and high risk as well.

## 2020-10-13 NOTE — PATIENT INSTRUCTIONS
Goals in preparing for bariatric surgery  You should be giving up all beverages that have carbonation, sugar, and caffeine (Refer to the approved liquids list provided at initial visit).  You should be drinking 64 ounces of low calorie (5 calories or less per serving) fluids per day. Suggestions include:  o Water (you may add fresh lemon or lime)  o Crystal Light  o Jeanmarie Liquid Water Enhancer  o Propel Zero  o Powerade Zero/Gatorade Zero  o Isopure  o Yalme8G  o SOBE Lifewater Zero  o Vitamin Water Zero  o Sugar Free Rigo-Aid  You should be eating 4-6 times per day.  Three small meals plus 1-2 snacks per day is your goal. This balances your calories and nutrients evenly throughout the day and helps to boost your metabolism. Refer to the snack list provided at your initial visit. Aim for a protein at every snack, plus a fruit, vegetable or starch. You should be eating protein at every meal and snack.  Protein is typically found in animal sources, i.e. chicken, lean beef, lean pork, fish, seafood and eggs. It is also found in low-fat dairy sources such as skim or 1% milk, low-fat yogurt, low-fat cheese, and low-fat cottage cheese. Plant based sources of protein include peanut butter, beans, and soy. You should be utilizing the 9-inch plate method.  Eating on a smaller plate will help you control portion size, but what you put on your plate counts also. Make ¼ of your plate lean protein, ¼ carbohydrate (fruit, grain or starchy vegetables) and ½ the plate non-starchy vegetables. You should eliminate caffeine.  Caffeine is dehydrating. After surgery, it's very important to stay hydrated. Giving up caffeine before surgery will help you focus on the changes necessary to be successful after surgery. There are many decaffeinated coffee and tea products available in grocery stores. You should be reducing added fat and sugar in your diet.    Frying foods adds too much fat and calories, but you could use an air fryer as it requires significantly less oil. Baking, broiling, or grilling meats add flavor without unhealthy fats. Using cooking oil spray and spray butter products are also healthy options that will aid in your weight loss. Foods high in added sugars are often also high in calories and low in nutrients. Eating habits after surgery need to be a long-term change. Eating habits are often so ingrained that it can be difficult to change. It is important to practice new eating habits prior to surgery to mentally prepare yourself for the challenge ahead. Also, remember that overall health, age, and genetics make each person's weight loss progress different. Do not compare your progress (pre- or post-operatively), the amount you eat, or your exercise to other patients. In addition, it is the responsibility of the patient to schedule and follow up on labs and tests completed during the pre-surgical period. Results will be reviewed at each visit. **IT IS IMPORTANT TO KNOW THAT YOU MUST COMPLETE ALL REQUIRED DIETARY CHANGES, TESTS, CLEARANCES AND ACTIVITIES BEFORE A SURGERY DATE CAN BE GIVEN. IF YOU HAVE NOT MET ANY OF THESE REQUIREMENTS THEN YOU WILL NOT BE GIVEN A SURGERY DATE UNTIL THEY HAVE BEEN MET TO OUR SATISFACTION. THIS IS NOT ONLY DONE TO HELP YOU BE SUCCESSFUL AFTER SURGERY, BUT TO BE SAFE AS WELL.**    Patient received dietary handouts and education.

## 2020-10-29 ENCOUNTER — TELEMEDICINE (OUTPATIENT)
Dept: BARIATRICS/WEIGHT MGMT | Age: 50
End: 2020-10-29
Payer: MEDICARE

## 2020-10-29 VITALS — BODY MASS INDEX: 36.43 KG/M2 | WEIGHT: 205.6 LBS | HEIGHT: 63 IN

## 2020-10-29 PROCEDURE — G8417 CALC BMI ABV UP PARAM F/U: HCPCS | Performed by: NURSE PRACTITIONER

## 2020-10-29 PROCEDURE — 1036F TOBACCO NON-USER: CPT | Performed by: NURSE PRACTITIONER

## 2020-10-29 PROCEDURE — G8484 FLU IMMUNIZE NO ADMIN: HCPCS | Performed by: NURSE PRACTITIONER

## 2020-10-29 PROCEDURE — 99213 OFFICE O/P EST LOW 20 MIN: CPT | Performed by: NURSE PRACTITIONER

## 2020-10-29 PROCEDURE — G8427 DOCREV CUR MEDS BY ELIG CLIN: HCPCS | Performed by: NURSE PRACTITIONER

## 2020-10-29 NOTE — PROGRESS NOTES
Vanessa Garcia lost 6.4 lbs over the past month. Pt is pleased with weight loss; she feels like she is back on track. Breakfast: Nectar protein shake    Snack: low fat cheese stick    Lunch: baked chix or steak, brussel sprouts or green beans or broccoli, occas red skin potatoes    Snack: 1T peanut butter    Dinner: high protein 10X10 Room    Snack: cheese stick OR yogurt    Is pt consuming smaller portions? yes she is mindful of portions    Is pt consuming at least 64 oz of fluids per day? yes water    Is pt consuming carbonated, caffeinated, or sugary beverages? no    Has pt sampled Unjury and/or Nectar protein?  Yes using daily    Exercise: using exercise bands; trying to walk as much as possible    Plan/Recommendations: continue meal plan as presented    Handouts: none    Bruno Betancourt

## 2020-10-30 ASSESSMENT — ENCOUNTER SYMPTOMS
COUGH: 0
SHORTNESS OF BREATH: 0
RESPIRATORY NEGATIVE: 1
EYES NEGATIVE: 1
ALLERGIC/IMMUNOLOGIC NEGATIVE: 1
GASTROINTESTINAL NEGATIVE: 1

## 2020-10-30 NOTE — PROGRESS NOTES
[facility] [place] ([facility]) Physicians   [employer]    Subjective:      Patient ID: Rony Lopez is a 52 y.o. female    HPI    The patient is here for their bariatric surgery preoperative education group visit. She has made several attempts at weight loss in the past without success and now has been scheduled to have bariatric surgery on November 16, 2020 for future weight loss. She is working to change her dietary behaviors and lose weight to improve comorbid conditions such as hyperlipidemia, obstructive sleep apnea and GERD. Rony Lopez is a very pleasant 52 y.o. female with Body mass index is 36.68 kg/m².     Past Medical History:   Diagnosis Date    Anxiety     Asthma     Bipolar 1 disorder ([facility] [address]     Pt is willing to see psych after 7/15 for confirmation of dx    Chronic bronchitis ([facility]     Chronic GERD 6/11/2020    COPD (chronic obstructive pulmonary disease) ([facility] [address] [facility]     Dr Sofia Redd Depression     Emphysema of lung ([facility] [facility]     Migraine     Migraines     Mixed hyperlipidemia 6/25/2020    MRSA infection within last 3 months 2012    axillary    Narcolepsy 2004    Dr Hakeem Suarez    Obesity     Pneumonia     Restless leg syndrome     Sleep apnea     did not tolerate cpap    Tobacco abuse     Tobacco use disorder 1/14/2011    Urinary incontinence      Past Surgical History:   Procedure Laterality Date    ABSCESS DRAINAGE  2012    L axilla MRSA, hosp for 7 days    ADENOIDECTOMY      BLADDER SUSPENSION  5/10    Mesh removed 1/9/15 in [facility], [address] [place] [address]  2/09    lumpectomy, ducts removed    FINGER SURGERY      right thumb    HYSTERECTOMY  2001 2/2 dysplasia, endometriosis, cysts, MYLENE BSO    NASAL SEPTUM SURGERY      PELVIC LAPAROSCOPY      endometriosis    MYLENE AND BSO  2001    Dysplasia, endometriosis    TONSILLECTOMY      UPPER GASTROINTESTINAL ENDOSCOPY N/A 7/10/2020    EGD BIOPSY performed by Jess York MD at [address] [facility] ENDOSCOPY     Family History Problem Relation Age of Onset    Depression Mother     Breast Cancer Mother     Stroke Mother         cva x 3    Hypertension Mother     Elevated Lipids Mother     Diabetes Mother     Coronary Art Dis Father         mi  age 43    Schizophrenia Father     Hypertension Father     Elevated Lipids Father     Diabetes Father     Birth Defects Son     Other Son         odd, autism    Asthma Son     Diabetes Son     Other Brother         colitis    Mental Illness Sister     Heart Failure Paternal Grandmother         CHF    Emphysema Maternal Grandfather     Cancer Paternal Grandfather      Social History     Tobacco Use    Smoking status: Former Smoker     Packs/day: 0.25     Years: 22.00     Pack years: 5.50     Types: Cigarettes     Last attempt to quit: 2020     Years since quittin.7    Smokeless tobacco: Never Used   Substance Use Topics    Alcohol use: Not Currently     I counseled the patient on the importance of not smoking and risks of ETOH. Allergies   Allergen Reactions    Ambien [Zolpidem Tartrate] Other (See Comments)     Shakey, anxious    Dilaudid [Hydromorphone Hcl] Other (See Comments)     Feels like skin is on fire.  Fentanyl Itching    Imitrex [Sumatriptan]      Face hot, felt sob    Morphine Other (See Comments)     Feels like skin is on fire. Tolerates Percocet. Vitals:    20 1457   Weight: 203 lb 12.8 oz (92.4 kg)   Height: 5' 2.5\" (1.588 m)     Body mass index is 36.68 kg/m². Current Outpatient Medications:     topiramate (TOPAMAX) 100 MG tablet, TAKE 1 TABLET BY MOUTH 2 TIMES DAILY, Disp: 180 tablet, Rfl: 1    clonazePAM (KLONOPIN) 1 MG tablet, Take 1 tablet by mouth 2 times daily as needed for Anxiety for up to 30 days. , Disp: 30 tablet, Rfl: 0    methylphenidate (RITALIN LA) 20 MG extended release capsule, Take 20 mg by mouth every morning., Disp: , Rfl:     albuterol (PROVENTIL) (2.5 MG/3ML) 0.083% nebulizer solution, Take 2.5 mg by nebulization every 6 hours as needed for Wheezing, Disp: , Rfl:     amitriptyline (ELAVIL) 50 MG tablet, Take 1.5 tablets by mouth nightly, Disp: 90 tablet, Rfl: 1    rizatriptan (MAXALT) 10 MG tablet, TAKE 1 TABLET BY MOUTH ONCE AS NEEDED FOR MIGRAINE MAY REPEAT IN 2 HOURS IF NEEDED. MAX 2 TABS/24 HOURS, Disp: 18 tablet, Rfl: 5    Cholecalciferol 50 MCG (2000 UT) TABS, Take 1 tablet by mouth daily Take 1 tablet by mouth daily. , Disp: 90 tablet, Rfl: 2    furosemide (LASIX) 40 MG tablet, TAKE 1 TABLET BY MOUTH EVERY DAY TO TWICE DAILY AS NEEDED SWELLING, Disp: 60 tablet, Rfl: 5    omeprazole (PRILOSEC) 20 MG delayed release capsule, Take 1 capsule by mouth Daily, Disp: 30 capsule, Rfl: 3    FLUoxetine (PROZAC) 20 MG capsule, TAKE 3 CAPSULES BY MOUTH DAILY, Disp: 90 capsule, Rfl: 5    potassium chloride (KLOR-CON M) 20 MEQ extended release tablet, TAKE 1 TABLET BY MOUTH DAILY, Disp: 90 tablet, Rfl: 0    VENTOLIN  (90 Base) MCG/ACT inhaler, INHALE 2 PUFFS INTO THE LUNGS 4 TIMES DAILY AS NEEDED., Disp: 18 g, Rfl: 5    benzonatate (TESSALON) 100 MG capsule, TAKE 1-2 CAPSULES BY MOUTH 3 TIMES DAILY AS NEEDED COUGH, Disp: 60 capsule, Rfl: 3    promethazine (PHENERGAN) 25 MG tablet, TAKE 1 TABLET BY MOUTH EVERY 6 HOURS AS NEEDED FOR NAUSEA, Disp: 30 tablet, Rfl: 0    butalbital-acetaminophen-caffeine (FIORICET, ESGIC) -40 MG per tablet, TAKE 1 TABLET BY MOUTH 3 TIMES DAILY AS NEEDED FOR MIGRAINE, Disp: 30 tablet, Rfl: 0    gabapentin (NEURONTIN) 300 MG capsule, TAKE ONE CAPSULE BY MOUTH ONE HOUR BEFORE BEDTIME, Disp: , Rfl: 5    tiotropium (SPIRIVA RESPIMAT) 2.5 MCG/ACT AERS inhaler, Inhale 2 puffs into the lungs daily, Disp: 4 g, Rfl: 5    amphetamine-dextroamphetamine (ADDERALL XR) 20 MG extended release capsule, TAKE 1 CAPSULE BY MOUTH EVERY DAY, Disp: , Rfl: 0    methylphenidate (RITALIN LA) 30 MG extended release capsule, Take 20 mg by mouth 3 times daily.  ., Disp: , Rfl:    LORATADINE-D 12HR 5-120 MG per extended release tablet, TAKE 1 TABLET BY MOUTH EVERY MORNING AS NEEDED FOR ALLERGY, Disp: 30 tablet, Rfl: 5    Respiratory Therapy Supplies (NEBULIZER/TUBING/MOUTHPIECE) KIT, 1 kit by Does not apply route daily as needed, Disp: 1 kit, Rfl: 0    OXYGEN, Inhale 2 L/min into the lungs continuous.  Regulate with portability at home (Patient taking differently: Inhale 2 L/min into the lungs as needed ), Disp: 1 Container, Rfl: 0    Lab Results   Component Value Date    WBC 8.4 06/24/2020    RBC 4.26 06/24/2020    HGB 14.1 06/24/2020    HCT 41.4 06/24/2020    MCV 97.2 06/24/2020    MCH 33.2 06/24/2020    MCHC 34.1 06/24/2020    MPV 8.1 06/24/2020    NEUTOPHILPCT 64.0 06/24/2020    LYMPHOPCT 27.6 06/24/2020    MONOPCT 6.1 06/24/2020    EOSRELPCT 1.7 06/24/2020    BASOPCT 0.6 06/24/2020    NEUTROABS 5.4 06/24/2020    LYMPHSABS 2.3 06/24/2020    MONOSABS 0.5 06/24/2020    EOSABS 0.1 06/24/2020     Lab Results   Component Value Date     06/24/2020    K 3.9 06/24/2020     06/24/2020    CO2 21 06/24/2020    ANIONGAP 13 06/24/2020    GLUCOSE 98 06/24/2020    BUN 17 06/24/2020    CREATININE 0.7 06/24/2020    LABGLOM >60 06/24/2020    GFRAA >60 06/24/2020    CALCIUM 9.1 06/24/2020    PROT 7.3 06/24/2020    LABALBU 4.2 06/24/2020    AGRATIO 1.4 06/24/2020    BILITOT <0.2 06/24/2020    ALKPHOS 85 06/24/2020    ALT 30 06/24/2020    AST 27 06/24/2020    GLOB 3.1 06/24/2020     Lab Results   Component Value Date    CHOL 198 09/01/2020    TRIG 133 09/01/2020    HDL 51 09/01/2020    LDLCALC 120 09/01/2020    LABVLDL 27 09/01/2020     Lab Results   Component Value Date    TSHREFLEX 2.53 06/24/2020     Lab Results   Component Value Date    IRON 70 06/24/2020    TIBC 324 06/24/2020    LABIRON 22 06/24/2020     Lab Results   Component Value Date    AORXERQR53 527 06/24/2020    FOLATE 11.58 06/24/2020     Lab Results   Component Value Date    VITD25 22.7 09/01/2020     Lab Results   Component Value Date    LABA1C 4.8 06/24/2020    EAG 91.1 06/24/2020     Review of Systems   Constitutional: Negative. Negative for chills, fatigue and fever. HENT: Negative. Eyes: Negative. Respiratory: Negative. Negative for cough and shortness of breath. Cardiovascular: Negative. Gastrointestinal: Negative. Endocrine: Negative. Genitourinary: Negative. Musculoskeletal: Negative. Skin: Negative. Allergic/Immunologic: Negative. Neurological: Negative. Hematological: Negative. Psychiatric/Behavioral: Negative. Objective:     Physical Exam  Vitals signs reviewed. Constitutional:       Appearance: She is well-developed. HENT:      Head: Normocephalic and atraumatic. Eyes:      Conjunctiva/sclera: Conjunctivae normal.      Pupils: Pupils are equal, round, and reactive to light. Neck:      Musculoskeletal: Normal range of motion and neck supple. Pulmonary:      Effort: Pulmonary effort is normal.   Abdominal:      Palpations: Abdomen is soft. Musculoskeletal: Normal range of motion. Skin:     General: Skin is warm and dry. Neurological:      Mental Status: She is alert and oriented to person, place, and time. Psychiatric:         Behavior: Behavior normal.         Thought Content: Thought content normal.         Judgment: Judgment normal.       Assessment and Plan:   Patient is here for their preoperative education group visit for sleeve gastrectomy. The patient is down 1.8 lbs today. The patient's current Body mass index is 36.68 kg/m². (11/3/20). She is making good dietary and behavior modifications and is considered to be a good surgical candidate. Patient has a diagnosis of hyperlipidemia. Discussed the benefits of weight loss and dietary changes on lipids and cholesterol. Discussed the fact that they will be required to crush or open their medications for the first two weeks after surgery and reviewed those medications that can not be crushed.     Patient has a diagnosis of obstructive sleep apnea and uses a CPAP for sleep. Discussed that weight loss can assist with improving sleep apnea symptoms. Explained to patient to make sure they bring their CPAP with them to the hospital for their surgery. Patient has a diagnosis of chronic GERD and takes a PPI. Discussed the benefits of weight loss and dietary changes on acid reflux. However, they will most likely need to continue their medication short term after surgery as they adjust to the new diet. Discussed the fact that they will be required to crush or open their medications for the first two weeks after surgery and reviewed those medications that can not be crushed. Patient with h/o hysterectomy so did not need to receive instruction that it is recommended to avoid pregnancy following bariatric surgery for at least 2 years to allow them to have stable weight loss and to help avoid increased risk of vitamin deficiencies and malnutrition. Patient received instructions from the registered dietitian in reference to the two week preoperative diet and the four phases of their postoperative diet. In addition I reviewed these instructions and stressed the importance of following these recommendations for their safety. Patient completed the preoperative class where they were provided with education related to their bariatric surgery, common surgical complications, medications preoperatively & postoperatively, special concerns related to bariatric surgery postoperatively, vitamin supplementation, patient agreement, PAT & scheduling, hospital course, wellness discovery program and what to do in the case of an emergency postoperatively. The dietitians reviewed all preoperative and postoperative diet instructions. Patient was given the opportunity to ask questions during the group visit and these questions were answered by myself and/or the dietitian.  A total of 30 minutes was spent conversing with the patient and over half of that time was spent counseling the patient on proper dietary behaviors, exercise and surgery protocols.

## 2020-11-03 ENCOUNTER — OFFICE VISIT (OUTPATIENT)
Dept: BARIATRICS/WEIGHT MGMT | Age: 50
End: 2020-11-03
Payer: MEDICARE

## 2020-11-03 VITALS — WEIGHT: 203.8 LBS | BODY MASS INDEX: 36.11 KG/M2 | HEIGHT: 63 IN

## 2020-11-03 PROCEDURE — 1036F TOBACCO NON-USER: CPT | Performed by: NURSE PRACTITIONER

## 2020-11-03 PROCEDURE — G8417 CALC BMI ABV UP PARAM F/U: HCPCS | Performed by: NURSE PRACTITIONER

## 2020-11-03 PROCEDURE — G8427 DOCREV CUR MEDS BY ELIG CLIN: HCPCS | Performed by: NURSE PRACTITIONER

## 2020-11-03 PROCEDURE — 99214 OFFICE O/P EST MOD 30 MIN: CPT | Performed by: NURSE PRACTITIONER

## 2020-11-03 PROCEDURE — G8484 FLU IMMUNIZE NO ADMIN: HCPCS | Performed by: NURSE PRACTITIONER

## 2020-11-04 ENCOUNTER — OFFICE VISIT (OUTPATIENT)
Dept: FAMILY MEDICINE CLINIC | Age: 50
End: 2020-11-04
Payer: MEDICARE

## 2020-11-04 VITALS
DIASTOLIC BLOOD PRESSURE: 76 MMHG | BODY MASS INDEX: 36.72 KG/M2 | HEART RATE: 110 BPM | SYSTOLIC BLOOD PRESSURE: 124 MMHG | TEMPERATURE: 97.5 F | OXYGEN SATURATION: 97 % | WEIGHT: 204 LBS

## 2020-11-04 PROCEDURE — 99213 OFFICE O/P EST LOW 20 MIN: CPT | Performed by: REGISTERED NURSE

## 2020-11-04 PROCEDURE — G0008 ADMIN INFLUENZA VIRUS VAC: HCPCS | Performed by: REGISTERED NURSE

## 2020-11-04 PROCEDURE — 90688 IIV4 VACCINE SPLT 0.5 ML IM: CPT | Performed by: REGISTERED NURSE

## 2020-11-04 RX ORDER — METHYLPHENIDATE HYDROCHLORIDE 20 MG/1
20 TABLET ORAL 3 TIMES DAILY
COMMUNITY

## 2020-11-04 RX ORDER — POTASSIUM CHLORIDE 20 MEQ/1
TABLET, EXTENDED RELEASE ORAL
Qty: 90 TABLET | Refills: 0 | Status: SHIPPED | OUTPATIENT
Start: 2020-11-04 | End: 2021-01-22

## 2020-11-04 RX ORDER — NEOMYCIN SULFATE, POLYMYXIN B SULFATE AND HYDROCORTISONE 10; 3.5; 1 MG/ML; MG/ML; [USP'U]/ML
4 SUSPENSION/ DROPS AURICULAR (OTIC) 3 TIMES DAILY
Qty: 1 BOTTLE | Refills: 0 | Status: SHIPPED | OUTPATIENT
Start: 2020-11-04 | End: 2020-11-14

## 2020-11-04 ASSESSMENT — ENCOUNTER SYMPTOMS
SHORTNESS OF BREATH: 0
RHINORRHEA: 0
SORE THROAT: 0
COUGH: 0

## 2020-11-04 NOTE — PROGRESS NOTES
Patient: Kala Caldwell is a 52 y.o. female who presents today with the following Chief Complaint(s):  Chief Complaint   Patient presents with    Pre-op Exam     11/16/20, Bariatric Sleeve Surgery, Dr Nacho Elmore        HPI:   Patient says her ears are sore. She was treated with Ciprodex drops recently. Patient would like a flu shot before her surgery. Current Outpatient Medications   Medication Sig Dispense Refill    [START ON 11/6/2020] clonazePAM (KLONOPIN) 1 MG tablet Take 1 tablet by mouth 2 times daily as needed for Anxiety for up to 30 days. 30 tablet 0    methylphenidate (RITALIN) 20 MG tablet Take 20 mg by mouth 3 times daily.  potassium chloride (KLOR-CON M) 20 MEQ extended release tablet TAKE 1 TABLET BY MOUTH DAILY 90 tablet 0    neomycin-polymyxin-hydrocortisone (CORTISPORIN) 3.5-99223-2 otic suspension Place 4 drops into both ears 3 times daily for 10 days 1 Bottle 0    topiramate (TOPAMAX) 100 MG tablet TAKE 1 TABLET BY MOUTH 2 TIMES DAILY 180 tablet 1    albuterol (PROVENTIL) (2.5 MG/3ML) 0.083% nebulizer solution Take 2.5 mg by nebulization every 6 hours as needed for Wheezing      amitriptyline (ELAVIL) 50 MG tablet Take 1.5 tablets by mouth nightly 90 tablet 1    rizatriptan (MAXALT) 10 MG tablet TAKE 1 TABLET BY MOUTH ONCE AS NEEDED FOR MIGRAINE MAY REPEAT IN 2 HOURS IF NEEDED. MAX 2 TABS/24 HOURS 18 tablet 5    Cholecalciferol 50 MCG (2000 UT) TABS Take 1 tablet by mouth daily Take 1 tablet by mouth daily. 90 tablet 2    furosemide (LASIX) 40 MG tablet TAKE 1 TABLET BY MOUTH EVERY DAY TO TWICE DAILY AS NEEDED SWELLING 60 tablet 5    omeprazole (PRILOSEC) 20 MG delayed release capsule Take 1 capsule by mouth Daily 30 capsule 3    FLUoxetine (PROZAC) 20 MG capsule TAKE 3 CAPSULES BY MOUTH DAILY 90 capsule 5    VENTOLIN  (90 Base) MCG/ACT inhaler INHALE 2 PUFFS INTO THE LUNGS 4 TIMES DAILY AS NEEDED.  18 g 5    promethazine (PHENERGAN)  Family history of early CAD     Past Medical History:   Diagnosis Date    Anxiety     Asthma     Bipolar 1 disorder ([facility] [address]     Pt is willing to see psych after 7/15 for confirmation of dx    Chronic bronchitis ([facility] [address]     Chronic GERD 2020    COPD [facility]chronic obstructive [facility] disease) ([facility]     Dr Marquez Guzman Depression     Emphysema of lung ([facility] [address]     Migraine     Migraines     Mixed hyperlipidemia 2020    MRSA infection within last 3 months     axillary    Narcolepsy     Dr Euna Boas    Obesity     Pneumonia     Restless leg syndrome     Sleep apnea     did not tolerate cpap    Tobacco abuse     Tobacco use disorder 2011    Urinary incontinence       Past Surgical History:   Procedure Laterality Date    ABSCESS DRAINAGE      L axilla MRSA, hosp for 7 days    ADENOIDECTOMY      BLADDER SUSPENSION  5/10    Mesh removed 1/9/15 in [facility], [address] [place] [address]      lumpectomy, ducts removed    FINGER SURGERY      right thumb    HYSTERECTOMY   dysplasia, endometriosis, cysts, MYLENE BSO    NASAL SEPTUM SURGERY      PELVIC LAPAROSCOPY      endometriosis    MYLENE AND BSO      Dysplasia, endometriosis    TONSILLECTOMY      UPPER GASTROINTESTINAL ENDOSCOPY N/A 7/10/2020    EGD BIOPSY performed by Laura Osborne MD at [address] [place] [address]       Family History   Problem Relation Age of Onset    Depression Mother     Breast Cancer Mother     Stroke Mother         cva x 3    Hypertension Mother     Elevated Lipids Mother     Diabetes Mother     Coronary Art Dis Father         mi  age 43    Schizophrenia Father     Hypertension Father    Unk Fujisawa Elevated Lipids Father     Diabetes Father     Birth Defects Son     Other Son         odd, autism    Asthma Son     Diabetes Son     Other Brother         colitis    Mental Illness Sister     Heart Failure Paternal Grandmother         CHF    Emphysema Maternal Grandfather     Cancer Paternal Grandfather       Allergies   Allergen Reactions    Ambien [Zolpidem Tartrate] Other (See Comments)     Shakey, anxious    Dilaudid [Hydromorphone Hcl] Other (See Comments)     Feels like skin is on fire.  Fentanyl Itching    Imitrex [Sumatriptan]      Face hot, felt sob    Morphine Other (See Comments)     Feels like skin is on fire. Tolerates Percocet. Review of Systems   Constitutional: Negative for fatigue and fever. HENT: Positive for ear pain. Negative for congestion, rhinorrhea and sore throat. Respiratory: Negative for cough and shortness of breath. Cardiovascular: Negative for chest pain. Vitals:    11/04/20 1252   BP: 124/76   Pulse: 110   Temp: 97.5 °F (36.4 °C)   TempSrc: Temporal   SpO2: 97%   Weight: 204 lb (92.5 kg)     Physical Exam  Constitutional:       Appearance: Normal appearance. She is normal weight. HENT:      Head: Normocephalic and atraumatic. Right Ear: Tympanic membrane and external ear normal. There is no impacted cerumen. Left Ear: Tympanic membrane and external ear normal. There is no impacted cerumen. Ears:      Comments: Ear canals erythematous bilaterally     Nose: Nose normal.      Mouth/Throat:      Mouth: Mucous membranes are moist.   Eyes:      Pupils: Pupils are equal, round, and reactive to light. Cardiovascular:      Rate and Rhythm: Normal rate. Pulses: Normal pulses. Heart sounds: Normal heart sounds. No murmur. No friction rub. No gallop. Pulmonary:      Effort: Pulmonary effort is normal.      Breath sounds: Normal breath sounds. No wheezing, rhonchi or rales. Skin:     General: Skin is warm and dry. Neurological:      Mental Status: She is alert. Psychiatric:         Mood and Affect: Mood normal.         Behavior: Behavior normal.         Thought Content: Thought content normal.         Judgment: Judgment normal.         Assessment/Plan:  1. Preop examination  See other note.     2. Hypokalemia    - potassium chloride (KLOR-CON M) 20 MEQ extended release tablet; TAKE 1 TABLET BY MOUTH DAILY  Dispense: 90 tablet; Refill: 0    3. Acute otitis externa of both ears, unspecified type    - neomycin-polymyxin-hydrocortisone (CORTISPORIN) 3.5-11286-7 otic suspension; Place 4 drops into both ears 3 times daily for 10 days  Dispense: 1 Bottle; Refill: 0    4. Need for influenza vaccination    - INFLUENZA, QUADV, 3 YRS AND OLDER, IM, MDV, 0.5ML ([place])    Was given influenza vaccination in the office. Refill given for potassium. Patient has had all of her blood work done for her surgery already. Ear canals are erythematous bilaterally. Patient states that they are still sore. Will prescribe Cortisporin drops as above. Return if symptoms worsen or fail to improve.

## 2020-11-04 NOTE — PROGRESS NOTES
[facility]  [phone]  Fax: [phone]   Pre-operative History and Physical        DIAGNOSIS:  Severe obesity (BMI 35.0-39. 9) with comorbidity ([address] Chronic GERD, Obstructive sleep apnea, Mixed hyperlipidemia      PROCEDURE:  Bariatric surgery for gastric sleeve      History Obtained From:  patient    HISTORY OF PRESENT ILLNESS:    The patient is a 52 y.o. female with significant past medical history of Severe obesity (BMI 35.0-39. 9) with comorbidity ([address] I am seeing this patient for preop consultation for Dr. Kimberly Fair.         Past Medical History:   Diagnosis Date    Anxiety     Asthma     Bipolar 1 disorder ([facility]     Pt is willing to see psych after 7/15 for confirmation of dx    Chronic bronchitis ([facility]     Chronic GERD 6/11/2020    COPD (chronic obstructive pulmonary disease) ([facility]     Dr Ring Mom Depression     Emphysema of lung ([facility]     Migraine     Migraines     Mixed hyperlipidemia 6/25/2020    MRSA infection within last 3 months 2012    axillary    Narcolepsy 2004    Dr Geoffrey Phalen    Obesity     Pneumonia     Restless leg syndrome     Sleep apnea     did not tolerate cpap    Tobacco abuse     Tobacco use disorder 1/14/2011    Urinary incontinence      Past Surgical History:   Procedure Laterality Date    ABSCESS DRAINAGE  2012    L axilla MRSA, hosp for 7 days    ADENOIDECTOMY      BLADDER SUSPENSION  5/10    Mesh removed 1/9/15 in [facility], [address] [place] [address]  2/09    lumpectomy, ducts removed    FINGER SURGERY      right thumb    HYSTERECTOMY  2001 2/2 dysplasia, endometriosis, cysts, MYLENE BSO    NASAL SEPTUM SURGERY      PELVIC LAPAROSCOPY      endometriosis    MYLENE AND BSO  2001    Dysplasia, endometriosis    TONSILLECTOMY      UPPER GASTROINTESTINAL ENDOSCOPY N/A 7/10/2020    EGD BIOPSY performed by Nakita Akins MD at [address] [place] [address]     Current Outpatient Medications   Medication Sig Dispense Refill    [START ON 11/6/2020] clonazePAM (Brenda Malik) 1 MG tablet Take 1 tablet by mouth 2 times daily as needed for Anxiety for up to 30 days. 30 tablet 0    methylphenidate (RITALIN) 20 MG tablet Take 20 mg by mouth 3 times daily.  topiramate (TOPAMAX) 100 MG tablet TAKE 1 TABLET BY MOUTH 2 TIMES DAILY 180 tablet 1    albuterol (PROVENTIL) (2.5 MG/3ML) 0.083% nebulizer solution Take 2.5 mg by nebulization every 6 hours as needed for Wheezing      amitriptyline (ELAVIL) 50 MG tablet Take 1.5 tablets by mouth nightly 90 tablet 1    rizatriptan (MAXALT) 10 MG tablet TAKE 1 TABLET BY MOUTH ONCE AS NEEDED FOR MIGRAINE MAY REPEAT IN 2 HOURS IF NEEDED. MAX 2 TABS/24 HOURS 18 tablet 5    Cholecalciferol 50 MCG (2000 UT) TABS Take 1 tablet by mouth daily Take 1 tablet by mouth daily. 90 tablet 2    furosemide (LASIX) 40 MG tablet TAKE 1 TABLET BY MOUTH EVERY DAY TO TWICE DAILY AS NEEDED SWELLING 60 tablet 5    omeprazole (PRILOSEC) 20 MG delayed release capsule Take 1 capsule by mouth Daily 30 capsule 3    FLUoxetine (PROZAC) 20 MG capsule TAKE 3 CAPSULES BY MOUTH DAILY 90 capsule 5    potassium chloride (KLOR-CON M) 20 MEQ extended release tablet TAKE 1 TABLET BY MOUTH DAILY 90 tablet 0    VENTOLIN  (90 Base) MCG/ACT inhaler INHALE 2 PUFFS INTO THE LUNGS 4 TIMES DAILY AS NEEDED.  18 g 5    promethazine (PHENERGAN) 25 MG tablet TAKE 1 TABLET BY MOUTH EVERY 6 HOURS AS NEEDED FOR NAUSEA 30 tablet 0    butalbital-acetaminophen-caffeine (FIORICET, ESGIC) -40 MG per tablet TAKE 1 TABLET BY MOUTH 3 TIMES DAILY AS NEEDED FOR MIGRAINE 30 tablet 0    gabapentin (NEURONTIN) 300 MG capsule TAKE ONE CAPSULE BY MOUTH ONE HOUR BEFORE BEDTIME  5    tiotropium (SPIRIVA RESPIMAT) 2.5 MCG/ACT AERS inhaler Inhale 2 puffs into the lungs daily 4 g 5    amphetamine-dextroamphetamine (ADDERALL XR) 20 MG extended release capsule TAKE 1 CAPSULE BY MOUTH EVERY DAY  0    LORATADINE-D 12HR 5-120 MG per extended release tablet TAKE 1 TABLET BY MOUTH EVERY MORNING AS NEEDED FOR ALLERGY 30 tablet 5    Respiratory Therapy Supplies (NEBULIZER/TUBING/MOUTHPIECE) KIT 1 kit by Does not apply route daily as needed 1 kit 0    OXYGEN Inhale 2 L/min into the lungs continuous. Regulate with portability at home (Patient taking differently: Inhale 2 L/min into the lungs as needed ) 1 Container 0     No current facility-administered medications for this visit. Allergies:  Ambien [zolpidem tartrate]; Dilaudid [hydromorphone hcl]; Fentanyl; Imitrex [sumatriptan]; and Morphine  History of allergic reaction to anesthesia:  No     Social History     Tobacco Use   Smoking Status Former Smoker    Packs/day: 0.25    Years: 22.00    Pack years: 5.50    Types: Cigarettes    Last attempt to quit: 2020    Years since quittin.7   Smokeless Tobacco Never Used     The patient states she drinks 12 per week. She has not had any alcohol since 10/17/20. Family History   Problem Relation Age of Onset    Depression Mother     Breast Cancer Mother     Stroke Mother         cva x 3    Hypertension Mother     Elevated Lipids Mother     Diabetes Mother     Coronary Art Dis Father         mi  age 43    Schizophrenia Father     Hypertension Father     Elevated Lipids Father     Diabetes Father     Birth Defects Son     Other Son         odd, autism    Asthma Son     Diabetes Son     Other Brother         colitis    Mental Illness Sister     Heart Failure Paternal Grandmother         CHF    Emphysema Maternal Grandfather     Cancer Paternal Grandfather        REVIEW OF SYSTEMS:    CONSTITUTIONAL:  negative  EYES:  positive for reading glasses  HEENT:  negative  RESPIRATORY:  positive for uses oxygen from O2 Sats less than 90% and dry cough which is normal for her.    CARDIOVASCULAR:  negative  GASTROINTESTINAL:  negative  GENITOURINARY:  negative  INTEGUMENT/BREAST:  negative  HEMATOLOGIC/LYMPHATIC:  negative  ALLERGIC/IMMUNOLOGIC:  positive for drug reactions- see medication allergies  ENDOCRINE:  negative  MUSCULOSKELETAL:  negative  NEUROLOGICAL:  negative    PHYSICAL EXAM:      /76   Pulse 110   Temp 97.5 °F (36.4 °C) (Temporal)   Wt 204 lb (92.5 kg)   LMP  (LMP Unknown)   SpO2 97%   BMI 36.72 kg/m²     CONSTITUTIONAL:  awake, alert, cooperative, no apparent distress, and appears stated age    Eyes:  Lids and lashes normal, pupils equal, round and reactive to light, extra ocular muscles intact, sclera clear, conjunctiva normal    Head/ENT:  Normocephalic, without obvious abnormality, atramatic, sinuses nontender on palpation, external ears without lesions though canals are erythematous (treated with otic drops), oral pharynx with moist mucus membranes, tonsils without erythema or exudates, gums normal and good dentition though she is having one tooth (upper left canine) removed on 11/11/20. Neck:  Supple, symmetrical, trachea midline, no adenopathy, thyroid symmetric, not enlarged and no tenderness, skin normal, No carotid bruit    Heart:  Normal apical impulse, regular rate and rhythm, normal S1 and S2, no S3 or S4, and no murmur noted    Lungs:  No increased work of breathing, good air exchange, clear to auscultation bilaterally, no crackles or wheezing    Abdomen:  No scars, normal bowel sounds, soft, non-distended, non-tender, no masses palpated, no hepatosplenomegally    Extremities:  No clubbing, cyanosis, or edema    NEUROLOGIC:  Awake, alert, oriented to name, place and time. Cranial nerves II-XII are grossly intact.      CBC with Differential:    Lab Results   Component Value Date    WBC 8.4 06/24/2020    RBC 4.26 06/24/2020    HGB 14.1 06/24/2020    HCT 41.4 06/24/2020     06/24/2020    MCV 97.2 06/24/2020    MCH 33.2 06/24/2020    MCHC 34.1 06/24/2020    RDW 13.3 06/24/2020    SEGSPCT 62.9 12/12/2012    LYMPHOPCT 27.6 06/24/2020    MONOPCT 6.1 06/24/2020    BASOPCT 0.6 06/24/2020    MONOSABS 0.5 06/24/2020    LYMPHSABS 2.3 06/24/2020 EOSABS 0.1 06/24/2020    BASOSABS 0.1 06/24/2020    DIFFTYPE Auto 12/12/2012     CMP:    Lab Results   Component Value Date     06/24/2020    K 3.9 06/24/2020     06/24/2020    CO2 21 06/24/2020    BUN 17 06/24/2020    CREATININE 0.7 06/24/2020    GFRAA >60 06/24/2020    AGRATIO 1.4 06/24/2020    LABGLOM >60 06/24/2020    GLUCOSE 98 06/24/2020    PROT 7.3 06/24/2020    LABALBU 4.2 06/24/2020    CALCIUM 9.1 06/24/2020    BILITOT <0.2 06/24/2020    ALKPHOS 85 06/24/2020    AST 27 06/24/2020    ALT 30 06/24/2020       ASSESSMENT AND PLAN:    1. Patient is a 52 y.o. female with above specified procedure planned on 11/16/20 with Dr. Dr. Seble Strickland at [facility].  They will not require cardiology evaluation prior to procedure. 2. Stop ASA/NSAIDS medications 7-10 days prior to procedure. 3.Patient is cleared for surgery  4. Preop has been faxed to Dr. Seble Strickland office.     Raul Collado, [address]  [phone]

## 2020-11-06 RX ORDER — DICLOFENAC SODIUM 75 MG/1
75 TABLET, DELAYED RELEASE ORAL 2 TIMES DAILY
Qty: 60 TABLET | Refills: 3 | Status: ON HOLD | OUTPATIENT
Start: 2020-11-06 | End: 2020-11-17 | Stop reason: HOSPADM

## 2020-11-06 NOTE — PROGRESS NOTES
Name_______________________________________Printed:____________________  Date and time of surgery___11/16 1100_____________________Arrival Time:0900________________   1. The instructions given regarding when and if a patient needs to stop oral intake prior to surgery varies. Follow the specific instructions you were given                  __x_Nothing to eat or to drink after Midnight the night before.                             ____Endoscopy patient follow your DRS instructions-generally you will be doing a part of the prep after Midnight                   ____Carbo loading or ERAS instructions will be given to select patients-if you have been given those instructions -please do the following                           The evening before your surgery after dinner before midnight drink 40 ounces of gatorade. If you are diabetic use sugar free. The morning of surgery drink 40 ounces of water. This needs to be finished 3 hours prior to your surgery start time. 2. Take the following pills with a small sip of water on the morning of surgery__inhaler topamax prozac prilosec_________________________________________________                  Do not take blood pressure medications ending in pril or sartan the fidel prior to surgery or the morning of surgery_   3. Aspirin, Ibuprofen, Advil, Naproxen, Vitamin E and other Anti-inflammatory products and supplements should be stopped for 5 -7days before surgery or as directed by your physician. 4. Check with your Doctor regarding stopping Plavix, Coumadin,Eliquis, Lovenox,Effient,Pradaxa,Xarelto, Fragmin or other blood thinners and follow their instructions. 5. Do not smoke, and do not drink any alcoholic beverages 24 hours prior to surgery. This includes NA Beer. Refrain from the usage of any recreational drugs. 6. You may brush your teeth and gargle the morning of surgery. DO NOT SWALLOW WATER   7.  You MUST make arrangements for a responsible adult to stay on site while you are here and take you home after your surgery. You will not be allowed to leave alone or drive yourself home. It is strongly suggested someone stay with you the first 24 hrs. Your surgery will be cancelled if you do not have a ride home. 8. A parent/legal guardian must accompany a child scheduled for surgery and plan to stay at the hospital until the child is discharged. Please do not bring other children with you. 9. Please wear simple, loose fitting clothing to the hospital.  Miriam Roe not bring valuables (money, credit cards, checkbooks, etc.) Do not wear any makeup (including no eye makeup) or nail polish on your fingers or toes. 10. DO NOT wear any jewelry or piercings on day of surgery. All body piercing jewelry must be removed. 11. If you have ___dentures, they will be removed before going to the OR; we will provide you a container. If you wear ___contact lenses or ___glasses, they will be removed; please bring a case for them. 12. Please see your family doctor/pediatrician for a history & physical and/or concerning medications. Bring any test results/reports from your physician's office. PCP__________________Phone___________H&P Appt. Date________             13 If you  have a Living Will and Durable Power of  for Healthcare, please bring in a copy. 15. Notify your Surgeon if you develop any illness between now and surgery  time, cough, cold, fever, sore throat, nausea, vomiting, etc.  Please notify your surgeon if you experience dizziness, shortness of breath or blurred vision between now & the time of your surgery             15. DO NOT shave your operative site 96 hours prior to surgery. For face & neck surgery, men may use an electric razor 48 hours prior to surgery. 16. Shower the night before or morning of surgery using an antibacterial soap or as you have been instructed.              17. To provide excellent care visitors will be limited to one in the room at any given time. 18.  Please bring picture ID and insurance card. 19.  Visit our web site for additional information:  [email or web address]              20.During flu season no children under the age of 15 are permitted in the hospital for the safety of all patients. 21. If you take a long acting insulin in the evening only  take half of your usual  dose the night  before your procedure              22. If you use a c-pap please bring DOS if staying overnight,             23.For your convenience [facility] has a pharmacy on site to fill your prescriptions. 24. If you use oxygen and have a portable tank please bring it  with you the DOS             25. Bring a complete list of all your medications with name and dose include any supplements. 26.Patient instructed to get their COVID-19 test done as directed by their doctor (5-7 days prior to procedure)  or patient states will get on _11/10_________. Patient was notified that they need to have an appointment,number to call provided. The day the COVID test is done is considered day one. Instructed to self quarantine after test until DOS. _PAts 11/10 There is a one visitor policy at [facility] for all surgeries and endoscopies. Whether the visitor can stay or will be asked to wait in the car will depend on the current policy and if social distancing can be maintained. The policy is subject to change at any time. Please make sure the visitor has a cell phone that is on,charged and able to accept calls, as this may be the way that the staff communicates with them. Pain management is NO VISITOR policyThe patients ride is expected to remain in the car with a cell phone for communication. If the ride is leaving the hospital grounds please make sure they are back in time for pickup.  Have the patient inform the staff on arrival what their rides plans are while the patient is in the facility. At the MAIN there is one visitor allowed. Please note that the visitor policy is subject to change.________________________________________   *Please call pre admission testing if you any further questions   Kelsie Yi 41    [id number] [facility]  [phone]   [address] [place] [address]       All above information reviewed with patient in person or by phone. Patient verbalizes understanding. All questions and concerns addressed.                                                                                                  Patient/Rep__per phone/pt__________________                                                                                                                                    PRE OP INSTRUCTIONS

## 2020-11-10 ENCOUNTER — HOSPITAL ENCOUNTER (OUTPATIENT)
Age: 50
Discharge: HOME | End: 2020-11-10
Payer: MEDICARE

## 2020-11-10 ENCOUNTER — OFFICE VISIT (OUTPATIENT)
Dept: PRIMARY CARE CLINIC | Age: 50
End: 2020-11-10
Payer: MEDICARE

## 2020-11-10 LAB
A/G RATIO: 1.1 (ref 1.1–2.2)
ALBUMIN SERPL-MCNC: 3.9 G/DL (ref 3.4–5)
ALP BLD-CCNC: 109 U/L (ref 40–129)
ALT SERPL-CCNC: 46 U/L (ref 10–40)
ANION GAP SERPL CALCULATED.3IONS-SCNC: 12 MMOL/L (ref 3–16)
AST SERPL-CCNC: 27 U/L (ref 15–37)
BILIRUB SERPL-MCNC: <0.2 MG/DL (ref 0–1)
BUN BLDV-MCNC: 24 MG/DL (ref 7–20)
CALCIUM SERPL-MCNC: 9.4 MG/DL (ref 8.3–10.6)
CHLORIDE BLD-SCNC: 104 MMOL/L (ref 99–110)
CO2: 21 MMOL/L (ref 21–32)
CREAT SERPL-MCNC: 0.8 MG/DL (ref 0.6–1.1)
GFR AFRICAN AMERICAN: >60
GFR NON-AFRICAN AMERICAN: >60
GLOBULIN: 3.4 G/DL
GLUCOSE BLD-MCNC: 90 MG/DL (ref 70–99)
HCT VFR BLD CALC: 41.7 % (ref 36–48)
HEMOGLOBIN: 13.9 G/DL (ref 12–16)
MCH RBC QN AUTO: 32.2 PG (ref 26–34)
MCHC RBC AUTO-ENTMCNC: 33.3 G/DL (ref 31–36)
MCV RBC AUTO: 96.6 FL (ref 80–100)
PDW BLD-RTO: 13.1 % (ref 12.4–15.4)
PLATELET # BLD: 281 K/UL (ref 135–450)
PMV BLD AUTO: 9.5 FL (ref 5–10.5)
POTASSIUM SERPL-SCNC: 4.5 MMOL/L (ref 3.5–5.1)
RBC # BLD: 4.31 M/UL (ref 4–5.2)
SODIUM BLD-SCNC: 137 MMOL/L (ref 136–145)
TOTAL PROTEIN: 7.3 G/DL (ref 6.4–8.2)
WBC # BLD: 9.3 K/UL (ref 4–11)

## 2020-11-10 PROCEDURE — 99211 OFF/OP EST MAY X REQ PHY/QHP: CPT | Performed by: NURSE PRACTITIONER

## 2020-11-10 PROCEDURE — 80053 COMPREHEN METABOLIC PANEL: CPT

## 2020-11-10 PROCEDURE — 85027 COMPLETE CBC AUTOMATED: CPT

## 2020-11-10 PROCEDURE — 36415 COLL VENOUS BLD VENIPUNCTURE: CPT

## 2020-11-10 NOTE — PROGRESS NOTES
Cristian Garcia received a viral test for COVID-19. They were educated on isolation and quarantine as appropriate. For any symptoms, they were directed to seek care from their PCP, given contact information to establish with a doctor, directed to an urgent care or the emergency room.

## 2020-11-10 NOTE — PATIENT INSTRUCTIONS
Advance Care Planning  People with COVID-19 may have no symptoms, mild symptoms, such as fever, cough, and shortness of breath or they may have more severe illness, developing severe and fatal pneumonia. As a result, Advance Care Planning with attention to naming a health care decision maker (someone you trust to make healthcare decisions for you if you could not speak for yourself) and sharing other health care preferences is important BEFORE a possible health crisis. Please contact your Primary Care Provider to discuss Advance Care Planning. Preventing the Spread of Coronavirus Disease 2019 in Homes and Residential Communities  For the most recent information go to [email or web address]    Prevention steps for People with confirmed or suspected COVID-19 (including persons under investigation) who do not need to be hospitalized  and   People with confirmed COVID-19 who were hospitalized and determined to be medically stable to go home    Your healthcare provider and public health staff will evaluate whether you can be cared for at home. If it is determined that you do not need to be hospitalized and can be isolated at home, you will be monitored by staff from your local or state health department. You should follow the prevention steps below until a healthcare provider or local or state health department says you can return to your normal activities. Stay home except to get medical care  People who are mildly ill with COVID-19 are able to isolate at home during their illness. You should restrict activities outside your home, except for getting medical care. Do not go to work, school, or public areas. Avoid using public transportation, ride-sharing, or taxis. Separate yourself from other people and animals in your home  People: As much as possible, you should stay in a specific room and away from other people in your home.  Also, you should use a separate bathroom, if available. Animals: You should restrict contact with pets and other animals while you are sick with COVID-19, just like you would around other people. Although there have not been reports of pets or other animals becoming sick with COVID-19, it is still recommended that people sick with COVID-19 limit contact with animals until more information is known about the virus. When possible, have another member of your household care for your animals while you are sick. If you are sick with COVID-19, avoid contact with your pet, including petting, snuggling, being kissed or licked, and sharing food. If you must care for your pet or be around animals while you are sick, wash your hands before and after you interact with pets and wear a facemask. Call ahead before visiting your doctor  If you have a medical appointment, call the healthcare provider and tell them that you have or may have COVID-19. This will help the healthcare providers office take steps to keep other people from getting infected or exposed. Wear a facemask  You should wear a facemask when you are around other people (e.g., sharing a room or vehicle) or pets and before you enter a healthcare providers office. If you are not able to wear a facemask (for example, because it causes trouble breathing), then people who live with you should not stay in the same room with you, or they should wear a facemask if they enter your room. Cover your coughs and sneezes  Cover your mouth and nose with a tissue when you cough or sneeze. Throw used tissues in a lined trash can. Immediately wash your hands with soap and water for at least 20 seconds or, if soap and water are not available, clean your hands with an alcohol-based hand  that contains at least 60% alcohol.   Clean your hands often  Wash your hands often with soap and water for at least 20 seconds, especially after blowing your nose, coughing, or sneezing; going to the bathroom; and have a medical emergency and need to call 911, notify the dispatch personnel that you have, or are being evaluated for COVID-19. If possible, put on a facemask before emergency medical services arrive. Discontinuing home isolation  Patients with confirmed COVID-19 should remain under home isolation precautions until the risk of secondary transmission to others is thought to be low. The decision to discontinue home isolation precautions should be made on a case-by-case basis, in consultation with healthcare providers and state and local health departments.

## 2020-11-11 LAB — SARS-COV-2: NOT DETECTED

## 2020-11-16 ENCOUNTER — HOSPITAL ENCOUNTER (INPATIENT)
Age: 50
LOS: 1 days | Discharge: HOME | DRG: 621 | End: 2020-11-17
Attending: SURGERY | Admitting: SURGERY
Payer: MEDICARE

## 2020-11-16 ENCOUNTER — ANESTHESIA EVENT (OUTPATIENT)
Dept: OPERATING ROOM | Age: 50
DRG: 621 | End: 2020-11-16
Payer: MEDICARE

## 2020-11-16 ENCOUNTER — ANESTHESIA (OUTPATIENT)
Dept: OPERATING ROOM | Age: 50
DRG: 621 | End: 2020-11-16
Payer: MEDICARE

## 2020-11-16 VITALS
RESPIRATION RATE: 2 BRPM | SYSTOLIC BLOOD PRESSURE: 119 MMHG | TEMPERATURE: 96.8 F | DIASTOLIC BLOOD PRESSURE: 63 MMHG | OXYGEN SATURATION: 96 %

## 2020-11-16 LAB
ABO/RH: NORMAL
ANTIBODY SCREEN: NORMAL
GLUCOSE BLD-MCNC: 90 MG/DL (ref 70–99)
PERFORMED ON: NORMAL

## 2020-11-16 PROCEDURE — 3600000005 HC SURGERY LEVEL 5 BASE: Performed by: SURGERY

## 2020-11-16 PROCEDURE — 86901 BLOOD TYPING SEROLOGIC RH(D): CPT

## 2020-11-16 PROCEDURE — 3700000001 HC ADD 15 MINUTES (ANESTHESIA): Performed by: SURGERY

## 2020-11-16 PROCEDURE — 6360000002 HC RX W HCPCS: Performed by: NURSE ANESTHETIST, CERTIFIED REGISTERED

## 2020-11-16 PROCEDURE — 6360000002 HC RX W HCPCS: Performed by: SURGERY

## 2020-11-16 PROCEDURE — 2500000003 HC RX 250 WO HCPCS: Performed by: NURSE ANESTHETIST, CERTIFIED REGISTERED

## 2020-11-16 PROCEDURE — 6360000002 HC RX W HCPCS: Performed by: ANESTHESIOLOGY

## 2020-11-16 PROCEDURE — 93005 ELECTROCARDIOGRAM TRACING: CPT | Performed by: ANESTHESIOLOGY

## 2020-11-16 PROCEDURE — 88307 TISSUE EXAM BY PATHOLOGIST: CPT

## 2020-11-16 PROCEDURE — 94640 AIRWAY INHALATION TREATMENT: CPT

## 2020-11-16 PROCEDURE — 0DB64Z3 EXCISION OF STOMACH, PERCUTANEOUS ENDOSCOPIC APPROACH, VERTICAL: ICD-10-PCS | Performed by: SURGERY

## 2020-11-16 PROCEDURE — 3600000015 HC SURGERY LEVEL 5 ADDTL 15MIN: Performed by: SURGERY

## 2020-11-16 PROCEDURE — 2500000003 HC RX 250 WO HCPCS: Performed by: SURGERY

## 2020-11-16 PROCEDURE — 2709999900 HC NON-CHARGEABLE SUPPLY: Performed by: SURGERY

## 2020-11-16 PROCEDURE — 2580000003 HC RX 258: Performed by: SURGERY

## 2020-11-16 PROCEDURE — 6370000000 HC RX 637 (ALT 250 FOR IP): Performed by: SURGERY

## 2020-11-16 PROCEDURE — 6370000000 HC RX 637 (ALT 250 FOR IP)

## 2020-11-16 PROCEDURE — 3700000000 HC ANESTHESIA ATTENDED CARE: Performed by: SURGERY

## 2020-11-16 PROCEDURE — 38570 LAPAROSCOPY LYMPH NODE BIOP: CPT | Performed by: SURGERY

## 2020-11-16 PROCEDURE — 86900 BLOOD TYPING SEROLOGIC ABO: CPT

## 2020-11-16 PROCEDURE — 2700000000 HC OXYGEN THERAPY PER DAY

## 2020-11-16 PROCEDURE — 7100000001 HC PACU RECOVERY - ADDTL 15 MIN: Performed by: SURGERY

## 2020-11-16 PROCEDURE — 86850 RBC ANTIBODY SCREEN: CPT

## 2020-11-16 PROCEDURE — 07BD3ZX EXCISION OF AORTIC LYMPHATIC, PERCUTANEOUS APPROACH, DIAGNOSTIC: ICD-10-PCS | Performed by: SURGERY

## 2020-11-16 PROCEDURE — 36415 COLL VENOUS BLD VENIPUNCTURE: CPT

## 2020-11-16 PROCEDURE — 7100000000 HC PACU RECOVERY - FIRST 15 MIN: Performed by: SURGERY

## 2020-11-16 PROCEDURE — 1200000000 HC SEMI PRIVATE

## 2020-11-16 PROCEDURE — 2720000010 HC SURG SUPPLY STERILE: Performed by: SURGERY

## 2020-11-16 PROCEDURE — 6360000002 HC RX W HCPCS

## 2020-11-16 PROCEDURE — 43775 LAP SLEEVE GASTRECTOMY: CPT | Performed by: SURGERY

## 2020-11-16 PROCEDURE — 2500000003 HC RX 250 WO HCPCS

## 2020-11-16 RX ORDER — SCOLOPAMINE TRANSDERMAL SYSTEM 1 MG/1
1 PATCH, EXTENDED RELEASE TRANSDERMAL
Status: DISCONTINUED | OUTPATIENT
Start: 2020-11-19 | End: 2020-11-17 | Stop reason: HOSPADM

## 2020-11-16 RX ORDER — LIDOCAINE HYDROCHLORIDE 20 MG/ML
INJECTION, SOLUTION EPIDURAL; INFILTRATION; INTRACAUDAL; PERINEURAL PRN
Status: DISCONTINUED | OUTPATIENT
Start: 2020-11-16 | End: 2020-11-16 | Stop reason: SDUPTHER

## 2020-11-16 RX ORDER — PROMETHAZINE HYDROCHLORIDE 25 MG/ML
6.25 INJECTION, SOLUTION INTRAMUSCULAR; INTRAVENOUS ONCE
Status: COMPLETED | OUTPATIENT
Start: 2020-11-16 | End: 2020-11-16

## 2020-11-16 RX ORDER — DEXMEDETOMIDINE HYDROCHLORIDE 100 UG/ML
INJECTION, SOLUTION INTRAVENOUS PRN
Status: DISCONTINUED | OUTPATIENT
Start: 2020-11-16 | End: 2020-11-16 | Stop reason: SDUPTHER

## 2020-11-16 RX ORDER — LIDOCAINE HYDROCHLORIDE 10 MG/ML
1 INJECTION, SOLUTION EPIDURAL; INFILTRATION; INTRACAUDAL; PERINEURAL
Status: DISCONTINUED | OUTPATIENT
Start: 2020-11-16 | End: 2020-11-16

## 2020-11-16 RX ORDER — METOCLOPRAMIDE HYDROCHLORIDE 5 MG/ML
10 INJECTION INTRAMUSCULAR; INTRAVENOUS EVERY 6 HOURS PRN
Status: DISCONTINUED | OUTPATIENT
Start: 2020-11-16 | End: 2020-11-17 | Stop reason: HOSPADM

## 2020-11-16 RX ORDER — SODIUM CHLORIDE 0.9 % (FLUSH) 0.9 %
10 SYRINGE (ML) INJECTION PRN
Status: DISCONTINUED | OUTPATIENT
Start: 2020-11-16 | End: 2020-11-17 | Stop reason: HOSPADM

## 2020-11-16 RX ORDER — ONDANSETRON 2 MG/ML
4 INJECTION INTRAMUSCULAR; INTRAVENOUS EVERY 6 HOURS PRN
Status: DISCONTINUED | OUTPATIENT
Start: 2020-11-16 | End: 2020-11-17 | Stop reason: HOSPADM

## 2020-11-16 RX ORDER — DIPHENHYDRAMINE HYDROCHLORIDE 50 MG/ML
INJECTION INTRAMUSCULAR; INTRAVENOUS
Status: COMPLETED
Start: 2020-11-16 | End: 2020-11-16

## 2020-11-16 RX ORDER — PROMETHAZINE HYDROCHLORIDE 25 MG/ML
6.25 INJECTION, SOLUTION INTRAMUSCULAR; INTRAVENOUS EVERY 6 HOURS PRN
Status: DISCONTINUED | OUTPATIENT
Start: 2020-11-16 | End: 2020-11-17 | Stop reason: HOSPADM

## 2020-11-16 RX ORDER — SODIUM CHLORIDE 9 MG/ML
INJECTION, SOLUTION INTRAVENOUS CONTINUOUS
Status: DISCONTINUED | OUTPATIENT
Start: 2020-11-16 | End: 2020-11-16

## 2020-11-16 RX ORDER — ROCURONIUM BROMIDE 10 MG/ML
INJECTION, SOLUTION INTRAVENOUS PRN
Status: DISCONTINUED | OUTPATIENT
Start: 2020-11-16 | End: 2020-11-16 | Stop reason: SDUPTHER

## 2020-11-16 RX ORDER — ALBUTEROL SULFATE 2.5 MG/3ML
2.5 SOLUTION RESPIRATORY (INHALATION) EVERY 6 HOURS PRN
Status: DISCONTINUED | OUTPATIENT
Start: 2020-11-16 | End: 2020-11-17 | Stop reason: HOSPADM

## 2020-11-16 RX ORDER — GLYCOPYRROLATE 1 MG/5 ML
SYRINGE (ML) INTRAVENOUS PRN
Status: DISCONTINUED | OUTPATIENT
Start: 2020-11-16 | End: 2020-11-16 | Stop reason: SDUPTHER

## 2020-11-16 RX ORDER — KETAMINE HCL IN NACL, ISO-OSM 100MG/10ML
SYRINGE (ML) INJECTION PRN
Status: DISCONTINUED | OUTPATIENT
Start: 2020-11-16 | End: 2020-11-16 | Stop reason: SDUPTHER

## 2020-11-16 RX ORDER — ALBUTEROL SULFATE 90 UG/1
2 AEROSOL, METERED RESPIRATORY (INHALATION) EVERY 4 HOURS PRN
Status: DISCONTINUED | OUTPATIENT
Start: 2020-11-16 | End: 2020-11-17 | Stop reason: HOSPADM

## 2020-11-16 RX ORDER — KETOROLAC TROMETHAMINE 30 MG/ML
INJECTION, SOLUTION INTRAMUSCULAR; INTRAVENOUS PRN
Status: DISCONTINUED | OUTPATIENT
Start: 2020-11-16 | End: 2020-11-16 | Stop reason: SDUPTHER

## 2020-11-16 RX ORDER — HYDROMORPHONE HCL 110MG/55ML
0.5 PATIENT CONTROLLED ANALGESIA SYRINGE INTRAVENOUS ONCE
Status: COMPLETED | OUTPATIENT
Start: 2020-11-16 | End: 2020-11-16

## 2020-11-16 RX ORDER — METHYLENE BLUE 10 MG/ML
INJECTION INTRAVENOUS
Status: COMPLETED | OUTPATIENT
Start: 2020-11-16 | End: 2020-11-16

## 2020-11-16 RX ORDER — MIDAZOLAM HYDROCHLORIDE 1 MG/ML
1 INJECTION INTRAMUSCULAR; INTRAVENOUS ONCE
Status: COMPLETED | OUTPATIENT
Start: 2020-11-16 | End: 2020-11-16

## 2020-11-16 RX ORDER — DIPHENHYDRAMINE HYDROCHLORIDE 50 MG/ML
INJECTION INTRAMUSCULAR; INTRAVENOUS PRN
Status: DISCONTINUED | OUTPATIENT
Start: 2020-11-16 | End: 2020-11-16 | Stop reason: SDUPTHER

## 2020-11-16 RX ORDER — PHENYLEPHRINE HCL IN 0.9% NACL 1 MG/10 ML
SYRINGE (ML) INTRAVENOUS PRN
Status: DISCONTINUED | OUTPATIENT
Start: 2020-11-16 | End: 2020-11-16 | Stop reason: SDUPTHER

## 2020-11-16 RX ORDER — HYDROMORPHONE HCL 110MG/55ML
0.5 PATIENT CONTROLLED ANALGESIA SYRINGE INTRAVENOUS EVERY 5 MIN PRN
Status: COMPLETED | OUTPATIENT
Start: 2020-11-16 | End: 2020-11-16

## 2020-11-16 RX ORDER — LORAZEPAM 2 MG/ML
0.5 INJECTION INTRAMUSCULAR ONCE
Status: COMPLETED | OUTPATIENT
Start: 2020-11-16 | End: 2020-11-16

## 2020-11-16 RX ORDER — SODIUM CHLORIDE, SODIUM LACTATE, POTASSIUM CHLORIDE, CALCIUM CHLORIDE 600; 310; 30; 20 MG/100ML; MG/100ML; MG/100ML; MG/100ML
INJECTION, SOLUTION INTRAVENOUS CONTINUOUS
Status: DISCONTINUED | OUTPATIENT
Start: 2020-11-16 | End: 2020-11-17 | Stop reason: HOSPADM

## 2020-11-16 RX ORDER — HYDROMORPHONE HCL 110MG/55ML
0.5 PATIENT CONTROLLED ANALGESIA SYRINGE INTRAVENOUS
Status: DISCONTINUED | OUTPATIENT
Start: 2020-11-16 | End: 2020-11-17 | Stop reason: HOSPADM

## 2020-11-16 RX ORDER — SODIUM CHLORIDE 0.9 % (FLUSH) 0.9 %
10 SYRINGE (ML) INJECTION EVERY 12 HOURS SCHEDULED
Status: DISCONTINUED | OUTPATIENT
Start: 2020-11-16 | End: 2020-11-17 | Stop reason: HOSPADM

## 2020-11-16 RX ORDER — SODIUM CHLORIDE 9 MG/ML
10 INJECTION INTRAVENOUS DAILY
Status: DISCONTINUED | OUTPATIENT
Start: 2020-11-17 | End: 2020-11-17 | Stop reason: HOSPADM

## 2020-11-16 RX ORDER — ONDANSETRON 2 MG/ML
4 INJECTION INTRAMUSCULAR; INTRAVENOUS
Status: DISCONTINUED | OUTPATIENT
Start: 2020-11-16 | End: 2020-11-16

## 2020-11-16 RX ORDER — BUPIVACAINE HYDROCHLORIDE AND EPINEPHRINE 2.5; 5 MG/ML; UG/ML
INJECTION, SOLUTION EPIDURAL; INFILTRATION; INTRACAUDAL; PERINEURAL
Status: COMPLETED | OUTPATIENT
Start: 2020-11-16 | End: 2020-11-16

## 2020-11-16 RX ORDER — MAGNESIUM HYDROXIDE 1200 MG/15ML
LIQUID ORAL CONTINUOUS PRN
Status: DISCONTINUED | OUTPATIENT
Start: 2020-11-16 | End: 2020-11-16 | Stop reason: ALTCHOICE

## 2020-11-16 RX ORDER — MAGNESIUM SULFATE HEPTAHYDRATE 500 MG/ML
INJECTION, SOLUTION INTRAMUSCULAR; INTRAVENOUS PRN
Status: DISCONTINUED | OUTPATIENT
Start: 2020-11-16 | End: 2020-11-16 | Stop reason: SDUPTHER

## 2020-11-16 RX ORDER — ALBUTEROL SULFATE 2.5 MG/3ML
2.5 SOLUTION RESPIRATORY (INHALATION) ONCE
Status: COMPLETED | OUTPATIENT
Start: 2020-11-16 | End: 2020-11-16

## 2020-11-16 RX ORDER — APREPITANT 80 MG/1
80 CAPSULE ORAL ONCE
Status: COMPLETED | OUTPATIENT
Start: 2020-11-16 | End: 2020-11-16

## 2020-11-16 RX ORDER — PANTOPRAZOLE SODIUM 40 MG/10ML
40 INJECTION, POWDER, LYOPHILIZED, FOR SOLUTION INTRAVENOUS DAILY
Status: DISCONTINUED | OUTPATIENT
Start: 2020-11-17 | End: 2020-11-17 | Stop reason: HOSPADM

## 2020-11-16 RX ORDER — HYDROMORPHONE HCL 110MG/55ML
PATIENT CONTROLLED ANALGESIA SYRINGE INTRAVENOUS PRN
Status: DISCONTINUED | OUTPATIENT
Start: 2020-11-16 | End: 2020-11-16 | Stop reason: SDUPTHER

## 2020-11-16 RX ORDER — LIDOCAINE HYDROCHLORIDE 10 MG/ML
0.5 INJECTION, SOLUTION EPIDURAL; INFILTRATION; INTRACAUDAL; PERINEURAL ONCE
Status: DISCONTINUED | OUTPATIENT
Start: 2020-11-16 | End: 2020-11-16

## 2020-11-16 RX ORDER — PROPOFOL 10 MG/ML
INJECTION, EMULSION INTRAVENOUS PRN
Status: DISCONTINUED | OUTPATIENT
Start: 2020-11-16 | End: 2020-11-16 | Stop reason: SDUPTHER

## 2020-11-16 RX ORDER — SUCCINYLCHOLINE/SOD CL,ISO/PF 200MG/10ML
SYRINGE (ML) INTRAVENOUS PRN
Status: DISCONTINUED | OUTPATIENT
Start: 2020-11-16 | End: 2020-11-16 | Stop reason: SDUPTHER

## 2020-11-16 RX ORDER — SCOLOPAMINE TRANSDERMAL SYSTEM 1 MG/1
1 PATCH, EXTENDED RELEASE TRANSDERMAL ONCE
Status: DISCONTINUED | OUTPATIENT
Start: 2020-11-16 | End: 2020-11-17

## 2020-11-16 RX ORDER — NALOXONE HYDROCHLORIDE 1 MG/ML
0.4 INJECTION INTRAMUSCULAR; INTRAVENOUS; SUBCUTANEOUS PRN
Status: DISCONTINUED | OUTPATIENT
Start: 2020-11-16 | End: 2020-11-17 | Stop reason: HOSPADM

## 2020-11-16 RX ORDER — SODIUM CHLORIDE, SODIUM LACTATE, POTASSIUM CHLORIDE, CALCIUM CHLORIDE 600; 310; 30; 20 MG/100ML; MG/100ML; MG/100ML; MG/100ML
INJECTION, SOLUTION INTRAVENOUS CONTINUOUS
Status: DISCONTINUED | OUTPATIENT
Start: 2020-11-16 | End: 2020-11-16

## 2020-11-16 RX ORDER — DIPHENHYDRAMINE HYDROCHLORIDE 50 MG/ML
12.5 INJECTION INTRAMUSCULAR; INTRAVENOUS
Status: COMPLETED | OUTPATIENT
Start: 2020-11-16 | End: 2020-11-16

## 2020-11-16 RX ORDER — DEXAMETHASONE SODIUM PHOSPHATE 4 MG/ML
INJECTION, SOLUTION INTRA-ARTICULAR; INTRALESIONAL; INTRAMUSCULAR; INTRAVENOUS; SOFT TISSUE PRN
Status: DISCONTINUED | OUTPATIENT
Start: 2020-11-16 | End: 2020-11-16 | Stop reason: SDUPTHER

## 2020-11-16 RX ADMIN — SUGAMMADEX 100 MG: 100 INJECTION, SOLUTION INTRAVENOUS at 11:27

## 2020-11-16 RX ADMIN — MIDAZOLAM 1 MG: 1 INJECTION INTRAMUSCULAR; INTRAVENOUS at 09:28

## 2020-11-16 RX ADMIN — ROCURONIUM BROMIDE 50 MG: 10 INJECTION, SOLUTION INTRAVENOUS at 10:24

## 2020-11-16 RX ADMIN — SODIUM CHLORIDE, POTASSIUM CHLORIDE, SODIUM LACTATE AND CALCIUM CHLORIDE: 600; 310; 30; 20 INJECTION, SOLUTION INTRAVENOUS at 11:06

## 2020-11-16 RX ADMIN — HYDROMORPHONE HYDROCHLORIDE 0.5 MG: 2 INJECTION, SOLUTION INTRAMUSCULAR; INTRAVENOUS; SUBCUTANEOUS at 19:50

## 2020-11-16 RX ADMIN — HYDROMORPHONE HYDROCHLORIDE 0.5 MG: 2 INJECTION, SOLUTION INTRAMUSCULAR; INTRAVENOUS; SUBCUTANEOUS at 11:42

## 2020-11-16 RX ADMIN — HYDROMORPHONE HYDROCHLORIDE 0.5 MG: 2 INJECTION, SOLUTION INTRAMUSCULAR; INTRAVENOUS; SUBCUTANEOUS at 11:36

## 2020-11-16 RX ADMIN — DEXAMETHASONE SODIUM PHOSPHATE 10 MG: 4 INJECTION, SOLUTION INTRAMUSCULAR; INTRAVENOUS at 10:49

## 2020-11-16 RX ADMIN — KETOROLAC TROMETHAMINE 30 MG: 60 INJECTION, SOLUTION INTRAMUSCULAR at 11:20

## 2020-11-16 RX ADMIN — APREPITANT 80 MG: 80 CAPSULE ORAL at 08:40

## 2020-11-16 RX ADMIN — HYDROMORPHONE HYDROCHLORIDE 0.5 MG: 2 INJECTION, SOLUTION INTRAMUSCULAR; INTRAVENOUS; SUBCUTANEOUS at 11:39

## 2020-11-16 RX ADMIN — CEFAZOLIN SODIUM 2 G: 10 INJECTION, POWDER, FOR SOLUTION INTRAVENOUS at 10:09

## 2020-11-16 RX ADMIN — DIPHENHYDRAMINE HYDROCHLORIDE 12.5 MG: 50 INJECTION, SOLUTION INTRAMUSCULAR; INTRAVENOUS at 19:00

## 2020-11-16 RX ADMIN — DEXMEDETOMIDINE HYDROCHLORIDE 5 MCG: 100 INJECTION, SOLUTION INTRAVENOUS at 10:45

## 2020-11-16 RX ADMIN — PROMETHAZINE HYDROCHLORIDE 6.25 MG: 25 INJECTION INTRAMUSCULAR; INTRAVENOUS at 21:55

## 2020-11-16 RX ADMIN — HYDROMORPHONE HYDROCHLORIDE 0.5 MG: 2 INJECTION, SOLUTION INTRAMUSCULAR; INTRAVENOUS; SUBCUTANEOUS at 18:22

## 2020-11-16 RX ADMIN — Medication 100 MCG: at 11:12

## 2020-11-16 RX ADMIN — SODIUM CHLORIDE, POTASSIUM CHLORIDE, SODIUM LACTATE AND CALCIUM CHLORIDE: 600; 310; 30; 20 INJECTION, SOLUTION INTRAVENOUS at 17:28

## 2020-11-16 RX ADMIN — ENOXAPARIN SODIUM 30 MG: 30 INJECTION SUBCUTANEOUS at 08:40

## 2020-11-16 RX ADMIN — Medication 100 MCG: at 11:01

## 2020-11-16 RX ADMIN — DIPHENHYDRAMINE HYDROCHLORIDE 12.5 MG: 50 INJECTION, SOLUTION INTRAMUSCULAR; INTRAVENOUS at 21:55

## 2020-11-16 RX ADMIN — HYDROMORPHONE HYDROCHLORIDE 0.5 MG: 2 INJECTION, SOLUTION INTRAMUSCULAR; INTRAVENOUS; SUBCUTANEOUS at 11:45

## 2020-11-16 RX ADMIN — SODIUM CHLORIDE, POTASSIUM CHLORIDE, SODIUM LACTATE AND CALCIUM CHLORIDE: 600; 310; 30; 20 INJECTION, SOLUTION INTRAVENOUS at 21:55

## 2020-11-16 RX ADMIN — DEXMEDETOMIDINE HYDROCHLORIDE 5 MCG: 100 INJECTION, SOLUTION INTRAVENOUS at 11:24

## 2020-11-16 RX ADMIN — SODIUM CHLORIDE, POTASSIUM CHLORIDE, SODIUM LACTATE AND CALCIUM CHLORIDE: 600; 310; 30; 20 INJECTION, SOLUTION INTRAVENOUS at 13:28

## 2020-11-16 RX ADMIN — Medication 100 MCG: at 10:55

## 2020-11-16 RX ADMIN — PROPOFOL 180 MG: 10 INJECTION, EMULSION INTRAVENOUS at 10:17

## 2020-11-16 RX ADMIN — HYDROMORPHONE HYDROCHLORIDE 0.5 MG: 2 INJECTION, SOLUTION INTRAMUSCULAR; INTRAVENOUS; SUBCUTANEOUS at 14:53

## 2020-11-16 RX ADMIN — SODIUM CHLORIDE, POTASSIUM CHLORIDE, SODIUM LACTATE AND CALCIUM CHLORIDE: 600; 310; 30; 20 INJECTION, SOLUTION INTRAVENOUS at 09:00

## 2020-11-16 RX ADMIN — MAGNESIUM SULFATE HEPTAHYDRATE 1 G: 500 INJECTION, SOLUTION INTRAMUSCULAR; INTRAVENOUS at 10:39

## 2020-11-16 RX ADMIN — Medication 160 MG: at 10:17

## 2020-11-16 RX ADMIN — ALBUTEROL SULFATE 2.5 MG: 2.5 SOLUTION RESPIRATORY (INHALATION) at 10:02

## 2020-11-16 RX ADMIN — FAMOTIDINE 20 MG: 10 INJECTION, SOLUTION INTRAVENOUS at 16:23

## 2020-11-16 RX ADMIN — CEFAZOLIN SODIUM 2 G: 10 INJECTION, POWDER, FOR SOLUTION INTRAVENOUS at 18:23

## 2020-11-16 RX ADMIN — Medication 20 MG: at 10:17

## 2020-11-16 RX ADMIN — MIDAZOLAM 1 MG: 1 INJECTION INTRAMUSCULAR; INTRAVENOUS at 09:55

## 2020-11-16 RX ADMIN — LIDOCAINE HYDROCHLORIDE 100 MG: 20 INJECTION, SOLUTION EPIDURAL; INFILTRATION; INTRACAUDAL; PERINEURAL at 10:17

## 2020-11-16 RX ADMIN — SODIUM CHLORIDE, POTASSIUM CHLORIDE, SODIUM LACTATE AND CALCIUM CHLORIDE: 600; 310; 30; 20 INJECTION, SOLUTION INTRAVENOUS at 22:03

## 2020-11-16 RX ADMIN — Medication 10 MG: at 10:57

## 2020-11-16 RX ADMIN — ENOXAPARIN SODIUM 30 MG: 30 INJECTION SUBCUTANEOUS at 22:03

## 2020-11-16 RX ADMIN — PROMETHAZINE HYDROCHLORIDE 6.25 MG: 25 INJECTION INTRAMUSCULAR; INTRAVENOUS at 14:11

## 2020-11-16 RX ADMIN — DEXMEDETOMIDINE HYDROCHLORIDE 5 MCG: 100 INJECTION, SOLUTION INTRAVENOUS at 10:22

## 2020-11-16 RX ADMIN — Medication 0.2 MG: at 10:47

## 2020-11-16 RX ADMIN — LORAZEPAM 0.5 MG: 2 INJECTION INTRAMUSCULAR; INTRAVENOUS at 12:46

## 2020-11-16 RX ADMIN — DIPHENHYDRAMINE HYDROCHLORIDE 10 MG: 50 INJECTION, SOLUTION INTRAMUSCULAR; INTRAVENOUS at 11:51

## 2020-11-16 RX ADMIN — HYDROMORPHONE HYDROCHLORIDE 0.5 MG: 2 INJECTION, SOLUTION INTRAMUSCULAR; INTRAVENOUS; SUBCUTANEOUS at 22:03

## 2020-11-16 ASSESSMENT — PULMONARY FUNCTION TESTS
PIF_VALUE: 0
PIF_VALUE: 2
PIF_VALUE: 27
PIF_VALUE: 24
PIF_VALUE: 17
PIF_VALUE: 24
PIF_VALUE: 0
PIF_VALUE: 0
PIF_VALUE: 23
PIF_VALUE: 24
PIF_VALUE: 23
PIF_VALUE: 29
PIF_VALUE: 21
PIF_VALUE: 28
PIF_VALUE: 5
PIF_VALUE: 26
PIF_VALUE: 2
PIF_VALUE: 25
PIF_VALUE: 19
PIF_VALUE: 1
PIF_VALUE: 27
PIF_VALUE: 24
PIF_VALUE: 17
PIF_VALUE: 4
PIF_VALUE: 18
PIF_VALUE: 0
PIF_VALUE: 17
PIF_VALUE: 26
PIF_VALUE: 17
PIF_VALUE: 19
PIF_VALUE: 23
PIF_VALUE: 17
PIF_VALUE: 1
PIF_VALUE: 23
PIF_VALUE: 0
PIF_VALUE: 19
PIF_VALUE: 25
PIF_VALUE: 26
PIF_VALUE: 17
PIF_VALUE: 27
PIF_VALUE: 26
PIF_VALUE: 23
PIF_VALUE: 23
PIF_VALUE: 27
PIF_VALUE: 0
PIF_VALUE: 1
PIF_VALUE: 5
PIF_VALUE: 21
PIF_VALUE: 0
PIF_VALUE: 17
PIF_VALUE: 26
PIF_VALUE: 23
PIF_VALUE: 23
PIF_VALUE: 2
PIF_VALUE: 17
PIF_VALUE: 23
PIF_VALUE: 0
PIF_VALUE: 17
PIF_VALUE: 3
PIF_VALUE: 23
PIF_VALUE: 26
PIF_VALUE: 20
PIF_VALUE: 27
PIF_VALUE: 2
PIF_VALUE: 27
PIF_VALUE: 24
PIF_VALUE: 27
PIF_VALUE: 0
PIF_VALUE: 0
PIF_VALUE: 26
PIF_VALUE: 4
PIF_VALUE: 23
PIF_VALUE: 26
PIF_VALUE: 3
PIF_VALUE: 26
PIF_VALUE: 26
PIF_VALUE: 17
PIF_VALUE: 1
PIF_VALUE: 0
PIF_VALUE: 18
PIF_VALUE: 4
PIF_VALUE: 2
PIF_VALUE: 0
PIF_VALUE: 25
PIF_VALUE: 17
PIF_VALUE: 1
PIF_VALUE: 4
PIF_VALUE: 26
PIF_VALUE: 17
PIF_VALUE: 19
PIF_VALUE: 3
PIF_VALUE: 17

## 2020-11-16 ASSESSMENT — PAIN DESCRIPTION - ONSET
ONSET: ON-GOING

## 2020-11-16 ASSESSMENT — PAIN DESCRIPTION - PAIN TYPE
TYPE: SURGICAL PAIN

## 2020-11-16 ASSESSMENT — PAIN - FUNCTIONAL ASSESSMENT
PAIN_FUNCTIONAL_ASSESSMENT: ACTIVITIES ARE NOT PREVENTED

## 2020-11-16 ASSESSMENT — PAIN DESCRIPTION - LOCATION
LOCATION: ABDOMEN

## 2020-11-16 ASSESSMENT — PAIN SCALES - GENERAL
PAINLEVEL_OUTOF10: 9
PAINLEVEL_OUTOF10: 8
PAINLEVEL_OUTOF10: 7
PAINLEVEL_OUTOF10: 4
PAINLEVEL_OUTOF10: 6
PAINLEVEL_OUTOF10: 5

## 2020-11-16 ASSESSMENT — PAIN DESCRIPTION - FREQUENCY
FREQUENCY: CONTINUOUS

## 2020-11-16 ASSESSMENT — PAIN DESCRIPTION - ORIENTATION
ORIENTATION: MID;LOWER

## 2020-11-16 ASSESSMENT — PAIN DESCRIPTION - DESCRIPTORS
DESCRIPTORS: BURNING;ACHING;TENDER
DESCRIPTORS: BURNING;ACHING;TENDER
DESCRIPTORS: ACHING;TENDER

## 2020-11-16 NOTE — ANESTHESIA PRE PROCEDURE
Department of Anesthesiology  Preprocedure Note       Name:  Luan Benavides   Age:  52 y.o.  :  1970                                          MRN:  [id number]         Date:  2020      Surgeon: Xochitl Haywood): Macey Sullivan MD    Procedure: Procedure(s):  LAPAROSCOPIC SLEEVE GASTRECTOMY - ETHICON  POSSIBLE LIVER BIOPSY    Medications prior to admission:   Prior to Admission medications    Medication Sig Start Date End Date Taking? Authorizing Provider   diclofenac (VOLTAREN) 75 MG EC tablet TAKE 1 TABLET BY MOUTH 2 TIMES DAILY 20   Melquiades Blake PA-C   clonazePAM (KLONOPIN) 1 MG tablet Take 1 tablet by mouth 2 times daily as needed for Anxiety for up to 30 days. 20  Ryan Chandler MD   methylphenidate (RITALIN) 20 MG tablet Take 20 mg by mouth 3 times daily. Historical Provider, MD   potassium chloride (KLOR-CON M) 20 MEQ extended release tablet TAKE 1 TABLET BY MOUTH DAILY 20   JENNIFER Cho CNP   topiramate (TOPAMAX) 100 MG tablet TAKE 1 TABLET BY MOUTH 2 TIMES DAILY 10/12/20   Ryan Chandler MD   albuterol (PROVENTIL) (2.5 MG/3ML) 0.083% nebulizer solution Take 2.5 mg by nebulization every 6 hours as needed for Wheezing    Historical Provider, MD   amitriptyline (ELAVIL) 50 MG tablet Take 1.5 tablets by mouth nightly 20   JENNIFER Cho CNP   rizatriptan (MAXALT) 10 MG tablet TAKE 1 TABLET BY MOUTH ONCE AS NEEDED FOR MIGRAINE MAY REPEAT IN 2 HOURS IF NEEDED. MAX 2 TABS/24 HOURS 9/3/20   Ryan Chandler MD   Cholecalciferol 50 MCG (2000 UT) TABS Take 1 tablet by mouth daily Take 1 tablet by mouth daily.  20   Verónica Slim, APRN - CNP   furosemide (LASIX) 40 MG tablet TAKE 1 TABLET BY MOUTH EVERY DAY TO TWICE DAILY AS NEEDED SWELLING 20   Ryan Chandler MD   omeprazole (PRILOSEC) 20 MG delayed release capsule Take 1 capsule by mouth Daily 7/10/20   Macey Sullivan MD   FLUoxetine (PROZAC) 20 MG capsule TAKE 3 CAPSULES BY MOUTH DAILY 7/9/20   Sintia Christian MD   VENTOLIN  (90 Base) MCG/ACT inhaler INHALE 2 PUFFS INTO THE LUNGS 4 TIMES DAILY AS NEEDED. 5/15/20   Hardy Jordan MD   promethazine (PHENERGAN) 25 MG tablet TAKE 1 TABLET BY MOUTH EVERY 6 HOURS AS NEEDED FOR NAUSEA 12/26/19   Mounika Hopper MD   butalbital-acetaminophen-caffeine (FIORICET, ESGIC) -40 MG per tablet TAKE 1 TABLET BY MOUTH 3 TIMES DAILY AS NEEDED FOR MIGRAINE 11/26/19   Sintia Christian MD   gabapentin (NEURONTIN) 300 MG capsule TAKE ONE CAPSULE BY MOUTH ONE HOUR BEFORE BEDTIME 9/25/19   Historical Provider, MD   tiotropium (SPIRIVA RESPIMAT) 2.5 MCG/ACT AERS inhaler Inhale 2 puffs into the lungs daily 9/26/19   Hardy Jordan MD   amphetamine-dextroamphetamine (ADDERALL XR) 20 MG extended release capsule TAKE 1 CAPSULE BY MOUTH EVERY DAY 6/14/19   Historical Provider, MD   LORATADINE-D 12HR 5-120 MG per extended release tablet TAKE 1 TABLET BY MOUTH EVERY MORNING AS NEEDED FOR ALLERGY 7/17/17   Sintia Christian MD   Respiratory Therapy Supplies (NEBULIZER/TUBING/MOUTHPIECE) KIT 1 kit by Does not apply route daily as needed 10/21/15   Sintia Christian MD   OXYGEN Inhale 2 L/min into the lungs continuous. Regulate with portability at home  Patient taking differently: Inhale 2 L/min into the lungs as needed  8/7/14   Dara Canales MD       Current medications:    Current Facility-Administered Medications   Medication Dose Route Frequency Provider Last Rate Last Dose    ondansetron (ZOFRAN) injection 4 mg  4 mg Intravenous Once PRN Nubia Escobedo MD           Allergies: Allergies   Allergen Reactions    Ambien [Zolpidem Tartrate] Other (See Comments)     Shakey, anxious    Dilaudid [Hydromorphone Hcl] Other (See Comments)     Feels like skin is on fire.  Fentanyl Itching    Imitrex [Sumatriptan]      Face hot, felt sob    Morphine Other (See Comments)     Feels like skin is on fire. Tolerates Percocet.        Problem List:    Patient     2/2 dysplasia, endometriosis, cysts, MYLENE BSO    NASAL SEPTUM SURGERY      PELVIC LAPAROSCOPY      endometriosis    MYLENE AND BSO  2001    Dysplasia, endometriosis    TONSILLECTOMY      UPPER GASTROINTESTINAL ENDOSCOPY N/A 7/10/2020    EGD BIOPSY performed by Nakita Akins MD at [address] [place] [address] History:    Social History     Tobacco Use    Smoking status: Former Smoker     Packs/day: 0.25     Years: 22.00     Pack years: 5.50     Types: Cigarettes     Last attempt to quit: 2020     Years since quittin.7    Smokeless tobacco: Never Used   Substance Use Topics    Alcohol use: Not Currently                                Counseling given: Not Answered      Vital Signs (Current):   Vitals:    20 1324   Weight: 203 lb (92.1 kg)   Height: 5' 1\" (1.549 m)                                              BP Readings from Last 3 Encounters:   20 124/76   20 128/72   20 132/76       NPO Status:                                                                                 BMI:   Wt Readings from Last 3 Encounters:   20 203 lb (92.1 kg)   20 204 lb (92.5 kg)   20 203 lb 12.8 oz (92.4 kg)     Body mass index is 38.36 kg/m².     CBC:   Lab Results   Component Value Date    WBC 9.3 11/10/2020    RBC 4.31 11/10/2020    HGB 13.9 11/10/2020    HCT 41.7 11/10/2020    MCV 96.6 11/10/2020    RDW 13.1 11/10/2020     11/10/2020       CMP:   Lab Results   Component Value Date     11/10/2020    K 4.5 11/10/2020     11/10/2020    CO2 21 11/10/2020    BUN 24 11/10/2020    CREATININE 0.8 11/10/2020    GFRAA >60 11/10/2020    AGRATIO 1.1 11/10/2020    LABGLOM >60 11/10/2020    GLUCOSE 90 11/10/2020    PROT 7.3 11/10/2020    CALCIUM 9.4 11/10/2020    BILITOT <0.2 11/10/2020    ALKPHOS 109 11/10/2020    AST 27 11/10/2020    ALT 46 11/10/2020       POC Tests: No results for input(s): POCGLU, POCNA, POCK, POCCL, POCBUN, POCHEMO, POCHCT in the last 72 hours.    Coags:   Lab Results   Component Value Date    PROTIME 11.4 06/24/2020    INR 0.98 06/24/2020    APTT 31.6 08/20/2018       HCG (If Applicable):   Lab Results   Component Value Date    PREGTESTUR negative 06/19/2011        ABGs: No results found for: PHART, PO2ART, FKO4PLI, DGU5PDE, BEART, N7BNVNGM     Type & Screen (If Applicable):  No results found for: LABABO, LABRH    Drug/Infectious Status (If Applicable):  No results found for: HIV, HEPCAB    COVID-19 Screening (If Applicable):   Lab Results   Component Value Date    COVID19 Not Detected 11/10/2020         Anesthesia Evaluation  Patient summary reviewed no history of anesthetic complications:   Airway: Mallampati: II  TM distance: >3 FB   Neck ROM: full  Mouth opening: > = 3 FB Dental: normal exam         Pulmonary: breath sounds clear to auscultation  (+) COPD:  sleep apnea:  asthma:     (-) wheezes                          ROS comment: Uses oxygen at night 2 LNC and prn during the day  COPD at baseline. No exacerbations within past year   Cardiovascular:        (-) CABG/stent, dysrhythmias and  angina      Rhythm: regular  Rate: normal                 ROS comment: Normal NM stress echo     Neuro/Psych:   (+) headaches:, psychiatric history:   (-) seizures, TIA and CVA           GI/Hepatic/Renal:   (+) GERD: well controlled,           Endo/Other:                      ROS comment: Patient states she feels like her skin in burning with dilaudid 20 yrs ago and gets itching with Fentanyl. She states she is willing to try either for pain control with this surgery. Abdominal:   (+) obese,         Vascular:                                    Anesthesia Plan      general     ASA 3       Induction: intravenous. MIPS: Postoperative opioids intended, Prophylactic antiemetics administered and Postoperative trial extubation. Anesthetic plan and risks discussed with patient. Use of blood products discussed with patient whom consented to blood products. Plan discussed with CRNA.                   Galindo Chao MD   11/16/2020

## 2020-11-16 NOTE — H&P
[facility]) Physicians   [facility]  [address] [place] [address], [address] [place] [id number] . Phone: [phone]  Fax: [phone]              Patient's History and Physical from November 4, 2020 was reviewed. Vanessa seen and examined. There has been no change. HISTORY OF PRESENT ILLNESS:    Vanesas Reddy is a very pleasant 52 y.o. with Body mass index is 36.79 kg/m². who is presenting for planned Laparoscopic Sleeve Gastrectomy for future weight loss.        Past Medical History:        Diagnosis Date    Anxiety     Asthma     Bipolar 1 disorder ([facility]     Pt is willing to see psych after 7/15 for confirmation of dx    Chronic bronchitis ([facility]     Chronic GERD 6/11/2020    COPD (chronic obstructive pulmonary disease) ([facility]     Dr Cheyanne Orozco Depression     Emphysema of lung ([facility]     Migraine     Migraines     Mixed hyperlipidemia 6/25/2020    MRSA infection within last 3 months 2012    axillary    Narcolepsy 2004    Dr Kaelyn Whatley    Obesity     Pneumonia     Restless leg syndrome     Sleep apnea     did not tolerate cpap    Tobacco abuse     Tobacco use disorder 1/14/2011    Urinary incontinence      Past Surgical History:        Procedure Laterality Date    ABSCESS DRAINAGE  2012    L axilla MRSA, hosp for 7 days    ADENOIDECTOMY      BLADDER SUSPENSION  5/10    Mesh removed 1/9/15 in [address]  2/09    lumpectomy, ducts removed    FINGER SURGERY      right thumb    HYSTERECTOMY  2001 2/2 dysplasia, endometriosis, cysts, MYLENE BSO    NASAL SEPTUM SURGERY      PELVIC LAPAROSCOPY      endometriosis    MYLENE AND BSO  2001    Dysplasia, endometriosis    TONSILLECTOMY      UPPER GASTROINTESTINAL ENDOSCOPY N/A 7/10/2020    EGD BIOPSY performed by Yobani Cristobal MD at [address] [place] [address]     Medications Prior to Admission:   Medications Prior to Admission: diclofenac (VOLTAREN) 75 MG EC tablet, TAKE 1 TABLET BY MOUTH 2 TIMES DAILY  clonazePAM (KLONOPIN) 1 MG tablet, Take 1 tablet by mouth 2 times daily as needed for Anxiety for up to 30 days. methylphenidate (RITALIN) 20 MG tablet, Take 20 mg by mouth 3 times daily. potassium chloride (KLOR-CON M) 20 MEQ extended release tablet, TAKE 1 TABLET BY MOUTH DAILY  topiramate (TOPAMAX) 100 MG tablet, TAKE 1 TABLET BY MOUTH 2 TIMES DAILY  albuterol (PROVENTIL) (2.5 MG/3ML) 0.083% nebulizer solution, Take 2.5 mg by nebulization every 6 hours as needed for Wheezing  amitriptyline (ELAVIL) 50 MG tablet, Take 1.5 tablets by mouth nightly  rizatriptan (MAXALT) 10 MG tablet, TAKE 1 TABLET BY MOUTH ONCE AS NEEDED FOR MIGRAINE MAY REPEAT IN 2 HOURS IF NEEDED. MAX 2 TABS/24 HOURS  Cholecalciferol 50 MCG (2000 UT) TABS, Take 1 tablet by mouth daily Take 1 tablet by mouth daily. furosemide (LASIX) 40 MG tablet, TAKE 1 TABLET BY MOUTH EVERY DAY TO TWICE DAILY AS NEEDED SWELLING  omeprazole (PRILOSEC) 20 MG delayed release capsule, Take 1 capsule by mouth Daily  FLUoxetine (PROZAC) 20 MG capsule, TAKE 3 CAPSULES BY MOUTH DAILY  VENTOLIN  (90 Base) MCG/ACT inhaler, INHALE 2 PUFFS INTO THE LUNGS 4 TIMES DAILY AS NEEDED. promethazine (PHENERGAN) 25 MG tablet, TAKE 1 TABLET BY MOUTH EVERY 6 HOURS AS NEEDED FOR NAUSEA  butalbital-acetaminophen-caffeine (FIORICET, ESGIC) -40 MG per tablet, TAKE 1 TABLET BY MOUTH 3 TIMES DAILY AS NEEDED FOR MIGRAINE  gabapentin (NEURONTIN) 300 MG capsule, TAKE ONE CAPSULE BY MOUTH ONE HOUR BEFORE BEDTIME  tiotropium (SPIRIVA RESPIMAT) 2.5 MCG/ACT AERS inhaler, Inhale 2 puffs into the lungs daily  amphetamine-dextroamphetamine (ADDERALL XR) 20 MG extended release capsule, TAKE 1 CAPSULE BY MOUTH EVERY DAY  LORATADINE-D 12HR 5-120 MG per extended release tablet, TAKE 1 TABLET BY MOUTH EVERY MORNING AS NEEDED FOR ALLERGY  Respiratory Therapy Supplies (NEBULIZER/TUBING/MOUTHPIECE) KIT, 1 kit by Does not apply route daily as needed  OXYGEN, Inhale 2 L/min into the lungs continuous. Regulate with portability at home (Patient taking differently: Inhale 2 L/min into the lungs as needed )    Allergies:  Ambien [zolpidem tartrate]; Dilaudid [hydromorphone hcl]; Fentanyl; Imitrex [sumatriptan]; and Morphine    Social History:   TOBACCO:   reports that she quit smoking about 9 months ago. Her smoking use included cigarettes. She has a 5.50 pack-year smoking history. She has never used smokeless tobacco.  ETOH:   reports previous alcohol use. Family History:       Problem Relation Age of Onset    Depression Mother     Breast Cancer Mother     Stroke Mother         cva x 3    Hypertension Mother     Elevated Lipids Mother     Diabetes Mother     Coronary Art Dis Father         mi  age 43    Schizophrenia Father     Hypertension Father     Elevated Lipids Father     Diabetes Father     Birth Defects Son     Other Son         odd, autism    Asthma Son     Diabetes Son     Other Brother         colitis    Mental Illness Sister     Heart Failure Paternal Grandmother         CHF    Emphysema Maternal Grandfather     Cancer Paternal Grandfather          REVIEW OF SYSTEMS:    Review of Systems - History obtained from the patient  General ROS: obesity  Psychological ROS: negative  Ophthalmic ROS: negative  Neurological ROS: negative  ENT ROS: negative  Allergy and Immunology ROS: negative  Hematological and Lymphatic ROS: negative  Endocrine ROS: negative  Breast ROS: negative  Respiratory ROS: negative  Cardiovascular ROS: negative  Gastrointestinal ROS:negative  Genito-Urinary ROS: negative  Musculoskeletal ROS: negative   Skin ROS: negative      PHYSICAL EXAM:      /75   Pulse 100   Temp 97.9 °F (36.6 °C) (Temporal)   Resp 18   Ht 5' 1\" (1.549 m)   Wt 194 lb 11.2 oz (88.3 kg)   LMP  (LMP Unknown)   SpO2 95%   BMI 36.79 kg/m²  I      Physical Exam   Vitals Reviewed   Constitutional: Patient is oriented to person, place, and time.  Patient appears well-developed and well-nourished. Patient is active and cooperative. Non-toxic appearance. No distress. HENT:   Head: Normocephalic and atraumatic. Head is without abrasion and without laceration. Hair is normal.   Right Ear: External ear normal. No lacerations. No drainage, swelling . Left Ear: External ear normal. No lacerations. No drainage, swelling. Nose: Nose normal. No nose lacerations or nasal deformity. Eyes: Conjunctivae, EOM and lids are normal. Right eye exhibits no discharge. No foreign body present in the right eye. Left eye exhibits no discharge. No foreign body present in the left eye. No scleral icterus. Neck: Trachea normal and normal range of motion. No JVD present. Pulmonary/Chest: Effort normal. No accessory muscle usage or stridor. No apnea. No respiratory distress. Cardiovascular: Normal rate and no JVD. Abdominal: Normal appearance. Patient exhibits no distension. Musculoskeletal: Normal range of motion. Patient exhibits no edema. Neurological: Patient is alert and oriented to person, place, and time. Patient has normal strength. GCS eye subscore is 4. GCS verbal subscore is 5. GCS motor subscore is 6. Skin: Skin is warm and dry. No abrasion and no rash noted. Patient is not diaphoretic. No cyanosis or erythema. Psychiatric: Patient has a normal mood and affect.  Speech is normal and behavior is normal. Cognition and memory are normal.       DATA:  CBC:   Lab Results   Component Value Date    WBC 9.3 11/10/2020    RBC 4.31 11/10/2020    HGB 13.9 11/10/2020    HCT 41.7 11/10/2020    MCV 96.6 11/10/2020    MCH 32.2 11/10/2020    MCHC 33.3 11/10/2020    RDW 13.1 11/10/2020     11/10/2020    MPV 9.5 11/10/2020     CMP:    Lab Results   Component Value Date     11/10/2020    K 4.5 11/10/2020     11/10/2020    CO2 21 11/10/2020    BUN 24 11/10/2020    CREATININE 0.8 11/10/2020    GFRAA >60 11/10/2020    AGRATIO 1.1 11/10/2020    LABGLOM >60 11/10/2020    GLUCOSE 90 health complications. We discussed that our goal is to ameliorate the medical problems and not to obtain a specific body mass index. Patient understands the risks and benefits and wishes to proceed with the procedure. Also understands if BMI is lower than 40 without significant co morbid conditions, concerns for risks of surgery being somewhat higher over long run, however patient wants to proceed and fully understands the risks. Clearly BMI over 35 does impose very serious health risks as well chances of losing weight on diet only is very limited and sustaining weight loss is even less, thus surgery is certainly recommended for long term weight loss and better health overall given compliance. I advised the patient that we can't guarantee final insurance approval.      The patient was counseled at length about the risks of cale Covid-19 during their perioperative period and any recovery window from their procedure. The patient was made aware that cale Covid-19  may worsen their prognosis for recovering from their procedure  and lend to a higher morbidity and/or mortality risk. All material risks, benefits, and reasonable alternatives including postponing the procedure were discussed. The patient does wish to proceed with the procedure at this time. 1.  Plan for Laparoscopic Sleeve Gastrectomy with General Anesthesia. Hansa Harp MD, FACS.

## 2020-11-16 NOTE — ANESTHESIA POSTPROCEDURE EVALUATION
Department of Anesthesiology  Postprocedure Note    Patient: Dulce Grimes  MRN: [id number]  YOB: 1970  Date of evaluation: 11/16/2020  Time:  1:58 PM     Procedure Summary     Date:  11/16/20 Room / Location:  [address]    Anesthesia Start:  1012 Anesthesia Stop:  4388    Procedure:  [address] (N/A Abdomen) Diagnosis:       (E66.01  MORBID OBESITY)      (K76.0 FATTY LIVER)    Surgeon:  Elizabeth Guaman MD Responsible Provider:  Stacey Arellano MD    Anesthesia Type:  general ASA Status:  3          Anesthesia Type: general    Niranjan Phase I: Niranjan Score: 9    Niranjan Phase II:      Last vitals: Reviewed and per EMR flowsheets.        Anesthesia Post Evaluation    Patient location during evaluation: PACU  Patient participation: complete - patient participated  Level of consciousness: awake  Airway patency: patent  Nausea & Vomiting: no vomiting  Complications: no  Cardiovascular status: hemodynamically stable  Respiratory status: acceptable  Hydration status: euvolemic

## 2020-11-16 NOTE — OP NOTE
[facility]) Physicians   [facility]  [address], [address] [address] . Phone: [phone]  Fax: [phone]             Procedure Note    Indications: The patient was evaluated by our multidisciplinary team and was found to be a good candidate for weight loss surgery for future weight loss. Body mass index is 36.79 kg/m². Pre-operative Diagnosis:   Patient Active Problem List   Diagnosis    Urinary incontinence    Moderate COPD (chronic obstructive pulmonary disease) (HCC)    Migraine with aura and without status migrainosus, not intractable    Anxiety    Depression    Tobacco abuse    CAP (community acquired pneumonia)    COPD exacerbation ([address]    Chronic respiratory failure with hypoxia ([address]    History of total hysterectomy with removal of both tubes and ovaries    Shortness of breath    Bipolar depression ([address]    Macromastia    Oxygen dependent    History of tobacco use    Primary insomnia    Hemoptysis    Obstructive sleep apnea    Severe obesity (BMI 35.0-39. 9) with comorbidity ([address]    Chronic GERD    Mixed hyperlipidemia    Vitamin D deficiency    Narcolepsy    Family history of early CAD         Post-operative Diagnosis:   Same      Procedure:    - Laparoscopic Sleeve Gastrectomy. - laparoscopic lymph node biopsy    Surgeon: Floridalma Machado MD    Anesthesia: General endotracheal anesthesia        Procedure Details   The patient was seen again in the Holding Room. The risks, benefits, complications, treatment options, and expected outcomes were discussed with the patient and/or family. The possibilities of reaction to medication, pulmonary aspiration, perforation of viscus, bleeding, recurrent infection, strictures, leaks, failure to lose weight, regaining weight,  the need for additional procedures, failure to diagnose a condition, and creating a complication requiring transfusion or operation were discussed.  There was concurrence with the proposed plan and informed consent was obtained. The site of surgery was properly noted/marked. The patient was taken to Operating Room, identified as Cady Thapa and the procedure verified as Laparoscopic Sleeve Gastrectomy. A Time Out was held and the above information confirmed. The patient was placed in the supine position and general anesthesia was induced, along with placement of orogastric tube, Venodyne boots, and a Armstrong catheter. Lovenox SQ given pre-operatively. IV Antibiotics given pre-operatively. All pressure points were padded properly. Patient prepped and draped in sterile fashion. A left upper quadrant incision was made and a veres needle was inserted after confirming with saline drop test.  After adequate pneumoperitoneum was obtained, a 5 mm trocar was inserted. This was followed by a endoscope which confirmed intra-abdominal placement and there was no injury on initial trocar placement. Additional ports were placed in the standard positions under direct vision. The abdomen was initially explored and no abnormalities were identified. A liver retractor was inserted through a small incision in the upper midline, lifted the liver upward, and was then secured to the OR table. The pylorus was identified and measurement was taken approximately 4-6 cm from the pylorus along the greater curvature of the stomach. The harmonic scalpel / ligasure was used to take down the attachments and short gastric vessels along the greater curve of the stomach. This was continued until all attachments were taken down and continued to the gastro-esophageal junction. A 34 Turkish dilator was placed along the lesser curvature and into the pylorus. I noted  perigastric lymph node near angle of his and that was safely removed and submitted to pathology. A stapler was fired along the dilator to create an appropriate sized gastric sleeve pouch.    Green/Gold firing followed by blue firings were used to create the sleeve. The staple line looked very healthy and no bleeding from staple line. The stomach was confirmed to be completely divided with uniform shape,  no twist in the sleeve and wide patent incisura. A 2-0 vicryl suture was used to over-sew and imbricate the sleeve staple line. The staple line was completely over-sewn. The dilator was removed and an Edlich tube was advanced across the sleeve to ensure patency mainly at incisura, then retracted to just above the GE junction. The stomach distal to the staple line was clamped and methylene blue saline was injected under pressure confirming No obstruction (flow noted to duodenum) and No leak. The abdomen was carefully inspected and there was no bleeding or any other abnormality. The stomach was brought out through the RUQ incision. Hemostasis was confirmed. Snow applied along suture line and/or biopsy sites to ensure hemostasis. The 15 and 12 port sites was closed using 0.0 Vicryl  suture at the level of the fascia and that was done under direct vision with the laparoscope to ensure proper closure and nothing entrapped. Local Anesthetic used at port sites. The trocar site skin wounds were closed using 4.0 Vicryl after copious Irrigation of the wounds. Dermabond / or steri strips applied. Instrument, sponge, and needle counts were correct at the conclusion of the case. Findings:  Obesity, perigastric lymphadenopathy  . Estimated Blood Loss:  Minimal           Drains: none           Total IV Fluids: 1500 ml           Specimens: Stomach (Subtotal)            Complications:  None; patient tolerated the procedure well. Disposition: PACU - hemodynamically stable. Condition: stable    Attending Attestation: I was present and scrubbed for the entire procedure.

## 2020-11-16 NOTE — PROGRESS NOTES
Pt resting quietly in bed with eyes closed, awakens to voice, drowsy at this time. VSS, O2 sats 97% on 4 L NC. Incisions on abdomen dry and intact, abdomen soft, ice pack in place. Pt seen by anesthesia, phase 1 criteria met. Awaiting bed placement on 4T. Will continue to monitor until bed available.

## 2020-11-17 ENCOUNTER — APPOINTMENT (OUTPATIENT)
Dept: GENERAL RADIOLOGY | Age: 50
DRG: 621 | End: 2020-11-17
Attending: SURGERY
Payer: MEDICARE

## 2020-11-17 VITALS
BODY MASS INDEX: 36.76 KG/M2 | SYSTOLIC BLOOD PRESSURE: 101 MMHG | DIASTOLIC BLOOD PRESSURE: 70 MMHG | OXYGEN SATURATION: 98 % | TEMPERATURE: 98.2 F | HEART RATE: 64 BPM | RESPIRATION RATE: 19 BRPM | WEIGHT: 194.7 LBS | HEIGHT: 61 IN

## 2020-11-17 PROBLEM — Z98.84 S/P LAPAROSCOPIC SLEEVE GASTRECTOMY: Status: ACTIVE | Noted: 2020-11-17

## 2020-11-17 LAB
ANION GAP SERPL CALCULATED.3IONS-SCNC: 6 MMOL/L (ref 3–16)
BUN BLDV-MCNC: 9 MG/DL (ref 7–20)
CALCIUM SERPL-MCNC: 9.1 MG/DL (ref 8.3–10.6)
CHLORIDE BLD-SCNC: 106 MMOL/L (ref 99–110)
CO2: 30 MMOL/L (ref 21–32)
CREAT SERPL-MCNC: 0.6 MG/DL (ref 0.6–1.1)
EKG ATRIAL RATE: 88 BPM
EKG DIAGNOSIS: NORMAL
EKG P AXIS: 61 DEGREES
EKG P-R INTERVAL: 162 MS
EKG Q-T INTERVAL: 436 MS
EKG QRS DURATION: 90 MS
EKG QTC CALCULATION (BAZETT): 527 MS
EKG R AXIS: 17 DEGREES
EKG T AXIS: 15 DEGREES
EKG VENTRICULAR RATE: 88 BPM
GFR AFRICAN AMERICAN: >60
GFR NON-AFRICAN AMERICAN: >60
GLUCOSE BLD-MCNC: 85 MG/DL (ref 70–99)
HCT VFR BLD CALC: 38.5 % (ref 36–48)
HEMOGLOBIN: 12.8 G/DL (ref 12–16)
MCH RBC QN AUTO: 32.2 PG (ref 26–34)
MCHC RBC AUTO-ENTMCNC: 33.2 G/DL (ref 31–36)
MCV RBC AUTO: 97 FL (ref 80–100)
PDW BLD-RTO: 12.8 % (ref 12.4–15.4)
PLATELET # BLD: 222 K/UL (ref 135–450)
PMV BLD AUTO: 8.4 FL (ref 5–10.5)
POTASSIUM SERPL-SCNC: 4.7 MMOL/L (ref 3.5–5.1)
RBC # BLD: 3.97 M/UL (ref 4–5.2)
SODIUM BLD-SCNC: 142 MMOL/L (ref 136–145)
WBC # BLD: 12.2 K/UL (ref 4–11)

## 2020-11-17 PROCEDURE — 6370000000 HC RX 637 (ALT 250 FOR IP): Performed by: NURSE PRACTITIONER

## 2020-11-17 PROCEDURE — 2700000000 HC OXYGEN THERAPY PER DAY

## 2020-11-17 PROCEDURE — 6370000000 HC RX 637 (ALT 250 FOR IP): Performed by: SURGERY

## 2020-11-17 PROCEDURE — 6360000004 HC RX CONTRAST MEDICATION

## 2020-11-17 PROCEDURE — 94640 AIRWAY INHALATION TREATMENT: CPT

## 2020-11-17 PROCEDURE — C9113 INJ PANTOPRAZOLE SODIUM, VIA: HCPCS | Performed by: SURGERY

## 2020-11-17 PROCEDURE — 85027 COMPLETE CBC AUTOMATED: CPT

## 2020-11-17 PROCEDURE — 2580000003 HC RX 258: Performed by: SURGERY

## 2020-11-17 PROCEDURE — 6360000002 HC RX W HCPCS: Performed by: SURGERY

## 2020-11-17 PROCEDURE — APPSS180 APP SPLIT SHARED TIME > 60 MINUTES: Performed by: NURSE PRACTITIONER

## 2020-11-17 PROCEDURE — 93010 ELECTROCARDIOGRAM REPORT: CPT | Performed by: INTERNAL MEDICINE

## 2020-11-17 PROCEDURE — 94761 N-INVAS EAR/PLS OXIMETRY MLT: CPT

## 2020-11-17 PROCEDURE — 74240 X-RAY XM UPR GI TRC 1CNTRST: CPT

## 2020-11-17 PROCEDURE — 80048 BASIC METABOLIC PNL TOTAL CA: CPT

## 2020-11-17 PROCEDURE — 99024 POSTOP FOLLOW-UP VISIT: CPT | Performed by: NURSE PRACTITIONER

## 2020-11-17 PROCEDURE — 36415 COLL VENOUS BLD VENIPUNCTURE: CPT

## 2020-11-17 RX ORDER — OXYCODONE HYDROCHLORIDE AND ACETAMINOPHEN 5; 325 MG/1; MG/1
2 TABLET ORAL EVERY 4 HOURS PRN
Status: DISCONTINUED | OUTPATIENT
Start: 2020-11-17 | End: 2020-11-17 | Stop reason: HOSPADM

## 2020-11-17 RX ORDER — PROMETHAZINE HYDROCHLORIDE 6.25 MG/5ML
12.5 SYRUP ORAL EVERY 6 HOURS PRN
Status: DISCONTINUED | OUTPATIENT
Start: 2020-11-17 | End: 2020-11-17 | Stop reason: HOSPADM

## 2020-11-17 RX ORDER — ONDANSETRON 4 MG/1
8 TABLET, ORALLY DISINTEGRATING ORAL EVERY 8 HOURS PRN
Qty: 30 TABLET | Refills: 0 | Status: SHIPPED | OUTPATIENT
Start: 2020-11-17 | End: 2021-01-06

## 2020-11-17 RX ORDER — OXYCODONE HYDROCHLORIDE AND ACETAMINOPHEN 5; 325 MG/1; MG/1
1 TABLET ORAL EVERY 4 HOURS PRN
Status: DISCONTINUED | OUTPATIENT
Start: 2020-11-17 | End: 2020-11-17 | Stop reason: HOSPADM

## 2020-11-17 RX ORDER — OXYCODONE HYDROCHLORIDE AND ACETAMINOPHEN 5; 325 MG/1; MG/1
1 TABLET ORAL EVERY 6 HOURS PRN
Qty: 28 TABLET | Refills: 0 | Status: SHIPPED | OUTPATIENT
Start: 2020-11-17 | End: 2020-11-24

## 2020-11-17 RX ORDER — DIPHENHYDRAMINE HYDROCHLORIDE 50 MG/ML
12.5 INJECTION INTRAMUSCULAR; INTRAVENOUS EVERY 6 HOURS PRN
Status: DISCONTINUED | OUTPATIENT
Start: 2020-11-17 | End: 2020-11-17 | Stop reason: HOSPADM

## 2020-11-17 RX ORDER — FUROSEMIDE 40 MG/1
40 TABLET ORAL DAILY
Status: DISCONTINUED | OUTPATIENT
Start: 2020-11-17 | End: 2020-11-17 | Stop reason: HOSPADM

## 2020-11-17 RX ORDER — ONDANSETRON 4 MG/1
8 TABLET, ORALLY DISINTEGRATING ORAL EVERY 8 HOURS PRN
Qty: 30 TABLET | Refills: 1 | Status: SHIPPED | OUTPATIENT
Start: 2020-11-17 | End: 2021-01-06 | Stop reason: ALTCHOICE

## 2020-11-17 RX ORDER — ONDANSETRON 4 MG/1
8 TABLET, ORALLY DISINTEGRATING ORAL EVERY 8 HOURS PRN
Status: DISCONTINUED | OUTPATIENT
Start: 2020-11-17 | End: 2020-11-17 | Stop reason: HOSPADM

## 2020-11-17 RX ADMIN — METOCLOPRAMIDE HYDROCHLORIDE 10 MG: 10 INJECTION, SOLUTION INTRAMUSCULAR; INTRAVENOUS at 08:59

## 2020-11-17 RX ADMIN — DIPHENHYDRAMINE HYDROCHLORIDE 12.5 MG: 50 INJECTION, SOLUTION INTRAMUSCULAR; INTRAVENOUS at 05:17

## 2020-11-17 RX ADMIN — IOHEXOL 50 ML: 350 INJECTION, SOLUTION INTRAVENOUS at 10:28

## 2020-11-17 RX ADMIN — ONDANSETRON 4 MG: 2 INJECTION INTRAMUSCULAR; INTRAVENOUS at 05:00

## 2020-11-17 RX ADMIN — FUROSEMIDE 40 MG: 40 TABLET ORAL at 15:22

## 2020-11-17 RX ADMIN — HYDROMORPHONE HYDROCHLORIDE 0.5 MG: 2 INJECTION, SOLUTION INTRAMUSCULAR; INTRAVENOUS; SUBCUTANEOUS at 08:13

## 2020-11-17 RX ADMIN — TIOTROPIUM BROMIDE INHALATION SPRAY 2 PUFF: 3.12 SPRAY, METERED RESPIRATORY (INHALATION) at 09:22

## 2020-11-17 RX ADMIN — CEFAZOLIN SODIUM 2 G: 10 INJECTION, POWDER, FOR SOLUTION INTRAVENOUS at 02:13

## 2020-11-17 RX ADMIN — OXYCODONE HYDROCHLORIDE AND ACETAMINOPHEN 2 TABLET: 5; 325 TABLET ORAL at 16:12

## 2020-11-17 RX ADMIN — SODIUM CHLORIDE, POTASSIUM CHLORIDE, SODIUM LACTATE AND CALCIUM CHLORIDE: 600; 310; 30; 20 INJECTION, SOLUTION INTRAVENOUS at 05:02

## 2020-11-17 RX ADMIN — HYDROMORPHONE HYDROCHLORIDE 0.5 MG: 2 INJECTION, SOLUTION INTRAMUSCULAR; INTRAVENOUS; SUBCUTANEOUS at 02:12

## 2020-11-17 RX ADMIN — ONDANSETRON 4 MG: 2 INJECTION INTRAMUSCULAR; INTRAVENOUS at 16:12

## 2020-11-17 RX ADMIN — Medication 10 ML: at 08:13

## 2020-11-17 RX ADMIN — ENOXAPARIN SODIUM 30 MG: 30 INJECTION SUBCUTANEOUS at 08:13

## 2020-11-17 RX ADMIN — HYDROMORPHONE HYDROCHLORIDE 0.5 MG: 2 INJECTION, SOLUTION INTRAMUSCULAR; INTRAVENOUS; SUBCUTANEOUS at 05:17

## 2020-11-17 RX ADMIN — PANTOPRAZOLE SODIUM 40 MG: 40 INJECTION, POWDER, FOR SOLUTION INTRAVENOUS at 08:12

## 2020-11-17 RX ADMIN — OXYCODONE HYDROCHLORIDE AND ACETAMINOPHEN 2 TABLET: 5; 325 TABLET ORAL at 12:07

## 2020-11-17 ASSESSMENT — PAIN - FUNCTIONAL ASSESSMENT
PAIN_FUNCTIONAL_ASSESSMENT: ACTIVITIES ARE NOT PREVENTED

## 2020-11-17 ASSESSMENT — PAIN DESCRIPTION - LOCATION
LOCATION: ABDOMEN

## 2020-11-17 ASSESSMENT — PAIN DESCRIPTION - FREQUENCY
FREQUENCY: CONTINUOUS
FREQUENCY: CONTINUOUS
FREQUENCY: INTERMITTENT
FREQUENCY: CONTINUOUS

## 2020-11-17 ASSESSMENT — PAIN SCALES - GENERAL
PAINLEVEL_OUTOF10: 0
PAINLEVEL_OUTOF10: 7
PAINLEVEL_OUTOF10: 6
PAINLEVEL_OUTOF10: 7
PAINLEVEL_OUTOF10: 4
PAINLEVEL_OUTOF10: 9
PAINLEVEL_OUTOF10: 8
PAINLEVEL_OUTOF10: 7
PAINLEVEL_OUTOF10: 9
PAINLEVEL_OUTOF10: 8

## 2020-11-17 ASSESSMENT — PAIN DESCRIPTION - DESCRIPTORS
DESCRIPTORS: ACHING;SORE
DESCRIPTORS: ACHING;TENDER
DESCRIPTORS: ACHING;TENDER
DESCRIPTORS: SORE;ACHING;TENDER

## 2020-11-17 ASSESSMENT — PAIN DESCRIPTION - ONSET
ONSET: ON-GOING

## 2020-11-17 ASSESSMENT — PAIN DESCRIPTION - ORIENTATION
ORIENTATION: MID
ORIENTATION: MID
ORIENTATION: MID;LOWER

## 2020-11-17 ASSESSMENT — PAIN DESCRIPTION - PAIN TYPE
TYPE: SURGICAL PAIN

## 2020-11-17 ASSESSMENT — PAIN DESCRIPTION - PROGRESSION: CLINICAL_PROGRESSION: GRADUALLY IMPROVING

## 2020-11-17 NOTE — PROGRESS NOTES
Patient complaining of itching, no hives noted. Requesting Benadryl. Spoke with anesthesia and new orders received. Medicated with benadryl. Continue to monitor.

## 2020-11-17 NOTE — PLAN OF CARE
Problem: Pain:  Goal: Pain level will decrease  Description: Pain level will decrease  11/17/2020 1038 by Doretha Espinosa RN  Outcome: Ongoing  11/17/2020 0100 by Floyd Lester RN  Outcome: Met This Shift  Goal: Control of acute pain  Description: Control of acute pain  11/17/2020 1038 by Doretha Espinosa RN  Outcome: Ongoing  11/17/2020 0100 by Floyd Lester RN  Outcome: Met This Shift  Goal: Control of chronic pain  Description: Control of chronic pain  11/17/2020 1038 by Doretha Espinosa RN  Outcome: Ongoing  11/17/2020 0100 by Floyd Lester RN  Outcome: Met This Shift     Problem: Discharge Planning:  Goal: Discharged to appropriate level of care  Description: Discharged to appropriate level of care  11/17/2020 1038 by Doretha Espinosa RN  Outcome: Ongoing  11/17/2020 0100 by Floyd Lester RN  Outcome: Ongoing     Problem:  Bowel Function - Altered:  Goal: Bowel elimination is within specified parameters  Description: Bowel elimination is within specified parameters  11/17/2020 1038 by Doretha Espinosa RN  Outcome: Ongoing  11/17/2020 0100 by Floyd Lester RN  Outcome: Ongoing     Problem: Fluid Volume - Imbalance:  Goal: Ability to achieve a balanced intake and output will improve  Description: Ability to achieve a balanced intake and output will improve  11/17/2020 1038 by Doretha Espinosa RN  Outcome: Ongoing  11/17/2020 0100 by Floyd Lester RN  Outcome: Met This Shift     Problem: Infection - Surgical Site:  Goal: Will show no infection signs and symptoms  Description: Will show no infection signs and symptoms  11/17/2020 1038 by Doretha Espinosa RN  Outcome: Ongoing  11/17/2020 0100 by Floyd Lester RN  Outcome: Met This Shift  Goal: Ability to maintain a body temperature in the normal range will improve  Description: Ability to maintain a body temperature in the normal range will improve  11/17/2020 1038 by Doretha Espinosa RN  Outcome: Ongoing  11/17/2020 0100 by Floyd Lester RN  Outcome: Met This Shift     Problem: Nutrition Deficit:  Goal: Ability to identify appropriate dietary choices will improve  Description: Ability to identify appropriate dietary choices will improve  11/17/2020 1038 by Fernandez Curran RN  Outcome: Ongoing  11/17/2020 0100 by Braden Bernabe RN  Outcome: Ongoing     Problem: Pain:  Goal: Pain level will decrease  Description: Pain level will decrease  11/17/2020 1038 by Fernandez Curran RN  Outcome: Ongoing  11/17/2020 0100 by Braden Bernabe RN  Outcome: Met This Shift  Goal: Control of acute pain  Description: Control of acute pain  11/17/2020 1038 by Fernandez Curran RN  Outcome: Ongoing  11/17/2020 0100 by Braden Bernabe RN  Outcome: Met This Shift  Goal: Control of chronic pain  Description: Control of chronic pain  11/17/2020 1038 by Fernandez Curran RN  Outcome: Ongoing  11/17/2020 0100 by Braden Bernabe RN  Outcome: Met This Shift     Problem: Venous Thromboembolism:  Goal: Will show no signs or symptoms of venous thromboembolism  Description: Will show no signs or symptoms of venous thromboembolism  11/17/2020 1038 by Fernandez Curran RN  Outcome: Ongoing  11/17/2020 0100 by Braden Bernabe RN  Outcome: Met This Shift  Goal: Absence of signs or symptoms of impaired coagulation  Description: Absence of signs or symptoms of impaired coagulation  11/17/2020 1038 by Fernandez Curran RN  Outcome: Ongoing  11/17/2020 0100 by Braden Bernabe RN  Outcome: Met This Shift     Problem: Anxiety:  Goal: Level of anxiety will decrease  Description: Level of anxiety will decrease  11/17/2020 1038 by Fernandez Curran RN  Outcome: Ongoing  11/17/2020 0100 by Braden Bernabe RN  Outcome: Met This Shift     Problem:  Activity Intolerance:  Goal: Ability to tolerate increased activity will improve  Description: Ability to tolerate increased activity will improve  11/17/2020 1038 by Fernandez Curran RN  Outcome: Ongoing  11/17/2020 0100 by Braden Bernabe RN  Outcome: Met This Shift     Problem: Airway Clearance - Ineffective:  Goal: Ability to maintain a clear airway will improve  Description: Ability to maintain a clear airway will improve  11/17/2020 1038 by Lul Gerardo RN  Outcome: Ongoing  11/17/2020 0100 by Keyon Montero RN  Outcome: Met This Shift

## 2020-11-17 NOTE — PROGRESS NOTES
[facility]) Physicians   General & Laparoscopic Surgery  [employer]       Pt seen and examined    S/p laparoscopic sleeve gastrectomy & lymph node biopsy. The patient is ambulating in arrieta. Pain is well controlled. There is some nausea, but no vomiting. Having some itching related to IV dilaudid. IV benadryl provided with relief. There are no other complaints or questions. Vitals:    11/17/20 0430 11/17/20 0520 11/17/20 0730 11/17/20 1143   BP: 113/63  108/72 101/70   Pulse: 67  74 64   Resp: 16 18 18 19   Temp: 97.6 °F (36.4 °C)  97.5 °F (36.4 °C) 98.2 °F (36.8 °C)   TempSrc: Oral  Oral Axillary   SpO2: 99% 98% 97% 98%   Weight:       Height:         Abdomen is soft, appropriately tender, incisions stable with no erythema. Breath sounds are clear bilaterally. Bowel sounds are active in all quadrants.     Data  CBC:   Lab Results   Component Value Date    WBC 12.2 11/17/2020    RBC 3.97 11/17/2020    HGB 12.8 11/17/2020    HCT 38.5 11/17/2020    MCV 97.0 11/17/2020    MCH 32.2 11/17/2020    MCHC 33.2 11/17/2020    RDW 12.8 11/17/2020     11/17/2020    MPV 8.4 11/17/2020     BMP:    Lab Results   Component Value Date     11/17/2020    K 4.7 11/17/2020     11/17/2020    CO2 30 11/17/2020    BUN 9 11/17/2020    LABALBU 3.9 11/10/2020    CREATININE 0.6 11/17/2020    CALCIUM 9.1 11/17/2020    GFRAA >60 11/17/2020    LABGLOM >60 11/17/2020    GLUCOSE 85 11/17/2020     Current Inpatient Medications    Current Facility-Administered Medications: diphenhydrAMINE (BENADRYL) injection 12.5 mg, 12.5 mg, Intravenous, Q6H PRN  albuterol (PROVENTIL) nebulizer solution 2.5 mg, 2.5 mg, Nebulization, Q6H PRN  tiotropium (SPIRIVA RESPIMAT) 2.5 MCG/ACT inhaler 2 puff, 2 puff, Inhalation, Daily  albuterol sulfate  (90 Base) MCG/ACT inhaler 2 puff, 2 puff, Inhalation, Q4H PRN  lactated ringers infusion, , Intravenous, Continuous  sodium chloride flush 0.9 % injection 10 mL, 10 mL, abdominal binder when walking for support. Patient needs to ambulate in the arrieta every 60-90 minutes. Patient will continue icing incisions. Plan for discharge today if nausea remains controlled, patient voids and is tolerating Phase I diet. Discussed with Dr. Huan Lockwood and Nursing Staff.     JORDANA OsorioC

## 2020-11-17 NOTE — PROGRESS NOTES
IV removed, pt given d/c instructions, education, 2 prescriptions which were filled in our outpatient pharmacy and pt taken to main lobby in wheelchair by the PCA to be taken home by private vehicle.

## 2020-11-17 NOTE — PROGRESS NOTES
Patient complaining of abdominal pain, spoke with anesthesia and new orders received for dilaudid. Medicated per orders.

## 2020-11-17 NOTE — PROGRESS NOTES
Patient up to rehab room 4900. Bedside handoff received from PACU RN. Vital signs stable. Stable on room air. Ice pack provided to abdomen. Post op education reviewed. Patient resting comfortable in bed. Call light within reach.

## 2020-11-17 NOTE — PROGRESS NOTES
Ambulated 3 laps around acute rehab unit independently. Tolerated ambulation well. Patient now sitting up in chair. Incentive spirometer use encouraged. Ice pack and abdominal binder remains in place. Call light within reach.

## 2020-11-17 NOTE — DISCHARGE SUMMARY
The patient was admitted on day of surgery and underwent a laparoscopic sleeve gastrectomy & lymph node biopsy. Surgery went well and the patient went to our post-op bariatric floor. Overnight, the patient had stable vitals signs, good urine output and ambulated in the halls. On POD #1 the patient underwent an UGI which revealed no leak or obstruction. Phase I diet was started and the patient tolerated well. The patient has no N/V, tolerating diet, ambulating and pain controlled on oral medication. The patient was discharged home today in stable condition. The patient will f/u in 2 weeks and was given dietary and ambulation instruction. The patient was instructed to call if there are any questions or concerns. Patient Active Problem List   Diagnosis    Urinary incontinence    Moderate COPD (chronic obstructive pulmonary disease) (HCC)    Migraine with aura and without status migrainosus, not intractable    Anxiety    Depression    Tobacco abuse    CAP (community acquired pneumonia)    COPD exacerbation ([address]    Chronic respiratory failure with hypoxia ([address]    History of total hysterectomy with removal of both tubes and ovaries    Shortness of breath    Bipolar depression ([address]    Macromastia    Oxygen dependent    History of tobacco use    Primary insomnia    Hemoptysis    Obstructive sleep apnea    Severe obesity (BMI 35.0-39. 9) with comorbidity ([address]    Chronic GERD    Mixed hyperlipidemia    Vitamin D deficiency    Narcolepsy    Family history of early CAD

## 2020-11-17 NOTE — PLAN OF CARE
Problem: Pain:  Goal: Pain level will decrease  Description: Pain level will decrease  Outcome: Met This Shift  Goal: Control of acute pain  Description: Control of acute pain  Outcome: Met This Shift  Goal: Control of chronic pain  Description: Control of chronic pain  Outcome: Met This Shift     Problem: Discharge Planning:  Goal: Discharged to appropriate level of care  Description: Discharged to appropriate level of care  Outcome: Ongoing     Problem:  Bowel Function - Altered:  Goal: Bowel elimination is within specified parameters  Description: Bowel elimination is within specified parameters  Outcome: Ongoing     Problem: Fluid Volume - Imbalance:  Goal: Ability to achieve a balanced intake and output will improve  Description: Ability to achieve a balanced intake and output will improve  Outcome: Met This Shift     Problem: Infection - Surgical Site:  Goal: Will show no infection signs and symptoms  Description: Will show no infection signs and symptoms  Outcome: Met This Shift  Goal: Ability to maintain a body temperature in the normal range will improve  Description: Ability to maintain a body temperature in the normal range will improve  Outcome: Met This Shift     Problem: Nutrition Deficit:  Goal: Ability to identify appropriate dietary choices will improve  Description: Ability to identify appropriate dietary choices will improve  Outcome: Ongoing     Problem: Pain:  Goal: Pain level will decrease  Description: Pain level will decrease  Outcome: Met This Shift  Goal: Control of acute pain  Description: Control of acute pain  Outcome: Met This Shift  Goal: Control of chronic pain  Description: Control of chronic pain  Outcome: Met This Shift     Problem: Venous Thromboembolism:  Goal: Will show no signs or symptoms of venous thromboembolism  Description: Will show no signs or symptoms of venous thromboembolism  Outcome: Met This Shift  Goal: Absence of signs or symptoms of impaired coagulation  Description: Absence of signs or symptoms of impaired coagulation  Outcome: Met This Shift     Problem: Anxiety:  Goal: Level of anxiety will decrease  Description: Level of anxiety will decrease  Outcome: Met This Shift     Problem:  Activity Intolerance:  Goal: Ability to tolerate increased activity will improve  Description: Ability to tolerate increased activity will improve  Outcome: Met This Shift     Problem: Airway Clearance - Ineffective:  Goal: Ability to maintain a clear airway will improve  Description: Ability to maintain a clear airway will improve  Outcome: Met This Shift

## 2020-11-17 NOTE — PROGRESS NOTES
C/o pain which she rates 9/10, given 2 Percocet 5mg PO per pt request. Call light in reach, will continue to monitor.

## 2020-11-17 NOTE — PROGRESS NOTES
Patient states relief of itching, requesting additional dose of pain med. Medicated per orders. Await bed on Med surg unit.

## 2020-11-18 ENCOUNTER — TELEPHONE (OUTPATIENT)
Dept: FAMILY MEDICINE CLINIC | Age: 50
End: 2020-11-18

## 2020-11-18 NOTE — TELEPHONE ENCOUNTER
[facility] Transitions Initial Follow Up Call    Outreach made within 2 business days of discharge: Yes    Patient: Tisha Vail Patient : 1970   MRN: [id number]  Reason for Admission: There are no discharge diagnoses documented for the most recent discharge. Discharge Date: 20       Spoke with: Vanessa    Discharge department/facility: [facility]    TCM Interactive Patient Contact:  Was patient able to fill all prescriptions: Yes  Was patient instructed to bring all medications to the follow-up visit: Yes  Is patient taking all medications as directed in the discharge summary?  Yes  Does patient understand their discharge instructions: Yes  Does patient have questions or concerns that need addressed prior to 7-14 day follow up office visit: no    Scheduled appointment with PCP within 7-14 days    Follow Up  Future Appointments   Date Time Provider Mariam Patton   2020 11:45 AM Paulo Gibson MD [facility]   2021  1:45 PM Ghislaine Gotti MD [place] [facility]   3/31/2021  1:30 PM Rachel Golden MD [facility]       Shannen Rutherford MA

## 2020-11-19 RX ORDER — PROMETHAZINE HYDROCHLORIDE 25 MG/1
TABLET ORAL
Qty: 30 TABLET | Refills: 0 | Status: SHIPPED | OUTPATIENT
Start: 2020-11-19 | End: 2021-02-01

## 2020-11-23 ENCOUNTER — TELEPHONE (OUTPATIENT)
Dept: BARIATRICS/WEIGHT MGMT | Age: 50
End: 2020-11-23

## 2020-11-23 NOTE — TELEPHONE ENCOUNTER
The patient was contacted to follow up on their recent bariatric surgery. The following topics were reviewed:    [x] Hydration is Adequate - water / CL / sf flavor pkts            --Patient is getting at least 48-64 oz of fluids a day, not including protein shakes. [x]Consuming Adequate Protein - w/ 6-8oz skim milk          [x]Consuming 2 protein shakes a day                [x]Consuming 60-80 grams of protein a day    [x] Food intake is appropriate - chicken / cream of wheat    [x]Adequately pureeing foods, so that there are no chunks left. [x]Taking in 1-2 oz at a time  [] Eating 4-6 times a day - 1-2x/day  [x] Following the 30-30-30 rule  [x] Reminded patient to keep food diary to bring to their 2 week follow up appointment. [] Pain relief techniques utilized  [] Taking pain medication as prescribed - as needed  [] Utilizing Lidoderm patches (if prescribed)  []Taking Tylenol instead of prescription pain medication  [x] Wearing abdominal binder  [x] Using ice for incisional pain    [x] Activity is appropriate   [x] Walking 10 minutes out of every hour  [x]Avoiding heavy lifting (>10lbs)  [] Utilizing their incentive spirometer - pt states she did not get one from the hospital, discussed breathing exercises     [] Issues with Nausea/Vomiting/Reflux - issues w/ reflux  [] Using Zofran PRN for nausea/vomiting   [x]Taking Prilosec for reflux     [] Issues with Constipation - no issues   []Tried Colace  []Tried Miralax    All questions and concerns addressed. Pt is doing well overall with nutrition/hydration goals. Pt states she does not have the incentive spirometer, reviewed breathing exercises. Pt states her IV blew in the hospital and now she has an knot in her bicep, advised warm compresses per Rosen Apa. Pt reports heartburn at night, she is taking prilosec, advised pepcid as needed. In addition, discussed elevating the head of bed and avoiding eating/drinking right before bed.   Pt voiced understanding. Patient was asked to please fill out hospital survey if she receives one in the mail.

## 2020-11-23 NOTE — TELEPHONE ENCOUNTER
Surgery Type: sleeve w/ lymph node biopsy    Surgery Date: 11/16/20    Surgeon: Dr. Jude Gonzalez    I attempted to contact the patient to follow up with them regarding their recent surgery. I have left a voicemail for the patient to return our call. If I am not in office when she returns my call, please have them speak with a dietician. Thanks!

## 2020-12-01 ENCOUNTER — OFFICE VISIT (OUTPATIENT)
Dept: BARIATRICS/WEIGHT MGMT | Age: 50
End: 2020-12-01

## 2020-12-01 VITALS
HEIGHT: 63 IN | RESPIRATION RATE: 18 BRPM | DIASTOLIC BLOOD PRESSURE: 74 MMHG | TEMPERATURE: 97 F | OXYGEN SATURATION: 97 % | HEART RATE: 95 BPM | SYSTOLIC BLOOD PRESSURE: 107 MMHG | BODY MASS INDEX: 33.66 KG/M2 | WEIGHT: 190 LBS

## 2020-12-01 PROCEDURE — 99024 POSTOP FOLLOW-UP VISIT: CPT | Performed by: SURGERY

## 2020-12-01 RX ORDER — CELECOXIB 200 MG/1
200 CAPSULE ORAL DAILY
Qty: 7 CAPSULE | Refills: 0 | Status: SHIPPED | OUTPATIENT
Start: 2020-12-01 | End: 2021-01-06 | Stop reason: ALTCHOICE

## 2020-12-01 NOTE — PROGRESS NOTES
Dietary Assessment Note  Vitals:   Vitals:    12/01/20 1140   BP: 107/74   Pulse: 95   Resp: 18   Temp: 97 °F (36.1 °C)   SpO2: 97%   Weight: 190 lb (86.2 kg)   Height: 5' 2.5\" (1.588 m)   Patient lost 13.8 lbs since pre-op. Total Weight Loss: 24.2 lbs    Labs reviewed: no new labs    Protein intake: 60-80 grams/day - w/ 6-8oz skim milk     Fluid intake: 48-64 oz/day - sf flavored water / chicken broth / sf jello    Multivitamin/mineral intake: yes - 4 fusion per day    Calcium intake: no    Other: Vit D    Exercise: up moving around, no strenuous exercise    Nutrition Assessment: 2 week post-op visit. Pt is eating pureed chicken 1x/day.     Amount able to eat per sitting: ~1.5oz    Following 30/30/30 rule: yes    Food Intolerances/issues: none    Client Concerns: disappointed she has only lost 4 lb since surgery / some pain R side    Goals:   - Increase eating to 2-3x/day  - Move to phase 3 on December 7, 2020    Plan: f/u at 6 weeks post-op    Zina Mendoza

## 2020-12-01 NOTE — PATIENT INSTRUCTIONS
Patient received dietary handouts and education.     Goals:   - Increase eating to 2-3x/day  - Move to phase 3 on December 7, 2020

## 2020-12-02 NOTE — PROGRESS NOTES
The patient is a 52 y.o. female who returns today for follow up. Dulce Grimes is s/p     Laparoscopic sleeve gastrectomy    We discussed how her weight affects her overall health including:  Patient Active Problem List   Diagnosis    Urinary incontinence    Moderate COPD (chronic obstructive pulmonary disease) (HCC)    Migraine with aura and without status migrainosus, not intractable    Anxiety    Depression    Tobacco abuse    CAP (community acquired pneumonia)    [facility]ation [facility]    Chronic respiratory failure with hypoxia ([facility]    History of total hysterectomy with removal of both tubes and ovaries    Shortness of breath    Bipolar depression ([facility]    Macromastia    Oxygen dependent    History of tobacco use    Primary insomnia    Hemoptysis    Obstructive sleep apnea    Severe obesity (BMI 35.0-39. 9) with comorbidity ([facility]    Chronic GERD    Mixed hyperlipidemia    Vitamin D deficiency    Narcolepsy    Family history of early CAD    S/P laparoscopic sleeve gastrectomy        Vitals:    12/01/20 1140   BP: 107/74   Pulse: 95   Resp: 18   Temp: 97 °F (36.1 °C)   SpO2: 97%   Weight: 190 lb (86.2 kg)   Height: 5' 2.5\" (1.588 m)       Lab Results   Component Value Date    WBC 12.2 11/17/2020    RBC 3.97 11/17/2020    HGB 12.8 11/17/2020    HCT 38.5 11/17/2020    MCV 97.0 11/17/2020    MCH 32.2 11/17/2020    MCHC 33.2 11/17/2020    MPV 8.4 11/17/2020    NEUTOPHILPCT 64.0 06/24/2020    LYMPHOPCT 27.6 06/24/2020    MONOPCT 6.1 06/24/2020    EOSRELPCT 1.7 06/24/2020    BASOPCT 0.6 06/24/2020    NEUTROABS 5.4 06/24/2020    LYMPHSABS 2.3 06/24/2020    MONOSABS 0.5 06/24/2020    EOSABS 0.1 06/24/2020     Lab Results   Component Value Date     11/17/2020    K 4.7 11/17/2020     11/17/2020    CO2 30 11/17/2020    ANIONGAP 6 11/17/2020    GLUCOSE 85 11/17/2020    BUN 9 11/17/2020    CREATININE 0.6 11/17/2020    LABGLOM >60 11/17/2020    GFRAA >60 11/17/2020    CALCIUM 9.1 11/17/2020    PROT 7.3 11/10/2020    LABALBU 3.9 11/10/2020    AGRATIO 1.1 11/10/2020    BILITOT <0.2 11/10/2020    ALKPHOS 109 11/10/2020    ALT 46 11/10/2020    AST 27 11/10/2020    GLOB 3.4 11/10/2020     Lab Results   Component Value Date    CHOL 198 09/01/2020    TRIG 133 09/01/2020    HDL 51 09/01/2020    LDLCALC 120 09/01/2020    LABVLDL 27 09/01/2020     Lab Results   Component Value Date    TSHREFLEX 2.53 06/24/2020     Lab Results   Component Value Date    IRON 70 06/24/2020    TIBC 324 06/24/2020    LABIRON 22 06/24/2020     Lab Results   Component Value Date    ZIDAHVRV17 527 06/24/2020    FOLATE 11.58 06/24/2020     Lab Results   Component Value Date    VITD25 22.7 09/01/2020     Lab Results   Component Value Date    LABA1C 4.8 06/24/2020    EAG 91.1 06/24/2020        The patient's current Body mass index is 34.2 kg/m². (12/1/20). Since her last visit she has lost 14 lbs since last visit and total of 24 lbs. Vanessa underwent dietary counseling, and I have reviewed and agree with the dietary counseling, and I have reviewed and agree with the diet plan. There are no changes in the patients medical history or physical exam.     Denies nausea, vomiting, fevers, chills, hiccups, shoulder pain, heartburn, dysphagia wound drainage/bulge nor change in color around incision. Bowels working ok and making urine. The incisions healing well. Overall I'm really pleased with Vanessa recovery. Pathology results were discussed with the patient. Vanessa advised to sign  release form for utilizing the 3 months complimentary membership in the [employer] starting after 6 weeks post op. I did explain thoroughly to the patient that compliance with  post op diet and other recommendations are integral part to improve the chances of successful weight loss and also not following it could end with serious health complications.      I advised [place] to gradually advance activity and  to call if there are any questions or concerns. Vanessa will follow up in 4 weeks. Having incisional pain, will try Celebrex. Please note that some or all of this report was generated using voice recognition software. Please notify me in case of any questions about the content of this document, as some errors in transcription may have occurred .

## 2020-12-04 RX ORDER — CLONAZEPAM 1 MG/1
TABLET ORAL
Qty: 60 TABLET | Refills: 0 | Status: SHIPPED | OUTPATIENT
Start: 2020-12-04 | End: 2020-12-31

## 2020-12-11 ENCOUNTER — TELEPHONE (OUTPATIENT)
Dept: BARIATRICS/WEIGHT MGMT | Age: 50
End: 2020-12-11

## 2020-12-11 RX ORDER — FAMOTIDINE 20 MG/1
20 TABLET, FILM COATED ORAL NIGHTLY
Qty: 30 TABLET | Refills: 1 | Status: SHIPPED | OUTPATIENT
Start: 2020-12-11 | End: 2021-02-25

## 2020-12-11 NOTE — TELEPHONE ENCOUNTER
Spoke with patient, detailed information given. Patient states she attempted eating Egg beaters but threw it up. She has since gone back to only pureed foods and cottage cheese. preferred pharmacy is ok. Patient will expect call from dietitian today or Monday.  Thank You

## 2020-12-11 NOTE — TELEPHONE ENCOUNTER
Patient called complaining of very bad acid reflux today. She is s/p sleeve 11/16/20. States she is already on omeprazole and it isnt helping. Can keep some stuff down but is having issues. Did get sick from eggs today already. Has tried to stick with purees and water but at times it even bothers her. Wakes up with nausea from the reflux. I spoke with an MA and just reiterated to patient to avoid spicy foods and to not eat 2hours prior to bed. She stated she didn't have those.   Please call [phone]

## 2020-12-11 NOTE — TELEPHONE ENCOUNTER
Could one of the dietitians give her a call to review dietary intake. She has only been on the soft and mushy diet this week so most likely having trouble adjusting to the increased texture. Baltazar Nolan or Brenna Fagan,   Could you let the patient know that I will have a dietitian call her, but it may not be until Monday. Most likely she is struggling with the increased texture of soft and mushy which is not unusual. Tell her to reduce her portion slightly. This could be causing the increased reflux and also make sure she is not eating within 2 hours of bedtime. Please let her know to continue the Prilosec in the morning and I will send in a prescription for Pepcid for her to take in the evening. I sent it to the preferred pharmacy [facility]. If she would like it sent somewhere else please let me know.   Thank you,  Hema Calloway, JORDANAC

## 2020-12-14 NOTE — TELEPHONE ENCOUNTER
Pt is s/p sleeve 11/16/20. RD attempted to call pt, however VM is full and cannot accept new messages at this time. Pt only has one number on file.

## 2021-01-03 ASSESSMENT — ENCOUNTER SYMPTOMS
EYES NEGATIVE: 1
RESPIRATORY NEGATIVE: 1
ROS SKIN COMMENTS: ABDOMINAL SURGICAL INCISIONS WELL HEALED.
ALLERGIC/IMMUNOLOGIC NEGATIVE: 1
GASTROINTESTINAL NEGATIVE: 1

## 2021-01-03 NOTE — PROGRESS NOTES
 SLEEVE GASTRECTOMY N/A 2020    LAPAROSCOPIC SLEEVE GASTRECTOMY - ETHICON performed by Edyta Lynn MD at [place] [address]      Dysplasia, endometriosis    TONSILLECTOMY      UPPER GASTROINTESTINAL ENDOSCOPY N/A 7/10/2020    EGD BIOPSY performed by Edyta Lynn MD at [address] [place] [address]     Family History   Problem Relation Age of Onset    Depression Mother     Breast Cancer Mother     Stroke Mother         cva x 3    Hypertension Mother     Elevated Lipids Mother     Diabetes Mother     Coronary Art Dis Father         mi  age 43    Schizophrenia Father     Hypertension Father     Elevated Lipids Father     Diabetes Father     Birth Defects Son     Other Son         odd, autism    Asthma Son     Diabetes Son     Other Brother         colitis    Mental Illness Sister     Heart Failure Paternal Grandmother         CHF    Emphysema Maternal Grandfather     Cancer Paternal Grandfather      Social History     Tobacco Use    Smoking status: Former Smoker     Packs/day: 0.25     Years: 22.00     Pack years: 5.50     Types: Cigarettes     Quit date: 2020     Years since quittin.9    Smokeless tobacco: Never Used   Substance Use Topics    Alcohol use: Not Currently     I counseled the patient on the importance of not smoking and risks of ETOH. Allergies   Allergen Reactions    Ambien [Zolpidem Tartrate] Other (See Comments)     Shakey, anxious    Dilaudid [Hydromorphone Hcl] Other (See Comments)     Feels like skin is on fire.  Fentanyl Itching    Imitrex [Sumatriptan]      Face hot, felt sob    Morphine Other (See Comments)     Feels like skin is on fire. Tolerates Percocet. Vitals:    21 1129   Weight: 177 lb (80.3 kg)   Height: 5' 2.5\" (1.588 m)     Body mass index is 31.86 kg/m².     Current Outpatient Medications:     omeprazole (PRILOSEC) 20 MG delayed release capsule, Take 1 capsule by mouth Daily, Disp: 30 capsule, Rfl: 3   clonazePAM (KLONOPIN) 1 MG tablet, TAKE 1 TABLET (1MG) BY MOUTH 2 TIMES DAILY AS NEEDED FOR ANXIETY FOR UP TO 30 DAYS., Disp: 60 tablet, Rfl: 0    FLUoxetine (PROZAC) 20 MG capsule, TAKE 3 CAPSULES (60 MG) BY MOUTH DAILY, Disp: 90 capsule, Rfl: 12    famotidine (PEPCID) 20 MG tablet, Take 1 tablet by mouth nightly, Disp: 30 tablet, Rfl: 1    promethazine (PHENERGAN) 25 MG tablet, TAKE 1 TABLET BY MOUTH EVERY 6 HOURS AS NEEDED FOR NAUSEA, Disp: 30 tablet, Rfl: 0    methylphenidate (RITALIN) 20 MG tablet, Take 20 mg by mouth 3 times daily. , Disp: , Rfl:     potassium chloride (KLOR-CON M) 20 MEQ extended release tablet, TAKE 1 TABLET BY MOUTH DAILY, Disp: 90 tablet, Rfl: 0    topiramate (TOPAMAX) 100 MG tablet, TAKE 1 TABLET BY MOUTH 2 TIMES DAILY, Disp: 180 tablet, Rfl: 1    albuterol (PROVENTIL) (2.5 MG/3ML) 0.083% nebulizer solution, Take 2.5 mg by nebulization every 6 hours as needed for Wheezing, Disp: , Rfl:     amitriptyline (ELAVIL) 50 MG tablet, Take 1.5 tablets by mouth nightly, Disp: 90 tablet, Rfl: 1    rizatriptan (MAXALT) 10 MG tablet, TAKE 1 TABLET BY MOUTH ONCE AS NEEDED FOR MIGRAINE MAY REPEAT IN 2 HOURS IF NEEDED. MAX 2 TABS/24 HOURS, Disp: 18 tablet, Rfl: 5    Cholecalciferol 50 MCG (2000 UT) TABS, Take 1 tablet by mouth daily Take 1 tablet by mouth daily. , Disp: 90 tablet, Rfl: 2    furosemide (LASIX) 40 MG tablet, TAKE 1 TABLET BY MOUTH EVERY DAY TO TWICE DAILY AS NEEDED SWELLING, Disp: 60 tablet, Rfl: 5    VENTOLIN  (90 Base) MCG/ACT inhaler, INHALE 2 PUFFS INTO THE LUNGS 4 TIMES DAILY AS NEEDED., Disp: 18 g, Rfl: 5    butalbital-acetaminophen-caffeine (FIORICET, ESGIC) -40 MG per tablet, TAKE 1 TABLET BY MOUTH 3 TIMES DAILY AS NEEDED FOR MIGRAINE, Disp: 30 tablet, Rfl: 0    gabapentin (NEURONTIN) 300 MG capsule, TAKE ONE CAPSULE BY MOUTH ONE HOUR BEFORE BEDTIME, Disp: , Rfl: 5   tiotropium (SPIRIVA RESPIMAT) 2.5 MCG/ACT AERS inhaler, Inhale 2 puffs into the lungs daily, Disp: 4 g, Rfl: 5    amphetamine-dextroamphetamine (ADDERALL XR) 20 MG extended release capsule, TAKE 1 CAPSULE BY MOUTH EVERY DAY, Disp: , Rfl: 0    LORATADINE-D 12HR 5-120 MG per extended release tablet, TAKE 1 TABLET BY MOUTH EVERY MORNING AS NEEDED FOR ALLERGY, Disp: 30 tablet, Rfl: 5    Respiratory Therapy Supplies (NEBULIZER/TUBING/MOUTHPIECE) KIT, 1 kit by Does not apply route daily as needed, Disp: 1 kit, Rfl: 0    OXYGEN, Inhale 2 L/min into the lungs continuous.  Regulate with portability at home (Patient taking differently: Inhale 2 L/min into the lungs as needed ), Disp: 1 Container, Rfl: 0    Lab Results   Component Value Date    WBC 12.2 11/17/2020    RBC 3.97 11/17/2020    HGB 12.8 11/17/2020    HCT 38.5 11/17/2020    MCV 97.0 11/17/2020    MCH 32.2 11/17/2020    MCHC 33.2 11/17/2020    MPV 8.4 11/17/2020    NEUTOPHILPCT 64.0 06/24/2020    LYMPHOPCT 27.6 06/24/2020    MONOPCT 6.1 06/24/2020    EOSRELPCT 1.7 06/24/2020    BASOPCT 0.6 06/24/2020    NEUTROABS 5.4 06/24/2020    LYMPHSABS 2.3 06/24/2020    MONOSABS 0.5 06/24/2020    EOSABS 0.1 06/24/2020     Lab Results   Component Value Date     11/17/2020    K 4.7 11/17/2020     11/17/2020    CO2 30 11/17/2020    ANIONGAP 6 11/17/2020    GLUCOSE 85 11/17/2020    BUN 9 11/17/2020    CREATININE 0.6 11/17/2020    LABGLOM >60 11/17/2020    GFRAA >60 11/17/2020    CALCIUM 9.1 11/17/2020    PROT 7.3 11/10/2020    LABALBU 3.9 11/10/2020    AGRATIO 1.1 11/10/2020    BILITOT <0.2 11/10/2020    ALKPHOS 109 11/10/2020    ALT 46 11/10/2020    AST 27 11/10/2020    GLOB 3.4 11/10/2020     Lab Results   Component Value Date    CHOL 198 09/01/2020    TRIG 133 09/01/2020    HDL 51 09/01/2020    LDLCALC 120 09/01/2020    LABVLDL 27 09/01/2020     Lab Results   Component Value Date    TSHREFLEX 2.53 06/24/2020     Lab Results   Component Value Date IRON 70 06/24/2020    TIBC 324 06/24/2020    LABIRON 22 06/24/2020     Lab Results   Component Value Date    LMDVQEFL82 527 06/24/2020    FOLATE 11.58 06/24/2020     Lab Results   Component Value Date    VITD25 22.7 09/01/2020     Lab Results   Component Value Date    LABA1C 4.8 06/24/2020    EAG 91.1 06/24/2020     Review of Systems   Constitutional: Negative. HENT: Negative. Eyes: Negative. Respiratory: Negative. Cardiovascular: Negative. Gastrointestinal: Negative. Endocrine: Negative. Genitourinary: Negative. Musculoskeletal: Negative. Skin:        Abdominal surgical incisions well healed. Allergic/Immunologic: Negative. Neurological: Negative. Hematological: Negative. Psychiatric/Behavioral: Negative. PHYSICAL EXAMINATION:    Constitutional: [x] Appears well-developed and well-nourished [x] No apparent distress      [] Abnormal-   Mental status  [x] Alert and awake  [x] Oriented to person/place/time [x]Able to follow commands      Eyes:  EOM    [x]  Normal  [] Abnormal-  Sclera  [x]  Normal  [] Abnormal -         Discharge [x]  None visible  [] Abnormal -    HENT:   [x] Normocephalic, atraumatic.   [] Abnormal     Neck: [x] No visualized mass     Pulmonary/Chest: [x] Respiratory effort normal.  [x] No visualized signs of difficulty breathing or respiratory distress        [] Abnormal-      Musculoskeletal:   [] Normal gait with no signs of ataxia         [x] Normal range of motion of neck        [] Abnormal-     Neurological:        [x] No Facial Asymmetry (Cranial nerve 7 motor function) (limited exam to video visit)          [x] No gaze palsy        [] Abnormal-         Skin:        [x] No significant exanthematous lesions or discoloration noted on facial skin         [] Abnormal-            Psychiatric:       [x] Normal Affect [x] No Hallucinations        [] Abnormal-     Other pertinent observable physical exam findings- Due to this being a TeleHealth encounter, evaluation of the following organ systems is limited: Vitals/Constitutional/EENT/Resp/CV/GI//MS/Neuro/Skin/Heme-Lymph-Imm. Assessment and Plan:   Patient is her via telemedicine and is 7 weeks s/p sleeve, down 13 lbs with a total weight loss of 37.2 lbs. The patient's current Body mass index is 31.86 kg/m². (1/6/21). She is doing well,denies n/v/dysphagia or reflux. She is tolerating diet, getting adequate fluids and protein. Bowels and bladder functioning. She is taking vitamins as instructed. She did meet with the registered dietitian for continued follow up. I agree with recommendations and plan. She is exercising with walking. Encouraged her to continue physical activity and the importance of exercise and weight loss. Incisions healing well. No lifting restrictions so will sign patient up for [facility]) today. We will see her back in 9 weeks for continued follow up or via telemedicine depending on COVID-19 restrictions at the time of their next appointment. A total of 15 minutes was spent conversing with the patient and over half of that time was spent counseling the patient on proper dietary behaviors, exercise and post-op progress. An electronic signature was used to authenticate this note. Pursuant to the emergency declaration under the [facility], [facility]5 waiver authority and the [employer] Act, this Virtual  Visit was conducted, with patient's consent, to reduce the patient's risk of exposure to COVID-19 and provide continuity of care for an established patient. Services were provided through a video synchronous discussion virtually to substitute for in-person clinic visit.

## 2021-01-03 NOTE — PATIENT INSTRUCTIONS
Diet tips to help make you successful postoperatively    Eating habits after surgery will need to be a long-term change. Eating habits are so ingrained that it can be difficult to change so having made these changes for surgery is a great accomplishment. It is important to maintain these new eating habits after surgery. Also, remember that overall health, age, and genetics make each person's weight loss progress different. Do not compare your progress, the amount you eat, or exercise to other patients. ? Protein first at every meal- Eat the protein portion of your meal first. Eating protein helps the body feel full and sends a signal to stop eating. Protein is very important in building tissue in the body. ? Eat at least 4 times per day- This includes protein supplements and small meals with a high amount of protein  ? Chewing your food thoroughly- Eating too quickly and improper chewing can cause pain and vomiting after surgery. ? Slowing down the speed at which you eat- Refill your fork only after you swallow. Adopt a new pattern of eating by taking a bite of food and putting your utensil down between bites. This will help to reduce the feeling of food being stuck.   ? Drink water and other fluids slowly- Drink at least 48 ounces per day minimum. Sip fluids as if they were hot beverages. If you find it difficult to stop gulping liquids, try using a sippy cup or a sport top water bottle. ? Make sure you are eating meals without drinking fluids- After surgery you will not be allowed to drink fluids 30 minutes before, during, or 30 minutes after your meal (30/30/30 rule). This will be a life-long behavior change. The reason for the rule is to keep food from passing through your smaller stomach more rapidly.  This will cause you to feel hungry again shortly after your meal. ? Continue to avoid caffeine and carbonated beverages- Caffeine acts as a diuretic and can be dehydrating as well as irritating to the lining of the stomach. Carbonated beverages release gas and can expand the stomach. ? Continue to keep temptation from your kitchen- Keep your pantry and kitchen cabinets cleaned out of those dangerous foods that might tempt you after surgery (chips, cookies, candy, etc.). ? Continue to increase your exercise program- Increase your daily physical activity. Aim for 5-6 days per week for 30 minutes. Walking is an easy way to get started with exercising. You want exercise to be a regular part of your life after surgery. ? Make sure you have a good support system- There will be many changes and adjustments to make after surgery. It is important to have a supportive friend, family member or co-worker, etc. with whom you can talk. Continue to attend [facility]) Weight Management support groups as they can be helpful in maintaining behaviors. In addition, it is the responsibility of the patient to schedule and follow up on labs and tests completed after surgery. Results will be reviewed at each visit. Patient received dietary handouts and education.     Goals:   - Move to phase 4 diet guidelines

## 2021-01-06 ENCOUNTER — TELEMEDICINE (OUTPATIENT)
Dept: BARIATRICS/WEIGHT MGMT | Age: 51
End: 2021-01-06

## 2021-01-06 VITALS — HEIGHT: 63 IN | BODY MASS INDEX: 31.36 KG/M2 | WEIGHT: 177 LBS

## 2021-01-06 DIAGNOSIS — Z98.84 S/P LAPAROSCOPIC SLEEVE GASTRECTOMY: ICD-10-CM

## 2021-01-06 DIAGNOSIS — G47.33 OBSTRUCTIVE SLEEP APNEA: ICD-10-CM

## 2021-01-06 DIAGNOSIS — E78.2 MIXED HYPERLIPIDEMIA: ICD-10-CM

## 2021-01-06 DIAGNOSIS — E66.9 OBESITY (BMI 30.0-34.9): ICD-10-CM

## 2021-01-06 DIAGNOSIS — K21.9 CHRONIC GERD: Primary | ICD-10-CM

## 2021-01-06 PROBLEM — E66.01 SEVERE OBESITY (BMI 35.0-39.9) WITH COMORBIDITY (HCC): Status: RESOLVED | Noted: 2020-06-11 | Resolved: 2021-01-06

## 2021-01-06 PROBLEM — E66.811 OBESITY (BMI 30.0-34.9): Status: ACTIVE | Noted: 2021-01-06

## 2021-01-06 PROCEDURE — 99024 POSTOP FOLLOW-UP VISIT: CPT | Performed by: NURSE PRACTITIONER

## 2021-01-06 RX ORDER — OMEPRAZOLE 20 MG/1
20 CAPSULE, DELAYED RELEASE ORAL DAILY
Qty: 30 CAPSULE | Refills: 3 | Status: SHIPPED | OUTPATIENT
Start: 2021-01-06 | End: 2021-02-25

## 2021-01-06 NOTE — PROGRESS NOTES
Dietary Assessment Note  Vitals:   Vitals:    01/06/21 1129   Weight: 177 lb (80.3 kg)   Height: 5' 2.5\" (1.588 m)   Patient lost 13 lbs since 2 week visit. Total Weight Loss: 37.2 lbs    Labs reviewed: no new labs    Protein intake: 60-80 grams/day - using protein shakes daily    Fluid intake: 48-64 oz/day - water     Multivitamin/mineral intake: yes - 4 fusion per day     Calcium intake: no    Other: Vit D / potassium w/ lasix    Exercise: up moving around, no strenuous exercise    Nutrition Assessment: 7 week post-op visit. B- protein shake  L- chopped/mushy chicken & mushy broccoli  D- similar to lunch  S- LF cheese stick OR unsweetened applesauce    Amount able to eat per sitting: <1/2 cup     Following 30/30/30 rule: yes    Food Intolerances/issues: none    Client Concerns: none    Goals:   - Move to phase 4 diet guidelines    Plan: f/u as directed    Due to the COVID-19 restrictions on close contact interactions the patient's visit was conducted via telephone in [place] of a face to face visit. The patient is here through telemedicine for their 7 week post-op visit.     Dayday Livingston

## 2021-01-22 DIAGNOSIS — E87.6 HYPOKALEMIA: ICD-10-CM

## 2021-01-22 RX ORDER — POTASSIUM CHLORIDE 20 MEQ/1
TABLET, EXTENDED RELEASE ORAL
Qty: 90 TABLET | Refills: 0 | Status: SHIPPED | OUTPATIENT
Start: 2021-01-22 | End: 2021-01-27

## 2021-01-27 DIAGNOSIS — E87.6 HYPOKALEMIA: ICD-10-CM

## 2021-01-27 RX ORDER — POTASSIUM CHLORIDE 20 MEQ/1
TABLET, EXTENDED RELEASE ORAL
Qty: 90 TABLET | Refills: 0 | Status: SHIPPED | OUTPATIENT
Start: 2021-01-27 | End: 2021-08-17

## 2021-02-01 DIAGNOSIS — R11.0 NAUSEA: ICD-10-CM

## 2021-02-01 RX ORDER — PROMETHAZINE HYDROCHLORIDE 25 MG/1
TABLET ORAL
Qty: 30 TABLET | Refills: 0 | Status: SHIPPED | OUTPATIENT
Start: 2021-02-01 | End: 2021-02-25

## 2021-02-08 ENCOUNTER — VIRTUAL VISIT (OUTPATIENT)
Dept: FAMILY MEDICINE CLINIC | Age: 51
End: 2021-02-08
Payer: MEDICARE

## 2021-02-08 DIAGNOSIS — N62 MACROMASTIA: ICD-10-CM

## 2021-02-08 DIAGNOSIS — Z72.0 TOBACCO ABUSE: ICD-10-CM

## 2021-02-08 DIAGNOSIS — Z98.84 S/P LAPAROSCOPIC SLEEVE GASTRECTOMY: Primary | ICD-10-CM

## 2021-02-08 PROCEDURE — G8428 CUR MEDS NOT DOCUMENT: HCPCS | Performed by: FAMILY MEDICINE

## 2021-02-08 PROCEDURE — 99213 OFFICE O/P EST LOW 20 MIN: CPT | Performed by: FAMILY MEDICINE

## 2021-02-08 NOTE — PROGRESS NOTES
famotidine (PEPCID) 20 MG tablet Take 1 tablet by mouth nightly  JENNIFER Jiménez CNP   methylphenidate (RITALIN) 20 MG tablet Take 20 mg by mouth 3 times daily. Historical Provider, MD   topiramate (TOPAMAX) 100 MG tablet TAKE 1 TABLET BY MOUTH 2 TIMES DAILY  Mounika Hopper MD   albuterol (PROVENTIL) (2.5 MG/3ML) 0.083% nebulizer solution Take 2.5 mg by nebulization every 6 hours as needed for Wheezing  Historical Provider, MD   rizatriptan (MAXALT) 10 MG tablet TAKE 1 TABLET BY MOUTH ONCE AS NEEDED FOR MIGRAINE MAY REPEAT IN 2 HOURS IF NEEDED. MAX 2 TABS/24 HOURS  Maria Isabel Corley MD   Cholecalciferol 50 MCG (2000 UT) TABS Take 1 tablet by mouth daily Take 1 tablet by mouth daily. JENNIFER Jiménez CNP   furosemide (LASIX) 40 MG tablet TAKE 1 TABLET BY MOUTH EVERY DAY TO TWICE DAILY AS NEEDED SWELLING  Mounika Hopper MD   VENTOLIN  (90 Base) MCG/ACT inhaler INHALE 2 PUFFS INTO THE LUNGS 4 TIMES DAILY AS NEEDED. Dorinda Phoenix MD   butalbital-acetaminophen-caffeine (FIORICET, ESGIC) -40 MG per tablet TAKE 1 TABLET BY MOUTH 3 TIMES DAILY AS NEEDED FOR MIGRAINE  Mounika Hopper MD   gabapentin (NEURONTIN) 300 MG capsule TAKE ONE CAPSULE BY MOUTH ONE HOUR BEFORE BEDTIME  Historical Provider, MD   tiotropium (SPIRIVA RESPIMAT) 2.5 MCG/ACT AERS inhaler Inhale 2 puffs into the lungs daily  Dorinda Pheonix MD   amphetamine-dextroamphetamine (ADDERALL XR) 20 MG extended release capsule TAKE 1 CAPSULE BY MOUTH EVERY DAY  Historical Provider, MD   LORATADINE-D 12HR 5-120 MG per extended release tablet TAKE 1 TABLET BY MOUTH EVERY MORNING AS NEEDED FOR ALLERGY  Maria Isabel Corley MD   Respiratory Therapy Supplies (NEBULIZER/TUBING/MOUTHPIECE) KIT 1 kit by Does not apply route daily as needed  Maria Isabel Corley MD   OXYGEN Inhale 2 L/min into the lungs continuous.  Regulate with portability at home  Patient taking differently: Inhale 2 L/min into the lungs as needed   Laurent Novak MD Social History     Tobacco Use    Smoking status: Former Smoker     Packs/day: 0.25     Years: 22.00     Pack years: 5.50     Types: Cigarettes     Quit date: 2020     Years since quittin.0    Smokeless tobacco: Never Used   Substance Use Topics    Alcohol use: Not Currently    Drug use: No     Comment: Quit cocaine and meth             PHYSICAL EXAMINATION:  [ INSTRUCTIONS:  \"[x]\" Indicates a positive item  \"[]\" Indicates a negative item  -- DELETE ALL ITEMS NOT EXAMINED]      Constitutional: [x] Appears well-developed and well-nourished [x] No apparent distress      [] Abnormal-   Mental status  [x] Alert and awake  [x] Oriented to person/place/time [x]Able to follow commands      Eyes:  EOM    [x]  Normal  [] Abnormal-  Sclera  [x]  Normal  [] Abnormal -         Discharge [x]  None visible  [] Abnormal -    HENT:   [x] Normocephalic, atraumatic. [] Abnormal   [] Mouth/Throat: Mucous membranes are moist.     External Ears [x] Normal  [] Abnormal-     Neck: [x] No visualized mass     Pulmonary/Chest: [x] Respiratory effort normal.  [x] No visualized signs of difficulty breathing or respiratory distress        [] Abnormal-      Musculoskeletal:   [] Normal gait with no signs of ataxia         [x] Normal range of motion of neck        [] Abnormal-       Neurological:        [x] No Facial Asymmetry (Cranial nerve 7 motor function) (limited exam to video visit)          [x] No gaze palsy        [] Abnormal-         Skin:        [x] No significant exanthematous lesions or discoloration noted on facial skin         [] Abnormal-            Psychiatric:       [x] Normal Affect [] No Hallucinations        [] Abnormal-     Other pertinent observable physical exam findings-     ASSESSMENT/PLAN:  1. S/P laparoscopic sleeve gastrectomy  Doing well, wt loss applauded.     2. Tobacco abuse Pt is cautioned that 1 cig leads to another. She will works toward cessation. Consider chantix again in future if mod remains stable. 3. Macromastia   - Pt will plan for surgery at least 1 full yr after gastrectomy      Return in about 3 months (around 5/8/2021) for fasting for routine check up. Karishma Ambrocio is a 52 y.o. female being evaluated by a Virtual Visit (video visit) encounter to address concerns as mentioned above. A caregiver was present when appropriate. Due to this being a TeleHealth encounter (During OBWVV-93 public health emergency), evaluation of the following organ systems was limited: Vitals/Constitutional/EENT/Resp/CV/GI//MS/Neuro/Skin/Heme-Lymph-Imm. Pursuant to the emergency declaration under the [address] [place] [address], [address] [place] [address] authority and the [employer] Act, this Virtual Visit was conducted with patient's (and/or legal guardian's) consent, to reduce the patient's risk of exposure to COVID-19 and provide necessary medical care. The patient (and/or legal guardian) has also been advised to contact this office for worsening conditions or problems, and seek emergency medical treatment and/or call 911 if deemed necessary. Patient identification was verified at the start of the visit: Yes    Total time spent on this encounter: Not billed by time    Services were provided through a video synchronous discussion virtually to substitute for in-person clinic visit. Patient and provider were located at their individual homes. --Liberty Dowling MD on 2/8/2021 at 10:08 PM    An electronic signature was used to authenticate this note.      Avoid tob, ok for ania appt

## 2021-02-24 DIAGNOSIS — K21.9 CHRONIC GERD: ICD-10-CM

## 2021-02-25 RX ORDER — OMEPRAZOLE 20 MG/1
20 CAPSULE, DELAYED RELEASE ORAL DAILY
Qty: 30 CAPSULE | Refills: 3 | Status: SHIPPED | OUTPATIENT
Start: 2021-02-25 | End: 2021-05-26

## 2021-02-25 RX ORDER — FAMOTIDINE 20 MG/1
20 TABLET, FILM COATED ORAL NIGHTLY
Qty: 30 TABLET | Refills: 1 | Status: SHIPPED | OUTPATIENT
Start: 2021-02-25 | End: 2021-02-25

## 2021-03-03 ASSESSMENT — ENCOUNTER SYMPTOMS
RESPIRATORY NEGATIVE: 1
EYES NEGATIVE: 1
GASTROINTESTINAL NEGATIVE: 1
ALLERGIC/IMMUNOLOGIC NEGATIVE: 1

## 2021-03-03 NOTE — PROGRESS NOTES
[facility]) Physicians   [employer] [facility] [employer]    3/17/2021    TELEHEALTH EVALUATION -- Audio/Visual (During TIEUJ-93 public health emergency)    Subjective:      Patient ID: Javan Wilson is a 52 y.o. female    HPI    4 months s/p sleeve gastrectomy    Vanessa Rendon is a 52 y.o. obese female , Body mass index is 29.7 kg/m². Due to the COVID-19 restrictions on close contact interactions the patient's visit was conducted via audio/video in jasper of a face to face visit. Patient has consented to have this visit conducted via audio/video and I am conducting it from the office. The patient is here through telemedicine for their post op bariatric surgery visit. Patient denies any nausea, vomiting, fevers, chills, shortness of breath, chest pain, constipation or urinary symptoms. Denies any dysphagia, but still having some reflux/heartburn. It is improving and really depends on what she eats.     Past Medical History:   Diagnosis Date    Anxiety     Asthma     Bipolar 1 disorder ([facility]     Pt is willing to see psych after 7/15 for confirmation of dx    Chronic bronchitis ([address]     Chronic GERD 6/11/2020    COPD (chronic obstructive pulmonary disease) ([facility] [address] [facility]     Dr Agata Vyas Depression     Emphysema of lung ([facility] [address] [facility]     Migraine     Migraines     Mixed hyperlipidemia 6/25/2020    MRSA infection within last 3 months 2012    axillary    Narcolepsy 2004    Dr Jaffe December    Obesity     Pneumonia     Restless leg syndrome     Sleep apnea     did not tolerate cpap    Tobacco abuse     Tobacco use disorder 1/14/2011    Urinary incontinence      Past Surgical History:   Procedure Laterality Date    ABSCESS DRAINAGE  2012    L axilla MRSA, hosp for 7 days    ADENOIDECTOMY      BLADDER SUSPENSION  5/10    Mesh removed 1/9/15 in [address]  2/09    lumpectomy, ducts removed    FINGER SURGERY      right thumb    HYSTERECTOMY  2001 2/2 dysplasia, endometriosis, cysts, MYLENE TABLET BY MOUTH EVERY 6 HOURS AS NEEDED FOR NAUSEA, Disp: 30 tablet, Rfl: 2    omeprazole (PRILOSEC) 20 MG delayed release capsule, TAKE 1 CAPSULE BY MOUTH DAILY, Disp: 30 capsule, Rfl: 3    clonazePAM (KLONOPIN) 1 MG tablet, TAKE 1 TABLET (1MG) BY MOUTH 2 TIMES DAILY AS NEEDED FOR ANXIETY FOR UP TO 30 DAYS., Disp: 60 tablet, Rfl: 0    famotidine (PEPCID) 20 MG tablet, TAKE 1 TABLET BY MOUTH NIGHTLY (PREVIOUS PRESCRIBER Nacho Mckeon [phone]), Disp: 30 tablet, Rfl: 5    potassium chloride (KLOR-CON M) 20 MEQ extended release tablet, TAKE 1 TABLET (20MEQ) BY MOUTH DAILY, Disp: 90 tablet, Rfl: 0    amitriptyline (ELAVIL) 50 MG tablet, TAKE 1.5 TABLETS (75 MG) BY MOUTH NIGHTLY, Disp: 135 tablet, Rfl: 3    FLUoxetine (PROZAC) 20 MG capsule, TAKE 3 CAPSULES (60 MG) BY MOUTH DAILY, Disp: 90 capsule, Rfl: 12    methylphenidate (RITALIN) 20 MG tablet, Take 20 mg by mouth 3 times daily. , Disp: , Rfl:     topiramate (TOPAMAX) 100 MG tablet, TAKE 1 TABLET BY MOUTH 2 TIMES DAILY, Disp: 180 tablet, Rfl: 1    albuterol (PROVENTIL) (2.5 MG/3ML) 0.083% nebulizer solution, Take 2.5 mg by nebulization every 6 hours as needed for Wheezing, Disp: , Rfl:     rizatriptan (MAXALT) 10 MG tablet, TAKE 1 TABLET BY MOUTH ONCE AS NEEDED FOR MIGRAINE MAY REPEAT IN 2 HOURS IF NEEDED. MAX 2 TABS/24 HOURS, Disp: 18 tablet, Rfl: 5    Cholecalciferol 50 MCG (2000 UT) TABS, Take 1 tablet by mouth daily Take 1 tablet by mouth daily. , Disp: 90 tablet, Rfl: 2    furosemide (LASIX) 40 MG tablet, TAKE 1 TABLET BY MOUTH EVERY DAY TO TWICE DAILY AS NEEDED SWELLING, Disp: 60 tablet, Rfl: 5    VENTOLIN  (90 Base) MCG/ACT inhaler, INHALE 2 PUFFS INTO THE LUNGS 4 TIMES DAILY AS NEEDED., Disp: 18 g, Rfl: 5    butalbital-acetaminophen-caffeine (FIORICET, ESGIC) -40 MG per tablet, TAKE 1 TABLET BY MOUTH 3 TIMES DAILY AS NEEDED FOR MIGRAINE, Disp: 30 tablet, Rfl: 0    gabapentin (NEURONTIN) 300 MG capsule, TAKE ONE CAPSULE BY MOUTH ONE HOUR BEFORE BEDTIME, Disp: , Rfl: 5    tiotropium (SPIRIVA RESPIMAT) 2.5 MCG/ACT AERS inhaler, Inhale 2 puffs into the lungs daily, Disp: 4 g, Rfl: 5    amphetamine-dextroamphetamine (ADDERALL XR) 20 MG extended release capsule, TAKE 1 CAPSULE BY MOUTH EVERY DAY, Disp: , Rfl: 0    LORATADINE-D 12HR 5-120 MG per extended release tablet, TAKE 1 TABLET BY MOUTH EVERY MORNING AS NEEDED FOR ALLERGY, Disp: 30 tablet, Rfl: 5    Respiratory Therapy Supplies (NEBULIZER/TUBING/MOUTHPIECE) KIT, 1 kit by Does not apply route daily as needed, Disp: 1 kit, Rfl: 0    OXYGEN, Inhale 2 L/min into the lungs continuous.  Regulate with portability at home (Patient taking differently: Inhale 2 L/min into the lungs as needed ), Disp: 1 Container, Rfl: 0    Lab Results   Component Value Date    WBC 12.2 11/17/2020    RBC 3.97 11/17/2020    HGB 12.8 11/17/2020    HCT 38.5 11/17/2020    MCV 97.0 11/17/2020    MCH 32.2 11/17/2020    MCHC 33.2 11/17/2020    MPV 8.4 11/17/2020    NEUTOPHILPCT 64.0 06/24/2020    LYMPHOPCT 27.6 06/24/2020    MONOPCT 6.1 06/24/2020    EOSRELPCT 1.7 06/24/2020    BASOPCT 0.6 06/24/2020    NEUTROABS 5.4 06/24/2020    LYMPHSABS 2.3 06/24/2020    MONOSABS 0.5 06/24/2020    EOSABS 0.1 06/24/2020     Lab Results   Component Value Date     11/17/2020    K 4.7 11/17/2020     11/17/2020    CO2 30 11/17/2020    ANIONGAP 6 11/17/2020    GLUCOSE 85 11/17/2020    BUN 9 11/17/2020    CREATININE 0.6 11/17/2020    LABGLOM >60 11/17/2020    GFRAA >60 11/17/2020    CALCIUM 9.1 11/17/2020    PROT 7.3 11/10/2020    LABALBU 3.9 11/10/2020    AGRATIO 1.1 11/10/2020    BILITOT <0.2 11/10/2020    ALKPHOS 109 11/10/2020    ALT 46 11/10/2020    AST 27 11/10/2020    GLOB 3.4 11/10/2020     Lab Results   Component Value Date    CHOL 198 09/01/2020    TRIG 133 09/01/2020    HDL 51 09/01/2020    LDLCALC 120 09/01/2020    LABVLDL 27 09/01/2020     Lab Results   Component Value Date    TSHREFLEX 2.53 06/24/2020     Lab Results   Component Value Date    IRON 70 06/24/2020    TIBC 324 06/24/2020    LABIRON 22 06/24/2020     Lab Results   Component Value Date    KSMGRMBS49 527 06/24/2020    FOLATE 11.58 06/24/2020     Lab Results   Component Value Date    VITD25 22.7 09/01/2020     Lab Results   Component Value Date    LABA1C 4.8 06/24/2020    EAG 91.1 06/24/2020       Review of Systems   Constitutional: Negative. HENT: Negative. Eyes: Negative. Respiratory: Negative. Cardiovascular: Negative. Gastrointestinal: Negative. Endocrine: Negative. Genitourinary: Negative. Musculoskeletal: Positive for arthralgias and back pain. Skin: Negative. Allergic/Immunologic: Negative. Neurological: Negative. Hematological: Negative. Psychiatric/Behavioral: Negative. PHYSICAL EXAMINATION:    Constitutional: [x] Appears well-developed and well-nourished [x] No apparent distress      [] Abnormal-   Mental status  [x] Alert and awake  [x] Oriented to person/place/time [x]Able to follow commands      Eyes:  EOM    [x]  Normal  [] Abnormal-  Sclera  [x]  Normal  [] Abnormal -         Discharge [x]  None visible  [] Abnormal -    HENT:   [x] Normocephalic, atraumatic.   [] Abnormal     Neck: [x] No visualized mass     Pulmonary/Chest: [x] Respiratory effort normal.  [x] No visualized signs of difficulty breathing or respiratory distress        [] Abnormal-      Musculoskeletal:   [] Normal gait with no signs of ataxia         [x] Normal range of motion of neck        [] Abnormal-     Neurological:        [x] No Facial Asymmetry (Cranial nerve 7 motor function) (limited exam to video visit)          [x] No gaze palsy        [] Abnormal-         Skin:        [x] No significant exanthematous lesions or discoloration noted on facial skin         [] Abnormal-            Psychiatric:       [x] Normal Affect [x] No Hallucinations        [] Abnormal-     Other pertinent observable physical exam findings-     Due to this being a TeleHealth encounter, evaluation of the following organ systems is limited: Vitals/Constitutional/EENT/Resp/CV/GI//MS/Neuro/Skin/Heme-Lymph-Imm. Assessment and Plan:   Patient is here via telemedicine and is 4 months s/p sleeve gastrectomy, down 12 lbs with a total weight loss of 49.2 lbs. The patient's current Body mass index is 29.7 kg/m². (3/17/21). She is doing well, denies n/v/dysphagia, but still with some reflux/heartburn. Will continue the Prilosec and Pepcid. She is tolerating diet, getting adequate fluids and protein. She is exercising at the gym 1-3 days per week and doing exercises at home with resistance bands or walking. Encouraged continued physical activity. She is taking vitamins as instructed. She did speak with the registered dietitian for continued follow up. I agree with recommendations and plan. Discussed with patient that we will order follow up labs at her next visit. We will see her back in 2 months for continued follow up or via telemedicine depending on COVID-19 restrictions at the time of their next appointment. Hyperlipidemia:   [x] Continue to make dietary and lifestyle modifications per our recommendations. [] Continue to follow up with their PCP for medication management and monitoring. Obstructive Sleep Apnea:   [x] Continue to make dietary and lifestyle modifications per our recommendations. [x] Reviewed the importance of wearing your CPAP/BIPAP. [x] Continue to follow up with their sleep medicine provider for CPAP/BIPAP management and monitoring. Chronic GERD:   [x] Continue to make dietary and lifestyle modifications per our recommendations. [x] Continue PPI. [x] Continue H2 Blocker  [] Wean PPI. Take every other day for two weeks. If no issues with heartburn/reflux you may decrease to every third day for two weeks. If no issues with heartburn/reflux you may stop the Prilosec.  Recommend that you get OTC Pepcid to take should you have occasional heartburn/reflux. Overweight:  [x] Continue to make dietary and lifestyle modifications per our recommendations. Total encounter time: 25 minutes, including any number of the following: Bariatric Postoperative work up/protocols, review of labs, imaging, provider notes, outside hospital records, performing examination/evaluation, counseling patient and/or family, ordering medications/tests, placing referrals and communication with referring physicians, coordination of care; discussing exercise and physical activity; discussing dietary plan/recall with the patient as well with registered dietitian and documentation in the EHR. Of note, the above was done during same day of the actual patient encounter. An electronic signature was used to authenticate this note. Pursuant to the emergency declaration under the [facility] waiver authority and the [employer] Act, this Virtual  Visit was conducted, with patient's consent, to reduce the patient's risk of exposure to COVID-19 and provide continuity of care for an established patient. Services were provided through a video synchronous discussion virtually to substitute for in-person clinic visit.

## 2021-03-03 NOTE — PATIENT INSTRUCTIONS
Diet tips to help make you successful postoperatively    Eating habits after surgery will need to be a long-term change. Eating habits are so ingrained that it can be difficult to change so having made these changes for surgery is a great accomplishment. It is important to maintain these new eating habits after surgery. Also, remember that overall health, age, and genetics make each person's weight loss progress different. Do not compare your progress, the amount you eat, or exercise to other patients.  Protein first at every meal- Eat the protein portion of your meal first. Eating protein helps the body feel full and sends a signal to stop eating. Protein is very important in building tissue in the body.  Eat at least 4 times per day- This includes protein supplements and small meals with a high amount of protein   Chewing your food thoroughly- Eating too quickly and improper chewing can cause pain and vomiting after surgery.  Slowing down the speed at which you eat- Refill your fork only after you swallow. Adopt a new pattern of eating by taking a bite of food and putting your utensil down between bites. This will help to reduce the feeling of food being stuck.    Drink water and other fluids slowly- Drink at least 48 ounces per day minimum. Sip fluids as if they were hot beverages. If you find it difficult to stop gulping liquids, try using a sippy cup or a sport top water bottle.  Make sure you are eating meals without drinking fluids- After surgery you will not be allowed to drink fluids 30 minutes before, during, or 30 minutes after your meal (30/30/30 rule). This will be a life-long behavior change. The reason for the rule is to keep food from passing through your smaller stomach more rapidly.  This will cause you to feel hungry again shortly after your meal.   Continue to avoid caffeine and carbonated beverages- Caffeine acts as a diuretic and can be dehydrating as well as irritating to the lining of the stomach. Carbonated beverages release gas and can expand the stomach.  Continue to keep temptation from your kitchen- Keep your pantry and kitchen cabinets cleaned out of those dangerous foods that might tempt you after surgery (chips, cookies, candy, etc.).  Continue to increase your exercise program- Increase your daily physical activity. Aim for 5-6 days per week for 30 minutes. Walking is an easy way to get started with exercising. You want exercise to be a regular part of your life after surgery.  Make sure you have a good support system- There will be many changes and adjustments to make after surgery. It is important to have a supportive friend, family member or co-worker, etc. with whom you can talk. Continue to attend [facility]) Weight Management support groups as they can be helpful in maintaining behaviors. In addition, it is the responsibility of the patient to schedule and follow up on labs and tests completed after surgery. Results will be reviewed at each visit. Patient received dietary handouts and education.

## 2021-03-17 ENCOUNTER — TELEMEDICINE (OUTPATIENT)
Dept: BARIATRICS/WEIGHT MGMT | Age: 51
End: 2021-03-17
Payer: MEDICARE

## 2021-03-17 VITALS — WEIGHT: 165 LBS | BODY MASS INDEX: 29.23 KG/M2 | HEIGHT: 63 IN

## 2021-03-17 DIAGNOSIS — K21.9 CHRONIC GERD: Primary | ICD-10-CM

## 2021-03-17 DIAGNOSIS — E66.3 OVERWEIGHT (BMI 25.0-29.9): ICD-10-CM

## 2021-03-17 DIAGNOSIS — G47.33 OBSTRUCTIVE SLEEP APNEA: ICD-10-CM

## 2021-03-17 DIAGNOSIS — E78.2 MIXED HYPERLIPIDEMIA: ICD-10-CM

## 2021-03-17 DIAGNOSIS — Z98.84 S/P LAPAROSCOPIC SLEEVE GASTRECTOMY: ICD-10-CM

## 2021-03-17 PROBLEM — E66.9 OBESITY (BMI 30.0-34.9): Status: RESOLVED | Noted: 2021-01-06 | Resolved: 2021-03-17

## 2021-03-17 PROBLEM — E66.811 OBESITY (BMI 30.0-34.9): Status: RESOLVED | Noted: 2021-01-06 | Resolved: 2021-03-17

## 2021-03-17 PROCEDURE — G8417 CALC BMI ABV UP PARAM F/U: HCPCS | Performed by: NURSE PRACTITIONER

## 2021-03-17 PROCEDURE — G8482 FLU IMMUNIZE ORDER/ADMIN: HCPCS | Performed by: NURSE PRACTITIONER

## 2021-03-17 PROCEDURE — 1036F TOBACCO NON-USER: CPT | Performed by: NURSE PRACTITIONER

## 2021-03-17 PROCEDURE — G8427 DOCREV CUR MEDS BY ELIG CLIN: HCPCS | Performed by: NURSE PRACTITIONER

## 2021-03-17 PROCEDURE — 99213 OFFICE O/P EST LOW 20 MIN: CPT | Performed by: NURSE PRACTITIONER

## 2021-03-17 ASSESSMENT — ENCOUNTER SYMPTOMS: BACK PAIN: 1

## 2021-03-17 NOTE — PROGRESS NOTES
Dietary Assessment Note      Vitals:   Vitals:    [phone]   Weight: 165 lb (74.8 kg)   Height: 5' 2.5\" (1.588 m)    Patient lost 12 lbs over past 2.5 months. Total Weight Loss: 49.2 lbs    Due to the COVID-19 restrictions on close contact interactions the patient's visit was conducted via telephone in jasper of a face to face visit. The patient is here through telemedicine for their post op visit. Labs reviewed: No new nutrition related labs     Protein intake: >80 g/pro/day      Fluid intake: 48-64 oz/day + 2 protein maldonado    Multivitamin/mineral intake: 4 fusion     Calcium intake: none    Other: Vit D     Exercise: Exercises 1-3 x week - going to [employer] - exercises shown by , staying active with ADL's     Nutrition Assessment: 4 mo s/p sleeve post-op visit. Pt does not track intakes, just more mindful. Feels she is drinking fluids all day. Reports unusual sleeping schedule so sometimes no set eating pattern in afternoon/dinner time. Breakfast: Nectar Shake made with 6 oz skim/1% milk     Snack: sometimes - 1/2 pure protein bar OR CC and grapes     Lunch: 4 oz grilled/crockpot chicken sometimes with small salad     Snack: protein water     Dinner:    Snack: might have another protein bar     Fluids: water, Premier protein clear/[id number] - will sometimes drink 2 protein maldonado per day    Amount able to eat per sittin oz protein + 1/2 c veggies or less     Following /30/30 rule: Yes    Food Intolerances/issues: some days a food bothers her and some days it does not - egg beaters, cream of wheat, oatmeal, mashed potatoes (plain) (no butter/sour cream) only with Mrs. Marquez     Client Concerns: none     Goals:   Limit shakes/bars to 1 x day and focus on whole food protein options, eating 4-6 x day - discussed options such as string cheese, yogurt, and adding fruits and veggies with meals/snacks  Continue staying active  Track intakes - discussed goal is 1200 calories/day     Plan: F/U at 6 months     Shira Aid

## 2021-03-22 DIAGNOSIS — G43.111 INTRACTABLE MIGRAINE WITH AURA WITH STATUS MIGRAINOSUS: ICD-10-CM

## 2021-03-22 RX ORDER — BUTALBITAL, ACETAMINOPHEN AND CAFFEINE 50; 325; 40 MG/1; MG/1; MG/1
TABLET ORAL
Qty: 30 TABLET | Refills: 0 | Status: SHIPPED | OUTPATIENT
Start: 2021-03-22 | End: 2021-03-24 | Stop reason: SDUPTHER

## 2021-03-22 RX ORDER — TIOTROPIUM BROMIDE 18 UG/1
CAPSULE ORAL; RESPIRATORY (INHALATION)
Qty: 30 CAPSULE | Refills: 5 | Status: SHIPPED | OUTPATIENT
Start: 2021-03-22 | End: 2021-03-24 | Stop reason: SDUPTHER

## 2021-03-24 ENCOUNTER — OFFICE VISIT (OUTPATIENT)
Dept: FAMILY MEDICINE CLINIC | Age: 51
End: 2021-03-24
Payer: MEDICARE

## 2021-03-24 VITALS
OXYGEN SATURATION: 96 % | HEIGHT: 62 IN | WEIGHT: 169 LBS | SYSTOLIC BLOOD PRESSURE: 98 MMHG | BODY MASS INDEX: 31.1 KG/M2 | DIASTOLIC BLOOD PRESSURE: 64 MMHG | TEMPERATURE: 97.8 F | HEART RATE: 93 BPM

## 2021-03-24 DIAGNOSIS — F41.9 ANXIETY: ICD-10-CM

## 2021-03-24 DIAGNOSIS — N62 MACROMASTIA: ICD-10-CM

## 2021-03-24 DIAGNOSIS — J44.9 MODERATE COPD (CHRONIC OBSTRUCTIVE PULMONARY DISEASE) (HCC): ICD-10-CM

## 2021-03-24 DIAGNOSIS — G43.111 INTRACTABLE MIGRAINE WITH AURA WITH STATUS MIGRAINOSUS: ICD-10-CM

## 2021-03-24 DIAGNOSIS — Z79.899 HIGH RISK MEDICATIONS (NOT ANTICOAGULANTS) LONG-TERM USE: ICD-10-CM

## 2021-03-24 DIAGNOSIS — E55.9 VITAMIN D DEFICIENCY: ICD-10-CM

## 2021-03-24 DIAGNOSIS — Z00.00 ROUTINE GENERAL MEDICAL EXAMINATION AT A HEALTH CARE FACILITY: Primary | ICD-10-CM

## 2021-03-24 DIAGNOSIS — J30.2 SEASONAL ALLERGIC RHINITIS, UNSPECIFIED TRIGGER: ICD-10-CM

## 2021-03-24 DIAGNOSIS — Z98.84 S/P LAPAROSCOPIC SLEEVE GASTRECTOMY: ICD-10-CM

## 2021-03-24 PROCEDURE — 1036F TOBACCO NON-USER: CPT | Performed by: FAMILY MEDICINE

## 2021-03-24 PROCEDURE — G0438 PPPS, INITIAL VISIT: HCPCS | Performed by: FAMILY MEDICINE

## 2021-03-24 PROCEDURE — 99214 OFFICE O/P EST MOD 30 MIN: CPT | Performed by: FAMILY MEDICINE

## 2021-03-24 PROCEDURE — G8482 FLU IMMUNIZE ORDER/ADMIN: HCPCS | Performed by: FAMILY MEDICINE

## 2021-03-24 PROCEDURE — G8417 CALC BMI ABV UP PARAM F/U: HCPCS | Performed by: FAMILY MEDICINE

## 2021-03-24 PROCEDURE — G8926 SPIRO NO PERF OR DOC: HCPCS | Performed by: FAMILY MEDICINE

## 2021-03-24 PROCEDURE — G8427 DOCREV CUR MEDS BY ELIG CLIN: HCPCS | Performed by: FAMILY MEDICINE

## 2021-03-24 PROCEDURE — 3023F SPIROM DOC REV: CPT | Performed by: FAMILY MEDICINE

## 2021-03-24 RX ORDER — TIOTROPIUM BROMIDE 18 UG/1
CAPSULE ORAL; RESPIRATORY (INHALATION)
Qty: 30 CAPSULE | Refills: 5 | Status: SHIPPED | OUTPATIENT
Start: 2021-03-24 | End: 2022-04-11

## 2021-03-24 RX ORDER — AMITRIPTYLINE HYDROCHLORIDE 75 MG/1
75 TABLET, FILM COATED ORAL NIGHTLY
Qty: 30 TABLET | Refills: 5 | Status: SHIPPED | OUTPATIENT
Start: 2021-03-24 | End: 2021-11-08

## 2021-03-24 RX ORDER — OXYCODONE HYDROCHLORIDE AND ACETAMINOPHEN 5; 325 MG/1; MG/1
1 TABLET ORAL EVERY 6 HOURS PRN
Qty: 28 TABLET | Refills: 0 | Status: CANCELLED | OUTPATIENT
Start: 2021-03-24 | End: 2021-03-31

## 2021-03-24 RX ORDER — BUTALBITAL, ACETAMINOPHEN AND CAFFEINE 50; 325; 40 MG/1; MG/1; MG/1
TABLET ORAL
Qty: 30 TABLET | Refills: 0 | Status: SHIPPED | OUTPATIENT
Start: 2021-03-24 | End: 2021-05-26

## 2021-03-24 RX ORDER — LORATADINE, PSEUDOEPHEDRINE SULFATE 5; 120 MG/1; MG/1
TABLET, FILM COATED, EXTENDED RELEASE ORAL
Qty: 30 TABLET | Refills: 5 | Status: SHIPPED | OUTPATIENT
Start: 2021-03-24 | End: 2022-04-11 | Stop reason: SDUPTHER

## 2021-03-24 ASSESSMENT — PATIENT HEALTH QUESTIONNAIRE - PHQ9
SUM OF ALL RESPONSES TO PHQ9 QUESTIONS 1 & 2: 0
SUM OF ALL RESPONSES TO PHQ QUESTIONS 1-9: 0
1. LITTLE INTEREST OR PLEASURE IN DOING THINGS: 0
2. FEELING DOWN, DEPRESSED OR HOPELESS: 0
SUM OF ALL RESPONSES TO PHQ QUESTIONS 1-9: 0
SUM OF ALL RESPONSES TO PHQ QUESTIONS 1-9: 0

## 2021-03-24 ASSESSMENT — LIFESTYLE VARIABLES: HOW OFTEN DO YOU HAVE A DRINK CONTAINING ALCOHOL: 0

## 2021-03-24 NOTE — PROGRESS NOTES
TABLET (1MG) BY MOUTH 2 TIMES DAILY AS NEEDED FOR ANXIETY FOR UP TO 30 DAYS. 60 tablet 0    famotidine (PEPCID) 20 MG tablet TAKE 1 TABLET BY MOUTH NIGHTLY (PREVIOUS PRESCRIBER Carley Wilkerson [phone]) 30 tablet 5    potassium chloride (KLOR-CON M) 20 MEQ extended release tablet TAKE 1 TABLET (20MEQ) BY MOUTH DAILY 90 tablet 0    amitriptyline (ELAVIL) 50 MG tablet TAKE 1.5 TABLETS (75 MG) BY MOUTH NIGHTLY 135 tablet 3    FLUoxetine (PROZAC) 20 MG capsule TAKE 3 CAPSULES (60 MG) BY MOUTH DAILY 90 capsule 12    methylphenidate (RITALIN) 20 MG tablet Take 20 mg by mouth 3 times daily.  topiramate (TOPAMAX) 100 MG tablet TAKE 1 TABLET BY MOUTH 2 TIMES DAILY 180 tablet 1    albuterol (PROVENTIL) (2.5 MG/3ML) 0.083% nebulizer solution Take 2.5 mg by nebulization every 6 hours as needed for Wheezing      rizatriptan (MAXALT) 10 MG tablet TAKE 1 TABLET BY MOUTH ONCE AS NEEDED FOR MIGRAINE MAY REPEAT IN 2 HOURS IF NEEDED. MAX 2 TABS/24 HOURS 18 tablet 5    Cholecalciferol 50 MCG (2000 UT) TABS Take 1 tablet by mouth daily Take 1 tablet by mouth daily. 90 tablet 2    furosemide (LASIX) 40 MG tablet TAKE 1 TABLET BY MOUTH EVERY DAY TO TWICE DAILY AS NEEDED SWELLING 60 tablet 5    VENTOLIN  (90 Base) MCG/ACT inhaler INHALE 2 PUFFS INTO THE LUNGS 4 TIMES DAILY AS NEEDED. 18 g 5    gabapentin (NEURONTIN) 300 MG capsule TAKE ONE CAPSULE BY MOUTH ONE HOUR BEFORE BEDTIME  5    amphetamine-dextroamphetamine (ADDERALL XR) 20 MG extended release capsule TAKE 1 CAPSULE BY MOUTH EVERY DAY  0    LORATADINE-D 12HR 5-120 MG per extended release tablet TAKE 1 TABLET BY MOUTH EVERY MORNING AS NEEDED FOR ALLERGY 30 tablet 5    Respiratory Therapy Supplies (NEBULIZER/TUBING/MOUTHPIECE) KIT 1 kit by Does not apply route daily as needed 1 kit 0    OXYGEN Inhale 2 L/min into the lungs continuous.  Regulate with portability at home (Patient taking differently: Inhale 2 L/min into the lungs as needed ) 1 Container 0 No current facility-administered medications for this visit. Patient's past medical history,surgical history, family history, medications,  and allergies  were all reviewed and updated as appropriate today. Review of Systems      Physical Exam      BP 98/64   Pulse 93   Temp 97.8 °F (36.6 °C) (Temporal)   Ht 5' 2\" (1.575 m)   Wt 169 lb (76.7 kg)   LMP  (LMP Unknown)   SpO2 96%   BMI 30.91 kg/m²     Assessment/Plan:    Chetan Bueno was seen today for medicare awv.     Diagnoses and all orders for this visit:    S/P laparoscopic sleeve gastrectomy

## 2021-03-24 NOTE — PROGRESS NOTES
Patient: Karishma Ambrocio is a 52 y. o.female who presents today with the following Chief Complaint(s):  Chief Complaint   Patient presents with    Medicare AWV         HPI:    Pt with copd, migraines, anx/dep, narcolepsy, BPD 1, gerd, hld is s/p sleeve gastrectomy 11/16/20. Has lost from 213 to  165 lb. Feels very healthy 2/2 wt loss. Was told insur would cover breast reduction if she were to lose to 150 lb. However, despite losing 48 lb thus far, has not lost any breast tissue wt. Conts to require bra size F. Conts to go to gym 1-3 times per wk. Uses bike, wts. Conts 48-64 oz water per day. Conts prilosec am and pepcid at hs. Pt states Dr Tiny Dawn is in favor of breast reduction. Migraines persist despite wt loss, which pt feels is due to neck and upper back pain from macromastia that evolves into migraine. Maxalt and fioricet not helping like prior. Increase in elavil from 50 to 11/2 qd or 75 mg has been helpful to lessen migraine freq though has trouble cutting pill. Has 2 migr per wk on avg, can last up to 9 days. Does not got to ED during migraine bc feels she is judged as drug seeker based on past experience. Has been drug free x 16 yrs. COPD remains controlled. Not tob since prior to surg 2/2 chantix. Conts to use O2 at hs, had never been able to tolerate CATHY. Saw Dr Elaine Schroeder recently, feels narcolepsy is under better control with wt loss. Is hoping to move to [facility], more options for son with special needs. Son's psychiatrist is weaning him off psychotrophics bc of tardive dyskinesia, stressful for pt. Other son is doing well, is about to purchase his 1st home at age 21. Current Outpatient Medications   Medication Sig Dispense Refill    amitriptyline (ELAVIL) 75 MG tablet Take 1 tablet by mouth nightly 30 tablet 5    Cholecalciferol 50 MCG (2000 UT) TABS Take 1 tablet by mouth daily Take 1 tablet by mouth daily.  90 tablet 2    loratadine-pseudoephedrine (LORATADINE-D 12HR) EVERY DAY  0    Respiratory Therapy Supplies (NEBULIZER/TUBING/MOUTHPIECE) KIT 1 kit by Does not apply route daily as needed 1 kit 0    OXYGEN Inhale 2 L/min into the lungs continuous. Regulate with portability at home (Patient taking differently: Inhale 2 L/min into the lungs as needed ) 1 Container 0     No current facility-administered medications for this visit. Patient's past medical history,surgical history, family history, medications,  and allergies  were all reviewed and updated as appropriate today. Review of Systems  abv    Physical Exam  Constitutional:       General: She is not in acute distress. Appearance: Normal appearance. She is well-developed. She is not ill-appearing. HENT:      Head: Normocephalic and atraumatic. Right Ear: Tympanic membrane, ear canal and external ear normal.      Left Ear: Tympanic membrane, ear canal and external ear normal.   Eyes:      General: No scleral icterus. Right eye: No discharge. Left eye: No discharge. Extraocular Movements: Extraocular movements intact. Conjunctiva/sclera: Conjunctivae normal.   Neck:      Musculoskeletal: Normal range of motion and neck supple. Vascular: No carotid bruit. Comments: Neg TMG  Cardiovascular:      Rate and Rhythm: Normal rate and regular rhythm. Pulses: Normal pulses. Heart sounds: Normal heart sounds. No murmur. Pulmonary:      Effort: Pulmonary effort is normal. No respiratory distress. Breath sounds: Normal breath sounds. No wheezing. Abdominal:      General: Bowel sounds are normal. There is no distension. Palpations: Abdomen is soft. There is no mass. Tenderness: There is no abdominal tenderness. Musculoskeletal: Normal range of motion. Right lower leg: No edema. Left lower leg: No edema. Lymphadenopathy:      Cervical: No cervical adenopathy. Skin:     General: Skin is warm and dry.       Capillary Refill: Capillary refill takes less than 2 seconds. Coloration: Skin is not jaundiced or pale. Findings: No rash. Neurological:      General: No focal deficit present. Mental Status: She is alert and oriented to person, place, and time. Cranial Nerves: No cranial nerve deficit. Deep Tendon Reflexes: Reflexes are normal and symmetric. Psychiatric:         Mood and Affect: Mood normal.         Behavior: Behavior normal.         Thought Content: Thought content normal.         Judgment: Judgment normal.           BP 98/64   Pulse 93   Temp 97.8 °F (36.6 °C) (Temporal)   Ht 5' 2\" (1.575 m)   Wt 169 lb (76.7 kg)   LMP  (LMP Unknown)   SpO2 96%   BMI 30.91 kg/m²     Assessment/Plan:  See below          Kalli Long Annual Wellness Visit  Name: Casie Luis Date: 3/24/2021   MRN: [id number] Sex: Female   Age: 52 y.o. Ethnicity: Non-/Non    : 1970 Race: Jaja Aikensarahi Conklin is here for Medicare AWV    Screenings for behavioral, psychosocial and functional/safety risks, and cognitive dysfunction are all negative except as indicated below. These results, as well as other patient data from the [address] form, are documented in Flowsheets linked to this Encounter. Allergies   Allergen Reactions    Ambien [Zolpidem Tartrate] Other (See Comments)     Shakey, anxious    Dilaudid [Hydromorphone Hcl] Other (See Comments)     Feels like skin is on fire.  Fentanyl Itching    Imitrex [Sumatriptan]      Face hot, felt sob    Morphine Other (See Comments)     Feels like skin is on fire. Tolerates Percocet. Prior to Visit Medications    Medication Sig Taking? Authorizing Provider   amitriptyline (ELAVIL) 75 MG tablet Take 1 tablet by mouth nightly Yes Zi Jeffries MD   Cholecalciferol 50 MCG ( UT) TABS Take 1 tablet by mouth daily Take 1 tablet by mouth daily.  Yes Zi Jeffries MD   loratadine-pseudoephedrine (LORATADINE-D 12HR) 5-120 MG per extended release tablet TAKE 1 Gopal Copas Yes Bessie Walters MD   butalbital-acetaminophen-caffeine (FIORICET, ESGIC) -40 MG per tablet TAKE 1 TABLET BY MOUTH 3 TIMES DAILY AS NEEDED FOR MIGRAINE Yes Mounika Hopper MD   tiotropium (Kristofer Faranya) 18 MCG inhalation capsule INHAKE 1 ENTIRE CAPSULE INTO THE LUNGS WITH HANDIHALER Yes Bessie Walters MD   promethazine (PHENERGAN) 25 MG tablet TAKE 1 TABLET BY MOUTH EVERY 6 HOURS AS NEEDED FOR NAUSEA Yes Mounika Hopper MD   omeprazole (PRILOSEC) 20 MG delayed release capsule TAKE 1 CAPSULE BY MOUTH DAILY Yes Brian Delgado, JENNIFER - CNP   clonazePAM (KLONOPIN) 1 MG tablet TAKE 1 TABLET (1MG) BY MOUTH 2 TIMES DAILY AS NEEDED FOR ANXIETY FOR UP TO 30 DAYS. Yes Bessie Walters MD   famotidine (PEPCID) 20 MG tablet TAKE 1 TABLET BY MOUTH NIGHTLY (PREVIOUS PRESCRIBER Nivia Fonseca [phone]) Yes Bessie Walters MD   potassium chloride (KLOR-CON M) 20 MEQ extended release tablet TAKE 1 TABLET (20MEQ) BY MOUTH DAILY Yes JENNIFER Garza - CNP   FLUoxetine (PROZAC) 20 MG capsule TAKE 3 CAPSULES (60 MG) BY MOUTH DAILY Yes Bessie Walters MD   methylphenidate (RITALIN) 20 MG tablet Take 20 mg by mouth 3 times daily. Yes Historical Provider, MD   topiramate (TOPAMAX) 100 MG tablet TAKE 1 TABLET BY MOUTH 2 TIMES DAILY Yes Mounika Hopper MD   albuterol (PROVENTIL) (2.5 MG/3ML) 0.083% nebulizer solution Take 2.5 mg by nebulization every 6 hours as needed for Wheezing Yes Historical Provider, MD   rizatriptan (MAXALT) 10 MG tablet TAKE 1 TABLET BY MOUTH ONCE AS NEEDED FOR MIGRAINE MAY REPEAT IN 2 HOURS IF NEEDED. MAX 2 TABS/24 HOURS Yes Mounika Hopper MD   furosemide (LASIX) 40 MG tablet TAKE 1 TABLET BY MOUTH EVERY DAY TO TWICE DAILY AS NEEDED SWELLING Yes Mounika Hopper MD   VENTOLIN  (90 Base) MCG/ACT inhaler INHALE 2 PUFFS INTO THE LUNGS 4 TIMES DAILY AS NEEDED.  Yes Mohinder Barreto MD   gabapentin (NEURONTIN) 300 MG capsule TAKE ONE CAPSULE BY MOUTH ONE HOUR BEFORE BEDTIME Yes Historical Provider, MD   amphetamine-dextroamphetamine (ADDERALL XR) 20 MG extended release capsule TAKE 1 CAPSULE BY MOUTH EVERY DAY Yes Historical Provider, MD   Respiratory Therapy Supplies (NEBULIZER/TUBING/MOUTHPIECE) KIT 1 kit by Does not apply route daily as needed Yes Eduin Tobar MD   OXYGEN Inhale 2 L/min into the lungs continuous.  Regulate with portability at home  Patient taking differently: Inhale 2 L/min into the lungs as needed  Yes María Goldberg MD         Past Medical History:   Diagnosis Date    Anxiety     Asthma     [facility]ipolar [facility]     Pt is willing to see psych after 7/15 for confirmation of dx    Chronic bronchitis ([facility]     Chronic GERD 06/11/2020    COPD (chronic obstructive pulmonary disease) ([facility]     Dr Kenia Saldivar Depression     Emphysema of lung ([facility]     Migraine     Migraines     Mixed hyperlipidemia 06/25/2020    MRSA infection within last 3 months 2012    axillary    Narcolepsy 2004    Dr Harvey Leach   [facility] Obesity     Pneumonia     Restless leg syndrome     Sleep apnea     did not tolerate cpap, uses O2 qhs    Tobacco abuse     Tobacco use disorder 01/14/2011    Urinary incontinence        Past Surgical History:   Procedure Laterality Date    ABSCESS DRAINAGE  2012    L axilla MRSA, hosp for 7 days    ADENOIDECTOMY      BLADDER SUSPENSION  5/10    Mesh removed 1/9/15 in [facility], [address]  2/09    lumpectomy, ducts removed    FINGER SURGERY      right thumb    HYSTERECTOMY  2001 2/2 dysplasia, endometriosis, cysts, MYLENE BSO    NASAL SEPTUM SURGERY      PELVIC LAPAROSCOPY      endometriosis    SLEEVE GASTRECTOMY N/A 11/16/2020    LAPAROSCOPIC SLEEVE GASTRECTOMY - ETHICON performed by Cristhian Lamb MD at [place] [address]  2001    Dysplasia, endometriosis    TONSILLECTOMY      UPPER GASTROINTESTINAL ENDOSCOPY N/A 7/10/2020    EGD BIOPSY performed by Colin Cuba MD at [facility]         Family History   Problem Relation Age of Onset    Depression Mother     Breast Cancer Mother     Stroke Mother         cva x 3    Hypertension Mother     Elevated Lipids Mother     Diabetes Mother     Coronary Art Dis Father         mi  age 43    Schizophrenia Father     Hypertension Father     Elevated Lipids Father     Diabetes Father     Birth Defects Son     Other Son         odd, autism    Asthma Son     Diabetes Son     Other Brother         colitis    Mental Illness Sister     Heart Failure Paternal Grandmother         CHF    Emphysema Maternal Grandfather     Cancer Paternal Grandfather        CareTeam (Including outside providers/suppliers regularly involved in providing care):   Patient Care Team:  Magy Bridges MD as PCP - General (Family Medicine)  Magy Bridges MD as PCP - [facility] Empaneled Provider  Natalee York MD as Consulting Physician (Pulmonology)    Wt Readings from Last 3 Encounters:   21 169 lb (76.7 kg)   21 165 lb (74.8 kg)   21 177 lb (80.3 kg)     Vitals:    21 1044   BP: 98/64   Pulse: 93   Temp: 97.8 °F (36.6 °C)   TempSrc: Temporal   SpO2: 96%   Weight: 169 lb (76.7 kg)   Height: 5' 2\" (1.575 m)     Body mass index is 30.91 kg/m². Based upon direct observation of the patient, evaluation of cognition reveals recent and remote memory intact.     General Appearance: alert and oriented to person, place and time, well developed and well- nourished, in no acute distress  Skin: warm and dry, no rash or erythema  Head: normocephalic and atraumatic  Eyes: pupils equal, round, and reactive to light, extraocular eye movements intact, conjunctivae normal  ENT: tympanic membrane, external ear and ear canal normal bilaterally, nose without deformity, nasal mucosa and turbinates normal without polyps  Neck: supple and non-tender without mass, no thyromegaly or thyroid nodules, no cervical lymphadenopathy  Pulmonary/Chest: clear to auscultation bilaterally- no wheezes, rales or rhonchi, normal air movement, no respiratory distress  Cardiovascular: normal rate, regular rhythm, normal S1 and S2, no murmurs, rubs, clicks, or gallops, distal pulses intact, no carotid bruits  Abdomen: soft, non-tender, non-distended, normal bowel sounds, no masses or organomegaly  Extremities: no cyanosis, clubbing or edema  Musculoskeletal: normal range of motion, no joint swelling, deformity or tenderness  Neurologic: reflexes normal and symmetric, no cranial nerve deficit, gait, coordination and speech normal    Patient's complete Health Risk Assessment and screening values have been reviewed and are found in Flowsheets. The following problems were reviewed today and where indicated follow up appointments were made and/or referrals ordered. Positive Risk Factor Screenings with Interventions:      Cognitive: Words recalled: 0 Words Recalled  Clock Drawing Test (CDT) Score: Normal  Total Score Interpretation: Positive Mini-Cog  Did the patient refuse to take the cognition test?: No  Cognitive Impairment Interventions:  · No further testing warranted         General Health and ACP:  General  In general, how would you say your health is?: Fair  In the past 7 days, have you experienced any of the following? New or Increased Pain, New or Increased Fatigue, Loneliness, Social Isolation, Stress or Anger?: None of These  Do you get the social and emotional support that you need?: Yes  Do you have a Living Will?: (!) No  Advance Directives     Power of [facility] Will ACP-Advance Directive ACP-Power of     Not on File Not on File Not on File Not on File      General Health Risk Interventions:  · No Living Will: Pt is considering living will and other paperwork regarding custody of son with special needs.     Health Habits/Nutrition:  Health Habits/Nutrition  Do you exercise for at least 20 minutes 2-3 times per week?: Yes  Have you lost any weight without trying in the past 3 months?: No  Do you eat only one meal per day?: No  Have you seen the dentist within the past year?: Yes  Body mass index: (!) 30.91  Health Habits/Nutrition Interventions:  · Wt loss applauded. Cont routine exercise. Hearing/Vision:  No exam data present  Hearing/Vision  Do you or your family notice any trouble with your hearing that hasn't been managed with hearing aids?: No  Do you have difficulty driving, watching TV, or doing any of your daily activities because of your eyesight?: No  Have you had an eye exam within the past year?: (!) No  Hearing/Vision Interventions:  · due for routine eye exam      Personalized Preventive Plan   Current Health Maintenance Status  Immunization History   Administered Date(s) Administered    FLUZONE 3 YEARS AND OVER 09/01/2015    Influenza, Quadv, IM, (6 mo and older Fluzone, Flulaval, Fluarix and 3 yrs and older Afluria) 11/04/2020    PPD Test 06/01/2010    Pneumococcal Polysaccharide ([id number]) 08/06/2014    Td, unspecified formulation 12/01/2007        Health Maintenance   Topic Date Due    Hepatitis C screen  Never done    HIV screen  Never done    COVID-19 Vaccine (1) Never done    DTaP/Tdap/Td vaccine (1 - Tdap) 10/18/1991    Breast cancer screen  09/18/2018    Annual Wellness Visit (AWV)  Never done    Potassium monitoring  11/17/2021    Creatinine monitoring  11/17/2021    Lipid screen  09/01/2025    Flu vaccine  Completed    Pneumococcal 0-64 years Vaccine  Completed    Hepatitis A vaccine  Aged Out    Hepatitis B vaccine  Aged Out    Hib vaccine  Aged Out    Meningococcal (ACWY) vaccine  Aged Out     Recommendations for [place] Due: see orders and patient instructions/AVS.  . Recommended screening schedule for the next 5-10 years is provided to the patient in written form: see Patient Instructions/AVS.    Mariann Kehr was seen today for medicare awv.     Diagnoses and all orders for this visit:    Routine general medical examination at a health care facility   Wt loss, routine exercise applauded   Plan for labs next visit, to inclued hep c and hiv if pt agrees. Pt is encouraged to get mammogram and covid vaccine. S/P laparoscopic sleeve gastrectomy   Cont f/u with Dr Jael Lima    Vitamin D deficiency  -     Cholecalciferol 48 MCG (2000 UT) TABS; Take 1 tablet by mouth daily Take 1 tablet by mouth daily, rf    Intractable migraine with aura with status migrainosus  -     amitriptyline (ELAVIL) 75 MG tablet; Take 1 tablet by mouth nightly (instead of 50 mg 1 1/2 qd) for ease  -     butalbital-acetaminophen-caffeine (FIORICET, ESGIC) -40 MG per tablet; TAKE 1 TABLET BY MOUTH 3 TIMES DAILY AS NEEDED FOR MIGRAINE, rf    High risk medications (not anticoagulants) long-term use  -     Drug Panel-PM-HI Res-UR Interp-A; Future    Macromastia   Pt's goal is 150 lb and if breast size has not decreased, pt plans to proceed with surgical reduction. \    Moderate COPD (chronic obstructive pulmonary disease) (HCC)  -     tiotropium (SPIRIVA HANDIHALER) 18 MCG inhalation capsule; INHAKE 1 ENTIRE CAPSULE INTO THE LUNGS WITH HANDIHALER, rf    Anxiety  -     Drug Panel-PM-HI Res-UR Interp-A;  Future    Seasonal allergic rhinitis, unspecified trigger  -     loratadine-pseudoephedrine (LORATADINE-D 12HR) 5-120 MG per extended release tablet; TAKE 1 TABLET BY MOUTH EVERY MORNING AS NEEDED FOR ALLERGY, rf

## 2021-03-24 NOTE — PATIENT INSTRUCTIONS
Personalized Preventive Plan for Henrik Bustos - 3/24/2021  Medicare offers a range of preventive health benefits. Some of the tests and screenings are paid in full while other may be subject to a deductible, co-insurance, and/or copay. Some of these benefits include a comprehensive review of your medical history including lifestyle, illnesses that may run in your family, and various assessments and screenings as appropriate. After reviewing your medical record and screening and assessments performed today your provider may have ordered immunizations, labs, imaging, and/or referrals for you. A list of these orders (if applicable) as well as your Preventive Care list are included within your After Visit Summary for your review. Other Preventive Recommendations:    · A preventive eye exam performed by an eye specialist is recommended every 1-2 years to screen for glaucoma; cataracts, macular degeneration, and other eye disorders. · A preventive dental visit is recommended every 6 months. · Try to get at least 150 minutes of exercise per week or 10,000 steps per day on a pedometer . · Order or download the FREE \"Exercise & Physical Activity: Your Everyday Guide\" from [employer]. Call 2-840.960.7667 or search [employer] online. · You need 3741-4924 mg of calcium and 2682-9864 IU of vitamin D per day. It is possible to meet your calcium requirement with diet alone, but a vitamin D supplement is usually necessary to meet this goal.  · When exposed to the sun, use a sunscreen that protects against both UVA and UVB radiation with an SPF of 30 or greater. Reapply every 2 to 3 hours or after sweating, drying off with a towel, or swimming. · Always wear a seat belt when traveling in a car. Always wear a helmet when riding a bicycle or motorcycle.             Please call [address] [place] [address] to schedule a mammogram.  Personalized Preventive Plan for Henrik Bustos - 3/24/2021  Medicare offers a range of preventive health benefits. Some of the tests and screenings are paid in full while other may be subject to a deductible, co-insurance, and/or copay. Some of these benefits include a comprehensive review of your medical history including lifestyle, illnesses that may run in your family, and various assessments and screenings as appropriate. After reviewing your medical record and screening and assessments performed today your provider may have ordered immunizations, labs, imaging, and/or referrals for you. A list of these orders (if applicable) as well as your Preventive Care list are included within your After Visit Summary for your review. Other Preventive Recommendations:    · A preventive eye exam performed by an eye specialist is recommended every 1-2 years to screen for glaucoma; cataracts, macular degeneration, and other eye disorders. · A preventive dental visit is recommended every 6 months. · Try to get at least 150 minutes of exercise per week or 10,000 steps per day on a pedometer . · Order or download the FREE \"Exercise & Physical Activity: Your Everyday Guide\" from [employer]. Call 1-763.434.6441 or search [employer] online. · You need 9976-2288 mg of calcium and 0006-9756 IU of vitamin D per day. It is possible to meet your calcium requirement with diet alone, but a vitamin D supplement is usually necessary to meet this goal.  · When exposed to the sun, use a sunscreen that protects against both UVA and UVB radiation with an SPF of 30 or greater. Reapply every 2 to 3 hours or after sweating, drying off with a towel, or swimming. · Always wear a seat belt when traveling in a car. Always wear a helmet when riding a bicycle or motorcycle.

## 2021-03-28 LAB
6-ACETYLMORPHINE: NOT DETECTED
7-AMINOCLONAZEPAM: PRESENT
ALPHA-OH-ALPRAZOLAM: NOT DETECTED
ALPHA-OH-MIDAZOLAM, URINE: NOT DETECTED
ALPRAZOLAM: NOT DETECTED
AMPHETAMINE: PRESENT
BARBITURATES: NOT DETECTED
BENZOYLECGONINE: NOT DETECTED
BUPRENORPHINE: NOT DETECTED
CARISOPRODOL: NOT DETECTED
CLONAZEPAM: PRESENT
CODEINE: NOT DETECTED
CREATININE URINE: 192.6 MG/DL (ref 20–400)
DIAZEPAM: NOT DETECTED
DRUGS EXPECTED: NORMAL
EER PAIN MGT DRUG PANEL, HIGH RES/EMIT U: NORMAL
ETHYL GLUCURONIDE: PRESENT
FENTANYL: NOT DETECTED
GABAPENTIN: PRESENT
HYDROCODONE: NOT DETECTED
HYDROMORPHONE: NOT DETECTED
LORAZEPAM: NOT DETECTED
MARIJUANA METABOLITE: NOT DETECTED
MDA: NOT DETECTED
MDEA: NOT DETECTED
MDMA URINE: NOT DETECTED
MEPERIDINE: NOT DETECTED
METHADONE: NOT DETECTED
METHAMPHETAMINE: NOT DETECTED
METHYLPHENIDATE: PRESENT
MIDAZOLAM: NOT DETECTED
MORPHINE: NOT DETECTED
NALOXONE: NOT DETECTED
NORBUPRENORPHINE, FREE: NOT DETECTED
NORDIAZEPAM: NOT DETECTED
NORFENTANYL: NOT DETECTED
NORHYDROCODONE, URINE: NOT DETECTED
NOROXYCODONE: NOT DETECTED
NOROXYMORPHONE, URINE: NOT DETECTED
OXAZEPAM: NOT DETECTED
OXYCODONE: NOT DETECTED
OXYMORPHONE: NOT DETECTED
PAIN MANAGEMENT DRUG PANEL: NORMAL
PAIN MANAGEMENT DRUG PANEL: NORMAL
PCP: NOT DETECTED
PHENTERMINE: NOT DETECTED
PREGABALIN: NOT DETECTED
TAPENTADOL, URINE: NOT DETECTED
TAPENTADOL-O-SULFATE, URINE: NOT DETECTED
TEMAZEPAM: NOT DETECTED
TRAMADOL: NOT DETECTED
ZOLPIDEM: NOT DETECTED

## 2021-03-30 ENCOUNTER — IMMUNIZATION (OUTPATIENT)
Dept: PRIMARY CARE CLINIC | Age: 51
End: 2021-03-30
Payer: MEDICARE

## 2021-03-30 PROCEDURE — 0011A COVID-19, MODERNA VACCINE 100MCG/0.5ML DOSE: CPT | Performed by: FAMILY MEDICINE

## 2021-03-30 PROCEDURE — 91301 COVID-19, MODERNA VACCINE 100MCG/0.5ML DOSE: CPT | Performed by: FAMILY MEDICINE

## 2021-03-31 ENCOUNTER — VIRTUAL VISIT (OUTPATIENT)
Dept: PULMONOLOGY | Age: 51
End: 2021-03-31
Payer: MEDICARE

## 2021-03-31 DIAGNOSIS — J44.9 MODERATE COPD (CHRONIC OBSTRUCTIVE PULMONARY DISEASE) (HCC): Primary | ICD-10-CM

## 2021-03-31 DIAGNOSIS — Z72.0 TOBACCO ABUSE: ICD-10-CM

## 2021-03-31 DIAGNOSIS — J96.11 CHRONIC RESPIRATORY FAILURE WITH HYPOXIA (HCC): ICD-10-CM

## 2021-03-31 PROCEDURE — 99442 PR PHYS/QHP TELEPHONE EVALUATION 11-20 MIN: CPT | Performed by: INTERNAL MEDICINE

## 2021-03-31 NOTE — PROGRESS NOTES
Shankar Blackwell is a 52 y.o. female evaluated via telephone on 3/31/2021. Consent:  She and/or health care decision maker is aware that that she may receive a bill for this telephone service, depending on her insurance coverage, and has provided verbal consent to proceed: Yes      Documentation:  I communicated with the patient and/or health care decision maker about COPD mgmt, Covid. Details of this discussion including any medical advice provided: Inhaled bronchodilators, COPD mgmt      I affirm this is a Patient Initiated Episode with an Established Patient who has not had a related appointment within my department in the past 7 days or scheduled within the next 24 hours. Total Time: minutes: 11-20 minutes    Note: not billable if this call serves to triage the patient into an appointment for the relevant concern      Candice Levin       Since last clinic visit, the patient reports she has been doing well. She had bariatric surgery- no complications. No smoking since 13 months ago. She is using spiriva and albuterol. She has used O2 occasionally. ASSESSMENT AND PLAN:    COPD (chronic obstructive pulmonary disease) moderate  -  pfts to confirm dx; stage moderate to severe  - Continue inhaled bronchodilator therapy  albuterol prn; changed Symbicort to Spiriva Respimat per patient preference, continue  -  up to date with Pneumococcal vaccine and Influenza vaccines. - Pulmonary rehab completed  - Advised to avoid smoking again  - had first Covid vaccination- Moderna      Chronic respiratory failure with hypoxemia  - continue 2l Supplemental oxygen with exertion;  light weight tanks and use OCD        Cough  The etiology of the patient's cough is likely smoking related chronic bronchitis.   - I recommended smoking avoidance  - Tessalon Perles as needed      Tobacco abuse  - encouraged continued complete cessation- continue  -Previously on Chantix per Dr. Jannie Pacheco  - f/u in 6 months

## 2021-04-27 ENCOUNTER — IMMUNIZATION (OUTPATIENT)
Dept: PRIMARY CARE CLINIC | Age: 51
End: 2021-04-27
Payer: MEDICARE

## 2021-04-27 PROCEDURE — 0012A COVID-19, MODERNA VACCINE 100MCG/0.5ML DOSE: CPT | Performed by: FAMILY MEDICINE

## 2021-04-27 PROCEDURE — 91301 COVID-19, MODERNA VACCINE 100MCG/0.5ML DOSE: CPT | Performed by: FAMILY MEDICINE

## 2021-05-10 ASSESSMENT — ENCOUNTER SYMPTOMS
RESPIRATORY NEGATIVE: 1
ALLERGIC/IMMUNOLOGIC NEGATIVE: 1
BACK PAIN: 1
EYES NEGATIVE: 1
GASTROINTESTINAL NEGATIVE: 1

## 2021-05-10 NOTE — PROGRESS NOTES
endometriosis    SLEEVE GASTRECTOMY N/A 2020    LAPAROSCOPIC SLEEVE GASTRECTOMY - ETHICON performed by Cayden Sharma MD at [place] [address]      Dysplasia, endometriosis    TONSILLECTOMY      UPPER GASTROINTESTINAL ENDOSCOPY N/A 7/10/2020    EGD BIOPSY performed by Cayden Sharma MD at [address] [place] [address]     Family History   Problem Relation Age of Onset    Depression Mother     Breast Cancer Mother     Stroke Mother         cva x 3    Hypertension Mother     Elevated Lipids Mother     Diabetes Mother     Coronary Art Dis Father         mi  age 43    Schizophrenia Father     Hypertension Father     Elevated Lipids Father     Diabetes Father     Birth Defects Son     Other Son         odd, autism    Asthma Son     Diabetes Son     Other Brother         colitis    Mental Illness Sister     Heart Failure Paternal Grandmother         CHF    Emphysema Maternal Grandfather     Cancer Paternal Grandfather      Social History     Tobacco Use    Smoking status: Former Smoker     Packs/day: 0.25     Years: 22.00     Pack years: 5.50     Types: Cigarettes     Quit date: 2020     Years since quittin.2    Smokeless tobacco: Never Used   Substance Use Topics    Alcohol use: Not Currently     I counseled the patient on the importance of not smoking and risks of ETOH. Allergies   Allergen Reactions    Ambien [Zolpidem Tartrate] Other (See Comments)     Shakey, anxious    Dilaudid [Hydromorphone Hcl] Other (See Comments)     Feels like skin is on fire.  Fentanyl Itching    Imitrex [Sumatriptan]      Face hot, felt sob    Morphine Other (See Comments)     Feels like skin is on fire. Tolerates Percocet. Vitals:    21 1309   Pulse: 105   SpO2: 96%   Weight: 160 lb (72.6 kg)   Height: 5' 2.5\" (1.588 m)     Body mass index is 28.8 kg/m².     Current Outpatient Medications:     promethazine (PHENERGAN) 25 MG tablet, TAKE 1 TABLET BY MOUTH EVERY 6 HOURS AS NEEDED FOR NAUSEA, Disp: 30 tablet, Rfl: 2    clonazePAM (KLONOPIN) 1 MG tablet, TAKE 1 TABLET (1MG) BY MOUTH 2 TIMES DAILY AS NEEDED FOR ANXIETY FOR UP TO 30 DAYS., Disp: 60 tablet, Rfl: 2    amitriptyline (ELAVIL) 75 MG tablet, Take 1 tablet by mouth nightly, Disp: 30 tablet, Rfl: 5    Cholecalciferol 50 MCG (2000 UT) TABS, Take 1 tablet by mouth daily Take 1 tablet by mouth daily. , Disp: 90 tablet, Rfl: 2    loratadine-pseudoephedrine (LORATADINE-D 12HR) 5-120 MG per extended release tablet, TAKE 1 TABLET BY MOUTH EVERY MORNING AS NEEDED FOR ALLERGY, Disp: 30 tablet, Rfl: 5    butalbital-acetaminophen-caffeine (FIORICET, ESGIC) -40 MG per tablet, TAKE 1 TABLET BY MOUTH 3 TIMES DAILY AS NEEDED FOR MIGRAINE, Disp: 30 tablet, Rfl: 0    tiotropium (SPIRIVA HANDIHALER) 18 MCG inhalation capsule, INHAKE 1 ENTIRE CAPSULE INTO THE LUNGS WITH HANDIHALER, Disp: 30 capsule, Rfl: 5    omeprazole (PRILOSEC) 20 MG delayed release capsule, TAKE 1 CAPSULE BY MOUTH DAILY, Disp: 30 capsule, Rfl: 3    famotidine (PEPCID) 20 MG tablet, TAKE 1 TABLET BY MOUTH NIGHTLY (PREVIOUS PRESCRIBER Conrad Brewer [phone]), Disp: 30 tablet, Rfl: 5    potassium chloride (KLOR-CON M) 20 MEQ extended release tablet, TAKE 1 TABLET (20MEQ) BY MOUTH DAILY, Disp: 90 tablet, Rfl: 0    FLUoxetine (PROZAC) 20 MG capsule, TAKE 3 CAPSULES (60 MG) BY MOUTH DAILY, Disp: 90 capsule, Rfl: 12    methylphenidate (RITALIN) 20 MG tablet, Take 20 mg by mouth 3 times daily. , Disp: , Rfl:     topiramate (TOPAMAX) 100 MG tablet, TAKE 1 TABLET BY MOUTH 2 TIMES DAILY, Disp: 180 tablet, Rfl: 1    albuterol (PROVENTIL) (2.5 MG/3ML) 0.083% nebulizer solution, Take 2.5 mg by nebulization every 6 hours as needed for Wheezing, Disp: , Rfl:     rizatriptan (MAXALT) 10 MG tablet, TAKE 1 TABLET BY MOUTH ONCE AS NEEDED FOR MIGRAINE MAY REPEAT IN 2 HOURS IF NEEDED.  MAX 2 TABS/24 HOURS, Disp: 18 tablet, Rfl: 5    furosemide (LASIX) 40 MG tablet, TAKE 1 06/24/2020     Lab Results   Component Value Date    IRON 70 06/24/2020    TIBC 324 06/24/2020    LABIRON 22 06/24/2020     Lab Results   Component Value Date    PRKBKJEK67 527 06/24/2020    FOLATE 11.58 06/24/2020     Lab Results   Component Value Date    VITD25 22.7 09/01/2020     Lab Results   Component Value Date    LABA1C 4.8 06/24/2020    EAG 91.1 06/24/2020       Review of Systems   Constitutional: Negative. HENT: Negative. Eyes: Negative. Respiratory: Negative. Cardiovascular: Negative. Gastrointestinal: Negative. Endocrine: Negative. Genitourinary: Negative. Musculoskeletal: Positive for arthralgias and back pain. Skin: Negative. Allergic/Immunologic: Negative. Neurological: Negative. Hematological: Negative. Psychiatric/Behavioral: Negative. PHYSICAL EXAMINATION:    Constitutional: [x] Appears well-developed and well-nourished [x] No apparent distress      [] Abnormal-   Mental status  [x] Alert and awake  [x] Oriented to person/place/time [x]Able to follow commands      Eyes:  EOM    [x]  Normal  [] Abnormal-  Sclera  [x]  Normal  [] Abnormal -         Discharge [x]  None visible  [] Abnormal -    HENT:   [x] Normocephalic, atraumatic.   [] Abnormal     Neck: [x] No visualized mass     Pulmonary/Chest: [x] Respiratory effort normal.  [x] No visualized signs of difficulty breathing or respiratory distress        [] Abnormal-      Musculoskeletal:   [] Normal gait with no signs of ataxia         [x] Normal range of motion of neck        [] Abnormal-     Neurological:        [x] No Facial Asymmetry (Cranial nerve 7 motor function) (limited exam to video visit)          [x] No gaze palsy        [] Abnormal-         Skin:        [x] No significant exanthematous lesions or discoloration noted on facial skin         [] Abnormal-            Psychiatric:       [x] Normal Affect [x] No Hallucinations        [] Abnormal-     Other pertinent observable physical exam findings- Due to this being a TeleHealth encounter, evaluation of the following organ systems is limited: Vitals/Constitutional/EENT/Resp/CV/GI//MS/Neuro/Skin/Heme-Lymph-Imm. Assessment and Plan:   Patient is here via telemedicine and is 6 months s/p sleeve gastrectomy, down 5 lbs with a total weight loss of 54.2 lbs. The patient's current Body mass index is 28.8 kg/m². (5/19/21). She is doing well, denies n/v/dysphagia or reflux. She is tolerating diet, getting adequate fluids and protein. She is exercising with walking daily and utilizing some strength training at home four days per week. Encouraged continued physical activity. She was only taking one multivitamin daily and no calcium. Reviewed that she should be taking two multivitamins and two calcium daily. She did speak with the registered dietitian for continued follow up. I agree with recommendations and plan. Follow up labs ordered and patient is responsible for completing. Patient gives verbal consent for me to leave message about results at phone number in chart or send a My Chart message. We will see her back in 2 months for continued follow up or via telemedicine depending on COVID-19 restrictions at the time of their next appointment. Hyperlipidemia:   [x] Continue to make dietary and lifestyle modifications per our recommendations. (Ordered CMP and Lipid panel today)  [] Continue to follow up with their PCP for medication management and monitoring. Obstructive Sleep Apnea:   [x] Continue to make dietary and lifestyle modifications per our recommendations. [x] Reviewed the importance of wearing your CPAP/BIPAP. [x] Continue to follow up with their sleep medicine provider for CPAP/BIPAP management and monitoring. Chronic GERD:   [x] Continue to make dietary and lifestyle modifications per our recommendations. [x] Continue PPI (Prilosec). [x] Continue H2 Blocker (Pepcid). [] Wean PPI. Take every other day for two weeks.  If no issues with

## 2021-05-19 ENCOUNTER — TELEMEDICINE (OUTPATIENT)
Dept: BARIATRICS/WEIGHT MGMT | Age: 51
End: 2021-05-19
Payer: MEDICARE

## 2021-05-19 VITALS — HEART RATE: 105 BPM | OXYGEN SATURATION: 96 % | HEIGHT: 63 IN | BODY MASS INDEX: 28.35 KG/M2 | WEIGHT: 160 LBS

## 2021-05-19 DIAGNOSIS — E66.3 OVERWEIGHT (BMI 25.0-29.9): ICD-10-CM

## 2021-05-19 DIAGNOSIS — G47.33 OBSTRUCTIVE SLEEP APNEA: ICD-10-CM

## 2021-05-19 DIAGNOSIS — E55.9 VITAMIN D DEFICIENCY: ICD-10-CM

## 2021-05-19 DIAGNOSIS — Z98.84 S/P LAPAROSCOPIC SLEEVE GASTRECTOMY: ICD-10-CM

## 2021-05-19 DIAGNOSIS — E78.2 MIXED HYPERLIPIDEMIA: ICD-10-CM

## 2021-05-19 DIAGNOSIS — K21.9 CHRONIC GERD: Primary | ICD-10-CM

## 2021-05-19 PROCEDURE — 99213 OFFICE O/P EST LOW 20 MIN: CPT | Performed by: NURSE PRACTITIONER

## 2021-05-19 PROCEDURE — 1036F TOBACCO NON-USER: CPT | Performed by: NURSE PRACTITIONER

## 2021-05-19 PROCEDURE — G8427 DOCREV CUR MEDS BY ELIG CLIN: HCPCS | Performed by: NURSE PRACTITIONER

## 2021-05-19 PROCEDURE — G8417 CALC BMI ABV UP PARAM F/U: HCPCS | Performed by: NURSE PRACTITIONER

## 2021-05-19 NOTE — PROGRESS NOTES
Dietary Assessment Note  Vitals:   Vitals:    05/19/21 1309   Pulse: 105   SpO2: 96%   Weight: 160 lb (72.6 kg)   Height: 5' 2.5\" (1.588 m)   Patient lost 5 lbs over past ~2 months, per pt report. Total Weight Loss: 54.2 lbs    Labs reviewed: no new labs    Protein intake: 60-80 grams/day     Fluid intake: 48-64 oz/day- water / protein water (no more than 3 a day)    Multivitamin/mineral intake: yes - 1 MVI     Calcium intake: no    Other: Vit D / potassium    Exercise: lots of walking / helping son on 2 acre property / has some home machines she uses for strength training at least 4x/wk     Nutrition Assessment: 6 month post-op visit. B- protein shake  S- veggie cake (egg whites/mushrooms/spinach)  S- 1/2 protein bar  D- chicken breast w/ broccoli or cauliflower or small salad     Amount able to eat per sitting: ~1/2 cup volume    Following 30/30/30 rule: yes    Food Intolerances/issues: none    Client Concerns: none    Goals:   - Continue plan  - Take 2 MVI / 2 Citracal Petite  *handout: alternative MVI     Plan: f/u in 2 months OR as directed    Due to the COVID-19 restrictions on close contact interactions the patient's visit was conducted via telephone in [place] of a face to face visit. The patient is here through telemedicine for their 6 month post-op visit.     Dario Figueroa RD, YUDI

## 2021-05-26 DIAGNOSIS — G43.111 INTRACTABLE MIGRAINE WITH AURA WITH STATUS MIGRAINOSUS: ICD-10-CM

## 2021-05-26 DIAGNOSIS — K21.9 CHRONIC GERD: ICD-10-CM

## 2021-05-26 RX ORDER — BUTALBITAL, ACETAMINOPHEN AND CAFFEINE 50; 325; 40 MG/1; MG/1; MG/1
TABLET ORAL
Qty: 30 TABLET | Refills: 0 | Status: SHIPPED | OUTPATIENT
Start: 2021-05-26 | End: 2021-06-10

## 2021-05-26 RX ORDER — OMEPRAZOLE 20 MG/1
20 CAPSULE, DELAYED RELEASE ORAL DAILY
Qty: 30 CAPSULE | Refills: 3 | Status: SHIPPED | OUTPATIENT
Start: 2021-05-26 | Stop reason: SDUPTHER

## 2021-06-10 DIAGNOSIS — Z72.0 TOBACCO ABUSE: Primary | ICD-10-CM

## 2021-06-10 DIAGNOSIS — G43.111 INTRACTABLE MIGRAINE WITH AURA WITH STATUS MIGRAINOSUS: ICD-10-CM

## 2021-06-10 DIAGNOSIS — K21.9 CHRONIC GERD: ICD-10-CM

## 2021-06-10 RX ORDER — VARENICLINE TARTRATE
KIT
Qty: 53 TABLET | OUTPATIENT
Start: 2021-06-10

## 2021-06-10 RX ORDER — BUTALBITAL, ACETAMINOPHEN AND CAFFEINE 50; 325; 40 MG/1; MG/1; MG/1
TABLET ORAL
Qty: 30 TABLET | Refills: 0 | Status: SHIPPED | OUTPATIENT
Start: 2021-06-10 | End: 2021-06-25

## 2021-06-10 RX ORDER — VARENICLINE TARTRATE 1 MG/1
TABLET, FILM COATED ORAL
Qty: 56 TABLET | Refills: 2 | Status: SHIPPED | OUTPATIENT
Start: 2021-06-10 | End: 2021-09-29 | Stop reason: SDUPTHER

## 2021-06-10 RX ORDER — OMEPRAZOLE 20 MG/1
20 CAPSULE, DELAYED RELEASE ORAL DAILY
Qty: 30 CAPSULE | Refills: 3 | Status: SHIPPED | OUTPATIENT
Start: 2021-06-10 | End: 2021-09-28

## 2021-06-10 NOTE — TELEPHONE ENCOUNTER
Last Office Visit  -  3/24/21  Next Office Visit  -  9/29/21    Last Filled  -    Last UDS -    Contract -

## 2021-06-15 DIAGNOSIS — G43.109 MIGRAINE WITH AURA AND WITHOUT STATUS MIGRAINOSUS, NOT INTRACTABLE: ICD-10-CM

## 2021-06-15 RX ORDER — TOPIRAMATE 100 MG/1
100 TABLET, FILM COATED ORAL 2 TIMES DAILY
Qty: 180 TABLET | Refills: 1 | Status: SHIPPED | OUTPATIENT
Start: 2021-06-15

## 2021-06-25 DIAGNOSIS — G43.111 INTRACTABLE MIGRAINE WITH AURA WITH STATUS MIGRAINOSUS: ICD-10-CM

## 2021-06-25 RX ORDER — BUTALBITAL, ACETAMINOPHEN AND CAFFEINE 50; 325; 40 MG/1; MG/1; MG/1
TABLET ORAL
Qty: 30 TABLET | Refills: 0 | Status: SHIPPED | OUTPATIENT
Start: 2021-06-25 | End: 2021-07-23

## 2021-07-10 DIAGNOSIS — R11.0 NAUSEA: ICD-10-CM

## 2021-07-12 RX ORDER — PROMETHAZINE HYDROCHLORIDE 25 MG/1
TABLET ORAL
Qty: 30 TABLET | Refills: 2 | Status: SHIPPED | OUTPATIENT
Start: 2021-07-12 | End: 2021-07-29

## 2021-07-22 DIAGNOSIS — G43.111 INTRACTABLE MIGRAINE WITH AURA WITH STATUS MIGRAINOSUS: ICD-10-CM

## 2021-07-22 DIAGNOSIS — F41.9 ANXIETY: ICD-10-CM

## 2021-07-23 DIAGNOSIS — K21.9 CHRONIC GERD: ICD-10-CM

## 2021-07-23 RX ORDER — CLONAZEPAM 1 MG/1
TABLET ORAL
Qty: 60 TABLET | Refills: 0 | Status: SHIPPED | OUTPATIENT
Start: 2021-07-23 | End: 2021-08-20

## 2021-07-23 RX ORDER — BUTALBITAL, ACETAMINOPHEN AND CAFFEINE 50; 325; 40 MG/1; MG/1; MG/1
TABLET ORAL
Qty: 30 TABLET | Refills: 0 | Status: SHIPPED | OUTPATIENT
Start: 2021-07-23 | End: 2021-08-23

## 2021-07-23 NOTE — TELEPHONE ENCOUNTER
Last Office Visit  -  3/24/21  Next Office Visit  -  9/26/21    Last Filled  -  5/1/21  Last UDS -  3/24/21  Contract -  11/15/16

## 2021-07-26 RX ORDER — FAMOTIDINE 20 MG/1
TABLET, FILM COATED ORAL
Qty: 30 TABLET | Refills: 5 | Status: SHIPPED | OUTPATIENT
Start: 2021-07-26 | End: 2022-01-19

## 2021-07-26 RX ORDER — RIZATRIPTAN BENZOATE 10 MG/1
TABLET ORAL
Qty: 18 TABLET | Refills: 5 | Status: SHIPPED | OUTPATIENT
Start: 2021-07-26 | End: 2021-09-29 | Stop reason: SDUPTHER

## 2021-07-28 DIAGNOSIS — R11.0 NAUSEA: ICD-10-CM

## 2021-07-29 RX ORDER — PROMETHAZINE HYDROCHLORIDE 25 MG/1
TABLET ORAL
Qty: 30 TABLET | Refills: 2 | Status: SHIPPED | OUTPATIENT
Start: 2021-07-29 | End: 2021-10-29

## 2021-08-17 DIAGNOSIS — E87.6 HYPOKALEMIA: ICD-10-CM

## 2021-08-17 RX ORDER — POTASSIUM CHLORIDE 20 MEQ/1
TABLET, EXTENDED RELEASE ORAL
Qty: 90 TABLET | Refills: 0 | Status: SHIPPED | OUTPATIENT
Start: 2021-08-17 | End: 2021-12-06

## 2021-08-20 DIAGNOSIS — G43.111 INTRACTABLE MIGRAINE WITH AURA WITH STATUS MIGRAINOSUS: ICD-10-CM

## 2021-08-20 DIAGNOSIS — F41.9 ANXIETY: ICD-10-CM

## 2021-08-20 NOTE — TELEPHONE ENCOUNTER
Last Office Visit  -  3/24/21  Next Office Visit  -  9/29/21    Last Filled  -  7/23/21  Last UDS -  3/24/21  Contract -  11/15/16

## 2021-08-23 RX ORDER — BUTALBITAL, ACETAMINOPHEN AND CAFFEINE 50; 325; 40 MG/1; MG/1; MG/1
TABLET ORAL
Qty: 30 TABLET | Refills: 0 | Status: SHIPPED | OUTPATIENT
Start: 2021-08-23 | End: 2021-10-29

## 2021-08-23 RX ORDER — CLONAZEPAM 1 MG/1
TABLET ORAL
Qty: 60 TABLET | Refills: 0 | Status: SHIPPED | OUTPATIENT
Start: 2021-08-23 | End: 2021-09-29 | Stop reason: SDUPTHER

## 2021-08-25 NOTE — CARE COORDINATION
Call back to patient to fu on questions re: medicaid. Explained there are multiple programs. If she has Medicaid QMB, she will not receive a Medicaid card. Instructed to call [facility] for more information on her benefits.     Shahana Newman RN, MSN  Ambulatory Care Manager  [phone] IL[facility] implant

## 2021-08-30 RX ORDER — FUROSEMIDE 40 MG/1
TABLET ORAL
Qty: 60 TABLET | Refills: 5 | Status: SHIPPED | OUTPATIENT
Start: 2021-08-30 | End: 2022-01-19

## 2021-09-29 ENCOUNTER — OFFICE VISIT (OUTPATIENT)
Dept: FAMILY MEDICINE CLINIC | Age: 51
End: 2021-09-29
Payer: MEDICARE

## 2021-09-29 ENCOUNTER — TELEPHONE (OUTPATIENT)
Dept: FAMILY MEDICINE CLINIC | Age: 51
End: 2021-09-29

## 2021-09-29 VITALS
HEIGHT: 61 IN | OXYGEN SATURATION: 97 % | TEMPERATURE: 97.4 F | HEART RATE: 95 BPM | SYSTOLIC BLOOD PRESSURE: 90 MMHG | DIASTOLIC BLOOD PRESSURE: 70 MMHG | BODY MASS INDEX: 27.94 KG/M2 | WEIGHT: 148 LBS

## 2021-09-29 DIAGNOSIS — F31.9 BIPOLAR DEPRESSION (HCC): ICD-10-CM

## 2021-09-29 DIAGNOSIS — G43.119 INTRACTABLE MIGRAINE WITH AURA WITHOUT STATUS MIGRAINOSUS: ICD-10-CM

## 2021-09-29 DIAGNOSIS — Z23 NEED FOR DIPHTHERIA-TETANUS-PERTUSSIS (TDAP) VACCINE: ICD-10-CM

## 2021-09-29 DIAGNOSIS — F41.9 ANXIETY: Primary | ICD-10-CM

## 2021-09-29 DIAGNOSIS — Z23 NEEDS FLU SHOT: ICD-10-CM

## 2021-09-29 DIAGNOSIS — Z12.11 COLON CANCER SCREENING: ICD-10-CM

## 2021-09-29 DIAGNOSIS — Z11.59 NEED FOR HEPATITIS C SCREENING TEST: ICD-10-CM

## 2021-09-29 DIAGNOSIS — Z72.0 TOBACCO ABUSE: ICD-10-CM

## 2021-09-29 DIAGNOSIS — F13.99 SEDATIVE, HYPNOTIC OR ANXIOLYTIC USE, UNSPECIFIED WITH UNSPECIFIED SEDATIVE, HYPNOTIC OR ANXIOLYTIC-INDUCED DISORDER (HCC): ICD-10-CM

## 2021-09-29 DIAGNOSIS — Z11.4 SCREENING FOR HIV WITHOUT PRESENCE OF RISK FACTORS: ICD-10-CM

## 2021-09-29 DIAGNOSIS — Z01.84 IMMUNITY STATUS TESTING: ICD-10-CM

## 2021-09-29 DIAGNOSIS — N62 MACROMASTIA: ICD-10-CM

## 2021-09-29 DIAGNOSIS — G43.109 MIGRAINE WITH AURA AND WITHOUT STATUS MIGRAINOSUS, NOT INTRACTABLE: Primary | ICD-10-CM

## 2021-09-29 DIAGNOSIS — R30.0 DYSURIA: ICD-10-CM

## 2021-09-29 PROBLEM — F13.20 SEDATIVE, HYPNOTIC OR ANXIOLYTIC DEPENDENCE, UNCOMPLICATED (HCC): Status: ACTIVE | Noted: 2021-09-29

## 2021-09-29 PROBLEM — F13.29 SEDATIVE, HYPNOTIC OR ANXIOLYTIC DEPENDENCE WITH UNSPECIFIED SEDATIVE, HYPNOTIC OR ANXIOLYTIC-INDUCED DISORDER (HCC): Status: ACTIVE | Noted: 2021-09-29

## 2021-09-29 LAB
BACTERIA: ABNORMAL /HPF
BILIRUBIN URINE: ABNORMAL
BLOOD, URINE: NEGATIVE
CLARITY: CLEAR
COLOR: ABNORMAL
COMMENT UA: ABNORMAL
CRYSTALS, UA: ABNORMAL /HPF
EPITHELIAL CELLS, UA: 2 /HPF (ref 0–5)
GLUCOSE URINE: NEGATIVE MG/DL
HYALINE CASTS: 1 /LPF (ref 0–8)
KETONES, URINE: NEGATIVE MG/DL
LEUKOCYTE ESTERASE, URINE: NEGATIVE
MICROSCOPIC EXAMINATION: ABNORMAL
NITRITE, URINE: NEGATIVE
PH UA: 6 (ref 5–8)
PROTEIN UA: NEGATIVE MG/DL
RBC UA: 1 /HPF (ref 0–4)
SPECIFIC GRAVITY UA: 1.03 (ref 1–1.03)
URINE TYPE: ABNORMAL
UROBILINOGEN, URINE: 0.2 E.U./DL
WBC UA: 1 /HPF (ref 0–5)

## 2021-09-29 PROCEDURE — 90674 CCIIV4 VAC NO PRSV 0.5 ML IM: CPT | Performed by: FAMILY MEDICINE

## 2021-09-29 PROCEDURE — G8417 CALC BMI ABV UP PARAM F/U: HCPCS | Performed by: FAMILY MEDICINE

## 2021-09-29 PROCEDURE — G0008 ADMIN INFLUENZA VIRUS VAC: HCPCS | Performed by: FAMILY MEDICINE

## 2021-09-29 PROCEDURE — 1036F TOBACCO NON-USER: CPT | Performed by: FAMILY MEDICINE

## 2021-09-29 PROCEDURE — 90471 IMMUNIZATION ADMIN: CPT | Performed by: FAMILY MEDICINE

## 2021-09-29 PROCEDURE — 90715 TDAP VACCINE 7 YRS/> IM: CPT | Performed by: FAMILY MEDICINE

## 2021-09-29 PROCEDURE — 99214 OFFICE O/P EST MOD 30 MIN: CPT | Performed by: FAMILY MEDICINE

## 2021-09-29 PROCEDURE — G8427 DOCREV CUR MEDS BY ELIG CLIN: HCPCS | Performed by: FAMILY MEDICINE

## 2021-09-29 RX ORDER — CLONAZEPAM 1 MG/1
TABLET ORAL
Qty: 60 TABLET | Refills: 0 | Status: SHIPPED | OUTPATIENT
Start: 2021-09-29 | End: 2021-10-29

## 2021-09-29 RX ORDER — VARENICLINE TARTRATE 1 MG/1
TABLET, FILM COATED ORAL
Qty: 56 TABLET | Refills: 2 | Status: SHIPPED | OUTPATIENT
Start: 2021-09-29 | End: 2021-11-24 | Stop reason: SDUPTHER

## 2021-09-29 RX ORDER — RIZATRIPTAN BENZOATE 10 MG/1
TABLET ORAL
Qty: 6 TABLET | Refills: 5 | Status: SHIPPED | OUTPATIENT
Start: 2021-09-29 | End: 2022-04-11

## 2021-09-29 NOTE — PROGRESS NOTES
Assessment/Plan:    Rahel Slaughter was seen today for 6 month follow-up and back pain. Diagnoses and all orders for this visit:    Anxiety,Bipolar depression ([address] though no anamika noted x yrs  -     clonazePAM (KLONOPIN) 1 MG tablet; TAKE 1 TABLET (1MG) BY MOUTH 2 TIMES DAILY AS NEEDED FOR ANXIETY FOR UP TO 30 DAYS, rf   Multiple new stressors noted    Intractable migraine with aura without status migrainosus  -     rizatriptan (MAXALT) 10 MG tablet; TAKE 1 TABLET BY MOUTH ONCE AS NEEDED FOR MIGRAINE MAY REPEAT IN 2 HOURS IF NEEDED. MAX 2 TABS/24 HOURS  -     Discontinue: Isometheptene-Dichloral-APAP (MIDRIN) -325 MG CAPS; Take 1 capsule by mouth every 4 hours as needed (migraine headache) for up to 30 days. To replace butalbital--Midrine was rx'd to replace butal, though pharmacy later called to say midrin is no longer made. Nurtec was rx'd, to replace maxalt and butal, though pt was told PA will be required. She will cont prior meds until med is approved/denied. Dariel Guzman1Ember MD, Plastic Surgery, [facility]    Need for diphtheria-tetanus-pertussis (Tdap) vaccine  -     Tdap (age 6y and older) IM (BOOSTRIX)    Immunity status testing  -     VARICELLA ZOSTER ANTIBODY, IGG; Future    Colon cancer screening  -     AFL - Elisa Godinez MD, Gastroenterology, [facility]    Needs flu shot  -     INFLUENZA, MDCK QUADV, 2 YRS AND OLDER, IM, PF, PREFILL SYR OR SDV, 0.5ML (FLUCELVAX QUADV, PF)    Need for hepatitis C screening test  -     Hepatitis C Antibody; Future    Screening for HIV without presence of risk factors  -     HIV Screen;  Future    Dysuria  -     Culture, Urine  -     Urinalysis with Microscopic    Tobacco abuse  -     varenicline (CHANTIX CONTINUING MONTH COLUMBA) 1 MG tablet; TAKE 1 TABLET BY MOUTH TWICE DAILY    Sedative, hypnotic or anxiolytic use, unspecified with unspecified sedative, hypnotic or anxiolytic-induced disorder        Patient Instructions   Please call  clonazePAM (KLONOPIN) 1 MG tablet TAKE 1 TABLET (1MG) BY MOUTH 2 TIMES DAILY AS NEEDED FOR ANXIETY FOR UP TO 30 DAYS. 60 tablet 0    omeprazole (PRILOSEC) 20 MG delayed release capsule TAKE 1 CAPSULE BY MOUTH DAILY 30 capsule 0    furosemide (LASIX) 40 MG tablet TAKE 1 TABLET BY MOUTH EVERY DAY TO TWICE DAILY AS NEEDED SWELLING 60 tablet 5    butalbital-acetaminophen-caffeine (FIORICET, ESGIC) -40 MG per tablet TAKE 1 TABLET BY MOUTH 3 TIMES DAILY AS NEEDED FOR MIGRAINE 30 tablet 0    potassium chloride (KLOR-CON M) 20 MEQ extended release tablet TAKE 1 TABLET (20MEQ) BY MOUTH DAILY (ORIGINAL RX WRITTEN BY DR.LEWIS SIMI ) 90 tablet 0    promethazine (PHENERGAN) 25 MG tablet TAKE 1 TABLET BY MOUTH EVERY 6 HOURS AS NEEDED FOR NAUSEA 30 tablet 2    famotidine (PEPCID) 20 MG tablet TAKE 1 TABLET BY MOUTH NIGHTLY 30 tablet 5    topiramate (TOPAMAX) 100 MG tablet TAKE 1 TABLET BY MOUTH 2 TIMES DAILY 180 tablet 1    amitriptyline (ELAVIL) 75 MG tablet Take 1 tablet by mouth nightly 30 tablet 5    Cholecalciferol 50 MCG (2000 UT) TABS Take 1 tablet by mouth daily Take 1 tablet by mouth daily. 90 tablet 2    loratadine-pseudoephedrine (LORATADINE-D 12HR) 5-120 MG per extended release tablet TAKE 1 TABLET BY MOUTH EVERY MORNING AS NEEDED FOR ALLERGY 30 tablet 5    tiotropium (SPIRIVA HANDIHALER) 18 MCG inhalation capsule INHAKE 1 ENTIRE CAPSULE INTO THE LUNGS WITH HANDIHALER 30 capsule 5    FLUoxetine (PROZAC) 20 MG capsule TAKE 3 CAPSULES (60 MG) BY MOUTH DAILY 90 capsule 12    methylphenidate (RITALIN) 20 MG tablet Take 20 mg by mouth 3 times daily.  albuterol (PROVENTIL) (2.5 MG/3ML) 0.083% nebulizer solution Take 2.5 mg by nebulization every 6 hours as needed for Wheezing      VENTOLIN  (90 Base) MCG/ACT inhaler INHALE 2 PUFFS INTO THE LUNGS 4 TIMES DAILY AS NEEDED.  18 g 5    gabapentin (NEURONTIN) 300 MG capsule TAKE ONE CAPSULE BY MOUTH ONE HOUR BEFORE BEDTIME  5    amphetamine-dextroamphetamine (ADDERALL XR) 20 MG extended release capsule TAKE 1 CAPSULE BY MOUTH EVERY DAY  0    Respiratory Therapy Supplies (NEBULIZER/TUBING/MOUTHPIECE) KIT 1 kit by Does not apply route daily as needed 1 kit 0    OXYGEN Inhale 2 L/min into the lungs continuous. Regulate with portability at home (Patient taking differently: Inhale 2 L/min into the lungs as needed ) 1 Container 0    tiZANidine (ZANAFLEX) 2 MG tablet Take 1 tablet by mouth 3 times daily as needed (back pain) 30 tablet 1    Rimegepant Sulfate 75 MG TBDP Place 1 tablet under the tongue daily as needed (migraine) Max 1/24 hr, to replace maxalt 8 tablet 0     No current facility-administered medications for this visit. Patient's past medical history,surgical history, family history, medications,  and allergies  were all reviewed and updated as appropriate today. Review of Systems  abv    Physical Exam  Constitutional:       Appearance: Normal appearance. She is well-developed. HENT:      Head: Normocephalic and atraumatic. Right Ear: External ear normal.      Left Ear: External ear normal.   Eyes:      General: No scleral icterus. Right eye: No discharge. Left eye: No discharge. Extraocular Movements: Extraocular movements intact. Conjunctiva/sclera: Conjunctivae normal.   Neck:      Comments: No visualized masses  FROM  Pulmonary:      Effort: Pulmonary effort is normal.   Musculoskeletal:         General: Normal range of motion. Comments: R SI ttp >L ST ttp   Skin:     General: Skin is warm and dry. Neurological:      General: No focal deficit present. Mental Status: She is alert and oriented to person, place, and time. Psychiatric:         Mood and Affect: Mood normal.         Behavior: Behavior normal.         Thought Content:  Thought content normal.         Judgment: Judgment normal.           BP 90/70   Pulse 95   Temp 97.4 °F (36.3 °C) (Temporal)   Ht 5' 1\" (1.549 m) Wt 148 lb (67.1 kg)   LMP  (LMP Unknown)   SpO2 97%   BMI 27.96 kg/m²

## 2021-09-30 ENCOUNTER — TELEPHONE (OUTPATIENT)
Dept: FAMILY MEDICINE CLINIC | Age: 51
End: 2021-09-30

## 2021-09-30 LAB — URINE CULTURE, ROUTINE: NORMAL

## 2021-09-30 NOTE — TELEPHONE ENCOUNTER
Pls tellpt  her pharm called to say that midrin is no longer made. I sent in a rx for a newer migraine med called [facility] 1qd prn migraine, to replace relpax. We'll likely need to do a PA for her insur to cover this med, so she can cont maxalt until we can get this med approved.

## 2021-10-01 RX ORDER — TIZANIDINE 2 MG/1
2 TABLET ORAL 3 TIMES DAILY PRN
Qty: 30 TABLET | Refills: 1 | Status: SHIPPED | OUTPATIENT
Start: 2021-10-01 | End: 2021-11-08

## 2021-10-05 ENCOUNTER — TELEPHONE (OUTPATIENT)
Dept: ADMINISTRATIVE | Age: 51
End: 2021-10-05

## 2021-10-05 NOTE — TELEPHONE ENCOUNTER
Submitted PA for Nurtec 75MG dispersible tablets, Key: [id number]. Medication has been APPROVED through 12/31/2021. Please notify patient. Thank you.

## 2021-10-22 ENCOUNTER — APPOINTMENT (OUTPATIENT)
Dept: GENERAL RADIOLOGY | Age: 51
End: 2021-10-22
Payer: MEDICARE

## 2021-10-22 ENCOUNTER — HOSPITAL ENCOUNTER (EMERGENCY)
Age: 51
Discharge: HOME | End: 2021-10-22
Payer: MEDICARE

## 2021-10-22 VITALS
HEIGHT: 61 IN | RESPIRATION RATE: 18 BRPM | WEIGHT: 146 LBS | HEART RATE: 89 BPM | TEMPERATURE: 97.7 F | SYSTOLIC BLOOD PRESSURE: 106 MMHG | DIASTOLIC BLOOD PRESSURE: 76 MMHG | BODY MASS INDEX: 27.56 KG/M2 | OXYGEN SATURATION: 96 %

## 2021-10-22 DIAGNOSIS — M25.511 ACUTE PAIN OF RIGHT SHOULDER: Primary | ICD-10-CM

## 2021-10-22 PROCEDURE — 99283 EMERGENCY DEPT VISIT LOW MDM: CPT

## 2021-10-22 PROCEDURE — 73030 X-RAY EXAM OF SHOULDER: CPT

## 2021-10-22 RX ORDER — NAPROXEN 500 MG/1
500 TABLET ORAL 2 TIMES DAILY
Qty: 30 TABLET | Refills: 0 | Status: SHIPPED | OUTPATIENT
Start: 2021-10-22 | End: 2022-04-11 | Stop reason: SDUPTHER

## 2021-10-22 RX ORDER — METHOCARBAMOL 750 MG/1
750 TABLET, FILM COATED ORAL 4 TIMES DAILY
Qty: 40 TABLET | Refills: 0 | Status: SHIPPED | OUTPATIENT
Start: 2021-10-22 | End: 2021-11-01

## 2021-10-22 ASSESSMENT — PAIN SCALES - GENERAL: PAINLEVEL_OUTOF10: 8

## 2021-10-22 NOTE — ED PROVIDER NOTES
[facility] Emergency Department    CHIEF COMPLAINT  Shoulder Pain (1 week ago pt was kneeling and when trying to get up she tripped and landed on right shoulder on concrete. pt reports pain has gotten wrose, radiates into back and chest. not relieved with tylenol or motrin)      SHARED SERVICE VISIT:  Evaluated by [facility]. My supervising physician was available for consultation. HISTORY OF PRESENT ILLNESS  Vanessa Daley is a 54 y.o. female who presents to the ED complaining of of ongoing shoulder pain status post fall. Patient reports trip and fall approximately 1 week ago. She injured right shoulder during that time. Pain worse with touch and movement. Does not appear to radiate. She has had no numbness, tingling, weakness of the extremity. She is right-hand dominant. Denies hitting head or losing consciousness. She has no complaints of chest pain or shortness of breath. No numbness, tingling, weakness of extremity. No other complaints, modifying factors or associated symptoms. Nursing notes reviewed.    Past Medical History:   Diagnosis Date    Anxiety     Asthma     Bipolar 1 disorder ([address]     Pt is willing to see psych after 7/15 for confirmation of dx    Chronic bronchitis ([address]     Chronic GERD 06/11/2020    COPD (chronic obstructive pulmonary disease) ([address]     Dr Joan Blackwell Depression     Emphy[facility]     Migraine     Migraines     Mixed hyperlipidemia 06/25/2020    MRSA infection within last 3 months 2012    axillary    Narcolepsy 2004    Dr Almaz Zapien   [facility] Obesity     Pneumonia     Restless leg syndrome     Sleep apnea     did not tolerate cpap, uses O2 qhs    Tobacco abuse     Tobacco use disorder 01/14/2011    Urinary incontinence      Past Surgical History:   Procedure Laterality Date    ABSCESS DRAINAGE  2012    L axilla MRSA, hosp for 7 days    ADENOIDECTOMY      BLADDER SUSPENSION  5/10    Mesh removed 1/9/15 in [place], [place]    BREAST SURGERY      lumpectomy, ducts removed    FINGER SURGERY      right thumb    HYSTERECTOMY   dysplasia, endometriosis, cysts, MYLENE BSO    NASAL SEPTUM SURGERY      PELVIC LAPAROSCOPY      endometriosis    SLEEVE GASTRECTOMY N/A 2020    LAPAROSCOPIC SLEEVE GASTRECTOMY - ETHICON performed by Giovanni Lopez MD at [facility]    Dysplasia, endometriosis    TONSILLECTOMY      UPPER GASTROINTESTINAL ENDOSCOPY N/A 7/10/2020    EGD BIOPSY performed by Giovanni Lopez MD at [address] [place] [address]     Family History   Problem Relation Age of Onset    Depression Mother     Breast Cancer Mother     Stroke Mother         cva x 3    Hypertension Mother     Elevated Lipids Mother     Diabetes Mother     Coronary Art Dis Father         mi  age 43    Schizophrenia Father     Hypertension Father    Lavona Euler Elevated Lipids Father     Diabetes Father     Birth Defects Son     Other Son         odd, autism    Asthma Son     Diabetes Son     Other Brother         colitis    Mental Illness Sister     Heart Failure Paternal Grandmother         CHF    Emphysema Maternal Grandfather     Cancer Paternal Grandfather      Social History     Socioeconomic History    Marital status:      Spouse name: Not on file    Number of children: 2    Years of education: Not on file    Highest education level: Not on file   Occupational History    Occupation: unemployed   Tobacco Use    Smoking status: Former Smoker     Packs/day: 0.25     Years: 22.00     Pack years: 5.50     Types: Cigarettes     Quit date: 2020     Years since quittin.7    Smokeless tobacco: Never Used   Vaping Use    Vaping Use: Never used   Substance and Sexual Activity    Alcohol use: Not Currently    Drug use: No     Comment: Quit cocaine and meth 2005    Sexual activity: Yes     Partners: Male     Birth control/protection: Surgical     Comment: hysterectomy   Other Topics Concern    Not on file   Social History Narrative    Not on file     Social Determinants of Health     Financial Resource Strain:     Difficulty of Paying Living Expenses:    Food Insecurity:     Worried About Running Out of Food in the Last Year:     [address] in the Last Year:    Transportation Needs:     Lack of Transportation (Medical):  Lack of Transportation (Non-Medical):    Physical Activity:     Days of Exercise per Week:     Minutes of Exercise per Session:    Stress:     Feeling of Stress :    Social Connections:     Frequency of Communication with Friends and Family:     Frequency of Social Gatherings with Friends and Family:     Attends [employer] Services:     Active Member of Clubs or Organizations:     Attends Club or Organization Meetings:     Marital Status:    Intimate Partner Violence:     Fear of Current or Ex-Partner:     Emotionally Abused:     Physically Abused:     Sexually Abused:      No current facility-administered medications for this encounter. Current Outpatient Medications   Medication Sig Dispense Refill    naproxen (NAPROSYN) 500 MG tablet Take 1 tablet by mouth 2 times daily 30 tablet 0    methocarbamol (ROBAXIN-750) 750 MG tablet Take 1 tablet by mouth 4 times daily for 10 days 40 tablet 0    tiZANidine (ZANAFLEX) 2 MG tablet Take 1 tablet by mouth 3 times daily as needed (back pain) 30 tablet 1    Rimegepant Sulfate 75 MG TBDP Place 1 tablet under the tongue daily as needed (migraine) Max 1/24 hr, to replace maxalt 8 tablet 0    varenicline (CHANTIX CONTINUING MONTH COLUMBA) 1 MG tablet TAKE 1 TABLET BY MOUTH TWICE DAILY 56 tablet 2    rizatriptan (MAXALT) 10 MG tablet TAKE 1 TABLET BY MOUTH ONCE AS NEEDED FOR MIGRAINE MAY REPEAT IN 2 HOURS IF NEEDED. MAX 2 TABS/24 HOURS 6 tablet 5    clonazePAM (KLONOPIN) 1 MG tablet TAKE 1 TABLET (1MG) BY MOUTH 2 TIMES DAILY AS NEEDED FOR ANXIETY FOR UP TO 30 DAYS.  60 tablet 0    omeprazole (PRILOSEC) 20 MG delayed release capsule TAKE 1 CAPSULE BY MOUTH DAILY 30 capsule 0    furosemide (LASIX) 40 MG tablet TAKE 1 TABLET BY MOUTH EVERY DAY TO TWICE DAILY AS NEEDED SWELLING 60 tablet 5    butalbital-acetaminophen-caffeine (FIORICET, ESGIC) -40 MG per tablet TAKE 1 TABLET BY MOUTH 3 TIMES DAILY AS NEEDED FOR MIGRAINE 30 tablet 0    potassium chloride (KLOR-CON M) 20 MEQ extended release tablet TAKE 1 TABLET (20MEQ) BY MOUTH DAILY (ORIGINAL RX WRITTEN BY  [facility] ) 90 tablet 0    promethazine (PHENERGAN) 25 MG tablet TAKE 1 TABLET BY MOUTH EVERY 6 HOURS AS NEEDED FOR NAUSEA 30 tablet 2    famotidine (PEPCID) 20 MG tablet TAKE 1 TABLET BY MOUTH NIGHTLY 30 tablet 5    topiramate (TOPAMAX) 100 MG tablet TAKE 1 TABLET BY MOUTH 2 TIMES DAILY 180 tablet 1    amitriptyline (ELAVIL) 75 MG tablet Take 1 tablet by mouth nightly 30 tablet 5    Cholecalciferol 50 MCG (2000 UT) TABS Take 1 tablet by mouth daily Take 1 tablet by mouth daily. 90 tablet 2    loratadine-pseudoephedrine (LORATADINE-D 12HR) 5-120 MG per extended release tablet TAKE 1 TABLET BY MOUTH EVERY MORNING AS NEEDED FOR ALLERGY 30 tablet 5    tiotropium (SPIRIVA HANDIHALER) 18 MCG inhalation capsule INHAKE 1 ENTIRE CAPSULE INTO THE LUNGS WITH HANDIHALER 30 capsule 5    FLUoxetine (PROZAC) 20 MG capsule TAKE 3 CAPSULES (60 MG) BY MOUTH DAILY 90 capsule 12    methylphenidate (RITALIN) 20 MG tablet Take 20 mg by mouth 3 times daily.  albuterol (PROVENTIL) (2.5 MG/3ML) 0.083% nebulizer solution Take 2.5 mg by nebulization every 6 hours as needed for Wheezing      VENTOLIN  (90 Base) MCG/ACT inhaler INHALE 2 PUFFS INTO THE LUNGS 4 TIMES DAILY AS NEEDED.  18 g 5    gabapentin (NEURONTIN) 300 MG capsule TAKE ONE CAPSULE BY MOUTH ONE HOUR BEFORE BEDTIME  5    amphetamine-dextroamphetamine (ADDERALL XR) 20 MG extended release capsule TAKE 1 CAPSULE BY MOUTH EVERY DAY  0    Respiratory Therapy Supplies (NEBULIZER/TUBING/MOUTHPIECE) KIT 1 kit by Does not apply route daily as needed 1 kit 0    OXYGEN Inhale 2 L/min into the lungs continuous. Regulate with portability at home (Patient taking differently: Inhale 2 L/min into the lungs as needed ) 1 Container 0     Allergies   Allergen Reactions    Ambien [Zolpidem Tartrate] Other (See Comments)     Shakey, anxious    Dilaudid [Hydromorphone Hcl] Other (See Comments)     Feels like skin is on fire.  Fentanyl Itching    Imitrex [Sumatriptan]      Face hot, felt sob    Morphine Other (See Comments)     Feels like skin is on fire. Tolerates Percocet. REVIEW OF SYSTEMS  6 systems reviewed, pertinent positives per HPI otherwise noted to be negative    PHYSICAL EXAM  /76   Pulse 89   Temp 97.7 °F (36.5 °C) (Oral)   Resp 18   Ht 5' 1\" (1.549 m)   Wt 146 lb (66.2 kg)   LMP  (LMP Unknown)   SpO2 96%   BMI 27.59 kg/m²   GENERAL APPEARANCE: Awake and alert. Cooperative. No acute distress. HEAD: Normocephalic. Atraumatic. EYES: PERRL. EOM's grossly intact. ENT: Mucous membranes are moist.   NECK: Supple. Normal ROM. No midline bony tenderness. No crepitus, deformity, or step-offs noted. No swelling, bruising, color change. Did put a new picture on my son here you as it does not look like that anymore  CHEST: Equal symmetric chest rise. LUNGS: Breathing is unlabored. Speaking comfortably in full sentences. Abdomen: Nondistended  EXTREMITIES: MAEE. No acute deformities. On exam of the right shoulder patient tender along clavicle and anterior shoulder without obvious deformity or step-off. No evidence of dislocation subluxations. Normal range of motion and strength testing although does elicit pain. Radial pulses are +2 and cap refill remains less than 5 seconds. SKIN: Warm and dry. NEUROLOGICAL: Alert and oriented. Strength is 5/5 in all extremities and sensation is intact.     RADIOLOGY  XR SHOULDER RIGHT (MIN 2 VIEWS)    Result Date: 10/22/2021  EXAMINATION: THREE XRAY VIEWS return. Final Impression  1. Acute pain of right shoulder      Blood pressure 106/76, pulse 89, temperature 97.7 °F (36.5 °C), temperature source Oral, resp. rate 18, height 5' 1\" (1.549 m), weight 146 lb (66.2 kg), SpO2 96 %, not currently breastfeeding. DISPOSITION  Patient was discharged to home in good condition.            Vicki Holguin  10/22/21 1640

## 2021-10-25 ENCOUNTER — CARE COORDINATION (OUTPATIENT)
Dept: CARE COORDINATION | Age: 51
End: 2021-10-25

## 2021-10-25 NOTE — CARE COORDINATION
ACM attempted to contact patient to f/u regarding recent ED visit and possible enrollment. Patient UTR, VM reached. Message containing ACM contact information left.

## 2021-10-26 ENCOUNTER — CARE COORDINATION (OUTPATIENT)
Dept: CARE COORDINATION | Age: 51
End: 2021-10-26

## 2021-10-26 NOTE — CARE COORDINATION
ACM attempted to contact patient for ED F/U and possible enrollment. Patient UTR x2. ACM left message containing ACM contact information.

## 2021-10-28 DIAGNOSIS — F41.9 ANXIETY: ICD-10-CM

## 2021-10-28 DIAGNOSIS — G43.111 INTRACTABLE MIGRAINE WITH AURA WITH STATUS MIGRAINOSUS: ICD-10-CM

## 2021-10-28 DIAGNOSIS — R11.0 NAUSEA: ICD-10-CM

## 2021-10-28 DIAGNOSIS — K21.9 CHRONIC GERD: ICD-10-CM

## 2021-10-28 RX ORDER — OMEPRAZOLE 20 MG/1
20 CAPSULE, DELAYED RELEASE ORAL DAILY
Qty: 30 CAPSULE | Refills: 0 | Status: SHIPPED | OUTPATIENT
Start: 2021-10-28 | End: 2021-11-28 | Stop reason: SDUPTHER

## 2021-10-28 NOTE — TELEPHONE ENCOUNTER
Last Office Visit  -  9/29/21  Next Office Visit  -  3/28/22    Last Filled  -  9/29/21  Last UDS -  3/24/21  Contract -  11/10/16

## 2021-10-29 RX ORDER — CLONAZEPAM 1 MG/1
TABLET ORAL
Qty: 60 TABLET | Refills: 0 | Status: SHIPPED | OUTPATIENT
Start: 2021-10-29 | End: 2021-12-06

## 2021-10-29 RX ORDER — PROMETHAZINE HYDROCHLORIDE 25 MG/1
TABLET ORAL
Qty: 30 TABLET | Refills: 2 | Status: SHIPPED | OUTPATIENT
Start: 2021-10-29 | End: 2022-01-19

## 2021-10-29 RX ORDER — BUTALBITAL, ACETAMINOPHEN AND CAFFEINE 50; 325; 40 MG/1; MG/1; MG/1
TABLET ORAL
Qty: 30 TABLET | Refills: 0 | Status: SHIPPED | OUTPATIENT
Start: 2021-10-29 | End: 2021-12-06

## 2021-11-02 ENCOUNTER — CARE COORDINATION (OUTPATIENT)
Dept: CARE COORDINATION | Age: 51
End: 2021-11-02

## 2021-11-02 NOTE — CARE COORDINATION
ACM attempted to contact patient for enrollment. Patient UTR x3. Message containing ACM contact information left. No further outreach.

## 2021-11-16 ENCOUNTER — CARE COORDINATION (OUTPATIENT)
Dept: CARE COORDINATION | Age: 51
End: 2021-11-16

## 2021-11-16 NOTE — CARE COORDINATION
Medications: Independent  Ability to prepare Food Preparation: Independent  Ability to maintain home (clean home, laundry): Independent  Ability to drive and/or has transportation: Independent  Ability to do shopping: Needs Assistance  Ability to manage finances: Independent  Is patient able to live independently?: Yes     Current Housing: Private Residence  For whom are you the caregiver?: special needs son  Does the person that you care for see a [facility] PCP?: No        Per the Fall Risk Screening, did the patient have 2 or more falls or 1 fall with injury in the past year?: Yes  How often do you think you are about to fall and you do NOT fall? For example, you grab something to stabilize yourself or hold onto a wall/furniture?: Occasionally  Use of a Mobility Aid: No  Difficulty walking/impaired gait: No  Issues with feet or shoes like numbness, edema, shoes not fitting: Yes (Comment: edema)  Changes in vision, poor vision or poor lighting in environment: No  Dizziness: Yes (Comment: if she stands up too quickly )  Other Fall Risk: No     Frequent urination at night?: Yes  Do you use rails/bars?: No  Do you have a non-slip tub mat?: Yes     Are you experiencing loss of meaning?: No  Are you experiencing loss of hope and peace?: No     Suggested Interventions and Community Resources   Disease Specific Clinic: In Process (Comment: pulmonology )   Zone Management Tools: In Process                  Prior to Admission medications    Medication Sig Start Date End Date Taking? Authorizing Provider   tiZANidine (ZANAFLEX) 2 MG tablet TAKE 1 TABLET BY MOUTH 3 TIMES DAILY AS NEEDED (BACK PAIN) 11/8/21  Yes Wilman Pickering MD   amitriptyline (ELAVIL) 75 MG tablet TAKE 1 TABLET (75 MG) BY MOUTH NIGHTLY 11/8/21  Yes Mounika Hopper MD   clonazePAM (KLONOPIN) 1 MG tablet TAKE 1 TABLET (1MG) BY MOUTH 2 TIMES DAILY AS NEEDED FOR ANXIETY FOR UP TO 30 DAYS.  10/29/21 11/29/21 Yes Aleida Simmons, APRN - CNP   promethazine (PHENERGAN) 25 MG tablet TAKE 1 TABLET BY MOUTH EVERY 6 HOURS AS NEEDED FOR NAUSEA 10/29/21  Yes Tiffany Nubia Simmons APRN - CNP   omeprazole (PRILOSEC) 20 MG delayed release capsule TAKE 1 CAPSULE BY MOUTH DAILY 10/28/21  Yes Nathalia Bee APRN - CNP   naproxen (NAPROSYN) 500 MG tablet Take 1 tablet by mouth 2 times daily 10/22/21  Yes CAT Arita   Rimegepant Sulfate 75 MG TBDP Place 1 tablet under the tongue daily as needed (migraine) Max 1/24 hr, to replace maxalt 9/30/21  Yes Andres Stafford MD   rizatriptan (MAXALT) 10 MG tablet TAKE 1 TABLET BY MOUTH ONCE AS NEEDED FOR MIGRAINE MAY REPEAT IN 2 HOURS IF NEEDED. MAX 2 TABS/24 HOURS 9/29/21  Yes Mounika Hopper MD   furosemide (LASIX) 40 MG tablet TAKE 1 TABLET BY MOUTH EVERY DAY TO TWICE DAILY AS NEEDED SWELLING 8/30/21  Yes Mounika Hopper MD   potassium chloride (KLOR-CON M) 20 MEQ extended release tablet TAKE 1 TABLET (20MEQ) BY MOUTH DAILY (ORIGINAL RX WRITTEN BY DR.[facility] ) 8/17/21  Yes Annmarie Coppola MD   famotidine (PEPCID) 20 MG tablet TAKE 1 TABLET BY MOUTH NIGHTLY 7/26/21  Yes Mounika Hopper MD   topiramate (TOPAMAX) 100 MG tablet TAKE 1 TABLET BY MOUTH 2 TIMES DAILY 6/15/21  Yes Guillermo Levine, APRN - CNP   Cholecalciferol 50 MCG (2000 UT) TABS Take 1 tablet by mouth daily Take 1 tablet by mouth daily. 3/24/21  Yes Andres Stafford MD   loratadine-pseudoephedrine (LORATADINE-D 12HR) 5-120 MG per extended release tablet TAKE 1 TABLET BY MOUTH EVERY MORNING AS NEEDED FOR ALLERGY 3/24/21  Yes Andres Stafford MD   tiotropium (Arrington Begin) 18 MCG inhalation capsule INHAKE 1 ENTIRE CAPSULE INTO THE LUNGS WITH HANDIHALER 3/24/21  Yes Andres Stafford MD   FLUoxetine (PROZAC) 20 MG capsule TAKE 3 CAPSULES (60 MG) BY MOUTH DAILY 12/31/20  Yes Andres Stafford MD   methylphenidate (RITALIN) 20 MG tablet Take 20 mg by mouth 3 times daily.    Yes Historical Provider, MD   albuterol (PROVENTIL) (2.5 MG/3ML) 0.083% nebulizer solution Take 2.5 mg

## 2021-11-16 NOTE — TELEPHONE ENCOUNTER
I agree with the Care Coordinator's Plan of Care. Just fyi that mammogram and lab orders would have to come from her surgeon.

## 2021-11-17 ENCOUNTER — CARE COORDINATION (OUTPATIENT)
Dept: CARE COORDINATION | Age: 51
End: 2021-11-17

## 2021-11-17 NOTE — CARE COORDINATION
ACM contacted patient to inform her that her PCP stated orders for mammogram and labs will need to be placed by her surgeon. Patient states she will contact her surgeon for these orders.

## 2021-11-24 DIAGNOSIS — K21.9 CHRONIC GERD: ICD-10-CM

## 2021-11-24 DIAGNOSIS — G43.109 MIGRAINE WITH AURA AND WITHOUT STATUS MIGRAINOSUS, NOT INTRACTABLE: ICD-10-CM

## 2021-11-24 RX ORDER — RIMEGEPANT SULFATE 75 MG/75MG
TABLET, ORALLY DISINTEGRATING ORAL
Qty: 8 TABLET | Refills: 0 | Status: SHIPPED | OUTPATIENT
Start: 2021-11-24 | End: 2021-12-23

## 2021-11-24 NOTE — TELEPHONE ENCOUNTER
Last Office Visit  -  9/29/21  Next Office Visit  -  3/28/22    Last Filled  -  Not showing as active med? Last UDS -    Contract -      Please advise?

## 2021-11-28 RX ORDER — OMEPRAZOLE 20 MG/1
20 CAPSULE, DELAYED RELEASE ORAL DAILY
Qty: 30 CAPSULE | Refills: 0 | Status: SHIPPED | OUTPATIENT
Start: 2021-11-28 | End: 2021-12-23

## 2021-12-06 DIAGNOSIS — F41.9 ANXIETY: ICD-10-CM

## 2021-12-06 DIAGNOSIS — G43.111 INTRACTABLE MIGRAINE WITH AURA WITH STATUS MIGRAINOSUS: ICD-10-CM

## 2021-12-06 RX ORDER — BUTALBITAL, ACETAMINOPHEN AND CAFFEINE 50; 325; 40 MG/1; MG/1; MG/1
TABLET ORAL
Qty: 30 TABLET | Refills: 0 | Status: SHIPPED | OUTPATIENT
Start: 2021-12-06 | End: 2021-12-23

## 2021-12-06 RX ORDER — CLONAZEPAM 1 MG/1
TABLET ORAL
Qty: 60 TABLET | Refills: 0 | Status: SHIPPED | OUTPATIENT
Start: 2021-12-06 | End: 2022-01-04

## 2021-12-06 NOTE — TELEPHONE ENCOUNTER
Last Office Visit  -  9/29/21  Next Office Visit  -  3/28/22    Last Filled  -  10/29/21  Last UDS -  3/24/21  Contract -  11/15/16

## 2021-12-07 ENCOUNTER — CARE COORDINATION (OUTPATIENT)
Dept: CARE COORDINATION | Age: 51
End: 2021-12-07

## 2021-12-07 NOTE — CARE COORDINATION
ACM attempted to contact patient for follow up. Patient UTR. ACM left VM containing contact information.

## 2021-12-22 DIAGNOSIS — G43.109 MIGRAINE WITH AURA AND WITHOUT STATUS MIGRAINOSUS, NOT INTRACTABLE: ICD-10-CM

## 2021-12-23 RX ORDER — RIMEGEPANT SULFATE 75 MG/75MG
TABLET, ORALLY DISINTEGRATING ORAL
Qty: 8 TABLET | Refills: 0 | Status: SHIPPED | OUTPATIENT
Start: 2021-12-23 | End: 2022-01-19

## 2021-12-31 DIAGNOSIS — F41.9 ANXIETY: ICD-10-CM

## 2022-01-04 ENCOUNTER — TELEPHONE (OUTPATIENT)
Dept: ADMINISTRATIVE | Age: 52
End: 2022-01-04

## 2022-01-04 RX ORDER — CLONAZEPAM 1 MG/1
TABLET ORAL
Qty: 60 TABLET | Refills: 0 | Status: SHIPPED | OUTPATIENT
Start: 2022-01-05 | End: 2022-02-09

## 2022-01-13 ENCOUNTER — CARE COORDINATION (OUTPATIENT)
Dept: CARE COORDINATION | Age: 52
End: 2022-01-13

## 2022-01-13 NOTE — CARE COORDINATION
ACM contacted patient for follow up. Patient states she is currently driving and asked to call ACM back when she is home. ACM agreed and the call ended.

## 2022-01-19 DIAGNOSIS — R11.0 NAUSEA: ICD-10-CM

## 2022-01-19 DIAGNOSIS — K21.9 CHRONIC GERD: ICD-10-CM

## 2022-01-19 RX ORDER — FUROSEMIDE 40 MG/1
TABLET ORAL
Qty: 60 TABLET | Refills: 5 | Status: SHIPPED | OUTPATIENT
Start: 2022-01-19 | End: 2022-07-12

## 2022-01-19 RX ORDER — PROMETHAZINE HYDROCHLORIDE 25 MG/1
TABLET ORAL
Qty: 30 TABLET | Refills: 2 | Status: SHIPPED | OUTPATIENT
Start: 2022-01-19 | End: 2022-04-19

## 2022-01-19 RX ORDER — FAMOTIDINE 20 MG/1
TABLET, FILM COATED ORAL
Qty: 30 TABLET | Refills: 5 | Status: SHIPPED | OUTPATIENT
Start: 2022-01-19 | End: 2022-07-12

## 2022-01-19 NOTE — TELEPHONE ENCOUNTER
Last Office Visit  -  9/29/21  Next Office Visit  -  3/28/22    Last Filled  -    Last UDS -    Contract -

## 2022-02-17 ENCOUNTER — CARE COORDINATION (OUTPATIENT)
Dept: CARE COORDINATION | Age: 52
End: 2022-02-17

## 2022-03-10 DIAGNOSIS — F41.9 ANXIETY: ICD-10-CM

## 2022-03-10 RX ORDER — CLONAZEPAM 1 MG/1
1 TABLET ORAL 2 TIMES DAILY PRN
Qty: 60 TABLET | Refills: 0 | Status: SHIPPED | OUTPATIENT
Start: 2022-03-10 | End: 2022-04-11 | Stop reason: SDUPTHER

## 2022-03-10 NOTE — TELEPHONE ENCOUNTER
Last Office Visit  -  9/29/21  Next Office Visit  -  3/28/22    Last Filled  -  2/9/22  Last UDS -  3/24/21  Contract -  11/10/16

## 2022-03-18 ENCOUNTER — CARE COORDINATION (OUTPATIENT)
Dept: CARE COORDINATION | Age: 52
End: 2022-03-18

## 2022-03-18 NOTE — CARE COORDINATION
ACM attempted to contact patient for f/u. Patient UTR. ACM unable to leave a message due to patient VM box being full. ACM will resolve episode at this time due to patient UTR several times.

## 2022-03-21 DIAGNOSIS — Z72.0 TOBACCO ABUSE: ICD-10-CM

## 2022-03-21 DIAGNOSIS — G43.111 INTRACTABLE MIGRAINE WITH AURA WITH STATUS MIGRAINOSUS: ICD-10-CM

## 2022-03-21 RX ORDER — VARENICLINE TARTRATE 1 MG/1
TABLET, FILM COATED ORAL
Qty: 56 TABLET | Refills: 0 | Status: SHIPPED | OUTPATIENT
Start: 2022-03-21 | End: 2022-04-19

## 2022-03-21 RX ORDER — VARENICLINE TARTRATE 1 MG/1
TABLET, FILM COATED ORAL
Qty: 56 TABLET | Refills: 1 | OUTPATIENT
Start: 2022-03-21

## 2022-03-21 RX ORDER — BUTALBITAL, ACETAMINOPHEN AND CAFFEINE 50; 325; 40 MG/1; MG/1; MG/1
TABLET ORAL
Qty: 30 TABLET | Refills: 0 | Status: SHIPPED | OUTPATIENT
Start: 2022-03-21 | End: 2022-04-19

## 2022-04-11 ENCOUNTER — OFFICE VISIT (OUTPATIENT)
Dept: FAMILY MEDICINE CLINIC | Age: 52
End: 2022-04-11
Payer: MEDICARE

## 2022-04-11 VITALS
HEART RATE: 85 BPM | DIASTOLIC BLOOD PRESSURE: 60 MMHG | OXYGEN SATURATION: 98 % | SYSTOLIC BLOOD PRESSURE: 90 MMHG | WEIGHT: 152 LBS | HEIGHT: 62 IN | BODY MASS INDEX: 27.97 KG/M2

## 2022-04-11 DIAGNOSIS — F13.99 SEDATIVE, HYPNOTIC OR ANXIOLYTIC USE, UNSPECIFIED WITH UNSPECIFIED SEDATIVE, HYPNOTIC OR ANXIOLYTIC-INDUCED DISORDER (HCC): ICD-10-CM

## 2022-04-11 DIAGNOSIS — F41.9 ANXIETY: ICD-10-CM

## 2022-04-11 DIAGNOSIS — N62 MACROMASTIA: ICD-10-CM

## 2022-04-11 DIAGNOSIS — Z98.84 S/P LAPAROSCOPIC SLEEVE GASTRECTOMY: ICD-10-CM

## 2022-04-11 DIAGNOSIS — Z87.891 HISTORY OF TOBACCO USE: ICD-10-CM

## 2022-04-11 DIAGNOSIS — F31.9 BIPOLAR DEPRESSION (HCC): Primary | ICD-10-CM

## 2022-04-11 DIAGNOSIS — J30.2 SEASONAL ALLERGIC RHINITIS, UNSPECIFIED TRIGGER: ICD-10-CM

## 2022-04-11 DIAGNOSIS — J44.9 MODERATE COPD (CHRONIC OBSTRUCTIVE PULMONARY DISEASE) (HCC): ICD-10-CM

## 2022-04-11 PROCEDURE — 1036F TOBACCO NON-USER: CPT | Performed by: FAMILY MEDICINE

## 2022-04-11 PROCEDURE — G8417 CALC BMI ABV UP PARAM F/U: HCPCS | Performed by: FAMILY MEDICINE

## 2022-04-11 PROCEDURE — 3023F SPIROM DOC REV: CPT | Performed by: FAMILY MEDICINE

## 2022-04-11 PROCEDURE — 99214 OFFICE O/P EST MOD 30 MIN: CPT | Performed by: FAMILY MEDICINE

## 2022-04-11 PROCEDURE — G8427 DOCREV CUR MEDS BY ELIG CLIN: HCPCS | Performed by: FAMILY MEDICINE

## 2022-04-11 RX ORDER — NAPROXEN 500 MG/1
500 TABLET ORAL DAILY PRN
Qty: 30 TABLET | Refills: 5 | Status: SHIPPED | OUTPATIENT
Start: 2022-04-11 | End: 2022-09-08

## 2022-04-11 RX ORDER — LORATADINE, PSEUDOEPHEDRINE SULFATE 5; 120 MG/1; MG/1
TABLET, FILM COATED, EXTENDED RELEASE ORAL
Qty: 30 TABLET | Refills: 5 | Status: SHIPPED | OUTPATIENT
Start: 2022-04-11

## 2022-04-11 RX ORDER — CLONAZEPAM 1 MG/1
1 TABLET ORAL 2 TIMES DAILY PRN
Qty: 60 TABLET | Refills: 0 | Status: SHIPPED | OUTPATIENT
Start: 2022-04-11 | End: 2022-05-16

## 2022-04-11 ASSESSMENT — PATIENT HEALTH QUESTIONNAIRE - PHQ9
3. TROUBLE FALLING OR STAYING ASLEEP: 0
4. FEELING TIRED OR HAVING LITTLE ENERGY: 0
SUM OF ALL RESPONSES TO PHQ QUESTIONS 1-9: 0
2. FEELING DOWN, DEPRESSED OR HOPELESS: 0
SUM OF ALL RESPONSES TO PHQ9 QUESTIONS 1 & 2: 0
7. TROUBLE CONCENTRATING ON THINGS, SUCH AS READING THE NEWSPAPER OR WATCHING TELEVISION: 0
SUM OF ALL RESPONSES TO PHQ QUESTIONS 1-9: 0
SUM OF ALL RESPONSES TO PHQ QUESTIONS 1-9: 0
6. FEELING BAD ABOUT YOURSELF - OR THAT YOU ARE A FAILURE OR HAVE LET YOURSELF OR YOUR FAMILY DOWN: 0
9. THOUGHTS THAT YOU WOULD BE BETTER OFF DEAD, OR OF HURTING YOURSELF: 0
1. LITTLE INTEREST OR PLEASURE IN DOING THINGS: 0
10. IF YOU CHECKED OFF ANY PROBLEMS, HOW DIFFICULT HAVE THESE PROBLEMS MADE IT FOR YOU TO DO YOUR WORK, TAKE CARE OF THINGS AT HOME, OR GET ALONG WITH OTHER PEOPLE: 0
SUM OF ALL RESPONSES TO PHQ QUESTIONS 1-9: 0
5. POOR APPETITE OR OVEREATING: 0
8. MOVING OR SPEAKING SO SLOWLY THAT OTHER PEOPLE COULD HAVE NOTICED. OR THE OPPOSITE, BEING SO FIGETY OR RESTLESS THAT YOU HAVE BEEN MOVING AROUND A LOT MORE THAN USUAL: 0

## 2022-04-11 NOTE — PROGRESS NOTES
Assessment/Plan:    Joann Armstrong was seen today for 6 month follow-up. Diagnoses and all orders for this visit:    Bipolar depression ([facility]   Pt conts on prozac 20, elavil 75 qhs. Cont to monitor. If pt experiences sx's of anamika will then rx mood stablizier. Anxiety  -     clonazePAM (KLONOPIN) 1 MG tablet; Take 1 tablet by mouth 2 times daily as needed for Anxiety for up to 30 days, rf. No sx of intolerance. Sedative, hypnotic or anxiolytic use, unspecified with unspecified sedative, hypnotic or anxiolytic-induced disorder ([facility]   RF klonopin. Eventually hope to wean dose. Seasonal allergic rhinitis, unspecified trigger  -     loratadine-pseudoephedrine, rf (LORATADINE-D 12HR) 5-120 MG per extended release tablet; TAKE 1 TABLET BY MOUTH EVERY MORNING AS NEEDED FOR ALLERGY    Moderate COPD (chronic obstructive pulmonary disease) ([facility] History of tobacco use   Cessation applauded   Pt comforted herself with cigarette upon hearing orientation news. Pt has since quit tob, is encouraged to avoid using cig as crutch. S/P laparoscopic sleeve gastrectomy   Doing well. Wt loss applauded. B/c of stomach surgery, pt is asked to minimize use of nsaid (as below). Macromastia   Has neck and pain. -     naproxen (NAPROSYN) 500 MG tablet; Take 1 tablet by mouth daily as needed for Pain Take with food. Take with food to    To see Dr Cydney Roberson soon      Patient Instructions   Please call [address] [place] [address] to schedule a mammgram.       Please make an appointment with one of our [facility] Consultants, Dr. Mary Patterson or Charu Schwartz MA. They will work with you to develop a plan to improve your overall well-being by addressing any behavioral or mental health factors that are influencing your health. You can schedule your appointment just like you schedule your other appointments here, at the  or over the phone. Each appointment will be 30 minutes long, and you will typically be seen every 2-4 weeks.  Your insurance Printed at [email or web address].  Will need to be signed.     should cover the visit, but you may owe a specialist copay. If you would prefer to be seen by a therapist more frequently, or for a longer visit, please ask your Primary Care Provider for a recommendation. Patient: Breezy Bacon is a 54 y. o.female who presents today with the following Chief Complaint(s):  Chief Complaint   Patient presents with    6 Month Follow-Up         HPI: Pt with copd, migraines, anx/dep, narcolepsy, BPD1, gerd, hld is s/p sleeve gastrectomy 11/16/20. Conts to struggle with large breasts and asso'd upper back pain. Despite 50 lb wt loss since gastric sleeve, bra size has decreased from 48/50 to 42 but cup size is unchanged and remains a size F.. To see Dr Giovanni Hartmann soon for long awaited surgery planning. Pt has remained nonsmoker since 2/2020 when she was told she was not breast reduction candidate until she was a nonsmoker. Son recently shared with pt that he is bisexual. Pt has been struggling with this news. Feels his orientation is against God. When hear this news. Pt was unable to cope. Smoked a cig but stopped after 1. Also had a beer that she is to avoid carbonated beverages since gastric surgery. Has not had further etoh. Other son is on autism spectrum, and his friend recently convinced him that he was transgender woman. Friend had him dress in drag. Pt's son, however, disagrees and denies gender concerns. Even so, pt struggles with situation. Feels prozac is effective for depression. Was on seroquel in past, fears more meds. Son has TD, wants to avoid more meds other than klonopin 1 mg bid. Pt is not on mood stablizers, denies anamika. Is to repeat cscope in 3 yr since 3/7/22 Cscope by Dr Jyoti Moralez revealed 1 polyp in ascending colon and 3 in descending colon.          Current Outpatient Medications   Medication Sig Dispense Refill    loratadine-pseudoephedrine (LORATADINE-D 12HR) 5-120 MG per extended release tablet TAKE 1 TABLET BY MOUTH EVERY MORNING AS NEEDED FOR ALLERGY 30 tablet 5    clonazePAM (KLONOPIN) 1 MG tablet Take 1 tablet by mouth 2 times daily as needed for Anxiety for up to 30 days. 60 tablet 0    naproxen (NAPROSYN) 500 MG tablet Take 1 tablet by mouth daily as needed for Pain Take with food. 30 tablet 5    butalbital-acetaminophen-caffeine (FIORICET, ESGIC) -40 MG per tablet TAKE 1 TABLET BY MOUTH 3 TIMES DAILY AS NEEDED FOR MIGRAINE 30 tablet 0    varenicline (CHANTIX) 1 MG tablet TAKE 1 TABLET BY MOUTH TWICE DAILY TAKE AFTER EATING WITH A FULL GLASS OF WATER 56 tablet 0    NURTEC 75 MG TBDP PLACE 1 TABLET UNDER THE TONGUE DAILY AS NEEDED (MIGRAINE) MAX 1/24 HR, TO REPLACE MAXALT 8 tablet 5    promethazine (PHENERGAN) 25 MG tablet TAKE 1 TABLET BY MOUTH EVERY 6 HOURS AS NEEDED FOR NAUSEA 30 tablet 2    furosemide (LASIX) 40 MG tablet TAKE 1 TABLET BY MOUTH EVERY DAY TO TWICE DAILY AS NEEDED SWELLING 60 tablet 5    famotidine (PEPCID) 20 MG tablet TAKE 1 TABLET BY MOUTH NIGHTLY 30 tablet 5    omeprazole (PRILOSEC) 20 MG delayed release capsule TAKE 1 CAPSULE BY MOUTH DAILY 30 capsule 3    FLUoxetine (PROZAC) 20 MG capsule TAKE 3 CAPSULES (60 MG) BY MOUTH DAILY 90 capsule 5    tiZANidine (ZANAFLEX) 2 MG tablet TAKE 1 TABLET BY MOUTH 3 TIMES DAILY AS NEEDED (BACK PAIN) 30 tablet 5    amitriptyline (ELAVIL) 75 MG tablet TAKE 1 TABLET (75 MG) BY MOUTH NIGHTLY 30 tablet 5    potassium chloride (KLOR-CON M) 20 MEQ extended release tablet TAKE 1 TABLET (20MEQ) BY MOUTH DAILY 90 tablet 1    topiramate (TOPAMAX) 100 MG tablet TAKE 1 TABLET BY MOUTH 2 TIMES DAILY 180 tablet 1    Cholecalciferol 50 MCG (2000 UT) TABS Take 1 tablet by mouth daily Take 1 tablet by mouth daily. 90 tablet 2    methylphenidate (RITALIN) 20 MG tablet Take 20 mg by mouth 3 times daily.       albuterol (PROVENTIL) (2.5 MG/3ML) 0.083% nebulizer solution Take 2.5 mg by nebulization every 6 hours as needed for Wheezing      VENTOLIN  (90 Base) MCG/ACT inhaler INHALE 2 PUFFS INTO THE LUNGS 4 TIMES DAILY AS NEEDED. 18 g 5    gabapentin (NEURONTIN) 300 MG capsule TAKE ONE CAPSULE BY MOUTH ONE HOUR BEFORE BEDTIME  5    amphetamine-dextroamphetamine (ADDERALL XR) 20 MG extended release capsule TAKE 1 CAPSULE BY MOUTH EVERY DAY  0    Respiratory Therapy Supplies (NEBULIZER/TUBING/MOUTHPIECE) KIT 1 kit by Does not apply route daily as needed 1 kit 0    OXYGEN Inhale 2 L/min into the lungs continuous. Regulate with portability at home (Patient taking differently: Inhale 2 L/min into the lungs as needed ) 1 Container 0     No current facility-administered medications for this visit. Patient's past medical history,surgical history, family history, medications,  and allergies  were all reviewed and updated as appropriate today. Review of Systems  abv    Physical Exam  Constitutional:       Appearance: Normal appearance. She is well-developed. HENT:      Head: Normocephalic and atraumatic. Right Ear: External ear normal.      Left Ear: External ear normal.   Eyes:      General: No scleral icterus. Right eye: No discharge. Left eye: No discharge. Extraocular Movements: Extraocular movements intact. Conjunctiva/sclera: Conjunctivae normal.   Neck:      Comments: No visualized masses  FROM  Pulmonary:      Effort: Pulmonary effort is normal.   Musculoskeletal:         General: Normal range of motion. Skin:     General: Skin is warm and dry. Neurological:      General: No focal deficit present. Mental Status: She is alert and oriented to person, place, and time. Psychiatric:         Behavior: Behavior normal.         Thought Content:  Thought content normal.         Judgment: Judgment normal.      Comments: Tearful when discussing son's orientation announcement             BP 90/60   Pulse 85   Ht 5' 2\" (1.575 m)   Wt 152 lb (68.9 kg)   LMP  (LMP Unknown)   SpO2 98%   BMI 27.80 kg/m²

## 2022-04-11 NOTE — PATIENT INSTRUCTIONS
Please call [address] [place] [address] to schedule a mammgram.       Please make an appointment with one of our [address], Dr. Sondra Whitmore or Rhonda Dunne MA. They will work with you to develop a plan to improve your overall well-being by addressing any behavioral or mental health factors that are influencing your health. You can schedule your appointment just like you schedule your other appointments here, at the  or over the phone. Each appointment will be 30 minutes long, and you will typically be seen every 2-4 weeks. Your insurance should cover the visit, but you may owe a specialist copay. If you would prefer to be seen by a therapist more frequently, or for a longer visit, please ask your Primary Care Provider for a recommendation.

## 2022-04-18 ENCOUNTER — OFFICE VISIT (OUTPATIENT)
Dept: PSYCHOLOGY | Age: 52
End: 2022-04-18
Payer: MEDICARE

## 2022-04-18 DIAGNOSIS — K21.9 CHRONIC GERD: ICD-10-CM

## 2022-04-18 DIAGNOSIS — G43.111 INTRACTABLE MIGRAINE WITH AURA WITH STATUS MIGRAINOSUS: ICD-10-CM

## 2022-04-18 DIAGNOSIS — R11.0 NAUSEA: ICD-10-CM

## 2022-04-18 DIAGNOSIS — F31.9 BIPOLAR 1 DISORDER (HCC): Primary | ICD-10-CM

## 2022-04-18 DIAGNOSIS — Z72.0 TOBACCO ABUSE: ICD-10-CM

## 2022-04-18 PROCEDURE — 90791 PSYCH DIAGNOSTIC EVALUATION: CPT | Performed by: PSYCHOLOGIST

## 2022-04-18 PROCEDURE — 1036F TOBACCO NON-USER: CPT | Performed by: PSYCHOLOGIST

## 2022-04-18 ASSESSMENT — ANXIETY QUESTIONNAIRES
2. NOT BEING ABLE TO STOP OR CONTROL WORRYING: 1-SEVERAL DAYS
3. WORRYING TOO MUCH ABOUT DIFFERENT THINGS: 1-SEVERAL DAYS
GAD7 TOTAL SCORE: 5
6. BECOMING EASILY ANNOYED OR IRRITABLE: 1-SEVERAL DAYS
4. TROUBLE RELAXING: 1-SEVERAL DAYS
7. FEELING AFRAID AS IF SOMETHING AWFUL MIGHT HAPPEN: 0-NOT AT ALL
5. BEING SO RESTLESS THAT IT IS HARD TO SIT STILL: 0-NOT AT ALL
1. FEELING NERVOUS, ANXIOUS, OR ON EDGE: 1

## 2022-04-18 ASSESSMENT — PATIENT HEALTH QUESTIONNAIRE - PHQ9
SUM OF ALL RESPONSES TO PHQ QUESTIONS 1-9: 2
2. FEELING DOWN, DEPRESSED OR HOPELESS: 1
1. LITTLE INTEREST OR PLEASURE IN DOING THINGS: 1
SUM OF ALL RESPONSES TO PHQ QUESTIONS 1-9: 2
SUM OF ALL RESPONSES TO PHQ QUESTIONS 1-9: 2
SUM OF ALL RESPONSES TO PHQ9 QUESTIONS 1 & 2: 2
SUM OF ALL RESPONSES TO PHQ QUESTIONS 1-9: 2

## 2022-04-18 NOTE — PROGRESS NOTES
Behavioral Health Consultation  Lali Claros, Ph.D.  Psychologist  4/18/2022  9:35 AM EDT      Time spent with Patient: 25 minutes  This is patient's first [facility] appointment. Reason for Consult:    Chief Complaint   Patient presents with    Depression    Anxiety     Referring Provider: Shruthi Evans MD  [address] [facility],  [facility]    Pt provided informed consent for the behavioral health program. Discussed with patient model of service to include the limits of confidentiality (i.e. abuse reporting, suicide intervention, etc.) and short-term intervention focused approach. Pt indicated understanding. Feedback given to PCP. S:  Patient presents with concerns about situational stressors. Sx are aggravated by Covid-19 pandemic. She lives with her 22year-old son, who has been experiencing some gender dysphoria. He has expressed concern that patient does not support his transition to living as a female. She denies this accusation, but admits that she worries about his safety, how he would react to public reaction to his new identity. He has a history of violent or destructive behavior. Patient finds relief from being alone, which is not always possible for her. Goals for treatment incude supportive therapy, coping strategies. Patient lives with her 22year-old son who has ASD, ODD, and mood disorder. Her son has a dog. Patient is currently on disability, but would like to return to work. Is trying to have a breast reduction, which would reduce pain and improve her mobility. Daily routine is variable, depends on her son's routine. Daily caffeine use includes 1 cup of coffee. No cigarette use, drinks alcohol a few times a week, no illegal drug use. There is some regular exercise (riding bike). Patient denies insomnia. Patient enjoys the following hobbies: motorcycles. Patient describes good social support. Family history is positive for schizophrenia (dad, niece), depression (mother, son).  Patient was seen by [facility] about 4 years ago. O:  MSE:    Appearance: good hygiene   Attitude: cooperative and friendly  Consciousness: alert  Orientation: oriented to person, place, time, general circumstance  Memory: recent and remote memory intact  Attention/Concentration: intact during session  Psychomotor Activity:normal  Eye Contact: normal  Speech: normal rate and volume, well-articulated  Mood: stressed  Affect: anxious  Perception: within normal limits  Thought Content: all-or-none thinking  Thought Process: logical, coherent and goal-directed  Insight: good  Judgment: intact  Ability to understand instructions: Yes  Ability to respond meaningfully: Yes  Morbid Ideation: no   Suicide Assessment: no suicidal ideation, plan, or intent  Homicidal Ideation: no    History:    Medications:   Current Outpatient Medications   Medication Sig Dispense Refill    loratadine-pseudoephedrine (LORATADINE-D 12HR) 5-120 MG per extended release tablet TAKE 1 TABLET BY MOUTH EVERY MORNING AS NEEDED FOR ALLERGY 30 tablet 5    clonazePAM (KLONOPIN) 1 MG tablet Take 1 tablet by mouth 2 times daily as needed for Anxiety for up to 30 days. 60 tablet 0    naproxen (NAPROSYN) 500 MG tablet Take 1 tablet by mouth daily as needed for Pain Take with food.  30 tablet 5    butalbital-acetaminophen-caffeine (FIORICET, ESGIC) -40 MG per tablet TAKE 1 TABLET BY MOUTH 3 TIMES DAILY AS NEEDED FOR MIGRAINE 30 tablet 0    varenicline (CHANTIX) 1 MG tablet TAKE 1 TABLET BY MOUTH TWICE DAILY TAKE AFTER EATING WITH A FULL GLASS OF WATER 56 tablet 0    NURTEC 75 MG TBDP PLACE 1 TABLET UNDER THE TONGUE DAILY AS NEEDED (MIGRAINE) MAX 1/24 HR, TO REPLACE MAXALT 8 tablet 5    promethazine (PHENERGAN) 25 MG tablet TAKE 1 TABLET BY MOUTH EVERY 6 HOURS AS NEEDED FOR NAUSEA 30 tablet 2    furosemide (LASIX) 40 MG tablet TAKE 1 TABLET BY MOUTH EVERY DAY TO TWICE DAILY AS NEEDED SWELLING 60 tablet 5    famotidine (PEPCID) 20 MG tablet TAKE 1 TABLET BY MOUTH NIGHTLY 30 tablet 5    omeprazole (PRILOSEC) 20 MG delayed release capsule TAKE 1 CAPSULE BY MOUTH DAILY 30 capsule 3    FLUoxetine (PROZAC) 20 MG capsule TAKE 3 CAPSULES (60 MG) BY MOUTH DAILY 90 capsule 5    tiZANidine (ZANAFLEX) 2 MG tablet TAKE 1 TABLET BY MOUTH 3 TIMES DAILY AS NEEDED (BACK PAIN) 30 tablet 5    amitriptyline (ELAVIL) 75 MG tablet TAKE 1 TABLET (75 MG) BY MOUTH NIGHTLY 30 tablet 5    potassium chloride (KLOR-CON M) 20 MEQ extended release tablet TAKE 1 TABLET (20MEQ) BY MOUTH DAILY 90 tablet 1    topiramate (TOPAMAX) 100 MG tablet TAKE 1 TABLET BY MOUTH 2 TIMES DAILY 180 tablet 1    Cholecalciferol 50 MCG (2000 UT) TABS Take 1 tablet by mouth daily Take 1 tablet by mouth daily. 90 tablet 2    methylphenidate (RITALIN) 20 MG tablet Take 20 mg by mouth 3 times daily.  albuterol (PROVENTIL) (2.5 MG/3ML) 0.083% nebulizer solution Take 2.5 mg by nebulization every 6 hours as needed for Wheezing      VENTOLIN  (90 Base) MCG/ACT inhaler INHALE 2 PUFFS INTO THE LUNGS 4 TIMES DAILY AS NEEDED. 18 g 5    gabapentin (NEURONTIN) 300 MG capsule TAKE ONE CAPSULE BY MOUTH ONE HOUR BEFORE BEDTIME  5    amphetamine-dextroamphetamine (ADDERALL XR) 20 MG extended release capsule TAKE 1 CAPSULE BY MOUTH EVERY DAY  0    Respiratory Therapy Supplies (NEBULIZER/TUBING/MOUTHPIECE) KIT 1 kit by Does not apply route daily as needed 1 kit 0    OXYGEN Inhale 2 L/min into the lungs continuous. Regulate with portability at home (Patient taking differently: Inhale 2 L/min into the lungs as needed ) 1 Container 0     No current facility-administered medications for this visit.      Social History:   Social History     Socioeconomic History    Marital status:      Spouse name: Not on file    Number of children: 2    Years of education: Not on file    Highest education level: Not on file   Occupational History    Occupation: unemployed   Tobacco Use    Smoking status: Not on file   [place] Smokeless tobacco: Never Used   Vaping Use    Vaping Use: Never used   Substance and Sexual Activity    Alcohol use: Not Currently    Drug use: No     Comment: Quit cocaine and meth 2005    Sexual activity: Yes     Partners: Male     Birth control/protection: Surgical     Comment: hysterectomy   Other Topics Concern    Not on file   Social History Narrative    Not on file     Social Determinants of Health     Financial Resource Strain:     Difficulty of Paying Living Expenses: Not on file   Food Insecurity:     Worried About Running Out of Food in the Last Year: Not on file    Mary of Food in the Last Year: Not on file   Transportation Needs:     Lack of Transportation (Medical): Not on file    Lack of Transportation (Non-Medical): Not on file   Physical Activity:     Days of Exercise per Week: Not on file    Minutes of Exercise per Session: Not on file   Stress:     Feeling of Stress : Not on file   Social Connections:     Frequency of Communication with Friends and Family: Not on file    Frequency of Social Gatherings with Friends and Family: Not on file    Attends Mormonism Services: Not on file    Active Member of [address] [place] [address] or Organizations: Not on file    Attends Club or Organization Meetings: Not on file    Marital Status: Not on file   Intimate Partner Violence:     Fear of Current or Ex-Partner: Not on file    Emotionally Abused: Not on file    Physically Abused: Not on file    Sexually Abused: Not on file   Housing Stability:     Unable to Pay for Housing in the Last Year: Not on file    Number of [place] in the Last Year: Not on file    Unstable Housing in the Last Year: Not on file     TOBACCO:   does not have a smoking history on file. She has never used smokeless tobacco.  ETOH:   reports previous alcohol use.   Family History:   Family History   Problem Relation Age of Onset    Depression Mother     Breast Cancer Mother     Stroke Mother         cva x 3    Hypertension Mother     Elevated Lipids Mother     Diabetes Mother     Coronary Art Dis Father         mi  age 43    Schizophrenia Father     Hypertension Father     Elevated Lipids Father     Diabetes Father     Birth Defects Son     Other Son         odd, autism    Asthma Son     Diabetes Son     Other Brother         colitis    Mental Illness Sister     Heart Failure Paternal Grandmother         CHF    Emphysema Maternal Grandfather     Cancer Paternal Grandfather      A:  Administered PHQ-9 and MARYAM-7 (see below). Patient endorses minimal symptoms of depression and mild symptoms of anxiety. Denies SI. Insight and motivation are good. PHQ Scores 2022 2022 3/24/2021 2017   PHQ2 Score 2 0 0 5   PHQ9 Score 2 0 0 23     Interpretation of Total Score Depression Severity: 1-4 = Minimal depression, 5-9 = Mild depression, 10-14 = Moderate depression, 15-19 = Moderately severe depression, 20-27 = Severe depression    MARYAM 7 SCORE 2022   MARYAM-7 Total Score 5 21     Interpretation of MARYAM-7 score: 5-9 = mild anxiety, 10-14 = moderate anxiety, 15+ = severe anxiety. Recommend referral to behavioral health for scores 10 or greater. Diagnosis:    1.  B[facility] ([facility]          Diagnosis Date    Anxiety     Asthma     Bipolar 1 disorder ([facility])     Pt is willing to see psych after 7/15 for confirmation of dx    [facility] bronchitis [facility]     Chronic GERD 2020    COPD (chronic obstructive pulmonary disease) ([facility])     Dr Geetha Gaspar Depression     Emphysema of lung ([facility]     Migraine     Migraines     Mixed hyperlipidemia 2020    MRSA infection within last 3 months     axillary    Narcolepsy     Dr Rodríguez Mcdaniels Obesity     Pneumonia     Restless leg syndrome     Sleep apnea     did not tolerate cpap, uses [facility]    Tobacco abuse     Tobacco use disorder 2011    Urinary incontinence      Plan:  Pt interventions:  Established rapport, Conducted functional assessment, [place]-setting to identify pt's primary goals for [facility] visit / overall health, Supportive techniques, Emphasized self-care as important for managing overall health and Collaboratively set goals with pt re: treatment.     Pt Behavioral Change Plan:   See Pt Instructions

## 2022-04-19 ENCOUNTER — TELEPHONE (OUTPATIENT)
Dept: BARIATRICS/WEIGHT MGMT | Age: 52
End: 2022-04-19

## 2022-04-19 RX ORDER — BUTALBITAL, ACETAMINOPHEN AND CAFFEINE 50; 325; 40 MG/1; MG/1; MG/1
TABLET ORAL
Qty: 30 TABLET | Refills: 0 | Status: SHIPPED | OUTPATIENT
Start: 2022-04-19 | End: 2022-05-16

## 2022-04-19 RX ORDER — VARENICLINE TARTRATE 1 MG/1
TABLET, FILM COATED ORAL
Qty: 56 TABLET | Refills: 0 | Status: SHIPPED | OUTPATIENT
Start: 2022-04-19 | End: 2022-05-16

## 2022-04-19 RX ORDER — OMEPRAZOLE 20 MG/1
CAPSULE, DELAYED RELEASE ORAL
Qty: 30 CAPSULE | Refills: 3 | OUTPATIENT
Start: 2022-04-19

## 2022-04-19 RX ORDER — TIZANIDINE 2 MG/1
TABLET ORAL
Qty: 30 TABLET | Refills: 5 | Status: SHIPPED | OUTPATIENT
Start: 2022-04-19 | End: 2022-10-06

## 2022-04-19 RX ORDER — PROMETHAZINE HYDROCHLORIDE 25 MG/1
TABLET ORAL
Qty: 30 TABLET | Refills: 2 | Status: SHIPPED | OUTPATIENT
Start: 2022-04-19 | End: 2022-08-09

## 2022-04-19 NOTE — TELEPHONE ENCOUNTER
Anesthesia Pre Eval Note    Anesthesia ROS/Med Hx          Cardiovascular Review:    Positive for hyperlipidemia    End/Other Review:    Positive for chronic pain  Positive for cancer      Relevant Problems   No relevant active problems       Physical Exam     Airway   Mallampati: II  TM Distance: >3 FB  Neck ROM: Full  Neck: Non-tender and Able to place in sniff position  TMJ Mobility: Good    Cardiovascular  Cardiovascular exam normal  Cardio Rhythm: Regular  Cardio Rate: Normal    Head Assessment  Head assessment: Normocephalic and Atraumatic    General Assessment  General Assessment: Alert and oriented and No acute distress    Dental Exam  Dental exam normal    Pulmonary Exam  Pulmonary exam normal  Breath sounds clear to auscultation:  Yes    Abdominal Exam  Abdominal exam normal      Anesthesia Plan:  Anesthesia Plan    ASA Status: 2    Anesthesia Type: General    Induction: Intravenous  Preferred Airway Type: ETT  Maintenance: Inhalational      Post-op Pain Management: Per Surgeon      Checklist  Reviewed: NPO Status, Allergies, Medications, Problem list and Past Med History  Monitors  Discussed risks of the following Invasive monitors with patient: Arterial Line    Consent/Risks Discussed Statement:  The proposed anesthetic plan, including its risks and benefits, have been discussed with the Patient along with the risks and benefits of alternatives. Questions were encouraged and answered and the patient and/or representative understands and agrees to proceed.        I discussed with the patient (and/or patient's legal representative) the risks and benefits of the proposed anesthesia plan, General, which may include services performed by other anesthesia providers.    Alternative anesthesia plans, if available, were reviewed with the patient (and/or patient's legal representative). Discussion has been held with the patient (and/or patient's legal representative) regarding risks of anesthesia, which include  Last visit 4/11/22  No future Nausea, Vomiting and Dental Injury and emergent situations that may require change in anesthesia plan.    The patient (and/or patient's legal representative) has indicated understanding, his/her questions have been answered, and he/she wishes to proceed with the planned anesthetic.    Blood Products: Not Anticipated

## 2022-04-19 NOTE — TELEPHONE ENCOUNTER
Patient has not been seen since her 6 month follow up in May 2021.  Needs an appointment in order to refill medication

## 2022-05-02 ENCOUNTER — OFFICE VISIT (OUTPATIENT)
Dept: SURGERY | Age: 52
End: 2022-05-02
Payer: MEDICARE

## 2022-05-02 ENCOUNTER — OFFICE VISIT (OUTPATIENT)
Dept: PSYCHOLOGY | Age: 52
End: 2022-05-02
Payer: MEDICARE

## 2022-05-02 VITALS
TEMPERATURE: 98.1 F | WEIGHT: 149 LBS | OXYGEN SATURATION: 96 % | HEART RATE: 90 BPM | DIASTOLIC BLOOD PRESSURE: 81 MMHG | SYSTOLIC BLOOD PRESSURE: 112 MMHG | BODY MASS INDEX: 27.25 KG/M2

## 2022-05-02 DIAGNOSIS — F31.9 BIPOLAR 1 DISORDER (HCC): Primary | ICD-10-CM

## 2022-05-02 DIAGNOSIS — N62 MACROMASTIA: Primary | ICD-10-CM

## 2022-05-02 PROCEDURE — G8427 DOCREV CUR MEDS BY ELIG CLIN: HCPCS | Performed by: SURGERY

## 2022-05-02 PROCEDURE — 1036F TOBACCO NON-USER: CPT | Performed by: SURGERY

## 2022-05-02 PROCEDURE — 90832 PSYTX W PT 30 MINUTES: CPT | Performed by: PSYCHOLOGIST

## 2022-05-02 PROCEDURE — 99213 OFFICE O/P EST LOW 20 MIN: CPT | Performed by: SURGERY

## 2022-05-02 PROCEDURE — G8417 CALC BMI ABV UP PARAM F/U: HCPCS | Performed by: SURGERY

## 2022-05-02 PROCEDURE — 1036F TOBACCO NON-USER: CPT | Performed by: PSYCHOLOGIST

## 2022-05-02 ASSESSMENT — PATIENT HEALTH QUESTIONNAIRE - PHQ9
SUM OF ALL RESPONSES TO PHQ QUESTIONS 1-9: 2
SUM OF ALL RESPONSES TO PHQ QUESTIONS 1-9: 2
SUM OF ALL RESPONSES TO PHQ9 QUESTIONS 1 & 2: 2
SUM OF ALL RESPONSES TO PHQ QUESTIONS 1-9: 2
SUM OF ALL RESPONSES TO PHQ QUESTIONS 1-9: 2
1. LITTLE INTEREST OR PLEASURE IN DOING THINGS: 1
2. FEELING DOWN, DEPRESSED OR HOPELESS: 1

## 2022-05-02 ASSESSMENT — ENCOUNTER SYMPTOMS
ABDOMINAL PAIN: 1
DIARRHEA: 0
SHORTNESS OF BREATH: 1
HEARTBURN: 1
WHEEZING: 1
NAUSEA: 1
DOUBLE VISION: 0
BLURRED VISION: 0
VOMITING: 1
COUGH: 1
BACK PAIN: 1

## 2022-05-02 ASSESSMENT — ANXIETY QUESTIONNAIRES
GAD7 TOTAL SCORE: 9
4. TROUBLE RELAXING: 1-SEVERAL DAYS
6. BECOMING EASILY ANNOYED OR IRRITABLE: 1-SEVERAL DAYS
2. NOT BEING ABLE TO STOP OR CONTROL WORRYING: 2-OVER HALF THE DAYS
3. WORRYING TOO MUCH ABOUT DIFFERENT THINGS: 2-OVER HALF THE DAYS
7. FEELING AFRAID AS IF SOMETHING AWFUL MIGHT HAPPEN: 1-SEVERAL DAYS
5. BEING SO RESTLESS THAT IT IS HARD TO SIT STILL: 1-SEVERAL DAYS
1. FEELING NERVOUS, ANXIOUS, OR ON EDGE: 1

## 2022-05-02 NOTE — PROGRESS NOTES
Behavioral Health Consultation  Nette Lazcano, Ph.D.  Psychologist  5/2/2022  11:29 AM EDT      Time spent with Patient: 25 minutes  This is patient's second  [facility] appointment. Reason for Consult:    Chief Complaint   Patient presents with    Depression    Anxiety     Referring Provider: Lizy Akins MD  [address],  [address] [place]    Feedback given to PCP. S:  Patient reported that she is anxious about upcoming appointment with plastic surgery. Is also hopeful about relief that breast reduction may provide for her. Patient recently interacted with her ex, which was stressful for her. Her son is starting a new day program next week. She is hopeful about this change, but acknowledges the need for adjusting to a new routine. Has been having racing thoughts, feels that she is not sleeping as well, has notice reduced appetite, is more tearful. Notes that she does not often get to spend time alone, have \"down time. \" Even when she is alone, she worries about her son and whether or not he may act out. Processed the stress of raising a special needs child, living with him and hoping he can develop more independence.     O:  MSE:    Appearance: good hygiene   Attitude: cooperative, friendly and tearful  Consciousness: alert  Orientation: oriented to person, place, time, general circumstance  Memory: recent and remote memory intact  Attention/Concentration: intact during session  Psychomotor Activity:normal  Eye Contact: normal  Speech: normal rate and volume, well-articulated  Mood: stressed  Affect: anxious  Perception: within normal limits  Thought Content: all-or-none thinking and intrusive thoughts  Thought Process: logical, coherent and goal-directed  Insight: good  Judgment: intact  Ability to understand instructions: Yes  Ability to respond meaningfully: Yes  Morbid Ideation: no   Suicide Assessment: no suicidal ideation, plan, or intent  Homicidal Ideation: no     History:    Medications: Current Outpatient Medications   Medication Sig Dispense Refill    butalbital-acetaminophen-caffeine (FIORICET, ESGIC) -40 MG per tablet TAKE 1 TABLET BY MOUTH 3 TIMES DAILY AS NEEDED FOR MIGRAINE 30 tablet 0    varenicline (CHANTIX) 1 MG tablet TAKE 1 TABLET BY MOUTH TWICE DAILY TAKE AFTER EATING WITH A FULL GLASS OF WATER 56 tablet 0    promethazine (PHENERGAN) 25 MG tablet TAKE 1 TABLET BY MOUTH EVERY 6 HOURS AS NEEDED FOR NAUSEA 30 tablet 2    tiZANidine (ZANAFLEX) 2 MG tablet TAKE 1 TABLET (2 MG) BY MOUTH 3 TIMES DAILY AS NEEDED (BACK PAIN) 30 tablet 5    loratadine-pseudoephedrine (LORATADINE-D 12HR) 5-120 MG per extended release tablet TAKE 1 TABLET BY MOUTH EVERY MORNING AS NEEDED FOR ALLERGY 30 tablet 5    clonazePAM (KLONOPIN) 1 MG tablet Take 1 tablet by mouth 2 times daily as needed for Anxiety for up to 30 days. 60 tablet 0    naproxen (NAPROSYN) 500 MG tablet Take 1 tablet by mouth daily as needed for Pain Take with food. 30 tablet 5    NURTEC 75 MG TBDP PLACE 1 TABLET UNDER THE TONGUE DAILY AS NEEDED (MIGRAINE) MAX 1/24 HR, TO REPLACE MAXALT 8 tablet 5    furosemide (LASIX) 40 MG tablet TAKE 1 TABLET BY MOUTH EVERY DAY TO TWICE DAILY AS NEEDED SWELLING 60 tablet 5    famotidine (PEPCID) 20 MG tablet TAKE 1 TABLET BY MOUTH NIGHTLY 30 tablet 5    omeprazole (PRILOSEC) 20 MG delayed release capsule TAKE 1 CAPSULE BY MOUTH DAILY 30 capsule 3    FLUoxetine (PROZAC) 20 MG capsule TAKE 3 CAPSULES (60 MG) BY MOUTH DAILY 90 capsule 5    amitriptyline (ELAVIL) 75 MG tablet TAKE 1 TABLET (75 MG) BY MOUTH NIGHTLY 30 tablet 5    potassium chloride (KLOR-CON M) 20 MEQ extended release tablet TAKE 1 TABLET (20MEQ) BY MOUTH DAILY 90 tablet 1    topiramate (TOPAMAX) 100 MG tablet TAKE 1 TABLET BY MOUTH 2 TIMES DAILY 180 tablet 1    Cholecalciferol 50 MCG (2000 UT) TABS Take 1 tablet by mouth daily Take 1 tablet by mouth daily.  90 tablet 2    methylphenidate (RITALIN) 20 MG tablet Take 20 mg by mouth 3 times daily.  albuterol (PROVENTIL) (2.5 MG/3ML) 0.083% nebulizer solution Take 2.5 mg by nebulization every 6 hours as needed for Wheezing      VENTOLIN  (90 Base) MCG/ACT inhaler INHALE 2 PUFFS INTO THE LUNGS 4 TIMES DAILY AS NEEDED. 18 g 5    gabapentin (NEURONTIN) 300 MG capsule TAKE ONE CAPSULE BY MOUTH ONE HOUR BEFORE BEDTIME  5    amphetamine-dextroamphetamine (ADDERALL XR) 20 MG extended release capsule TAKE 1 CAPSULE BY MOUTH EVERY DAY  0    Respiratory Therapy Supplies (NEBULIZER/TUBING/MOUTHPIECE) KIT 1 kit by Does not apply route daily as needed 1 kit 0    OXYGEN Inhale 2 L/min into the lungs continuous. Regulate with portability at home (Patient taking differently: Inhale 2 L/min into the lungs as needed ) 1 Container 0     No current facility-administered medications for this visit. Social History:   Social History     Socioeconomic History    Marital status:      Spouse name: Not on file    Number of children: 2    Years of education: Not on file    Highest education level: Not on file   Occupational History    Occupation: unemployed   Tobacco Use    Smoking status: Not on file    Smokeless tobacco: Never Used   Vaping Use    Vaping Use: Never used   Substance and Sexual Activity    Alcohol use: Not Currently    Drug use: No     Comment: Quit cocaine and meth 2005    Sexual activity: Yes     Partners: Male     Birth control/protection: Surgical     Comment: hysterectomy   Other Topics Concern    Not on file   Social History Narrative    Not on file     Social Determinants of Health     Financial Resource Strain:     Difficulty of Paying Living Expenses: Not on file   Food Insecurity:     Worried About [address] in the Last Year: Not on file    [address] in the Last Year: Not on file   Transportation Needs:     Lack of Transportation (Medical): Not on file    Lack of Transportation (Non-Medical):  Not on file   Physical Activity:  Days of Exercise per Week: Not on file    Minutes of Exercise per Session: Not on file   Stress:     Feeling of Stress : Not on file   Social Connections:     Frequency of Communication with Friends and Family: Not on file    Frequency of Social Gatherings with Friends and Family: Not on file    Attends Ep[employer] Services: Not on file    Active Member of [address] [place] [address] [employer] or Organizations: Not on file    Attends Club or Organization Meetings: Not on file    Marital Status: Not on file   Intimate Partner Violence:     Fear of Current or Ex-Partner: Not on file    Emotionally Abused: Not on file    Physically Abused: Not on file    Sexually Abused: Not on file   Housing Stability:     Unable to Pay for Housing in the Last Year: Not on file    Number of [place] in the Last Year: Not on file    Unstable Housing in the Last Year: Not on file     TOBACCO:   does not have a smoking history on file. She has never used smokeless tobacco.  ETOH:   reports previous alcohol use. Family History:   Family History   Problem Relation Age of Onset    Depression Mother     Breast Cancer Mother     Stroke Mother         cva x 3    Hypertension Mother     Elevated Lipids Mother     Diabetes Mother     Coronary Art Dis Father         mi  age 43    Schizophrenia Father     Hypertension Father     Elevated Lipids Father     Diabetes Father     Birth Defects Son     Other Son         odd, autism   [facility] Asthma Son     Diabetes Son     Other Brother         colitis    Mental Illness Sister     Heart Failure Paternal Grandmother         CHF    Emphysema Maternal Grandfather     Cancer Paternal Grandfather      A:  Re-administered PHQ-9 and MARYAM-7 (see below). Patient endorses minimal symptoms of depression and mild symptoms of anxiety. Denies SI. Slight increase in symptoms of anxiety since previous administration of MARYAM-7 and no change in symptoms of depression since previous administration of PHQ-9. Insight and motivation are good. PHQ Scores 5/2/2022 4/18/2022 4/11/2022 3/24/2021 7/19/2017   PHQ2 Score 2 2 0 0 5   PHQ9 Score 2 2 0 0 23     Interpretation of Total Score Depression Severity: 1-4 = Minimal depression, 5-9 = Mild depression, 10-14 = Moderate depression, 15-19 = Moderately severe depression, 20-27 = Severe depression    MARYAM 7 SCORE 5/2/2022 4/18/2022 7/19/2017   MARYAM-7 Total Score 9 5 21     Interpretation of MARAYM-7 score: 5-9 = mild anxiety, 10-14 = moderate anxiety, 15+ = severe anxiety. Recommend referral to behavioral health for scores 10 or greater. Diagnosis:    1. Bipolar 1 disorder ([facility]          Diagnosis Date    Anxiety     Asthma     Bipolar 1 disorder ([facility])     Pt is willing to see psych after 7/15 for confirmation of dx    Chronic bronchitis ([facility]     Chronic GERD 06/11/2020    COPD (chronic obstructive pulmonary disease) ([facility])     Dr Zach Montoya Depression     Emphysema of lung ([facility]     Migraine     Migraines     Mixed hyperlipidemia 06/25/2020    MRSA infection within last 3 months 2012    axillary    Narcolepsy 2004    Dr Jennifer Calixto   Lilyan Montane Obesity     Pneumonia     Restless leg syndrome     Sleep apnea     did not tolerate cpap, uses [facility]    Tobacco abuse     Tobacco use disorder 01/14/2011    Urinary incontinence      Plan:  Pt interventions:  Discussed and set plan for behavioral activation, Conducted functional assessment, Snyder-setting to identify pt's primary goals for [facility] visit / overall health, Supportive techniques and Emphasized self-care as important for managing overall health.     Pt Behavioral Change Plan:   See Pt Instructions

## 2022-05-02 NOTE — PATIENT INSTRUCTIONS
1. Aim to spend 5 minutes each day alone. It's OK to tell Gopi Argelia you need this time. 2. Return to see Dr. Rosie Danielson in 2 weeks.

## 2022-05-02 NOTE — PROGRESS NOTES
[facility]    CC: Macromastia    REFERRING PHYSICIAN: Yoselyn Fleming MD    HPI: This is a 54 y.o.  female who presents to clinic with desire for breast reduction. She states that she has had large breasts since her adolescence. She has had difficulties with bras secondary to her severe COPD. Of note, she has had recurrent infections on the left breast after prior ductal surgery (20 years ago). Since her last evaluation, she underwent bariatric surgery () losing a total of 79 lbs. She is actively still losing weight with Dr. Eduarda Douglas (anticipate another 20 lbs weight loss). Her pertinent breast history include the following:    Last Mammogram:  - Scared about additional imaging.   Her PCP is scheduling her for a screening mammogram.  She understands she will needs this done prior to any surgical intervention. --> scheduled for another mammogram    Current bra size: 45 F   Desired bra size: 36C or 36 D  Pregnancies/miscarriages: 3/2  Breast feeding: no future plans (hysterectomy)    Breast Symptoms:    Macromastia Symptoms:  Upper back pain: Yes      Bra strap grooves: Yes      Wears supportive bras (>1 yr): Yes, however has not been able to in the past 3 years due to her significant COPD      Tried conservative measures (PT, MDs, etc): Yes      Intertrigo: Yes      Head/neck pain: Yes      Headaches: Yes      Paresthesias of hands/fingers: Yes    PMHx:   Past Medical History:   Diagnosis Date    Anxiety     Asthma     Bipolar 1 disorder ([address]     Pt is willing to see psych after 7/15 for confirmation of dx    Chronic bronchitis ([address]     Chronic GERD 2020    [facility] disease) [facility]     Dr David Batista Depression     Emphysema of lung ([facility]     Migraine     Migraines     Mixed hyperlipidemia 2020    MRSA infection within last 3 months     axillary    Narcolepsy     Dr Lindbergh Gaucher    Obesity     Pneumonia     Restless leg syndrome     Sleep apnea     did not tolerate cpap, uses [facility]    Tobacco abuse     Tobacco use disorder 01/14/2011    Urinary incontinence    COPD (no longer O2 dependent)    PSHx:   Past Surgical History:   Procedure Laterality Date    ABSCESS DRAINAGE  2012    L axilla MRSA, hosp for 7 days    ADENOIDECTOMY      BLADDER SUSPENSION  5/10    [facility] [address]  2/09    lumpectomy, ducts removed    FINGER SURGERY      right thumb    HYSTERECTOMY  2001 2/2 dysplasia, endometriosis, cysts, MYLENE BSO    NASAL SEPTUM SURGERY      PELVIC LAPAROSCOPY      endometriosis    SLEEVE GASTRECTOMY N/A 11/16/2020    LAPAROSCOPIC SLEEVE GASTRECTOMY - ETHICON performed by Ildefonso Bassett MD at [place] [address]  2001    Dysplasia, endometriosis    TONSILLECTOMY      UPPER GASTROINTESTINAL ENDOSCOPY N/A 7/10/2020    EGD BIOPSY performed by Ildefonso Bassett MD at [address] [place] [address]:   Allergies   Allergen Reactions    Ambien [Zolpidem Tartrate] Other (See Comments)     Shakey, anxious    Dilaudid [Hydromorphone Hcl] Other (See Comments)     Feels like skin is on fire.  Fentanyl Itching    Imitrex [Sumatriptan]      Face hot, felt sob    Morphine Other (See Comments)     Feels like skin is on fire. Tolerates Percocet.      SOCIAL: Former smoker (June 2017 - respiratory failure), occ ETOH, no IVD  FHx:  Past history of breast CA: Yes (significant history with multiple relatives)   Past family members with breast reduction: No   Past family members with breast augmentation: No    Meds:   Current Outpatient Medications   Medication Sig Dispense Refill    butalbital-acetaminophen-caffeine (FIORICET, ESGIC) -40 MG per tablet TAKE 1 TABLET BY MOUTH 3 TIMES DAILY AS NEEDED FOR MIGRAINE 30 tablet 0    varenicline (CHANTIX) 1 MG tablet TAKE 1 TABLET BY MOUTH TWICE DAILY TAKE AFTER EATING WITH A FULL GLASS OF WATER 56 tablet 0    promethazine (PHENERGAN) 25 MG tablet TAKE 1 TABLET BY MOUTH EVERY 6 HOURS AS NEEDED FOR NAUSEA 30 tablet 2    tiZANidine (ZANAFLEX) 2 MG tablet TAKE 1 TABLET (2 MG) BY MOUTH 3 TIMES DAILY AS NEEDED (BACK PAIN) 30 tablet 5    loratadine-pseudoephedrine (LORATADINE-D 12HR) 5-120 MG per extended release tablet TAKE 1 TABLET BY MOUTH EVERY MORNING AS NEEDED FOR ALLERGY 30 tablet 5    clonazePAM (KLONOPIN) 1 MG tablet Take 1 tablet by mouth 2 times daily as needed for Anxiety for up to 30 days. 60 tablet 0    naproxen (NAPROSYN) 500 MG tablet Take 1 tablet by mouth daily as needed for Pain Take with food. 30 tablet 5    NURTEC 75 MG TBDP PLACE 1 TABLET UNDER THE TONGUE DAILY AS NEEDED (MIGRAINE) MAX 1/24 HR, TO REPLACE MAXALT 8 tablet 5    furosemide (LASIX) 40 MG tablet TAKE 1 TABLET BY MOUTH EVERY DAY TO TWICE DAILY AS NEEDED SWELLING 60 tablet 5    famotidine (PEPCID) 20 MG tablet TAKE 1 TABLET BY MOUTH NIGHTLY 30 tablet 5    omeprazole (PRILOSEC) 20 MG delayed release capsule TAKE 1 CAPSULE BY MOUTH DAILY 30 capsule 3    FLUoxetine (PROZAC) 20 MG capsule TAKE 3 CAPSULES (60 MG) BY MOUTH DAILY 90 capsule 5    amitriptyline (ELAVIL) 75 MG tablet TAKE 1 TABLET (75 MG) BY MOUTH NIGHTLY 30 tablet 5    potassium chloride (KLOR-CON M) 20 MEQ extended release tablet TAKE 1 TABLET (20MEQ) BY MOUTH DAILY 90 tablet 1    topiramate (TOPAMAX) 100 MG tablet TAKE 1 TABLET BY MOUTH 2 TIMES DAILY 180 tablet 1    Cholecalciferol 50 MCG (2000 UT) TABS Take 1 tablet by mouth daily Take 1 tablet by mouth daily. 90 tablet 2    methylphenidate (RITALIN) 20 MG tablet Take 20 mg by mouth 3 times daily.  albuterol (PROVENTIL) (2.5 MG/3ML) 0.083% nebulizer solution Take 2.5 mg by nebulization every 6 hours as needed for Wheezing      VENTOLIN  (90 Base) MCG/ACT inhaler INHALE 2 PUFFS INTO THE LUNGS 4 TIMES DAILY AS NEEDED.  18 g 5    gabapentin (NEURONTIN) 300 MG capsule TAKE ONE CAPSULE BY MOUTH ONE HOUR BEFORE BEDTIME  5    amphetamine-dextroamphetamine (ADDERALL XR) 20 MG extended release capsule TAKE 1 CAPSULE BY MOUTH EVERY DAY  0    Respiratory Therapy Supplies (NEBULIZER/TUBING/MOUTHPIECE) KIT 1 kit by Does not apply route daily as needed 1 kit 0    OXYGEN Inhale 2 L/min into the lungs continuous. Regulate with portability at home (Patient taking differently: Inhale 2 L/min into the lungs as needed ) 1 Container 0     No current facility-administered medications for this visit. ROS  Review of Systems   Constitutional: Positive for malaise/fatigue. Negative for chills, fever and weight loss. Eyes: Negative for blurred vision and double vision. Respiratory: Positive for cough, shortness of breath and wheezing. Cardiovascular: Positive for chest pain (constantly). Gastrointestinal: Positive for abdominal pain (with severe coughing episodes), heartburn, nausea and vomiting (with migraines). Negative for diarrhea. Genitourinary: Positive for flank pain (Had pelvic mesh removed). Musculoskeletal: Positive for back pain, joint pain and neck pain. Skin: Positive for rash (beneath breasts). Neurological: Positive for dizziness and headaches (migraines). Negative for seizures. Endo/Heme/Allergies: Bruises/bleeds easily. Psychiatric/Behavioral: Positive for depression. Negative for hallucinations and suicidal ideas. The patient is nervous/anxious.       EXAM   /81   Pulse 90   Temp 98.1 °F (36.7 °C)   Wt 149 lb (67.6 kg)   LMP  (LMP Unknown)   SpO2 96%   BMI 27.25 kg/m²     GEN: NAD  CVS: RRR  PULM: No active respiratory distress  Course breath sounds  BREAST: Left larger than Right   R  Ptosis thgthrthathdtheth:th th4th Palpable masses: No     Nipple retraction: No     Palpable axillary lymphadenopathy: No     SN-N: 31 cm     N-IMF: 12.41 cm     Breast width: 18.4 cm         L  Ptosis thgthrthathdtheth:th th4th Palpable masses: No     Nipple retraction: No     Palpable axillary lymphadenopathy: No     SN-N: 30.5 cm     N-IMF: 12.5 cm     Breast width: 18.5 cm     Lateral periareolar scar; superolateral scar healed (prior abscess drainage)    Estimated Reduction Volume (Schnur scale): 370 grams (each)    RADIOLOGY: PENDING    IMP: 54 y.o. female with symptomatic macromastia  PLAN: Would benefit from breast reduction. Once mammogram obtained, will submit to insurance and schedule. A discussion regarding surgical options including: Wise pattern reduction with inferior based pedicle was performed with the patientHer symptoms of macromastia were discussed in detail and that surgical intervention is focused primarily on relieving upper torso complaints. Additionally, discussion regarding the risks including, but not limited to: bleeding (potentially requiring transfusion or reoperation), infection, seroma, reoperation, poor cosmetic outcome, scarring, revisional surgery, nipple loss, diminished sensation, inability to breastfeed, VTE (DVT/PE), and death was performed. All questions were answered in a satisfactory manner.      Paulette Soriano MD  [facility]  05/02/22

## 2022-05-10 ENCOUNTER — HOSPITAL ENCOUNTER (OUTPATIENT)
Dept: MAMMOGRAPHY | Age: 52
Discharge: HOME | End: 2022-05-15
Payer: MEDICARE

## 2022-05-10 VITALS — WEIGHT: 145 LBS | HEIGHT: 62 IN | BODY MASS INDEX: 26.68 KG/M2

## 2022-05-10 DIAGNOSIS — Z12.31 VISIT FOR SCREENING MAMMOGRAM: ICD-10-CM

## 2022-05-10 PROCEDURE — 77063 BREAST TOMOSYNTHESIS BI: CPT

## 2022-05-14 DIAGNOSIS — K21.9 CHRONIC GERD: ICD-10-CM

## 2022-05-14 DIAGNOSIS — F41.9 ANXIETY: ICD-10-CM

## 2022-05-16 DIAGNOSIS — G43.111 INTRACTABLE MIGRAINE WITH AURA WITH STATUS MIGRAINOSUS: ICD-10-CM

## 2022-05-16 DIAGNOSIS — Z72.0 TOBACCO ABUSE: ICD-10-CM

## 2022-05-16 DIAGNOSIS — E87.6 HYPOKALEMIA: ICD-10-CM

## 2022-05-16 RX ORDER — AMITRIPTYLINE HYDROCHLORIDE 75 MG/1
TABLET, FILM COATED ORAL
Qty: 30 TABLET | Refills: 5 | Status: SHIPPED | OUTPATIENT
Start: 2022-05-16 | End: 2022-11-03

## 2022-05-16 RX ORDER — OMEPRAZOLE 20 MG/1
CAPSULE, DELAYED RELEASE ORAL
Qty: 30 CAPSULE | Refills: 3 | OUTPATIENT
Start: 2022-05-16

## 2022-05-16 RX ORDER — BUTALBITAL, ACETAMINOPHEN AND CAFFEINE 50; 325; 40 MG/1; MG/1; MG/1
TABLET ORAL
Qty: 30 TABLET | Refills: 0 | Status: SHIPPED | OUTPATIENT
Start: 2022-05-16 | End: 2022-06-15

## 2022-05-16 RX ORDER — VARENICLINE TARTRATE 1 MG/1
TABLET, FILM COATED ORAL
Qty: 56 TABLET | Refills: 2 | Status: SHIPPED | OUTPATIENT
Start: 2022-05-16 | End: 2022-08-09

## 2022-05-16 RX ORDER — POTASSIUM CHLORIDE 20 MEQ/1
TABLET, EXTENDED RELEASE ORAL
Qty: 30 TABLET | Refills: 5 | Status: SHIPPED | OUTPATIENT
Start: 2022-05-16 | End: 2022-11-03

## 2022-05-16 RX ORDER — CLONAZEPAM 1 MG/1
1 TABLET ORAL 2 TIMES DAILY PRN
Qty: 60 TABLET | Refills: 0 | Status: SHIPPED | OUTPATIENT
Start: 2022-05-16 | End: 2022-06-10

## 2022-05-16 NOTE — TELEPHONE ENCOUNTER
Last Office Visit  -  4/11/22  Next Office Visit  -  n/a    Last Filled  -  4/11/22  Last UDS -  3/24/21  Contract -  11/10/16

## 2022-05-16 NOTE — TELEPHONE ENCOUNTER
Last Office Visit  -  4/11/22  Next Office Visit  -  n/a    Last Filled  -    Last UDS -    Contract -

## 2022-06-02 ENCOUNTER — TELEPHONE (OUTPATIENT)
Dept: SURGERY | Age: 52
End: 2022-06-02

## 2022-06-10 ENCOUNTER — E-VISIT (OUTPATIENT)
Dept: FAMILY MEDICINE CLINIC | Age: 52
End: 2022-06-10
Payer: MEDICARE

## 2022-06-10 ENCOUNTER — TELEPHONE (OUTPATIENT)
Dept: FAMILY MEDICINE CLINIC | Age: 52
End: 2022-06-10

## 2022-06-10 DIAGNOSIS — F41.9 ANXIETY: ICD-10-CM

## 2022-06-10 DIAGNOSIS — Z87.09 HISTORY OF COPD: ICD-10-CM

## 2022-06-10 DIAGNOSIS — B96.89 ACUTE BACTERIAL SINUSITIS: Primary | ICD-10-CM

## 2022-06-10 DIAGNOSIS — J01.90 ACUTE BACTERIAL SINUSITIS: Primary | ICD-10-CM

## 2022-06-10 PROCEDURE — 99421 OL DIG E/M SVC 5-10 MIN: CPT | Performed by: NURSE PRACTITIONER

## 2022-06-10 RX ORDER — AMOXICILLIN AND CLAVULANATE POTASSIUM 875; 125 MG/1; MG/1
1 TABLET, FILM COATED ORAL 2 TIMES DAILY
Qty: 20 TABLET | Refills: 0 | Status: SHIPPED | OUTPATIENT
Start: 2022-06-10 | End: 2022-06-20

## 2022-06-10 RX ORDER — CLONAZEPAM 1 MG/1
1 TABLET ORAL 2 TIMES DAILY PRN
Qty: 60 TABLET | Refills: 0 | Status: SHIPPED | OUTPATIENT
Start: 2022-06-15 | End: 2022-07-12

## 2022-06-10 RX ORDER — PREDNISONE 20 MG/1
20 TABLET ORAL 2 TIMES DAILY
Qty: 10 TABLET | Refills: 0 | Status: SHIPPED | OUTPATIENT
Start: 2022-06-10 | End: 2022-06-15

## 2022-06-10 ASSESSMENT — LIFESTYLE VARIABLES
SMOKING_STATUS: NO, I'M A FORMER SMOKER
PACKS_PER_DAY: 1
SMOKING_YEARS: 30

## 2022-06-10 NOTE — TELEPHONE ENCOUNTER
Patient contacted the office stating she is pretty confident that she has a sinus infection, congestion, pressure  and would like an antibiotic called in, please advise [phone].     [employer]

## 2022-06-14 DIAGNOSIS — F41.9 ANXIETY: ICD-10-CM

## 2022-06-14 DIAGNOSIS — G43.111 INTRACTABLE MIGRAINE WITH AURA WITH STATUS MIGRAINOSUS: ICD-10-CM

## 2022-06-14 NOTE — TELEPHONE ENCOUNTER
Last Office Visit  -  4/11/22  Next Office Visit  -      Last Filled  -    Last UDS -    Contract -      Looks like the klonopin was sent already to fill tomorrow

## 2022-06-15 RX ORDER — CLONAZEPAM 1 MG/1
1 TABLET ORAL 2 TIMES DAILY PRN
Qty: 60 TABLET | OUTPATIENT
Start: 2022-06-15 | End: 2022-07-15

## 2022-06-15 RX ORDER — BUTALBITAL, ACETAMINOPHEN AND CAFFEINE 50; 325; 40 MG/1; MG/1; MG/1
TABLET ORAL
Qty: 30 TABLET | Refills: 0 | Status: SHIPPED | OUTPATIENT
Start: 2022-06-15 | End: 2022-07-12

## 2022-06-19 ASSESSMENT — ENCOUNTER SYMPTOMS
BLURRED VISION: 0
HEARTBURN: 1
BACK PAIN: 1
DIARRHEA: 0
ABDOMINAL PAIN: 1
COUGH: 1
WHEEZING: 1
NAUSEA: 1
SHORTNESS OF BREATH: 1
VOMITING: 1
DOUBLE VISION: 0

## 2022-06-20 ENCOUNTER — OFFICE VISIT (OUTPATIENT)
Dept: SURGERY | Age: 52
End: 2022-06-20
Payer: MEDICARE

## 2022-06-20 VITALS
DIASTOLIC BLOOD PRESSURE: 69 MMHG | OXYGEN SATURATION: 94 % | SYSTOLIC BLOOD PRESSURE: 116 MMHG | TEMPERATURE: 97.5 F | WEIGHT: 153.2 LBS | HEIGHT: 62 IN | HEART RATE: 101 BPM | BODY MASS INDEX: 28.19 KG/M2

## 2022-06-20 DIAGNOSIS — N62 MACROMASTIA: Primary | ICD-10-CM

## 2022-06-20 PROCEDURE — 99213 OFFICE O/P EST LOW 20 MIN: CPT | Performed by: SURGERY

## 2022-06-20 PROCEDURE — G8417 CALC BMI ABV UP PARAM F/U: HCPCS | Performed by: SURGERY

## 2022-06-20 PROCEDURE — G8427 DOCREV CUR MEDS BY ELIG CLIN: HCPCS | Performed by: SURGERY

## 2022-06-20 PROCEDURE — 1036F TOBACCO NON-USER: CPT | Performed by: SURGERY

## 2022-06-20 NOTE — LETTER
is on fire. Tolerates Percocet. No.   PHYSICIAN ORDERS   HUC/  RN      ORDERS WITH CHECK BOXES MUST BE SELECTED. ALL OTHER ORDERS WILL BE AUTOMATICALLY INITIATED. Date / Time of Order:   6/21/22   9:10 AM          Procedure:  Bilateral breast reduction       1. Cefazolin (Ancef) 2 gm IV OCTOR   ** NOTE:  If patient weight is > 120 kg, Administer 3 gm         2. ** If PCN allergy, then Clindamycin 600mg IV OCTOR.   ** If prior history of MRSA, Vancomycin 1g IV OCTOR (please check with renal function prior to administration)       3.  Other orders: Transexemic acid 1g IV OCTOR; No Decadron; SA       Physician / Surgeon:  Kishan Taylor MD  Office Ph:  [phone]       Physician Signature:     9/07

## 2022-06-20 NOTE — PROGRESS NOTES
[facility]    CC: Macromastia    REFERRING PHYSICIAN: Rosalino Mathew MD    HPI: This is a 54 y.o.  female who presents to clinic with desire for breast reduction. She states that she has had large breasts since her adolescence. She has had difficulties with bras secondary to her severe COPD. Of note, she has had recurrent infections on the left breast after prior ductal surgery (20 years ago). She underwent bariatric surgery () losing a total of 79 lbs. She is actively still losing weight with Dr. Tammy Arriaga (anticipate another 20 lbs weight loss). Since her last evaluation, she notes a sinus infection, but otherwise has not noted a change. She has gained 4 lbs due to need for Prednisone. She presents for discussion regarding mastopexy and reduction.      Her pertinent breast history include the following:    Last Mammogram:     Current bra size: 45 F   Desired bra size: As small as possible  Pregnancies/miscarriages: 3/2  Breast feeding: no future plans (hysterectomy)    Breast Symptoms:    Macromastia Symptoms:  Upper back pain: Yes      Bra strap grooves: Yes      Wears supportive bras (>1 yr): Yes, however has not been able to in the past 3 years due to her significant COPD      Tried conservative measures (PT, MDs, etc): Yes      Intertrigo: Yes      Head/neck pain: Yes      Headaches: Yes      Paresthesias of hands/fingers: Yes    PMHx:   Past Medical History:   Diagnosis Date    Anxiety     Asthma     Bipolar 1 disorder ([address]     Pt is willing to see psych after 7/15 for confirmation of dx    Chronic bronchitis ([address]     Chronic GERD 2020    COPD (chronic obstructive pulmonary disease) ([address]     Dr Víctor Parrish Depression     [facility]     Migraine     Migraines     Mixed hyperlipidemia 2020    MRSA infection within last 3 months     axillary    Narcolepsy     Dr Marisel Rosales    Obesity     Pneumonia     Restless leg syndrome     Sleep apnea     did not tolerate cpap, uses O2 qhs    Tobacco abuse     Tobacco use disorder 01/14/2011    Urinary incontinence    COPD (no longer O2 dependent)  Bipolar    PSHx:   Past Surgical History:   Procedure Laterality Date    ABCESS DRAINAGE  2012    L axilla MRSA, hosp for 7 days    ADENOIDECTOMY      BLADDER SUSPENSION  5/10    Mesh removed 1/9/15 in [place] [address] BREAST SURGERY  2/09    lumpectomy, ducts removed    FINGER SURGERY      right thumb    HYSTERECTOMY  2001 2/2 dysplasia, endometriosis, cysts, MYLENE BSO    LEG AMPUTATION N/A 11/16/2020    LAPAROSCOPIC SLEEVE GASTRECTOMY - ETHICON performed by Alyce Valdes MD [address]      endometriosis    TONSILLECTOMY      TOTAL ABDOMINAL HYSTERECTOMY W/ BILATERAL SALPINGOOPHORECTOMY  2001    Dysplasia, endometriosis    UPPER GASTROINTESTINAL ENDOSCOPY N/A 7/10/2020    EGD BIOPSY performed by Alyce Valdes MD at [address] [facility]:   Allergies   Allergen Reactions    Ambien [Zolpidem Tartrate] Other (See Comments)     Shakey, anxious    Dilaudid [Hydromorphone Hcl] Other (See Comments)     Feels like skin is on fire.  Fentanyl Itching    Imitrex [Sumatriptan]      Face hot, felt sob    Morphine Other (See Comments)     Feels like skin is on fire. Tolerates Percocet.      SOCIAL: Former smoker (June 2017 - respiratory failure), occ ETOH, no IVD  FHx:  Past history of breast CA: Yes (significant history with multiple relatives)   Past family members with breast reduction: No   Past family members with breast augmentation: No    Meds:   Current Outpatient Medications   Medication Sig Dispense Refill    butalbital-acetaminophen-caffeine (FIORICET, ESGIC) -40 MG per tablet TAKE 1 TABLET BY MOUTH 3 TIMES DAILY AS NEEDED FOR MIGRAINE 30 tablet 0    amoxicillin-clavulanate (AUGMENTIN) 875-125 MG per tablet Take 1 tablet by mouth 2 times daily for 10 days 20 tablet 0    clonazePAM (KLONOPIN) 1 MG tablet Take 1 tablet by mouth 2 times daily as needed for Anxiety for up to 30 days. 60 tablet 0    varenicline (CHANTIX) 1 MG tablet TAKE 1 TABLET BY MOUTH TWICE DAILY TAKE AFTER EATING WITH A FULL GLASS OF WATER 56 tablet 2    amitriptyline (ELAVIL) 75 MG tablet TAKE 1 TABLET (75 MG) BY MOUTH NIGHTLY 30 tablet 5    potassium chloride (KLOR-CON M) 20 MEQ extended release tablet TAKE 1 TABLET (20 MEQ) BY MOUTH DAILY 30 tablet 5    promethazine (PHENERGAN) 25 MG tablet TAKE 1 TABLET BY MOUTH EVERY 6 HOURS AS NEEDED FOR NAUSEA 30 tablet 2    tiZANidine (ZANAFLEX) 2 MG tablet TAKE 1 TABLET (2 MG) BY MOUTH 3 TIMES DAILY AS NEEDED (BACK PAIN) 30 tablet 5    loratadine-pseudoephedrine (LORATADINE-D 12HR) 5-120 MG per extended release tablet TAKE 1 TABLET BY MOUTH EVERY MORNING AS NEEDED FOR ALLERGY 30 tablet 5    naproxen (NAPROSYN) 500 MG tablet Take 1 tablet by mouth daily as needed for Pain Take with food. 30 tablet 5    NURTEC 75 MG TBDP PLACE 1 TABLET UNDER THE TONGUE DAILY AS NEEDED (MIGRAINE) MAX 1/24 HR, TO REPLACE MAXALT 8 tablet 5    furosemide (LASIX) 40 MG tablet TAKE 1 TABLET BY MOUTH EVERY DAY TO TWICE DAILY AS NEEDED SWELLING 60 tablet 5    famotidine (PEPCID) 20 MG tablet TAKE 1 TABLET BY MOUTH NIGHTLY 30 tablet 5    omeprazole (PRILOSEC) 20 MG delayed release capsule TAKE 1 CAPSULE BY MOUTH DAILY 30 capsule 3    FLUoxetine (PROZAC) 20 MG capsule TAKE 3 CAPSULES (60 MG) BY MOUTH DAILY 90 capsule 5    topiramate (TOPAMAX) 100 MG tablet TAKE 1 TABLET BY MOUTH 2 TIMES DAILY 180 tablet 1    Cholecalciferol 50 MCG (2000 UT) TABS Take 1 tablet by mouth daily Take 1 tablet by mouth daily. 90 tablet 2    methylphenidate (RITALIN) 20 MG tablet Take 20 mg by mouth 3 times daily.       albuterol (PROVENTIL) (2.5 MG/3ML) 0.083% nebulizer solution Take 2.5 mg by nebulization every 6 hours as needed for Wheezing      VENTOLIN  (90 Base) MCG/ACT inhaler INHALE 2 PUFFS INTO THE LUNGS 4 TIMES DAILY AS NEEDED. 18 g 5    gabapentin (NEURONTIN) 300 MG capsule TAKE ONE CAPSULE BY MOUTH ONE HOUR BEFORE BEDTIME  5    amphetamine-dextroamphetamine (ADDERALL XR) 20 MG extended release capsule TAKE 1 CAPSULE BY MOUTH EVERY DAY  0    Respiratory Therapy Supplies (NEBULIZER/TUBING/MOUTHPIECE) KIT 1 kit by Does not apply route daily as needed 1 kit 0    OXYGEN Inhale 2 L/min into the lungs continuous. Regulate with portability at home (Patient taking differently: Inhale 2 L/min into the lungs as needed ) 1 Container 0     No current facility-administered medications for this visit. ROS  Review of Systems   Constitutional: Positive for malaise/fatigue. Negative for chills, fever and weight loss. Eyes: Negative for blurred vision and double vision. Respiratory: Positive for cough, shortness of breath and wheezing. Cardiovascular: Positive for chest pain (constantly). Gastrointestinal: Positive for abdominal pain (with severe coughing episodes), heartburn, nausea and vomiting (with migraines). Negative for diarrhea. Genitourinary: Positive for flank pain (Had pelvic mesh removed). Musculoskeletal: Positive for back pain, joint pain and neck pain. Skin: Positive for rash (beneath breasts). Neurological: Positive for dizziness and headaches (migraines). Negative for seizures. Endo/Heme/Allergies: Bruises/bleeds easily. Psychiatric/Behavioral: Positive for depression. Negative for hallucinations and suicidal ideas. The patient is nervous/anxious.       EXAM   /69   Pulse (!) 101   Temp 97.5 °F (36.4 °C)   Ht 5' 2\" (1.575 m)   Wt 153 lb 3.2 oz (69.5 kg)   LMP  (LMP Unknown) Comment: age 36 total  SpO2 94%   BMI 28.02 kg/m²     GEN: NAD  CVS: RRR  PULM: No active respiratory distress  Course breath sounds  BREAST: Left larger than Right   R  Ptosis thgthrthathdtheth:th th4th Palpable masses: No     Nipple retraction: No     Palpable axillary lymphadenopathy: No     SN-N: 31 cm     N-IMF: 12.41 cm     Breast width: 18.4 cm         L  Ptosis ndgndrndanddndend:nd nd2nd Palpable masses: No     Nipple retraction: No     Palpable axillary lymphadenopathy: No     SN-N: 30.5 cm     N-IMF: 12.5 cm     Breast width: 18.5 cm     Lateral periareolar scar; superolateral scar healed (prior abscess drainage)    Estimated Reduction Volume (Schnur scale): 370 grams (each)    RADIOLOGY: Reviewed    IMP: 54 y.o. female with symptomatic macromastia  PLAN: Would benefit from breast reduction. She understands that she has a higher risk of healing compromise due to her previous breast infections (around the nipple) as well as her comorbidities. We discussed the differences between reduction and lift and she desires to proceed with a reduction. Will obtain clearances and submit to insurance. Once all satisfactory, will schedule. A discussion regarding surgical options including: Wise pattern reduction with inferior based pedicle was performed with the patientHer symptoms of macromastia were discussed in detail and that surgical intervention is focused primarily on relieving upper torso complaints. Additionally, discussion regarding the risks including, but not limited to: bleeding (potentially requiring transfusion or reoperation), infection, seroma, reoperation, poor cosmetic outcome, scarring, revisional surgery, nipple loss, diminished sensation, inability to breastfeed, VTE (DVT/PE), and death was performed. All questions were answered in a satisfactory manner.      Trey Clarke MD  [id number] [facility]  06/19/22

## 2022-06-21 ENCOUNTER — TELEPHONE (OUTPATIENT)
Dept: SURGERY | Age: 52
End: 2022-06-21

## 2022-06-21 NOTE — TELEPHONE ENCOUNTER
The patient was in the office to see Dr. Delroy Amado yesterday. PLAN: Would benefit from breast reduction. She understands that she has a higher risk of healing compromise due to her previous breast infections (around the nipple) as well as her comorbidities. We discussed the differences between reduction and lift and she desires to proceed with a reduction. Will obtain clearances and submit to insurance. Once all satisfactory, will schedule. I received a surgery letter. The patient has Medicare A&B as her primary insurance. The CPT Code listed does not require pre certification. The patient has [place] as her secondary insurance. Medicaid does not require pre certification, since they follow Medicare guidelines. I spoke with the patient at the home number listed. The patient is now scheduled for surgery with  on 7-. The patient is aware of H&P and that she needs IM and pulmonology clearances. The patient is scheduled for her post op appointment 8-8-2022. I will mail the surgery information and instructions to the patient today. I will close this phone note.

## 2022-06-28 ENCOUNTER — TELEPHONE (OUTPATIENT)
Dept: PULMONOLOGY | Age: 52
End: 2022-06-28

## 2022-06-28 NOTE — TELEPHONE ENCOUNTER
Pt called in requesting a pulmonary clearance appointment. Pt is former dr. Frank Christopher pt, surgery is scheduled 7/29/2022. Please advise.

## 2022-07-08 DIAGNOSIS — F41.9 ANXIETY: ICD-10-CM

## 2022-07-08 RX ORDER — FLUOXETINE HYDROCHLORIDE 20 MG/1
CAPSULE ORAL
Qty: 90 CAPSULE | Refills: 5 | Status: SHIPPED | OUTPATIENT
Start: 2022-07-08

## 2022-07-11 ENCOUNTER — OFFICE VISIT (OUTPATIENT)
Dept: FAMILY MEDICINE CLINIC | Age: 52
End: 2022-07-11
Payer: MEDICARE

## 2022-07-11 VITALS
SYSTOLIC BLOOD PRESSURE: 110 MMHG | OXYGEN SATURATION: 97 % | HEART RATE: 87 BPM | BODY MASS INDEX: 28.17 KG/M2 | DIASTOLIC BLOOD PRESSURE: 70 MMHG | WEIGHT: 154 LBS

## 2022-07-11 DIAGNOSIS — Z01.818 PRE-OP EXAM: Primary | ICD-10-CM

## 2022-07-11 DIAGNOSIS — N62 MACROMASTIA: ICD-10-CM

## 2022-07-11 DIAGNOSIS — N64.89 OTHER SPECIFIED DISORDERS OF BREAST: ICD-10-CM

## 2022-07-11 PROCEDURE — 99213 OFFICE O/P EST LOW 20 MIN: CPT | Performed by: NURSE PRACTITIONER

## 2022-07-11 PROCEDURE — 93000 ELECTROCARDIOGRAM COMPLETE: CPT | Performed by: NURSE PRACTITIONER

## 2022-07-11 PROCEDURE — G8417 CALC BMI ABV UP PARAM F/U: HCPCS | Performed by: NURSE PRACTITIONER

## 2022-07-11 PROCEDURE — G8427 DOCREV CUR MEDS BY ELIG CLIN: HCPCS | Performed by: NURSE PRACTITIONER

## 2022-07-11 RX ORDER — GABAPENTIN 400 MG/1
CAPSULE ORAL
COMMUNITY
Start: 2022-06-14

## 2022-07-11 NOTE — PROGRESS NOTES
[facility] [place] [facility]  [phone]  Fax: [phone]   Pre-operative History and Physical      DIAGNOSIS:  Macromastia    PROCEDURE:  Bilateral breast reduction      History Obtained From:  patient    HISTORY OF PRESENT ILLNESS:    The patient is a 54 y.o. female with significant past medical history of Macromastia.  I am seeing this patient for preop consultation for Dr. Terrance Pimentel       Past Medical History:   Diagnosis Date    Anxiety     Asthma     Bipolar 1 disorder ([address]     Pt is willing to see psych after 7/15 for confirmation of dx    Chronic bronchitis ([address]     Chronic GERD 06/11/2020    COPD [facility] disease) [facility]     Dr Jl Haque Depression     Emphysema of lung ([facility]     Macromastia     Migraine     Migraines     Mixed hyperlipidemia 06/25/2020    MRSA infection within last 3 months 2012    axillary    Narcolepsy 2004    Dr Namita Hee Barnett Obesity     Pneumonia     Restless leg syndrome     Sleep apnea     did not tolerate cpap, uses O2 qhs    Tobacco abuse     Tobacco use disorder 01/14/2011    Urinary incontinence      Past Surgical History:   Procedure Laterality Date    ABSCESS DRAINAGE  2012    L axilla MRSA, hosp for 7 days    ADENOIDECTOMY      BLADDER SUSPENSION  5/10    Mesh removed 1/9/15 in [place], [address]  BREAST SURGERY  2/09    lumpectomy, ducts removed    FINGER SURGERY      right thumb    HYSTERECTOMY (CERVIX STATUS UNKNOWN)  2001    2/2 dysplasia, endometriosis, cysts, MYLENE BSO    NASAL SEPTUM SURGERY      PELVIC LAPAROSCOPY      endometriosis    SLEEVE GASTRECTOMY N/A 11/16/2020    LAPAROSCOPIC SLEEVE GASTRECTOMY - ETHICON performed by Hilary Figueredo MD at [facility] (CERVIX REMOVED)  2001    Dysplasia, endometriosis    TONSILLECTOMY      UPPER GASTROINTESTINAL ENDOSCOPY N/A 7/10/2020    EGD BIOPSY performed by Hilary Figueredo MD at [address] [facility] ENDOSCOPY     Current Outpatient Medications   Medication Sig Dispense Refill    gabapentin (NEURONTIN) 400 MG capsule TAKE 1 CAPSULE BY MOUTH AT BEDTIME      FLUoxetine (PROZAC) 20 MG capsule TAKE 3 CAPSULES (60 MG) BY MOUTH DAILY 90 capsule 5    butalbital-acetaminophen-caffeine (FIORICET, ESGIC) -40 MG per tablet TAKE 1 TABLET BY MOUTH 3 TIMES DAILY AS NEEDED FOR MIGRAINE 30 tablet 0    clonazePAM (KLONOPIN) 1 MG tablet Take 1 tablet by mouth 2 times daily as needed for Anxiety for up to 30 days. 60 tablet 0    varenicline (CHANTIX) 1 MG tablet TAKE 1 TABLET BY MOUTH TWICE DAILY TAKE AFTER EATING WITH A FULL GLASS OF WATER 56 tablet 2    amitriptyline (ELAVIL) 75 MG tablet TAKE 1 TABLET (75 MG) BY MOUTH NIGHTLY 30 tablet 5    potassium chloride (KLOR-CON M) 20 MEQ extended release tablet TAKE 1 TABLET (20 MEQ) BY MOUTH DAILY 30 tablet 5    promethazine (PHENERGAN) 25 MG tablet TAKE 1 TABLET BY MOUTH EVERY 6 HOURS AS NEEDED FOR NAUSEA 30 tablet 2    tiZANidine (ZANAFLEX) 2 MG tablet TAKE 1 TABLET (2 MG) BY MOUTH 3 TIMES DAILY AS NEEDED (BACK PAIN) 30 tablet 5    loratadine-pseudoephedrine (LORATADINE-D 12HR) 5-120 MG per extended release tablet TAKE 1 TABLET BY MOUTH EVERY MORNING AS NEEDED FOR ALLERGY 30 tablet 5    naproxen (NAPROSYN) 500 MG tablet Take 1 tablet by mouth daily as needed for Pain Take with food. 30 tablet 5    NURTEC 75 MG TBDP PLACE 1 TABLET UNDER THE TONGUE DAILY AS NEEDED (MIGRAINE) MAX 1/24 HR, TO REPLACE MAXALT 8 tablet 5    furosemide (LASIX) 40 MG tablet TAKE 1 TABLET BY MOUTH EVERY DAY TO TWICE DAILY AS NEEDED SWELLING 60 tablet 5    famotidine (PEPCID) 20 MG tablet TAKE 1 TABLET BY MOUTH NIGHTLY 30 tablet 5    omeprazole (PRILOSEC) 20 MG delayed release capsule TAKE 1 CAPSULE BY MOUTH DAILY 30 capsule 3    topiramate (TOPAMAX) 100 MG tablet TAKE 1 TABLET BY MOUTH 2 TIMES DAILY 180 tablet 1    Cholecalciferol 50 MCG (2000 UT) TABS Take 1 tablet by mouth daily Take 1 tablet by mouth daily.  90 tablet 2    methylphenidate negative  EYES:  positive for  glasses  HEENT:  negative  RESPIRATORY:  negative  CARDIOVASCULAR:  negative  GASTROINTESTINAL:  negative  GENITOURINARY:  negative  INTEGUMENT/BREAST:  positive for macromastia  HEMATOLOGIC/LYMPHATIC:  negative  ALLERGIC/IMMUNOLOGIC:  positive for drug reactions  ENDOCRINE:  negative  MUSCULOSKELETAL:  negative  NEUROLOGICAL:  positive for migraines    PHYSICAL EXAM:      /70   Pulse 87   Wt 154 lb (69.9 kg)   LMP  (LMP Unknown) Comment: age 36 total  SpO2 97%   BMI 28.17 kg/m²     CONSTITUTIONAL:  awake, alert, cooperative, no apparent distress, and appears stated age    Eyes:  Lids and lashes normal, pupils equal, round and reactive to light, extra ocular muscles intact, sclera clear, conjunctiva normal    Head/ENT:  Normocephalic, without obvious abnormality, atramatic, sinuses nontender on palpation, external ears without lesions, oral pharynx with moist mucus membranes, tonsils without erythema or exudates, gums normal and good dentition- has some crowns on teeth    Neck:  Supple, symmetrical, trachea midline, no adenopathy, thyroid symmetric, not enlarged and no tenderness, skin normal,  Heart:  Normal apical impulse, regular rate and rhythm, normal S1 and S2, no S3 or S4, and no murmur noted    Lungs:  No increased work of breathing, good air exchange, clear to auscultation bilaterally, no crackles or wheezing    Abdomen:  No scars, normal bowel sounds, soft, non-distended, non-tender, no masses palpated, no hepatosplenomegally    Extremities:  No clubbing, cyanosis, or edema    NEUROLOGIC:  Awake, alert, oriented to name, place and time. Cranial nerves II-XII are grossly intact. Motor is 5 out of 5 bilaterally. DATA:  EKG:  Date:  7/11/2022  I have reviewed EKG with the following interpretation:sinus rhythm  Impression:  WNL      CBC, CMP, PT, PTT, INR    ASSESSMENT AND PLAN:    1.   Patient is a 54 y.o. female with above specified procedure planned on 7/29/22 with Dr.Neilendu Tenisha Vegas  at [facility].     2. Stop ASA/NSAIDS medications 7-10 days prior to procedure. 3.Patient is cleared for surgery  4. Preop has been faxed to Dr. Lima Cleaves office  5.  Will get pulmonary clearance    JENNIFER Lackey - CNP    [address] [place] [address]  [phone]

## 2022-07-12 DIAGNOSIS — G43.111 INTRACTABLE MIGRAINE WITH AURA WITH STATUS MIGRAINOSUS: ICD-10-CM

## 2022-07-12 DIAGNOSIS — N62 MACROMASTIA: ICD-10-CM

## 2022-07-12 DIAGNOSIS — F41.9 ANXIETY: ICD-10-CM

## 2022-07-12 DIAGNOSIS — Z01.818 PRE-OP EXAM: ICD-10-CM

## 2022-07-12 DIAGNOSIS — K21.9 CHRONIC GERD: ICD-10-CM

## 2022-07-12 DIAGNOSIS — N64.89 OTHER SPECIFIED DISORDERS OF BREAST: ICD-10-CM

## 2022-07-12 DIAGNOSIS — G43.109 MIGRAINE WITH AURA AND WITHOUT STATUS MIGRAINOSUS, NOT INTRACTABLE: ICD-10-CM

## 2022-07-12 LAB
A/G RATIO: 1.7 (ref 1.1–2.2)
ALBUMIN SERPL-MCNC: 4.3 G/DL (ref 3.4–5)
ALP BLD-CCNC: 86 U/L (ref 40–129)
ALT SERPL-CCNC: 17 U/L (ref 10–40)
ANION GAP SERPL CALCULATED.3IONS-SCNC: 8 MMOL/L (ref 3–16)
APTT: 27.5 SEC (ref 23–34.3)
AST SERPL-CCNC: 19 U/L (ref 15–37)
BASOPHILS ABSOLUTE: 0 K/UL (ref 0–0.2)
BASOPHILS RELATIVE PERCENT: 0.6 %
BILIRUB SERPL-MCNC: 0.3 MG/DL (ref 0–1)
BUN BLDV-MCNC: 18 MG/DL (ref 7–20)
CALCIUM SERPL-MCNC: 9.7 MG/DL (ref 8.3–10.6)
CHLORIDE BLD-SCNC: 100 MMOL/L (ref 99–110)
CO2: 32 MMOL/L (ref 21–32)
CREAT SERPL-MCNC: 0.7 MG/DL (ref 0.6–1.1)
EOSINOPHILS ABSOLUTE: 0.1 K/UL (ref 0–0.6)
EOSINOPHILS RELATIVE PERCENT: 2.3 %
GFR AFRICAN AMERICAN: >60
GFR NON-AFRICAN AMERICAN: >60
GLUCOSE BLD-MCNC: 87 MG/DL (ref 70–99)
HCT VFR BLD CALC: 40.3 % (ref 36–48)
HEMOGLOBIN: 13.8 G/DL (ref 12–16)
INR BLD: 0.98 (ref 0.87–1.14)
LYMPHOCYTES ABSOLUTE: 2.4 K/UL (ref 1–5.1)
LYMPHOCYTES RELATIVE PERCENT: 41.4 %
MCH RBC QN AUTO: 34.3 PG (ref 26–34)
MCHC RBC AUTO-ENTMCNC: 34.2 G/DL (ref 31–36)
MCV RBC AUTO: 100.3 FL (ref 80–100)
MONOCYTES ABSOLUTE: 0.5 K/UL (ref 0–1.3)
MONOCYTES RELATIVE PERCENT: 8.7 %
NEUTROPHILS ABSOLUTE: 2.7 K/UL (ref 1.7–7.7)
NEUTROPHILS RELATIVE PERCENT: 47 %
PDW BLD-RTO: 12.6 % (ref 12.4–15.4)
PLATELET # BLD: 265 K/UL (ref 135–450)
PMV BLD AUTO: 7.8 FL (ref 5–10.5)
POTASSIUM SERPL-SCNC: 4.9 MMOL/L (ref 3.5–5.1)
PROTHROMBIN TIME: 12.9 SEC (ref 11.7–14.5)
RBC # BLD: 4.02 M/UL (ref 4–5.2)
SODIUM BLD-SCNC: 140 MMOL/L (ref 136–145)
TOTAL PROTEIN: 6.8 G/DL (ref 6.4–8.2)
WBC # BLD: 5.8 K/UL (ref 4–11)

## 2022-07-12 RX ORDER — BUTALBITAL, ACETAMINOPHEN AND CAFFEINE 50; 325; 40 MG/1; MG/1; MG/1
TABLET ORAL
Qty: 30 TABLET | Refills: 0 | Status: SHIPPED | OUTPATIENT
Start: 2022-07-12 | End: 2022-08-09

## 2022-07-12 RX ORDER — FAMOTIDINE 20 MG/1
TABLET, FILM COATED ORAL
Qty: 30 TABLET | Refills: 5 | Status: SHIPPED | OUTPATIENT
Start: 2022-07-12

## 2022-07-12 RX ORDER — RIMEGEPANT SULFATE 75 MG/75MG
TABLET, ORALLY DISINTEGRATING ORAL
Qty: 8 TABLET | Refills: 5 | Status: SHIPPED | OUTPATIENT
Start: 2022-07-12

## 2022-07-12 RX ORDER — FUROSEMIDE 40 MG/1
TABLET ORAL
Qty: 60 TABLET | Refills: 5 | Status: SHIPPED | OUTPATIENT
Start: 2022-07-12

## 2022-07-12 RX ORDER — CLONAZEPAM 1 MG/1
1 TABLET ORAL 2 TIMES DAILY PRN
Qty: 60 TABLET | Refills: 0 | Status: SHIPPED | OUTPATIENT
Start: 2022-07-15 | End: 2022-08-09

## 2022-07-12 NOTE — TELEPHONE ENCOUNTER
Last Office Visit  -  7/11/22  Next Office Visit  -      Last Filled  -  6/15/22  Last UDS -  3/24/21  Contract -  11/15/16

## 2022-07-19 ENCOUNTER — NURSE ONLY (OUTPATIENT)
Dept: SURGERY | Age: 52
End: 2022-07-19

## 2022-07-19 DIAGNOSIS — N62 MACROMASTIA: Primary | ICD-10-CM

## 2022-07-22 ENCOUNTER — OFFICE VISIT (OUTPATIENT)
Dept: PULMONOLOGY | Age: 52
End: 2022-07-22
Payer: MEDICARE

## 2022-07-22 VITALS
SYSTOLIC BLOOD PRESSURE: 125 MMHG | BODY MASS INDEX: 28.67 KG/M2 | DIASTOLIC BLOOD PRESSURE: 79 MMHG | HEART RATE: 77 BPM | RESPIRATION RATE: 14 BRPM | WEIGHT: 155.8 LBS | OXYGEN SATURATION: 96 % | HEIGHT: 62 IN

## 2022-07-22 DIAGNOSIS — F17.200 SMOKING: Primary | ICD-10-CM

## 2022-07-22 PROCEDURE — G8417 CALC BMI ABV UP PARAM F/U: HCPCS | Performed by: INTERNAL MEDICINE

## 2022-07-22 PROCEDURE — 1036F TOBACCO NON-USER: CPT | Performed by: INTERNAL MEDICINE

## 2022-07-22 PROCEDURE — G8427 DOCREV CUR MEDS BY ELIG CLIN: HCPCS | Performed by: INTERNAL MEDICINE

## 2022-07-22 PROCEDURE — 99214 OFFICE O/P EST MOD 30 MIN: CPT | Performed by: INTERNAL MEDICINE

## 2022-07-22 NOTE — PROGRESS NOTES
PRE-OP INSTRUCTIONS FOR THE SURGICAL PATIENT YOU ARE UNABLE TO MAKE CONTACT FOR AN INTERVIEW:        Follow all instructions provided to you from your surgeons office, including your ARRIVAL TIME. Enter the MAIN entrance located on [address] and report to the desk. Bring your insurance & photo ID with you. You may also be asked to pay a co-pay, as you may want to bring a check or credit card with you. Leave all other valuables at home. Arrange for someone to drive you home and be with you for the first 24 hours after discharge. Bring your medication list with you day of surgery with doses and frequency listed (including over the counter medications)  You must contact your surgeon for Instructions regarding:              - ALL medication instructions, especially if taking blood thinners, aspirin, or diabetic medication.         -Bariatric patients call surgeon if on diabetic medications as some need to be stopped 1 week preop  - IF  there is a change in your physical condition such as a cold, fever, rash, cuts, sores or any other infection, especially near your surgical site. A Pre-op History and Physical for surgery MUST be completed by your Physician or an Urgent Care within 30 days of your procedure date. Please bring a copy with you on the day of your procedure and along with any other testing performed. DO NOT EAT ANYTHING eight hours prior to arrival for surgery. May have up to 8 ounces of water 4 hours prior to arrival for surgery. NOTE: ALL Gastric, Bariatric and Bowel surgery patients MUST follow their surgeon's instructions. No gum, candy, mints, or ice chips day of procedure. Please refrain from drinking alcohol the day before or day of your procedure. Please do not smoke the day of your procedure. Dress in loose, comfortable clothing appropriate for redressing after your procedure.  Do not wear jewelry (including body piercings), make-up, fingernail polish,

## 2022-07-22 NOTE — PROGRESS NOTES
[facility] PRE-SURGICAL TESTING INSTRUCTIONS                                  PRIOR TO PROCEDURE DATE:        1. PLEASE FOLLOW ANY  GUIDELINES/ INSTRUCTIONS PRIOR TO YOUR PROCEDURE AS ADVISED BY YOUR SURGEON. 2. Arrange for someone to drive you home and be with you for the first 24 hours after discharge for your safety after your procedure for which you received sedation. Ensure it is someone we can share information with regarding your discharge. 3. You must contact your surgeon for instructions IF:  You are taking any blood thinners, aspirin, anti-inflammatory or vitamin E. There is a change in your physical condition such as a cold, fever, rash, cuts, sores or any other infection, especially near your surgical site. 4. Do not drink alcohol the day before or day of your procedure. 5. A Pre-op History and Physical for surgery MUST be completed by your Physician or Urgent Care within 30 days of your procedure date. Please bring a copy with you on the day of your procedure and along with any other testing performed. THE DAY OF YOUR PROCEDURE:  1. Follow instructions for ARRIVAL TIME as DIRECTED BY YOUR SURGEON. 2. Enter the MAIN entrance from [address] and follow the signs to the free [employer] or [employer] parking (offered free of charge 6am-5pm). 3. Enter the Main Entrance of the hospital (do not enter from the lower level of the parking garage). Upon entrance, check in with the  at the main desk on your left. If no one is available at the desk, proceed into the [facility] Waiting Room and go through the door directly into the [facility]. There is a Check-in desk ACROSS from Room 5 (marked with a sign hanging from the ceiling). The phone number for the surgery center is [phone]. 4. Please call [phone] option #2 option #2 if you have not been preregistered yet.   On the day of your procedure bring your insurance card and photo may want to bring a method of payment, i.e. Check or credit card, if you wish to pay your co-pay the day of surgery. 12. If you are to stay overnight, you may bring a bag with personal items. Please have any large items you may need brought in by your family after your arrival to your hospital room. 15. If you have a Living Will or Durable Power of , please bring a copy on the day of your procedure. 15. With your permission, one family member may accompany you while you are being prepared for surgery. Once you are ready, additional family members may join you. HOW WE KEEP YOU SAFE and WORK TO PREVENT SURGICAL SITE INFECTIONS:  1. Health care workers should always check your ID bracelet to verify your name and birth date. You will be asked many times to state your name, date of birth, and allergies. 2. Health care workers should always clean their hands with soap or alcohol gel before providing care to you. It is okay to ask anyone if they cleaned their hands before they touch you. 3. You will be actively involved in verifying the type of procedure you are having and ensuring the correct surgical site. This will be confirmed multiple times prior to your procedure. Do NOT nely your surgery site UNLESS instructed to by your surgeon. 4. Do not shave or wax for 72 hours prior to procedure near your operative site. Shaving with a razor can irritate your skin and make it easier to develop an infection. On the day of your procedure, any hair that needs to be removed near the surgical site will be clipped by a healthcare worker using a special clippers designed to avoid skin irritation. 5. When you are in the operating room, your surgical site will be cleansed with a special soap, and in most cases, you will be given an antibiotic before the surgery begins. What to expect AFTER YOUR PROCEDURE:  1.  Immediately following your procedure, your will be taken to the PACU for the first

## 2022-07-22 NOTE — PROGRESS NOTES
P  [facility]                                               Pulmonary Clinic Consult     I had the pleasure of seeing  Josh Huff     Chief Complaint   Patient presents with    Follow-up     Pulmonary clearance copd        HISTORY OF PRESENT ILLNESS:    Josh Huff is a 54y.o. year old  Who start smoking at age 15  And increase gradually 1 pack and she quit 2020 so has 25 PPY smoking       The Patient comes in with SOB that has been going on for the last few years however it got better after quit smoking . Associated with  cough with clear sputum usually She  states that it get worse with exercise or walking long distance and he can walk . 1 block And go 1flight of stairs before get short winded      She lost 79 pounds  She use O2 occasionally when O2 sat less than 90  Had bariatrics surgery 11/2020   Sleep apnea and she could not use mask and no way she want it understanding risk       On Albuterol and Spiriv    ALLERGIES:    Allergies   Allergen Reactions    Ambien [Zolpidem Tartrate] Other (See Comments)     Shakey, anxious    Dilaudid [Hydromorphone Hcl] Other (See Comments)     Feels like skin is on fire. Open wound on face and arm    Fentanyl Itching     Burning, skin blistered    Imitrex [Sumatriptan]      Face hot, felt sob    Morphine Other (See Comments)     Feels like skin is on fire. Tolerates Percocet.        PAST MEDICAL HISTORY:       Diagnosis Date    Anxiety     Asthma     Bipolar 1 disorder ([address]     Pt is willing to see psych after 7/15 for confirmation of dx    Chronic bronchitis ([address]     Chronic GERD 06/11/2020    COPD (chronic obstructive pulmonary disease) ([address]     Dr Joshua Souza    Depression     Emphysema of lung ([address]     Macromastia     Migraine     Migraines     Mixed hyperlipidemia 06/25/2020    MRSA infection within last 3 months 2012    axillary    Narcolepsy 2004    Dr Mattson Men    Obesity     Pneumonia     Restless leg tablet TAKE 1 TABLET BY MOUTH 2 TIMES DAILY 180 tablet 1    Cholecalciferol 50 MCG (2000 UT) TABS Take 1 tablet by mouth daily Take 1 tablet by mouth daily. 90 tablet 2    methylphenidate (RITALIN) 20 MG tablet Take 20 mg by mouth 3 times daily. albuterol (PROVENTIL) (2.5 MG/3ML) 0.083% nebulizer solution Take 2.5 mg by nebulization every 6 hours as needed for Wheezing      VENTOLIN  (90 Base) MCG/ACT inhaler INHALE 2 PUFFS INTO THE LUNGS 4 TIMES DAILY AS NEEDED. 18 g 5    amphetamine-dextroamphetamine (ADDERALL XR) 20 MG extended release capsule TAKE 1 CAPSULE BY MOUTH EVERY DAY  0    Respiratory Therapy Supplies (NEBULIZER/TUBING/MOUTHPIECE) KIT 1 kit by Does not apply route daily as needed 1 kit 0    OXYGEN Inhale 2 L/min into the lungs continuous. Regulate with portability at home (Patient taking differently: Inhale 2 L/min into the lungs as needed) 1 Container 0     No current facility-administered medications for this visit.        SOCIAL AND OCCUPATIONAL HEALTH:  Social History     Tobacco Use   Smoking Status Former    Packs/day: 0.25    Years: 22.00    Pack years: 5.50    Types: Cigarettes    Quit date: 2020    Years since quittin.4   Smokeless Tobacco Never     SURGICAL HISTORY:   Past Surgical History:   Procedure Laterality Date    ABSCESS DRAINAGE      L axilla MRSA, hosp for 7 days    ADENOIDECTOMY      BLADDER SUSPENSION  5/10    Mesh removed 1/9/15 in [facility]      lumpectomy, ducts removed    FINGER SURGERY      right thumb    HYSTERECTOMY (CERVIX STATUS UNKNOWN)   dysplasia, endometriosis, cysts, MYLENE BSO    NASAL SEPTUM SURGERY      PELVIC LAPAROSCOPY      endometriosis    SLEEVE GASTRECTOMY N/A 2020    LAPAROSCOPIC SLEEVE GASTRECTOMY - ETHICON performed by Angelina Gupta MD at [facility] [address] (CERVIX REMOVED)      Dysplasia, endometriosis    TONSILLECTOMY      UPPER GASTROINTESTINAL ENDOSCOPY N/A 7/10/2020    EGD BIOPSY performed by Nicole Morales MD at [facility] [address]:   Lung cancer:  DVT or PE     REVIEW OF SYSTEMS:  Constitutional: General health is good . There has been no weight changes. No fevers, fatigue or weakness. Head: Patient denies any history of trauma, convulsive disorder or syncope. Skin:  Patient denies history of changes in pigmentation, eruptions or pruritus. No easy bruising or bleeding. EENT: no nasal congestion   Cardiovascular ,No chest pain ,No edema ,  Respiration:SOB:+  ,KINCAID :+  Gastrointestinal:No GI bleed ,no melena  ,no hematemesis    Musculoskeletal: no joint pain ,no swelling  Neurological:no , syncope. Denies twitching, convulsions, loss of consciousness or memory. Endocrine:  . No history of goiter, exophthalmos or dryness of skin. The patient has no history of diabetes. Hematopoietic:  No history of bleeding disorders or easy bruising. Rheumatic:  No connective tissue disease history or polyarthritis/inflammatory joint disease. PHYSICAL EXAMINATION:  Vitals:    07/22/22 1114   BP: 125/79   Pulse: 77   Resp: 14   SpO2: 96%     Constitutional: This is a well developed, well nourished 54y.o. year old female who is alert, oriented, cooperative and in no apparent distress. Head was normocephalic and atraumatic. EENT: Mallampati class :  Extraocular muscles intact. External canals are patent and no discharge was appreciated. Septum was midline,   mucosa was without erythema, exudates or cobblestoning. No thrush was noted. Neck: Supple without thyromegaly. No elevated JVP. Trachea was midline. No carotid bruits were auscultated. Respiratory: a scatted rhonchi     Cardiovascular: Regular without murmur, clicks, gallops or rubs. There is no left or right ventricular heave. Pulses:  Carotid, radial and femoral pulses were equally bilaterally. Abdomen: Slightly rounded and soft without organomegaly.  No rebound, rigidity or guarding was appreciated. Lymphatic: No lymphadenopathy. Musculoskeletal: no joint . Extremities: no edme a  Skin:  Warm and dry. Good color, turgor and pigmentation. No lesions or scars. Neurological/Psychiatric: The patient's general behavior, level of consciousness, thought content and emotional status is normal.  Cranial nerves II-XII are intact. DATA: S  Spirometry: The FEV1 to FVC is 57%. The FEV1 is 0.93 L, or 32% of  predicted. There is a significant response to bronchodilators with an  increase in FVC. Lung volumes: The total lung capacity is reduced at 3.48 L, or 68% of  predicted. Diffusion capacity:  The diffusion capacity is reduced at 6.93 or 28% of  predicted. Flow volume loop:  compatible with an obstructive defect. The patients efforts were variable with the best efforts reported. IMPRESSION:    1. There is a very severe obstructive and restrictive defect present. There  is a response to bronchodilators with an increase in FVC. There is a severe  reduction in diffusion capacity. 2.  These results could be consistent with a diagnosis of very severe COPD  with broncho-reactivity with a restrictive defect as a result of the patients  obesity. Clinical correlation is required.      IMPRESSION:    1-very severe COPD/restrictive pattern as per PFT 2014   2-chronic hypoxia   3-SOB   4-CATHY  5-                PLAN:      -discuss with Vanessa about risk with her CATHY and COPD severe and hypoxia she is moderate to higher isk for lung complication ,she understand all the risk from pneumonia,Vent ,trach ,ARF etc and she would like to proceed with surgery understanding al above,she already went through bariatric surgery with no issues  -PFT   -6  minutes   -she is happy with inhalers will keep   _ will ct chest screen when she is 50 ,next Nov   May benefit from observe in hospital after surgery     Flu and Pneumovax     Thank you for allowing me to participate in [facility] [address] [facility]. I will keep following with you ,should you have any concerns ,please contact us at [facility] pulmonary office     Sincerely,        Siva Conner MD  Pulmonary & Critical Care Medicine     NOTE: This report was transcribed using voice recognition software. Every effort was made to ensure accuracy; however, inadvertent computerized transcription errors may be present.

## 2022-07-22 NOTE — PROGRESS NOTES
Place patient label inside box (if no patient label, complete below)  Name:  :  MR#:   [address] / PROCEDURE  I (we), MIGUELANGEL Nuñez (Patient Name) authorize DR. Talisha Jones (Provider / Maribel Fountain) and/or such assistants as may be selected by him/her, to perform the following operation/procedure(s): BILATERAL BREAST REDUCTION       Note: If unable to obtain consent prior to an emergent procedure, document the emergent reason in the medical record. This procedure has been explained to my (our) satisfaction and included in the explanation was: The intended benefit, nature, and extent of the procedure to be performed; The significant risks involved and the probability of success; Alternative procedures and methods of treatment; The dangers and probable consequences of such alternatives (including no procedure or treatment); The expected consequences of the procedure on my future health; Whether other qualified individuals would be performing important surgical tasks and/or whether  would be present to advise or support the procedure. I (we) understand that there are other risks of infection and other serious complications in the pre-operative/procedural and postoperative/procedural stages of my (our) care. I (we) have asked all of the questions which I (we) thought were important in deciding whether or not to undergo treatment or diagnosis. These questions have been answered to my (our) satisfaction. I (we) understand that no assurance can be given that the procedure will be a success, and no guarantee or warranty of success has been given to me (us). It has been explained to me (us) that during the course of the operation/procedure, unforeseen conditions may be revealed that necessitate extension of the original procedure(s) or different procedure(s) than those set forth in Paragraph 1.  I (we) authorize and request that the above-named physician, his/her assistants or his/her designees, perform procedures as necessary and desirable if deemed to be in my (our) best interest.     Revised 8/2/2021                                                                          Page 1 of 2       I acknowledge that health care personnel may be observing this procedure for the purpose of medical education or other specified purposes as may be necessary as requested and/or approved by my (our) physician. I (we) consent to the disposal by the hospital Pathologist of the removed tissue, parts or organs in accordance with hospital policy. I do ____ do not ____ consent to the use of a local infiltration pain blocking agent that will be used by my provider/surgical provider to help alleviate pain during my procedure. I do ____ do not ____ consent to an emergent blood transfusion in the case of a life-threatening situation that requires blood components to be administered. This consent is valid for 24 hours from the beginning of the procedure. This patient does ____ or does not ____ currently have a DNR status/order. If DNR order is in place, obtain Addendum to the Surgical Consent for ALL Patients with a DNR Order to address noah-operative status for limited intervention or DNR suspension.      I have read and fully understand the above Consent for Operation/Procedure and that all blanks were completed before I signed the consent.   _____________________________       _____________________      ____/____am/pm  Signature of Patient or legal representative      Printed Name / Relationship            Date / Time   ____________________________       _____________________      ____/____am/pm  Witness to Signature                                    Printed Name                    Date / Time    If patient is unable to sign or is a minor, complete the following)  Patient is a minor, ____ years of age, or unable to sign because:

## 2022-07-25 ENCOUNTER — TELEPHONE (OUTPATIENT)
Dept: SURGERY | Age: 52
End: 2022-07-25

## 2022-07-25 NOTE — TELEPHONE ENCOUNTER
Left a message for Vanessa to let her know that we do have the pulmonary clearance in her chart from Dr. Indy Horta MD.

## 2022-07-25 NOTE — TELEPHONE ENCOUNTER
Patient called to ensure Krystyna received the pulmonary clearance for her surgery this Friday, 7/29/22    Patient can be reached at [phone]

## 2022-07-28 ENCOUNTER — ANESTHESIA EVENT (OUTPATIENT)
Dept: OPERATING ROOM | Age: 52
End: 2022-07-28
Payer: MEDICARE

## 2022-07-29 ENCOUNTER — ANESTHESIA (OUTPATIENT)
Dept: OPERATING ROOM | Age: 52
End: 2022-07-29
Payer: MEDICARE

## 2022-07-29 ENCOUNTER — HOSPITAL ENCOUNTER (OUTPATIENT)
Age: 52
Setting detail: OUTPATIENT SURGERY
Discharge: HOME | End: 2022-07-29
Attending: SURGERY | Admitting: SURGERY
Payer: MEDICARE

## 2022-07-29 VITALS
SYSTOLIC BLOOD PRESSURE: 107 MMHG | BODY MASS INDEX: 28.54 KG/M2 | OXYGEN SATURATION: 94 % | DIASTOLIC BLOOD PRESSURE: 75 MMHG | TEMPERATURE: 96.7 F | WEIGHT: 155.08 LBS | HEART RATE: 73 BPM | HEIGHT: 62 IN | RESPIRATION RATE: 14 BRPM

## 2022-07-29 DIAGNOSIS — N62 MACROMASTIA: ICD-10-CM

## 2022-07-29 PROCEDURE — 2500000003 HC RX 250 WO HCPCS: Performed by: NURSE ANESTHETIST, CERTIFIED REGISTERED

## 2022-07-29 PROCEDURE — 2500000003 HC RX 250 WO HCPCS: Performed by: SURGERY

## 2022-07-29 PROCEDURE — 3600000014 HC SURGERY LEVEL 4 ADDTL 15MIN: Performed by: SURGERY

## 2022-07-29 PROCEDURE — 6360000002 HC RX W HCPCS: Performed by: NURSE PRACTITIONER

## 2022-07-29 PROCEDURE — 19318 BREAST REDUCTION: CPT | Performed by: SURGERY

## 2022-07-29 PROCEDURE — 2709999900 HC NON-CHARGEABLE SUPPLY: Performed by: SURGERY

## 2022-07-29 PROCEDURE — 88305 TISSUE EXAM BY PATHOLOGIST: CPT

## 2022-07-29 PROCEDURE — C1729 CATH, DRAINAGE: HCPCS | Performed by: SURGERY

## 2022-07-29 PROCEDURE — 6360000002 HC RX W HCPCS: Performed by: ANESTHESIOLOGY

## 2022-07-29 PROCEDURE — 7100000010 HC PHASE II RECOVERY - FIRST 15 MIN: Performed by: SURGERY

## 2022-07-29 PROCEDURE — 3700000000 HC ANESTHESIA ATTENDED CARE: Performed by: SURGERY

## 2022-07-29 PROCEDURE — 6360000002 HC RX W HCPCS: Performed by: NURSE ANESTHETIST, CERTIFIED REGISTERED

## 2022-07-29 PROCEDURE — 6370000000 HC RX 637 (ALT 250 FOR IP): Performed by: ANESTHESIOLOGY

## 2022-07-29 PROCEDURE — 3700000001 HC ADD 15 MINUTES (ANESTHESIA): Performed by: SURGERY

## 2022-07-29 PROCEDURE — 94664 DEMO&/EVAL PT USE INHALER: CPT

## 2022-07-29 PROCEDURE — 7100000001 HC PACU RECOVERY - ADDTL 15 MIN: Performed by: SURGERY

## 2022-07-29 PROCEDURE — 7100000011 HC PHASE II RECOVERY - ADDTL 15 MIN: Performed by: SURGERY

## 2022-07-29 PROCEDURE — 2580000003 HC RX 258: Performed by: SURGERY

## 2022-07-29 PROCEDURE — 2720000010 HC SURG SUPPLY STERILE: Performed by: SURGERY

## 2022-07-29 PROCEDURE — 3600000004 HC SURGERY LEVEL 4 BASE: Performed by: SURGERY

## 2022-07-29 PROCEDURE — 7100000000 HC PACU RECOVERY - FIRST 15 MIN: Performed by: SURGERY

## 2022-07-29 PROCEDURE — 2580000003 HC RX 258: Performed by: NURSE ANESTHETIST, CERTIFIED REGISTERED

## 2022-07-29 PROCEDURE — 94640 AIRWAY INHALATION TREATMENT: CPT

## 2022-07-29 PROCEDURE — 2580000003 HC RX 258: Performed by: ANESTHESIOLOGY

## 2022-07-29 PROCEDURE — A4217 STERILE WATER/SALINE, 500 ML: HCPCS | Performed by: SURGERY

## 2022-07-29 PROCEDURE — 6360000002 HC RX W HCPCS: Performed by: SURGERY

## 2022-07-29 PROCEDURE — 6370000000 HC RX 637 (ALT 250 FOR IP): Performed by: SURGERY

## 2022-07-29 RX ORDER — CEPHALEXIN 500 MG/1
500 CAPSULE ORAL 4 TIMES DAILY
Qty: 40 CAPSULE | Refills: 0 | Status: SHIPPED | OUTPATIENT
Start: 2022-07-29 | End: 2022-08-08

## 2022-07-29 RX ORDER — OXYCODONE HYDROCHLORIDE AND ACETAMINOPHEN 5; 325 MG/1; MG/1
1 TABLET ORAL ONCE
Status: COMPLETED | OUTPATIENT
Start: 2022-07-29 | End: 2022-07-29

## 2022-07-29 RX ORDER — PROCHLORPERAZINE EDISYLATE 5 MG/ML
5 INJECTION INTRAMUSCULAR; INTRAVENOUS
Status: COMPLETED | OUTPATIENT
Start: 2022-07-29 | End: 2022-07-29

## 2022-07-29 RX ORDER — MAGNESIUM HYDROXIDE 1200 MG/15ML
LIQUID ORAL CONTINUOUS PRN
Status: DISCONTINUED | OUTPATIENT
Start: 2022-07-29 | End: 2022-07-29 | Stop reason: HOSPADM

## 2022-07-29 RX ORDER — PROPOFOL 10 MG/ML
INJECTION, EMULSION INTRAVENOUS PRN
Status: DISCONTINUED | OUTPATIENT
Start: 2022-07-29 | End: 2022-07-29 | Stop reason: SDUPTHER

## 2022-07-29 RX ORDER — SODIUM CHLORIDE, SODIUM LACTATE, POTASSIUM CHLORIDE, CALCIUM CHLORIDE 600; 310; 30; 20 MG/100ML; MG/100ML; MG/100ML; MG/100ML
INJECTION, SOLUTION INTRAVENOUS CONTINUOUS
Status: DISCONTINUED | OUTPATIENT
Start: 2022-07-29 | End: 2022-07-29 | Stop reason: HOSPADM

## 2022-07-29 RX ORDER — KETAMINE HCL IN NACL, ISO-OSM 20 MG/2 ML
SYRINGE (ML) INJECTION PRN
Status: DISCONTINUED | OUTPATIENT
Start: 2022-07-29 | End: 2022-07-29 | Stop reason: SDUPTHER

## 2022-07-29 RX ORDER — ALBUTEROL SULFATE 2.5 MG/3ML
2.5 SOLUTION RESPIRATORY (INHALATION) ONCE
Status: COMPLETED | OUTPATIENT
Start: 2022-07-29 | End: 2022-07-29

## 2022-07-29 RX ORDER — SODIUM CHLORIDE, SODIUM LACTATE, POTASSIUM CHLORIDE, CALCIUM CHLORIDE 600; 310; 30; 20 MG/100ML; MG/100ML; MG/100ML; MG/100ML
INJECTION, SOLUTION INTRAVENOUS CONTINUOUS PRN
Status: DISCONTINUED | OUTPATIENT
Start: 2022-07-29 | End: 2022-07-29 | Stop reason: SDUPTHER

## 2022-07-29 RX ORDER — DEXMEDETOMIDINE HYDROCHLORIDE 100 UG/ML
INJECTION, SOLUTION INTRAVENOUS PRN
Status: DISCONTINUED | OUTPATIENT
Start: 2022-07-29 | End: 2022-07-29 | Stop reason: SDUPTHER

## 2022-07-29 RX ORDER — OXYCODONE HYDROCHLORIDE AND ACETAMINOPHEN 5; 325 MG/1; MG/1
1 TABLET ORAL EVERY 6 HOURS PRN
Qty: 28 TABLET | Refills: 0 | Status: SHIPPED | OUTPATIENT
Start: 2022-07-29 | End: 2022-08-05

## 2022-07-29 RX ORDER — HYDRALAZINE HYDROCHLORIDE 20 MG/ML
10 INJECTION INTRAMUSCULAR; INTRAVENOUS
Status: DISCONTINUED | OUTPATIENT
Start: 2022-07-29 | End: 2022-07-29 | Stop reason: HOSPADM

## 2022-07-29 RX ORDER — MIDAZOLAM HYDROCHLORIDE 1 MG/ML
INJECTION INTRAMUSCULAR; INTRAVENOUS PRN
Status: DISCONTINUED | OUTPATIENT
Start: 2022-07-29 | End: 2022-07-29 | Stop reason: SDUPTHER

## 2022-07-29 RX ORDER — ONDANSETRON 2 MG/ML
INJECTION INTRAMUSCULAR; INTRAVENOUS PRN
Status: DISCONTINUED | OUTPATIENT
Start: 2022-07-29 | End: 2022-07-29 | Stop reason: SDUPTHER

## 2022-07-29 RX ORDER — SUCCINYLCHOLINE/SOD CL,ISO/PF 200MG/10ML
SYRINGE (ML) INTRAVENOUS PRN
Status: DISCONTINUED | OUTPATIENT
Start: 2022-07-29 | End: 2022-07-29 | Stop reason: SDUPTHER

## 2022-07-29 RX ORDER — DEXAMETHASONE SODIUM PHOSPHATE 4 MG/ML
INJECTION, SOLUTION INTRA-ARTICULAR; INTRALESIONAL; INTRAMUSCULAR; INTRAVENOUS; SOFT TISSUE PRN
Status: DISCONTINUED | OUTPATIENT
Start: 2022-07-29 | End: 2022-07-29 | Stop reason: SDUPTHER

## 2022-07-29 RX ORDER — SODIUM CHLORIDE 0.9 % (FLUSH) 0.9 %
5-40 SYRINGE (ML) INJECTION EVERY 12 HOURS SCHEDULED
Status: DISCONTINUED | OUTPATIENT
Start: 2022-07-29 | End: 2022-07-29 | Stop reason: HOSPADM

## 2022-07-29 RX ORDER — MEPERIDINE HYDROCHLORIDE 25 MG/ML
25 INJECTION INTRAMUSCULAR; INTRAVENOUS; SUBCUTANEOUS
Status: COMPLETED | OUTPATIENT
Start: 2022-07-29 | End: 2022-07-29

## 2022-07-29 RX ORDER — SODIUM CHLORIDE 0.9 % (FLUSH) 0.9 %
5-40 SYRINGE (ML) INJECTION PRN
Status: DISCONTINUED | OUTPATIENT
Start: 2022-07-29 | End: 2022-07-29 | Stop reason: HOSPADM

## 2022-07-29 RX ORDER — OXYCODONE HYDROCHLORIDE 5 MG/1
10 TABLET ORAL ONCE
Status: COMPLETED | OUTPATIENT
Start: 2022-07-29 | End: 2022-07-29

## 2022-07-29 RX ORDER — ALBUTEROL SULFATE 2.5 MG/3ML
SOLUTION RESPIRATORY (INHALATION)
Status: DISCONTINUED
Start: 2022-07-29 | End: 2022-07-29 | Stop reason: HOSPADM

## 2022-07-29 RX ORDER — ROCURONIUM BROMIDE 10 MG/ML
INJECTION, SOLUTION INTRAVENOUS PRN
Status: DISCONTINUED | OUTPATIENT
Start: 2022-07-29 | End: 2022-07-29 | Stop reason: SDUPTHER

## 2022-07-29 RX ORDER — SODIUM CHLORIDE 9 MG/ML
25 INJECTION, SOLUTION INTRAVENOUS PRN
Status: DISCONTINUED | OUTPATIENT
Start: 2022-07-29 | End: 2022-07-29 | Stop reason: HOSPADM

## 2022-07-29 RX ADMIN — SUGAMMADEX 200 MG: 100 INJECTION, SOLUTION INTRAVENOUS at 12:56

## 2022-07-29 RX ADMIN — CEFAZOLIN 2000 MG: 2 INJECTION, POWDER, FOR SOLUTION INTRAMUSCULAR; INTRAVENOUS at 10:56

## 2022-07-29 RX ADMIN — OXYCODONE 10 MG: 5 TABLET ORAL at 10:50

## 2022-07-29 RX ADMIN — Medication 20 MG: at 12:15

## 2022-07-29 RX ADMIN — DEXMEDETOMIDINE HYDROCHLORIDE 10 MCG: 100 INJECTION, SOLUTION INTRAVENOUS at 12:37

## 2022-07-29 RX ADMIN — MIDAZOLAM HYDROCHLORIDE 2 MG: 2 INJECTION, SOLUTION INTRAMUSCULAR; INTRAVENOUS at 10:56

## 2022-07-29 RX ADMIN — TRANEXAMIC ACID 1000 MG: 1 INJECTION, SOLUTION INTRAVENOUS at 11:10

## 2022-07-29 RX ADMIN — Medication 100 MG: at 10:59

## 2022-07-29 RX ADMIN — PROPOFOL 50 MG: 10 INJECTION, EMULSION INTRAVENOUS at 11:02

## 2022-07-29 RX ADMIN — DEXAMETHASONE SODIUM PHOSPHATE 4 MG: 4 INJECTION, SOLUTION INTRAMUSCULAR; INTRAVENOUS at 11:08

## 2022-07-29 RX ADMIN — Medication 20 MG: at 11:16

## 2022-07-29 RX ADMIN — SODIUM CHLORIDE, POTASSIUM CHLORIDE, SODIUM LACTATE AND CALCIUM CHLORIDE: 600; 310; 30; 20 INJECTION, SOLUTION INTRAVENOUS at 09:54

## 2022-07-29 RX ADMIN — OXYCODONE AND ACETAMINOPHEN 1 TABLET: 5; 325 TABLET ORAL at 14:13

## 2022-07-29 RX ADMIN — ROCURONIUM BROMIDE 30 MG: 50 INJECTION, SOLUTION INTRAVENOUS at 12:42

## 2022-07-29 RX ADMIN — ALBUTEROL SULFATE 2.5 MG: 2.5 SOLUTION RESPIRATORY (INHALATION) at 09:50

## 2022-07-29 RX ADMIN — PROCHLORPERAZINE EDISYLATE 5 MG: 5 INJECTION, SOLUTION INTRAMUSCULAR; INTRAVENOUS at 13:23

## 2022-07-29 RX ADMIN — ONDANSETRON 4 MG: 2 INJECTION INTRAMUSCULAR; INTRAVENOUS at 11:08

## 2022-07-29 RX ADMIN — Medication 100 MG: at 11:01

## 2022-07-29 RX ADMIN — MEPERIDINE HYDROCHLORIDE 25 MG: 25 INJECTION INTRAMUSCULAR; INTRAVENOUS; SUBCUTANEOUS at 13:23

## 2022-07-29 RX ADMIN — PROPOFOL 150 MG: 10 INJECTION, EMULSION INTRAVENOUS at 11:00

## 2022-07-29 RX ADMIN — SODIUM CHLORIDE, SODIUM LACTATE, POTASSIUM CHLORIDE, AND CALCIUM CHLORIDE: .6; .31; .03; .02 INJECTION, SOLUTION INTRAVENOUS at 10:56

## 2022-07-29 RX ADMIN — SODIUM CHLORIDE, SODIUM LACTATE, POTASSIUM CHLORIDE, AND CALCIUM CHLORIDE: .6; .31; .03; .02 INJECTION, SOLUTION INTRAVENOUS at 12:20

## 2022-07-29 RX ADMIN — ROCURONIUM BROMIDE 10 MG: 50 INJECTION, SOLUTION INTRAVENOUS at 10:59

## 2022-07-29 ASSESSMENT — PAIN SCALES - GENERAL
PAINLEVEL_OUTOF10: 6
PAINLEVEL_OUTOF10: 10
PAINLEVEL_OUTOF10: 0
PAINLEVEL_OUTOF10: 3
PAINLEVEL_OUTOF10: 3
PAINLEVEL_OUTOF10: 0
PAINLEVEL_OUTOF10: 10

## 2022-07-29 ASSESSMENT — PAIN DESCRIPTION - DESCRIPTORS
DESCRIPTORS: DISCOMFORT
DESCRIPTORS: ACHING

## 2022-07-29 ASSESSMENT — COPD QUESTIONNAIRES: CAT_SEVERITY: MODERATE

## 2022-07-29 ASSESSMENT — PAIN DESCRIPTION - PAIN TYPE: TYPE: SURGICAL PAIN

## 2022-07-29 ASSESSMENT — ENCOUNTER SYMPTOMS: SHORTNESS OF BREATH: 1

## 2022-07-29 ASSESSMENT — PAIN DESCRIPTION - ORIENTATION
ORIENTATION: RIGHT;LEFT

## 2022-07-29 ASSESSMENT — PAIN - FUNCTIONAL ASSESSMENT
PAIN_FUNCTIONAL_ASSESSMENT: 0-10
PAIN_FUNCTIONAL_ASSESSMENT: 0-10
PAIN_FUNCTIONAL_ASSESSMENT: PREVENTS OR INTERFERES SOME ACTIVE ACTIVITIES AND ADLS

## 2022-07-29 ASSESSMENT — PAIN DESCRIPTION - LOCATION
LOCATION: BREAST

## 2022-07-29 NOTE — PROGRESS NOTES
PACU Transfer to hospitals    Vitals:    07/29/22 1400   BP: (!) 115/91   Pulse: 83   Resp: 15   Temp: 97.6 °F (36.4 °C)   SpO2: 96%         Intake/Output Summary (Last 24 hours) at 7/29/2022 1404  Last data filed at 7/29/2022 1358  Gross per 24 hour   Intake 1683 ml   Output 20 ml   Net 1663 ml       Pain assessment:  present - adequately treated  Pain Level: 3    Patient transferred to care of hospitals RN.

## 2022-07-29 NOTE — PROGRESS NOTES
Pt received from OR to PACU # 15 via stretcher. Post:  Bilateral breast reduction     Report received from [address] [place] [address]. Per report Pt has numerous pain med allergies and voided just before going into the OR. Respirations reg and easy. Pt is A & O x4. Attached to PACU monitoring system. Alarms and parameters set    Pain 10/10 and no complaints of nausea.

## 2022-07-29 NOTE — H&P
Ling Fee    [phone]    [facility] Same Day Surgery Update H & P  Department of General Surgery   Surgical Service   Pre-operative History and Physical  Last H & P within the last 30 days. DIAGNOSIS:   Macromastia [N62]    Procedure(s):  BILATERAL BREAST REDUCTION    History obtained from: Patient interview and EHR      HISTORY OF PRESENT ILLNESS:   The patient is a 54 y.o. female with c/o cervical pain, thoracic pain and HA in the setting of macromastia presents today for the above procedure. Illness Screening: Patient denies fever, chills, worsening cough, or close contact with sick individuals.         Past Medical History:        Diagnosis Date    Anxiety     Asthma     Bipolar 1 disorder ([facility]     Pt is willing to see psych after 7/15 for confirmation of dx    Chronic bronchitis ([facility]     Chronic GERD 06/11/2020    COPD (chronic obstructive pulmonary disease) ([facility]     Dr Ibeth Goldberg    Depression     Emphysema of lung ([facility]     Macromastia     Migraine     Migraines     Mixed hyperlipidemia 06/25/2020    MRSA infection within last 3 months 2012    axillary    Narcolepsy 2004    Dr Namita Buchanan    Obesity     Pneumonia     Restless leg syndrome     Sleep apnea     did not tolerate cpap, uses O2 qhs    Tobacco abuse     Tobacco use disorder 01/14/2011    Urinary incontinence      Past Surgical History:        Procedure Laterality Date    ABSCESS DRAINAGE  2012    L axilla MRSA, hosp for 7 days    ADENOIDECTOMY      BLADDER SUSPENSION  5/10    Mesh removed 1/9/15 in [address]  2/09    lumpectomy, ducts removed    FINGER SURGERY      right thumb    HYSTERECTOMY (CERVIX STATUS UNKNOWN)  2001 2/2 dysplasia, endometriosis, cysts, MYLENE BSO    NASAL SEPTUM SURGERY      PELVIC LAPAROSCOPY      endometriosis    SLEEVE GASTRECTOMY N/A 11/16/2020    LAPAROSCOPIC SLEEVE GASTRECTOMY - ETHICON performed by Hilary Figueredo MD at [address] [address] ([address] [facility]) loratadine-pseudoephedrine (LORATADINE-D 12HR) 5-120 MG per extended release tablet TAKE 1 TABLET BY MOUTH EVERY MORNING AS NEEDED FOR ALLERGY 4/11/22   Pooja Burton MD   naproxen (NAPROSYN) 500 MG tablet Take 1 tablet by mouth daily as needed for Pain Take with food. 4/11/22   Pooja Burton MD   omeprazole (PRILOSEC) 20 MG delayed release capsule TAKE 1 CAPSULE BY MOUTH DAILY 12/23/21   JENNIFER Flynn CNP   topiramate (TOPAMAX) 100 MG tablet TAKE 1 TABLET BY MOUTH 2 TIMES DAILY 6/15/21   JENNIFER Haji CNP   omeprazole (PRILOSEC) 20 MG delayed release capsule TAKE 1 CAPSULE BY MOUTH DAILY 5/26/21   JENNIFER Flynn CNP   Cholecalciferol 50 MCG (2000 UT) TABS Take 1 tablet by mouth daily Take 1 tablet by mouth daily. 3/24/21   Pooja Burton MD   methylphenidate (RITALIN) 20 MG tablet Take 20 mg by mouth 3 times daily. Historical Provider, MD   albuterol (PROVENTIL) (2.5 MG/3ML) 0.083% nebulizer solution Take 2.5 mg by nebulization every 6 hours as needed for Wheezing    Historical Provider, MD   VENTOLIN  (90 Base) MCG/ACT inhaler INHALE 2 PUFFS INTO THE LUNGS 4 TIMES DAILY AS NEEDED. 5/15/20   Abena Sánchez MD   amphetamine-dextroamphetamine (ADDERALL XR) 20 MG extended release capsule TAKE 1 CAPSULE BY MOUTH EVERY DAY 6/14/19   Historical Provider, MD   Respiratory Therapy Supplies (NEBULIZER/TUBING/MOUTHPIECE) KIT 1 kit by Does not apply route daily as needed 10/21/15   Pooja Burton MD   OXYGEN Inhale 2 L/min into the lungs continuous.  Regulate with portability at home  Patient taking differently: Inhale 2 L/min into the lungs as needed 8/7/14   Edith Gil MD         Allergies:  Ambien [zolpidem tartrate], Dilaudid [hydromorphone hcl], Fentanyl, Imitrex [sumatriptan], and Morphine    PHYSICAL EXAM:      /80   Pulse 73   Temp 96.9 °F (36.1 °C) (Temporal)   Resp 16   Ht 5' 2\" (1.575 m)   Wt 155 lb 1.3 oz (70.3 kg)   LMP  (LMP Unknown) Comment: age 31 total  SpO2 97%   BMI 28.36 kg/m²      Airway:  Airway patent with no audible stridor    Heart:  Regular rate and rhythm, No murmur noted    Lungs:  No increased work of breathing, good air exchange, scattered rhonchi bilaterally    Abdomen:  Soft, non-distended, non-tender, no rebound tenderness or guarding, and no masses palpated    ASSESSMENT AND PLAN     Patient is a 54 y.o. female with above specified procedure planned. 1.  The patients history and physical was obtained and signed off by the pre-admission testing department. Patient seen and focused exam done today- no new changes since last physical exam on 7/11/22    2. Access to ancillary services are available per request of the provider. 3.  An albuterol HHN was ordered for the patient. RN aware.     Hortencia Hope, APRN - CNP     7/29/2022

## 2022-07-29 NOTE — ANESTHESIA PRE PROCEDURE
Department of Anesthesiology  Preprocedure Note       Name:  Ciara Pacheco   Age:  54 y.o.  :  1970                                          MRN:  [id number]         Date:  2022      Surgeon: Marce Hull):  Carl Albarran MD    Procedure: Procedure(s):  BILATERAL BREAST REDUCTION    Medications prior to admission:   Prior to Admission medications    Medication Sig Start Date End Date Taking? Authorizing Provider   clonazePAM (KLONOPIN) 1 MG tablet Take 1 tablet by mouth 2 times daily as needed for Anxiety for up to 30 days.  7/15/22 8/14/22  JENNIFER Dacosta CNP   butalbital-acetaminophen-caffeine (FIORICET, ESGIC) -40 MG per tablet TAKE 1 TABLET BY MOUTH 3 TIMES DAILY AS NEEDED FOR MIGRAINE 22   JENNIFER Dacosta CNP   NURTEC 75 MG TBDP PLACE 1 TABLET UNDER THE TONGUE DAILY AS NEEDED (MIGRAINE) MAX 1/24 HR, TO REPLACE MAXALT 22   JENNIFER Dacosta CNP   furosemide (LASIX) 40 MG tablet TAKE 1 TABLET BY MOUTH EVERY DAY TO TWICE DAILY AS NEEDED SWELLING 22   JENNIFER Dacosta CNP   famotidine (PEPCID) 20 MG tablet TAKE 1 TABLET BY MOUTH NIGHTLY 22   JENNIFER Dacosta CNP   gabapentin (NEURONTIN) 400 MG capsule TAKE 1 CAPSULE BY MOUTH AT BEDTIME 22   Historical Provider, MD   FLUoxetine (PROZAC) 20 MG capsule TAKE 3 CAPSULES (60 MG) BY MOUTH DAILY 22   Trey ShellsJENNIFER CNP   varenicline (CHANTIX) 1 MG tablet TAKE 1 TABLET BY MOUTH TWICE DAILY TAKE AFTER EATING WITH A FULL GLASS OF WATER 22   Pooja Burton MD   amitriptyline (ELAVIL) 75 MG tablet TAKE 1 TABLET (75 MG) BY MOUTH NIGHTLY 22   Pooja Burton MD   potassium chloride (KLOR-CON M) 20 MEQ extended release tablet TAKE 1 TABLET (20 MEQ) BY MOUTH DAILY 22   Pooja Burton MD   promethazine (PHENERGAN) 25 MG tablet TAKE 1 TABLET BY MOUTH EVERY 6 HOURS AS NEEDED FOR NAUSEA 22   Soledad Marialuisa Roys, APRN - CNP   tiZANidine (ZANAFLEX) 2 MG tablet TAKE 1 TABLET (2 MG) BY MOUTH 3 TIMES DAILY AS NEEDED (BACK PAIN) 4/19/22   JENNIFER Ryan CNP   loratadine-pseudoephedrine (LORATADINE-D 12HR) 5-120 MG per extended release tablet TAKE 1 TABLET BY MOUTH EVERY MORNING AS NEEDED FOR ALLERGY 4/11/22   Shante Davila MD   naproxen (NAPROSYN) 500 MG tablet Take 1 tablet by mouth daily as needed for Pain Take with food. 4/11/22   Shante Davila MD   omeprazole (PRILOSEC) 20 MG delayed release capsule TAKE 1 CAPSULE BY MOUTH DAILY 12/23/21   JENNIFER Guillaume CNP   topiramate (TOPAMAX) 100 MG tablet TAKE 1 TABLET BY MOUTH 2 TIMES DAILY 6/15/21   JENNIFER Foreman CNP   omeprazole (PRILOSEC) 20 MG delayed release capsule TAKE 1 CAPSULE BY MOUTH DAILY 5/26/21   JENNIFER Guillaume CNP   Cholecalciferol 50 MCG (2000 UT) TABS Take 1 tablet by mouth daily Take 1 tablet by mouth daily. 3/24/21   Shante Davila MD   methylphenidate (RITALIN) 20 MG tablet Take 20 mg by mouth 3 times daily. Historical Provider, MD   albuterol (PROVENTIL) (2.5 MG/3ML) 0.083% nebulizer solution Take 2.5 mg by nebulization every 6 hours as needed for Wheezing    Historical Provider, MD   VENTOLIN  (90 Base) MCG/ACT inhaler INHALE 2 PUFFS INTO THE LUNGS 4 TIMES DAILY AS NEEDED. 5/15/20   Corinne Huguenin, MD   amphetamine-dextroamphetamine (ADDERALL XR) 20 MG extended release capsule TAKE 1 CAPSULE BY MOUTH EVERY DAY 6/14/19   Historical Provider, MD   Respiratory Therapy Supplies (NEBULIZER/TUBING/MOUTHPIECE) KIT 1 kit by Does not apply route daily as needed 10/21/15   Shante Davila MD   OXYGEN Inhale 2 L/min into the lungs continuous.  Regulate with portability at home  Patient taking differently: Inhale 2 L/min into the lungs as needed 8/7/14   Mattie Méndez MD       Current medications:    Current Facility-Administered Medications   Medication Dose Route Frequency Provider Last Rate Last Admin    ceFAZolin (ANCEF) 2,000 mg in sodium chloride 0.9 % 50 mL IVPB (mini-bag)  2,000 mg IntraVENous Once Teresa Grubbs MD        tranexamic acid (CYKLOKAPRON) 1,000 mg in sodium chloride 0.9 % 60 mL IVPB  1,000 mg IntraVENous Once Teresa Grubbs MD        lactated ringers infusion   IntraVENous Continuous Laveta ,  mL/hr at 07/29/22 0954 New Bag at 07/29/22 0954    albuterol (PROVENTIL) nebulizer solution 2.5 mg  2.5 mg Nebulization Once JENNIFER Pretty CNP        albuterol (PROVENTIL) (2.5 MG/3ML) 0.083% nebulizer solution                Allergies: Allergies   Allergen Reactions    Ambien [Zolpidem Tartrate] Other (See Comments)     Shakey, anxious    Dilaudid [Hydromorphone Hcl] Other (See Comments)     Feels like skin is on fire. Open wound on face and arm    Fentanyl Itching     Burning, skin blistered    Imitrex [Sumatriptan]      Face hot, felt sob    Morphine Other (See Comments)     Feels like skin is on fire. Tolerates Percocet. Problem List:    Patient Active Problem List   Diagnosis Code    Urinary incontinence R32    Moderate COPD (chronic obstructive pulmonary disease) ([facility]) J44.9    Migraine with aura and without status migrainosus, not intractable G43. 109    Anxiety F41.9    Depression F32. A    Tobacco abuse Z72.0    CAP (community acquired pneumonia) J18.9    COPD exacerbation ([facility]) J44.1    Chronic respiratory failure with hypoxia ([facility] [address] J96.11    History of total hysterectomy with removal of both tubes and ovaries Z90.710, Z90.722, Z90.79    Shortness of breath R06.02    Bipolar depression ([facility]) F31.9    Macromastia N62    Oxygen dependent Z99.81    History of tobacco use Z87.891    Primary insomnia F51.01    Hemoptysis R04.2    Obstructive sleep apnea G47.33    Chronic GERD K21.9    Mixed hyperlipidemia E78.2    Vitamin D deficiency E55.9    Narcolepsy G47.419    Family history of early CAD Z80.55    S/P laparoscopic sleeve gastrectomy Z98.84    Overweight (BMI 25.0-29. 9) E66.3    Sedative, hypnotic or anxiolytic use, unspecified with unspecified sedative, hypnotic or anxiolytic-induced disorder F13.99    Sedative, hypnotic or anxiolytic dependence with unspecified sedative, hypnotic or anxiolytic-induced disorder F13.29    Sedative, hypnotic or anxiolytic dependence, uncomplicated Q79.30    Prolapse of female pelvic organs N81.9    Stress incontinence N39.3       Past Medical History:        Diagnosis Date    Anxiety     Asthma     Bipolar 1 disorder (HCC)     Pt is willing to see psych after 7/15 for confirmation of dx    Chronic bronchitis ([facility] [address]     Chronic GERD 06/11/2020    COPD (chronic obstructive pulmonary disease) ([facility] [address]     Dr Reva Preston Depression     [facility]     Macromastia     Migraine     Migraines     Mixed hyperlipidemia 06/25/2020    MRSA infection within last 3 months 2012    axillary    Narcolepsy 2004    Dr Miriam Ayala   [facility] Obesity     Pneumonia     Restless leg syndrome     Sleep apnea     did not tolerate cpap, uses O2 qhs    Tobacco abuse     Tobacco use disorder 01/14/2011    Urinary incontinence        Past Surgical History:        Procedure Laterality Date    ABSCESS DRAINAGE  2012    L axilla MRSA, hosp for 7 days    ADENOIDECTOMY      BLADDER SUSPENSION  5/10    Mesh removed 1/9/15 in [place], [address] [place] [address] BREAST SURGERY  2/09    lumpectomy, ducts removed    FINGER SURGERY      right thumb    HYSTERECTOMY (CERVIX STATUS UNKNOWN)  2001 2/2 dysplasia, endometriosis, cysts, MYLENE BSO    NASAL SEPTUM SURGERY      PELVIC LAPAROSCOPY      endometriosis    SLEEVE GASTRECTOMY N/A 11/16/2020    LAPAROSCOPIC SLEEVE GASTRECTOMY - ETHICON performed by Petra Santizo MD at [place] [address] (CERVIX REMOVED)  2001    Dysplasia, endometriosis    TONSILLECTOMY      UPPER GASTROINTESTINAL ENDOSCOPY N/A 7/10/2020    EGD BIOPSY performed by Petra Santizo MD at [address] History:    Social History 07/12/2022 11:59 AM    ALT 17 07/12/2022 11:59 AM       POC Tests: No results for input(s): POCGLU, POCNA, POCK, POCCL, POCBUN, POCHEMO, POCHCT in the last 72 hours. Coags:   Lab Results   Component Value Date/Time    PROTIME 12.9 07/12/2022 11:59 AM    INR 0.98 07/12/2022 11:59 AM    APTT 27.5 07/12/2022 11:59 AM       HCG (If Applicable):   Lab Results   Component Value Date    PREGTESTUR negative 06/19/2011        ABGs: No results found for: PHART, PO2ART, ABS3CZX, GYG0ZZJ, BEART, B8RKQJMG     Type & Screen (If Applicable):  No results found for: LABABO, LABRH    Drug/Infectious Status (If Applicable):  No results found for: HIV, HEPCAB    COVID-19 Screening (If Applicable):   Lab Results   Component Value Date/Time    COVID19 Not Detected 11/10/2020 09:20 AM           Anesthesia Evaluation  Patient summary reviewed and Nursing notes reviewed no history of anesthetic complications:   Airway: Mallampati: II  TM distance: >3 FB   Neck ROM: full  Mouth opening: > = 3 FB   Dental: normal exam         Pulmonary:normal exam    (+) COPD: moderate,  shortness of breath: chronic,  sleep apnea:  rhonchi:bilateral decreased breath sounds: bilateral wheezes: scattered asthma:     (-) pneumonia                           Cardiovascular:  Exercise tolerance: good (>4 METS),       (-)  angina, orthopnea and PND    ECG reviewed  Rhythm: regular  Rate: normal  Echocardiogram reviewed         Beta Blocker:  Not on Beta Blocker      ROS comment: The patient had a Dobutamine Stress Echocardiogram. Baseline echocardiogram   shows normal left ventricular function with an estimated ejection fraction   of 55-60 %. Following the dobutamine infusion there was augmentation of all   segments with no areas of stress induced hypokinesis. Neuro/Psych:   (+) headaches:, psychiatric history:            GI/Hepatic/Renal:   (+) GERD:,           Endo/Other:                     Abdominal:         (-) obese Abdomen: soft.       Vascular: Other Findings: Patient receiving nebulized albuterol in pre-op. Allergic to most IV narcotic except demerol. Patient is good candidate for ketamine and precedex adjuncts          Anesthesia Plan      general     ASA 3       Induction: intravenous. MIPS: Postoperative opioids intended and Prophylactic antiemetics administered. Anesthetic plan and risks discussed with patient. Plan discussed with CRNA and attending.                     Corey Haynes DO   7/29/2022

## 2022-07-29 NOTE — OP NOTE
[place] [address] SURGERY     OPERATIVE DICTATION    NAME: Taz Archer   MRN: [id number]  DATE: 7/29/2022     AGE: 54 y.o. PREOPERATIVE DIAGNOSIS:  Macromastia. POSTOPERATIVE DIAGNOSIS:  Macromastia. OPERATION PERFORMED:  Bilateral breast reduction. SURGEON:  Niya Neville MD     ASSISTANT:  Robe Frank). ANESTHESIA:  General.     ESTIMATED BLOOD LOSS:  50 mL. DRAINS:  2 (1 each breast). SPECIMENS:  Breast tissue (right breast 369.6 gm, left breast 650.7 gm). OPERATIVE INDICATIONS:  This is an 54y.o.-year-old female with a history of  symptomatic macromastia. She desired breast reduction to alleviate her  symptoms. The risks, benefits, alternatives, outcomes, and  personnel involved with the aforementioned procedure were discussed in  detail with the patient. After all questions were answered in a  satisfactory manner, she agreed to proceed. OPERATIVE PROCEDURE:  The patient was marked in the preoperative holding  area and then brought to the operating room, and placed in supine position  on the operating room table. After satisfactory induction of general  endotracheal anesthesia, the patient was then prepped and draped in the  usual sterile manner. Time-out was performed confirming the patient and  the procedure to be performed. The operation commenced via  de-epithelializing along the inferior aspect of the breast to create an  inferior pedicle. A Alfaro pattern was then scored using the previous  markings. Breast tissue was removed from thelateral, medial, superior, and posterior aspects of the breast pedicle. The same procedure was then performed on the contralateral left side removing the same tissue. The patient was then  tailor-tacked, sat up to check for symmetry. Once symmetry was assessed to  be satisfactory, the patient was then sat back down. The breast opened up.   The wounds were both copiously irrigated and hemostasis with  electrocautery, clips, and ties. Once it was satisfactory, the wound was  then again tailor-tacked and then closed in layers using 3-0 Monocryl and Stratafix sutures. The nipples were then brought through the superior aspect of the vertical incision after being sat up to ensure appropriate positioning. The nipple was then sutured in place using 3-0  Monocryl suture. A sterile dressing was then applied. The patient was  then awakened and taken to PACU in satisfactory condition. There were no  immediate complications. The patient tolerated the procedure well. At the  end of the case, all counts were correct.      Brandy Quarles MD

## 2022-07-29 NOTE — PROGRESS NOTES
Ambulatory Surgery/Procedure Discharge Note    Vitals:    07/29/22 1420   BP: 107/75   Pulse: 73   Resp: 14   Temp: (!) 96.7 °F (35.9 °C)   SpO2: 94%     Pt meets discharge criteria per Niranjan score. In: 3878 [P.O.:150; I.V.:1473]  Out: 20     Restroom use offered before discharge. Yes    Pain assessment:  present - adequately treated  Pain Level: 6    Pt and S.O./family states \"ready to go home\". Pt alert and oriented x4. IV removed. Denies N/V. Steri strips c/d/I.  Gauze and surgical glue in place. Voided prior to discharge. Discharge instructions given to pt and bf with pt permission. Pt and bf verbalized understanding of all instructions. Left with all belongings, 2 prescriptions, and discharge instructions. Patient discharged to home/self care.  Patient discharged via wheel chair by transporter to waiting family/S.O.       7/29/2022 3:01 PM

## 2022-07-29 NOTE — DISCHARGE INSTRUCTIONS
[address] [facility]    Ghulam French MD  [address] [employer] ([address] [facility], [facility] [address]  [phone]    Post-op Instructions  ___________________________________________    Breast Reduction    The following instructions will help you know what to expect in the days following your surgery. Do not, however, hesitate to call if you have questions or concerns. Activities  - Sleep in a flexed position with your head and shoulders elevated. Keep pillows under your knees for the first few days. You can resume your normal sleeping position when comfortable. Also be careful not to sleep on the cord that connects the vac dressing to the home machine as this can cause pressure problems on your skin. - Driving is prohibited for 1 to 2 weeks. Do not drive while taking pain medications. - Avoid heavy lifting (no more than 5 pounds) and vigorous use of your arms for the first 3 weeks. - Do not engage in sports, aerobics, or vacuuming.   - Start arm raising exercises gently within 1 week of surgery. This will be discussed at your follow-up appointment. Diet  - Resume your preoperative diet as tolerated. Pain Control/Medications  - You will receive a prescription for pain medication that can be taken as directed if needed for pain control. The pain medication may cause constipation and does impair your ability to drive or make important decisions.          - You may also be given a muscle relaxant that can help with muscle spasms. Do                  not take the pain medication and muscle relaxant at the same time        - There is absolutely NO driving while on pain medication or Valium® or                        Flexoril® .         - Additionally do NOT take any of the following products:    Aspirin/low-dose aspirin    Ibuprofen (Advil®, Motrin®    Naprosyn or Naproxen (Aleve®)    Vitamin E (even small amounts in multiple vitamins)    Herbals or homeopathic medicines or green tea - In the event of accidental drain removal, place a piece of dry gauze over the drain site and contact your surgeon. - If your drain bulb loses suction or your drain has migrated out from the drain site, do not attempt to push the drain back into your skin. Cover the area with dry gauze. You may be asked by your surgeon to place a piece of semi occlusive dressing (Tegaderm) over the drain site to keep the site clean and drain in place until you are seen in the office    Wound Care    - You will have some swelling or bruising of the breasts and upper abdomen. This is normal and will lessen over the next 1 to 3 weeks. - You may notice a change in sensation or numbness of the breast skin. This is common after surgery and should improve gradually over time. - You can resume daily showers in 24 hours, but avoid very hot water. You can allow soapy water to run over your breast and then after showering, pat the breast dry. Do not scrub or wipe the reconstructed breast until instructed (usually 6-8 weeks). Do not get the dressing wet as this can diminish the seal activating the alarm on the machine. Showering may prove difficult and sponge bathing would likely be more appropriate until the dressing is removed. - Do NOT submerge your incisions (e.g. no swimming or baths submerging breasts until instructed)  - A bra should be worn at all times day and night after surgery. The bra should not be tight, have under wires, or have strong elastic. You will receive a surgical bra from the hospital stay as well as an additional clean bra to take home. Wear these bras for the first week until your follow-up appointment. - If you have an ACE wrap, keep this in place for the first 24 hours and then switch to a surgical bra, which will be worn at all times until follow-up.      - Do NOT submerge your vac (e.g. no swimming or baths submerging until instructed)    Follow-up  Call your doctor's office at the first sign of:  Excessive worsening pain. Bleeding under the vac dressing. Redness, drainage or odor from the vac. Fever or chills. Shortness of breath. Do not hesitate to call if you have any questions or concerns    [facility]    There are potential side effects of anesthesia or sedation you may experience for the first 24 hours. These side effects include:    Confusion or Memory loss, Dizziness, or Delayed Reaction Times   [x]A responsible person should be with you for the next 24 hours. Do not operate any vehicles (automobiles, bicycles, motorcycles) or power tools or machinery for 24 hours. Do not sign any legal documents or make any legal decisions for 24 hours. Do not drink alcohol for 24 hours or while taking narcotic pain medication. Nausea    [x]Start with light diet and progress to your normal diet as you feel like eating. However, if you experience nausea or repeated episodes of vomiting which persist beyond 12-24 hours, notify your physician. Once nausea has passed, remember to keep drinking fluids. Difficulty Passing Urine  [x]Drink extra amounts of fluid today. Notify your physician if you have not urinated within 8 hours after your procedure or you feel uncomfortable. Irritated Throat from a Breathing Tube  [x]Drink extra amounts of fluid today. Lozenges may help. Muscle Aches  [x]You may experience some generalized body aches as your muscles recover from medications used to relax them during surgery. These will gradually subside. MEDICATION INSTRUCTIONS:  [x]Prescription(S) x   2  sent with you. Use as directed. When taking pain medications, you may experience the side effect of dizziness or drowsiness. Do not drink alcohol or drive when taking these medications. []Prescription(S) x          Called to Pharmacy Name and location:    [x]Give the list of your medications to your primary care physician on your next visit. Keep your med list updated and carry it with in case of emergencies. [x] Narcotic pain medications can cause the side effect of significant constipation. You may want to add a stool softener to your postoperative medication schedule or speak to your surgeon on how best to manage this side effect. NARCOTIC SAFETY:  Your pain medicine is only for you to take. Safely store your medicines. Store pills up high and out of reach of children and pets. Ensure safety caps are snapped tightly  Keep track of how many pills you have left    Unused medication can be disposed of by taking them to a drop-off box or take-back program that is authorized by the [facility]. Access to a site near you can be found on the [facility] website ([address] [facility] [employer]. [facility]). If you have a CPAP machine, it is very important that you use it daily during all periods of sleep and daytime rest during your recovery at home. Surgery and Anesthesia place a significant amount of stress on your body. Using your CPAP will help keep you safe and lessen the negative effects of that stress. FOLLOW-UP RECOVERY CARE:  [x]Call the office at [phone] for follow-up appointment and problems    Watch for these possible complications, symptoms, or side effects of anesthesia. Call physician if they or any other problems occur:  Signs of INFECTION   > Fever over 101°     > Redness, swelling, hardness or warmth at the operative site   >Foul smelling or cloudy drainage at the operative site   Unrelieved PAIN  Unrelieved NAUSEA  Blood soaked dressing. (Some oozing may be normal)  Inability to urinate      Numb, pale, blue, cold or tingling extremity      Physician:  Ember Doyle    The above instructions were reviewed with patient/significant other.   The following additional patient specific information was reviewed with the patient/significant other:  [x]Procedure/physician specific instructions  [x]Medication information sheet(S) including potential side effects  []Jakys egress test  []Pain Ball management  []FAQ Catheter associated blood stream infections  []FAQ Surgical Site Infections  []Other-    I have read and understand the instructions given to me: ____________________________________________   (Patient/S.O. Signature)            Date/time 7/29/2022 1:42 PM         PACU:  [phone]   M-F 700 AM - 7 PM      SAME DAY SERVICES:  [phone] M-F 7AM-6PM        If you smoke STOP. We care about your health!

## 2022-07-29 NOTE — ANESTHESIA POSTPROCEDURE EVALUATION
Department of Anesthesiology  Postprocedure Note    Patient: Puneet Ayala  MRN: [id number]  YOB: 1970  Date of evaluation: 7/29/2022      Procedure Summary     Date: 07/29/22 Room / Location: [facility] OR  / [facility]    Anesthesia Start: 1056 Anesthesia Stop: 3910    Procedure: BILATERAL BREAST REDUCTION (Bilateral: Breast) Diagnosis:       Macromastia      (Macromastia [place])    Surgeons: Alejandra Beard MD Responsible Provider: Edwar Jay DO    Anesthesia Type: general ASA Status: 3          Anesthesia Type: No value filed.     Niranjan Phase I: Niranjan Score: 10    Niranjan Phase II:        Anesthesia Post Evaluation    Patient location during evaluation: PACU  Patient participation: complete - patient participated  Level of consciousness: awake  Pain score: 0  Airway patency: patent  Nausea & Vomiting: no nausea and no vomiting  Complications: no  Cardiovascular status: blood pressure returned to baseline  Respiratory status: acceptable  Hydration status: euvolemic  Multimodal analgesia pain management approach

## 2022-07-29 NOTE — FLOWSHEET NOTE
Sitting up, taking ice chips and sips of black coffee well with no nausea. Oral care provided with Vaseline to lips.

## 2022-08-01 ENCOUNTER — OFFICE VISIT (OUTPATIENT)
Dept: SURGERY | Age: 52
End: 2022-08-01

## 2022-08-01 VITALS
HEART RATE: 91 BPM | BODY MASS INDEX: 30.91 KG/M2 | HEIGHT: 62 IN | SYSTOLIC BLOOD PRESSURE: 118 MMHG | WEIGHT: 168 LBS | TEMPERATURE: 98.3 F | DIASTOLIC BLOOD PRESSURE: 79 MMHG

## 2022-08-01 DIAGNOSIS — Z09 POSTOP CHECK: Primary | ICD-10-CM

## 2022-08-01 PROCEDURE — 99024 POSTOP FOLLOW-UP VISIT: CPT | Performed by: SURGERY

## 2022-08-01 NOTE — PROGRESS NOTES
[facility]    PROCEDURE: BBR  DATE: 7/25/22    Chika Yee has been recovering satisfactorily since her procedure. Pain has been well controlled without pain medications. She notes that there has been some issues with function from her drain site on the left. EXAM    /79   Pulse 91   Temp 98.3 °F (36.8 °C)   Ht 5' 2\" (1.575 m)   Wt 168 lb (76.2 kg)   LMP  (LMP Unknown) Comment: age 36 total  PF 98 L/min   BMI 30.73 kg/m²     GEN: NAD   BREAST: Incisions healing well  No hematoma/seroma  Nipples viable  Drains serosang    IMP: 54 y. o.female s/p BBR  PLAN: The left drain suture was cut and resecured with immediate return of fluid.   Will return in 1 week for drain removal.     Eliud Hare MD  [address] [place] [address] [address] Reconstructive Surgery  [phone]  08/01/22

## 2022-08-05 ENCOUNTER — NURSE ONLY (OUTPATIENT)
Dept: SURGERY | Age: 52
End: 2022-08-05

## 2022-08-05 DIAGNOSIS — Z09 POSTOP CHECK: Primary | ICD-10-CM

## 2022-08-05 NOTE — PROGRESS NOTES
[facility]  Post-Op Visit    Patient: Tim Russo  YOB: 1970    HPI: Tim Russo is a 54 y.o. female who presents today for post-op visit. She is S/P  Bilateral breast reduction on 07/29/2022. She has been doing well since her procedure. She presents today for drain removal. Bilateral breast incisions well approximating with Prineo, nipple incisions intact with steri-strips. Chief Complaint   Patient presents with    Post-Op Check     Drain removal       Plan: Bilateral breast DANIEL drains removed without difficulty. Band Aids applied. Instructed to call if she experiences, fever, chills, swelling or engorgement of breast, incision concerns or any other issues. Post op restrictions reinforced. Follow up in 4 weeks.        ESTRELLITA BrownN, RN [address] [place] [address] 177 and Reconstructive Surgery  [phone]

## 2022-08-09 DIAGNOSIS — Z72.0 TOBACCO ABUSE: ICD-10-CM

## 2022-08-09 DIAGNOSIS — F41.9 ANXIETY: ICD-10-CM

## 2022-08-09 DIAGNOSIS — G43.111 INTRACTABLE MIGRAINE WITH AURA WITH STATUS MIGRAINOSUS: ICD-10-CM

## 2022-08-09 DIAGNOSIS — R11.0 NAUSEA: ICD-10-CM

## 2022-08-09 RX ORDER — VARENICLINE TARTRATE 1 MG/1
TABLET, FILM COATED ORAL
Qty: 56 TABLET | Refills: 2 | Status: SHIPPED | OUTPATIENT
Start: 2022-08-09 | End: 2022-11-03 | Stop reason: SDUPTHER

## 2022-08-09 RX ORDER — BUTALBITAL, ACETAMINOPHEN AND CAFFEINE 50; 325; 40 MG/1; MG/1; MG/1
TABLET ORAL
Qty: 30 TABLET | Refills: 0 | Status: SHIPPED | OUTPATIENT
Start: 2022-08-09 | End: 2022-09-07

## 2022-08-09 RX ORDER — CLONAZEPAM 1 MG/1
1 TABLET ORAL 2 TIMES DAILY PRN
Qty: 60 TABLET | Refills: 0 | Status: SHIPPED | OUTPATIENT
Start: 2022-08-09 | End: 2022-09-14

## 2022-08-09 RX ORDER — PROMETHAZINE HYDROCHLORIDE 25 MG/1
TABLET ORAL
Qty: 30 TABLET | Refills: 2 | Status: SHIPPED | OUTPATIENT
Start: 2022-08-09 | End: 2022-10-06

## 2022-08-09 NOTE — TELEPHONE ENCOUNTER
Last Office Visit  -  7/11/22  Next Office Visit  -  n/a    Last Filled  -  7/15/22  Last UDS -  3/24/21  Contract -  11/10/16

## 2022-08-26 ENCOUNTER — OFFICE VISIT (OUTPATIENT)
Dept: SURGERY | Age: 52
End: 2022-08-26

## 2022-08-26 VITALS
HEART RATE: 91 BPM | DIASTOLIC BLOOD PRESSURE: 78 MMHG | WEIGHT: 168 LBS | OXYGEN SATURATION: 98 % | SYSTOLIC BLOOD PRESSURE: 122 MMHG | BODY MASS INDEX: 30.73 KG/M2 | TEMPERATURE: 97.5 F

## 2022-08-26 DIAGNOSIS — S21.002A WOUND OF LEFT BREAST, INITIAL ENCOUNTER: ICD-10-CM

## 2022-08-26 DIAGNOSIS — N62 MACROMASTIA: Primary | ICD-10-CM

## 2022-08-26 PROCEDURE — 99024 POSTOP FOLLOW-UP VISIT: CPT

## 2022-08-26 RX ORDER — CEPHALEXIN 500 MG/1
500 CAPSULE ORAL 2 TIMES DAILY
Qty: 14 CAPSULE | Refills: 0 | Status: SHIPPED | OUTPATIENT
Start: 2022-08-26 | End: 2022-09-02

## 2022-08-26 NOTE — PROGRESS NOTES
[facility]    PROCEDURE: BBR  DATE: 7/25/22    Susannah Akhtar has been recovering poorly since her procedure. Pain has been well controlled without pain medications. Patient presents today with concerns following her BBR. She states she is not healing as well as she expected. EXAM    /78   Pulse 91   Temp 97.5 °F (36.4 °C)   Wt 168 lb (76.2 kg)   LMP  (LMP Unknown) Comment: age 36 total  SpO2 98%   BMI 30.73 kg/m²       GEN: NAD   BREAST: Incisions healing with exceptions of left breast lateral incision has dehiscence with drainage. Right breast healing appropriately. IMP: 54 y. o.female s/p BBR  PLAN: will begin patient on antibiotics and she will irrigate with vashe. She will follow up with me in office in 2 weeks to ensure wound healing.          Melissa Rivera, APRN - [id number] [facility] Reconstructive Surgery  [phone]  08/26/22

## 2022-08-31 LAB
ANAEROBIC CULTURE: ABNORMAL
GRAM STAIN RESULT: ABNORMAL
ORGANISM: ABNORMAL
ORGANISM: ABNORMAL
WOUND/ABSCESS: ABNORMAL
WOUND/ABSCESS: ABNORMAL

## 2022-09-01 ENCOUNTER — TELEPHONE (OUTPATIENT)
Dept: SURGERY | Age: 52
End: 2022-09-01

## 2022-09-01 RX ORDER — CIPROFLOXACIN 500 MG/1
500 TABLET, FILM COATED ORAL 2 TIMES DAILY
Qty: 20 TABLET | Refills: 0 | Status: SHIPPED | OUTPATIENT
Start: 2022-09-01 | End: 2022-09-11

## 2022-09-01 NOTE — PROGRESS NOTES
Informed patient to not take Zanaflex while taking Cipro, she states she only takes it as needed but will not take it while on Cipro.    Alley Kelley

## 2022-09-01 NOTE — TELEPHONE ENCOUNTER
----- Message from JENNIFER Cifuentes CNP sent at 9/1/2022  1:45 PM EDT -----  Hi guys,   Please let vernon know the culture came back and we will need to change her atb for her based on these findings. Cipro 500 bid for 10 days. I will send it to her pharmacy. Tell her to call with any issues and I hope she is feeling better.    Dell Wolfe   ----- Message -----  From: Zohra Mathis Incoming Lab Results From [facility] (Serafin De Anda)  Sent: 8/31/2022   6:20 PM EDT  To: JENNIFER Cifuentes CNP

## 2022-09-07 DIAGNOSIS — N62 MACROMASTIA: ICD-10-CM

## 2022-09-07 DIAGNOSIS — G43.111 INTRACTABLE MIGRAINE WITH AURA WITH STATUS MIGRAINOSUS: ICD-10-CM

## 2022-09-07 RX ORDER — BUTALBITAL, ACETAMINOPHEN AND CAFFEINE 50; 325; 40 MG/1; MG/1; MG/1
TABLET ORAL
Qty: 30 TABLET | Refills: 0 | Status: SHIPPED | OUTPATIENT
Start: 2022-09-07 | End: 2022-10-06

## 2022-09-07 NOTE — TELEPHONE ENCOUNTER
Pls let pt know I saw her request for naprosyn refill. Before I refill it, I wanted to check and see if Dr Lexx Fair had any restrictions or suggestions about taking antiinflammatories since she had a gastric sleeve. If he didn't restrict or limit her from taking these, pls remind her to cont her pepcid and always take the naprosyn with food to prevent risk for ulcers.

## 2022-09-08 RX ORDER — NAPROXEN 500 MG/1
500 TABLET ORAL DAILY PRN
Qty: 30 TABLET | Refills: 5 | Status: SHIPPED | OUTPATIENT
Start: 2022-09-08 | End: 2022-10-05

## 2022-09-09 ENCOUNTER — OFFICE VISIT (OUTPATIENT)
Dept: SURGERY | Age: 52
End: 2022-09-09

## 2022-09-09 VITALS
BODY MASS INDEX: 30.73 KG/M2 | TEMPERATURE: 98.7 F | SYSTOLIC BLOOD PRESSURE: 125 MMHG | DIASTOLIC BLOOD PRESSURE: 78 MMHG | OXYGEN SATURATION: 98 % | WEIGHT: 168 LBS | HEART RATE: 88 BPM

## 2022-09-09 DIAGNOSIS — T81.30XA WOUND DEHISCENCE: Primary | ICD-10-CM

## 2022-09-09 PROCEDURE — 99024 POSTOP FOLLOW-UP VISIT: CPT

## 2022-09-09 RX ORDER — SULFAMETHOXAZOLE AND TRIMETHOPRIM 800; 160 MG/1; MG/1
1 TABLET ORAL 2 TIMES DAILY
Qty: 20 TABLET | Refills: 0 | Status: SHIPPED | OUTPATIENT
Start: 2022-09-09 | End: 2022-09-19

## 2022-09-09 NOTE — PROGRESS NOTES
[facility]    PROCEDURE:  BBR  DATE: 7/29/22    Patricia Thorpe has been recovering poorly since her procedure. Pain has been well controlled without pain medications. She presents to the office today with concerns that the area on her left breast has not healed and she feels is worse. She also states that the right breast now has two areas as well that are open. Patient was started on Cipro following her culture results on 9/1/22. She states she has been on multiple antibiotics over the years for her many health problems and feels many \"do not work for her anymore\".      EXAM    /78   Pulse 88   Temp 98.7 °F (37.1 °C)   Wt 168 lb (76.2 kg)   LMP  (LMP Unknown) Comment: age 36 total  SpO2 98%   BMI 30.73 kg/m²       GEN: NAD   BREAST: Incisions healing with exception of left breast with lateral incision dehiscence with drainage, right breast with two small dehiscence medial and lateral.     Result Notes    1 Follow-up Encounter  Component 8/26/22 1138    Gram Stain Result 2+ Gram variable rods present   2+ WBC's (Mononuclear) present   2+ WBC's (Polymorphonuclear) present    Anaerobic Culture No anaerobes isolated    Organism Enterobacter cloacae complex Abnormal     WOUND/ABSCESS Light growth    Organism Acinetobacter radioresistens Abnormal     WOUND/ABSCESS Light growth    Resulting Agency [address] [facility]        Susceptibility    Enterobacter cloacae complex (1)    Antibiotic Interpretation Microscan  Method Status    ceFAZolin Resistant >=64 mcg/mL BACTERIAL SUSCEPTIBILITY PANEL BY RANDI     cefepime Sensitive <=0.12 mcg/mL BACTERIAL SUSCEPTIBILITY PANEL BY RANDI     ciprofloxacin Sensitive <=0.25 mcg/mL BACTERIAL SUSCEPTIBILITY PANEL BY RANDI     ertapenem Sensitive <=0.12 mcg/mL BACTERIAL SUSCEPTIBILITY PANEL BY RANDI     gentamicin Sensitive <=1 mcg/mL BACTERIAL SUSCEPTIBILITY PANEL BY RANDI     levofloxacin Sensitive <=0.12 mcg/mL BACTERIAL SUSCEPTIBILITY PANEL BY RANDI     piperacillin-tazobactam Sensitive <=4 mcg/mL BACTERIAL SUSCEPTIBILITY PANEL BY RANDI     trimethoprim-sulfamethoxazole Sensitive <=20 mcg/mL BACTERIAL SUSCEPTIBILITY PANEL BY RANDI       Acinetobacter radioresistens (2)    Antibiotic Interpretation Microscan  Method Status    ampicillin-sulbactam Sensitive <=8/4 mcg/mL BACTERIAL SUSCEPTIBILITY PANEL BY RANDI     cefepime Sensitive <=2 mcg/mL BACTERIAL SUSCEPTIBILITY PANEL BY RANDI     cefTRIAXone Intermediate 32 mcg/mL BACTERIAL SUSCEPTIBILITY PANEL BY RANDI     ciprofloxacin Sensitive <=1 mcg/mL BACTERIAL SUSCEPTIBILITY PANEL BY RANDI     gentamicin Sensitive <=4 mcg/mL BACTERIAL SUSCEPTIBILITY PANEL BY RANDI     meropenem Sensitive <=1 mcg/mL BACTERIAL SUSCEPTIBILITY PANEL BY RANDI     tobramycin Sensitive <=4 mcg/mL BACTERIAL SUSCEPTIBILITY PANEL BY RANDI     trimethoprim-sulfamethoxazole Sensitive <=2/38 mcg/mL BACTERIAL SUSCEPTIBILITY PANEL BY RANDI                  IMP: 54 y. o.female s/p BBR   PLAN: Will begin patient on Bactrim DS for 10 days and refer to ID to help manage this. She may need IV antibiotics to treat. Will follow up in office in 1 week to keep an eye on incision. Will continue with vashe, silvadene and guaze dressing.          Oneyda Bhandari, APRN - [address] [facility] Reconstructive Surgery  [phone]  09/09/22

## 2022-09-12 LAB
GRAM STAIN RESULT: NORMAL
WOUND/ABSCESS: NORMAL

## 2022-09-13 ENCOUNTER — TELEPHONE (OUTPATIENT)
Dept: SURGERY | Age: 52
End: 2022-09-13

## 2022-09-13 NOTE — TELEPHONE ENCOUNTER
Spoke with Vanessa this AM. I did inform her that the culture was negative for growth. She stated that she notified Edgardo Espino CNP last evening with concerns that her breast incision has another area that is open. She is on the schedule to see Dr. Melvin Augustin tomorrow. ----- Message from JENNIFER Caraballo CNP sent at 9/12/2022 11:10 AM EDT -----  The wound culture Friday taken of the left lateral dehiscence is without growth. That is a good sign. How is she doing ?   Margarita Fuel  ----- Message -----  From: Pascual Juarez Incoming Lab Results From [facility] (Danay Anderson)  Sent: 9/12/2022  10:35 AM EDT  To: JENNIFER Caraballo CNP

## 2022-09-14 ENCOUNTER — OFFICE VISIT (OUTPATIENT)
Dept: SURGERY | Age: 52
End: 2022-09-14

## 2022-09-14 VITALS
BODY MASS INDEX: 29.26 KG/M2 | HEIGHT: 62 IN | HEART RATE: 94 BPM | DIASTOLIC BLOOD PRESSURE: 78 MMHG | SYSTOLIC BLOOD PRESSURE: 119 MMHG | WEIGHT: 159 LBS | OXYGEN SATURATION: 94 % | TEMPERATURE: 97.8 F

## 2022-09-14 DIAGNOSIS — Z11.4 SCREENING FOR HIV WITHOUT PRESENCE OF RISK FACTORS: ICD-10-CM

## 2022-09-14 DIAGNOSIS — Z01.84 IMMUNITY STATUS TESTING: ICD-10-CM

## 2022-09-14 DIAGNOSIS — T81.30XA WOUND DEHISCENCE: Primary | ICD-10-CM

## 2022-09-14 DIAGNOSIS — Z11.59 NEED FOR HEPATITIS C SCREENING TEST: ICD-10-CM

## 2022-09-14 DIAGNOSIS — T81.30XA WOUND DEHISCENCE: ICD-10-CM

## 2022-09-14 DIAGNOSIS — F41.9 ANXIETY: ICD-10-CM

## 2022-09-14 LAB
A/G RATIO: 1.6 (ref 1.1–2.2)
ALBUMIN SERPL-MCNC: 4.5 G/DL (ref 3.4–5)
ALP BLD-CCNC: 93 U/L (ref 40–129)
ALT SERPL-CCNC: 19 U/L (ref 10–40)
ANION GAP SERPL CALCULATED.3IONS-SCNC: 9 MMOL/L (ref 3–16)
AST SERPL-CCNC: 20 U/L (ref 15–37)
BILIRUB SERPL-MCNC: <0.2 MG/DL (ref 0–1)
BUN BLDV-MCNC: 15 MG/DL (ref 7–20)
CALCIUM SERPL-MCNC: 9.8 MG/DL (ref 8.3–10.6)
CHLORIDE BLD-SCNC: 98 MMOL/L (ref 99–110)
CO2: 31 MMOL/L (ref 21–32)
CREAT SERPL-MCNC: 0.9 MG/DL (ref 0.6–1.1)
GFR AFRICAN AMERICAN: >60
GFR NON-AFRICAN AMERICAN: >60
GLUCOSE BLD-MCNC: 89 MG/DL (ref 70–99)
HCT VFR BLD CALC: 42 % (ref 36–48)
HEMOGLOBIN: 14.1 G/DL (ref 12–16)
HEPATITIS C ANTIBODY INTERPRETATION: NORMAL
MCH RBC QN AUTO: 34.6 PG (ref 26–34)
MCHC RBC AUTO-ENTMCNC: 33.7 G/DL (ref 31–36)
MCV RBC AUTO: 102.8 FL (ref 80–100)
PDW BLD-RTO: 13 % (ref 12.4–15.4)
PLATELET # BLD: 255 K/UL (ref 135–450)
PMV BLD AUTO: 8.3 FL (ref 5–10.5)
POTASSIUM SERPL-SCNC: 4.8 MMOL/L (ref 3.5–5.1)
RBC # BLD: 4.09 M/UL (ref 4–5.2)
SODIUM BLD-SCNC: 138 MMOL/L (ref 136–145)
TOTAL PROTEIN: 7.3 G/DL (ref 6.4–8.2)
WBC # BLD: 5.9 K/UL (ref 4–11)

## 2022-09-14 PROCEDURE — 99024 POSTOP FOLLOW-UP VISIT: CPT | Performed by: SURGERY

## 2022-09-14 RX ORDER — CLONAZEPAM 1 MG/1
1 TABLET ORAL 2 TIMES DAILY PRN
Qty: 60 TABLET | Refills: 0 | Status: SHIPPED | OUTPATIENT
Start: 2022-09-14 | End: 2022-10-18

## 2022-09-14 NOTE — PROGRESS NOTES
[facility]    PROCEDURE: BBR  DATE: 7/25/22    Anjali Quintanilla has been recovering satisfactorily since her procedure. Pain has been well controlled without pain medications. She is concerned regarding multiple small areas of superficial skin separation on her bilateral breasts. EXAM    /78   Pulse 94   Temp 97.8 °F (36.6 °C)   Ht 5' 2\" (1.575 m)   Wt 159 lb (72.1 kg)   LMP  (LMP Unknown) Comment: age 36 total  SpO2 94%   BMI 29.08 kg/m²     GEN: NAD   BREAST: Incisions healing with 3 areas on the inferior incisions (approximately 1-3 cm each)  No hematoma/seroma  Nipples viable      IMP: 54 y. o.female s/p BBR  PLAN: Discussed nutrition optimization. Will obtain labs to ensure this is overall ok and then return in a few weeks.     Ghulam French MD  [address] [place] [address] [address] Reconstructive Surgery  [phone]  09/14/22

## 2022-09-14 NOTE — TELEPHONE ENCOUNTER
Last Office Visit  -  7/11/22- Pre op  Next Office Visit  -  none    Last Filled  -  8/9/22  Last UDS -    Contract -

## 2022-09-15 ENCOUNTER — TELEPHONE (OUTPATIENT)
Dept: SURGERY | Age: 52
End: 2022-09-15

## 2022-09-15 LAB
HIV AG/AB: NORMAL
HIV ANTIGEN: NORMAL
HIV-1 ANTIBODY: NORMAL
HIV-2 AB: NORMAL
VARICELLA-ZOSTER VIRUS AB, IGG: NORMAL

## 2022-09-15 NOTE — TELEPHONE ENCOUNTER
----- Message from JENNIFER Hopkins CNP sent at 9/15/2022 11:09 AM EDT -----  CMP is normal   Deacon Calhoun   ----- Message -----  From: Emily Kuo Incoming Lab Results From [facility] (Joshua Dallas)  Sent: 9/14/2022  10:01 PM EDT  To: JENNIFER Hopkins CNP

## 2022-09-15 NOTE — TELEPHONE ENCOUNTER
Spoke with Vanessa and notified her of normal CMP labs.  Verbalized understanding     ----- Message from JENNIFER Aj CNP sent at 9/15/2022 11:09 AM EDT -----  CMP is normal   Chetan Guzman   ----- Message -----  From: Deysi Casas Incoming Lab Results From [facility] (John Duran)  Sent: 9/14/2022  10:01 PM EDT  To: JENNIFER Aj CNP

## 2022-09-17 LAB
COTININE: <5 NG/ML
NICOTINE: <5 NG/ML

## 2022-09-19 ENCOUNTER — TELEPHONE (OUTPATIENT)
Dept: SURGERY | Age: 52
End: 2022-09-19

## 2022-09-23 ENCOUNTER — OFFICE VISIT (OUTPATIENT)
Dept: SURGERY | Age: 52
End: 2022-09-23

## 2022-09-23 VITALS
SYSTOLIC BLOOD PRESSURE: 120 MMHG | WEIGHT: 159 LBS | OXYGEN SATURATION: 98 % | DIASTOLIC BLOOD PRESSURE: 80 MMHG | HEART RATE: 95 BPM | TEMPERATURE: 97.2 F | BODY MASS INDEX: 29.08 KG/M2

## 2022-09-23 DIAGNOSIS — Z09 POSTOP CHECK: Primary | ICD-10-CM

## 2022-09-23 PROCEDURE — 99024 POSTOP FOLLOW-UP VISIT: CPT

## 2022-09-23 NOTE — PROGRESS NOTES
[facility]    PROCEDURE: BBR  DATE: 7/25/22    Ciara Pacheco has been recovering satisfactorily since her procedure. Pain has been well controlled without pain medications. She presents today for a wound check. She states that the drainage has stopped almost completley. She feels like it is improving. EXAM    /80   Pulse 95   Temp 97.2 °F (36.2 °C)   Wt 159 lb (72.1 kg)   LMP  (LMP Unknown) Comment: age 36 total  SpO2 98%   BMI 29.08 kg/m²       GEN: NAD   BREAST:  Incisions healing with 3 areas on the inferior incisions (approximately 1-3 cm each)              No hematoma/seroma                        Nipples viable          IMP: 54 y. o.female s/p BBR   PLAN: She feels areas are improving with taya and good nutrition. She continue with local wound care and follow up as needed. She will call with any concerns in the interim.      Carlos Graham, APRN - [address] [facility] Reconstructive Surgery  [phone]  09/23/22

## 2022-10-05 DIAGNOSIS — N62 MACROMASTIA: ICD-10-CM

## 2022-10-05 RX ORDER — NAPROXEN 500 MG/1
500 TABLET ORAL DAILY PRN
Qty: 30 TABLET | Refills: 5 | Status: SHIPPED | OUTPATIENT
Start: 2022-10-05

## 2022-10-05 NOTE — TELEPHONE ENCOUNTER
Last Office Visit  -  07/11/2022  Next Office Visit  -  n/a    Last Filled  -    Last UDS -    Contract -

## 2022-10-06 DIAGNOSIS — G43.111 INTRACTABLE MIGRAINE WITH AURA WITH STATUS MIGRAINOSUS: ICD-10-CM

## 2022-10-06 DIAGNOSIS — R11.0 NAUSEA: ICD-10-CM

## 2022-10-06 RX ORDER — PROMETHAZINE HYDROCHLORIDE 25 MG/1
TABLET ORAL
Qty: 30 TABLET | Refills: 2 | Status: SHIPPED | OUTPATIENT
Start: 2022-10-06

## 2022-10-06 RX ORDER — TIZANIDINE 2 MG/1
TABLET ORAL
Qty: 30 TABLET | Refills: 5 | Status: SHIPPED | OUTPATIENT
Start: 2022-10-06

## 2022-10-06 RX ORDER — BUTALBITAL, ACETAMINOPHEN AND CAFFEINE 50; 325; 40 MG/1; MG/1; MG/1
TABLET ORAL
Qty: 30 TABLET | Refills: 0 | Status: SHIPPED | OUTPATIENT
Start: 2022-10-06 | End: 2022-11-03

## 2022-10-17 DIAGNOSIS — F41.9 ANXIETY: ICD-10-CM

## 2022-10-18 RX ORDER — CLONAZEPAM 1 MG/1
1 TABLET ORAL 2 TIMES DAILY PRN
Qty: 60 TABLET | Refills: 0 | Status: SHIPPED | OUTPATIENT
Start: 2022-10-18 | End: 2022-12-05

## 2022-10-18 NOTE — TELEPHONE ENCOUNTER
Last Office Visit  -  07/11/2022  Next Office Visit  -  n/a    Last Filled  -  09/14/2022  Last UDS -  03/24/2021  Contract -  11/15/2016

## 2022-11-03 DIAGNOSIS — G43.111 INTRACTABLE MIGRAINE WITH AURA WITH STATUS MIGRAINOSUS: ICD-10-CM

## 2022-11-03 DIAGNOSIS — Z72.0 TOBACCO ABUSE: ICD-10-CM

## 2022-11-03 DIAGNOSIS — E87.6 HYPOKALEMIA: ICD-10-CM

## 2022-11-03 RX ORDER — POTASSIUM CHLORIDE 20 MEQ/1
TABLET, EXTENDED RELEASE ORAL
Qty: 30 TABLET | Refills: 5 | Status: SHIPPED | OUTPATIENT
Start: 2022-11-03

## 2022-11-03 RX ORDER — BUTALBITAL, ACETAMINOPHEN AND CAFFEINE 50; 325; 40 MG/1; MG/1; MG/1
TABLET ORAL
Qty: 30 TABLET | Refills: 0 | Status: SHIPPED | OUTPATIENT
Start: 2022-11-05 | End: 2022-12-05

## 2022-11-03 RX ORDER — AMITRIPTYLINE HYDROCHLORIDE 75 MG/1
TABLET, FILM COATED ORAL
Qty: 30 TABLET | Refills: 5 | Status: SHIPPED | OUTPATIENT
Start: 2022-11-03

## 2022-11-03 RX ORDER — VARENICLINE TARTRATE 1 MG/1
TABLET, FILM COATED ORAL
Qty: 56 TABLET | Refills: 2 | Status: SHIPPED | OUTPATIENT
Start: 2022-11-03

## 2022-11-03 RX ORDER — VARENICLINE TARTRATE 1 MG/1
TABLET, FILM COATED ORAL
Qty: 56 TABLET | Refills: 2 | OUTPATIENT
Start: 2022-11-03

## 2022-11-07 ENCOUNTER — E-VISIT (OUTPATIENT)
Dept: PRIMARY CARE CLINIC | Age: 52
End: 2022-11-07
Payer: MEDICARE

## 2022-11-07 DIAGNOSIS — J06.9 UPPER RESPIRATORY TRACT INFECTION, UNSPECIFIED TYPE: Primary | ICD-10-CM

## 2022-11-07 PROCEDURE — 99422 OL DIG E/M SVC 11-20 MIN: CPT | Performed by: NURSE PRACTITIONER

## 2022-11-07 RX ORDER — PREDNISONE 10 MG/1
TABLET ORAL
Qty: 20 TABLET | Refills: 0 | Status: SHIPPED | OUTPATIENT
Start: 2022-11-07 | End: 2022-11-17

## 2022-11-07 RX ORDER — AMOXICILLIN AND CLAVULANATE POTASSIUM 875; 125 MG/1; MG/1
1 TABLET, FILM COATED ORAL 2 TIMES DAILY
Qty: 20 TABLET | Refills: 0 | Status: SHIPPED | OUTPATIENT
Start: 2022-11-07 | End: 2022-11-17

## 2022-11-07 ASSESSMENT — LIFESTYLE VARIABLES
PACKS_PER_DAY: 1
SMOKING_YEARS: 35
SMOKING_STATUS: NO, I'M A FORMER SMOKER

## 2022-11-07 NOTE — PROGRESS NOTES
Cody Garcia (1970) initiated an asynchronous digital communication through [address] [place] [address]. HPI: per patient questionnaire     Exam: not applicable    Diagnoses and all orders for this visit:  Diagnoses and all orders for this visit:    Upper respiratory tract infection, unspecified type    Other orders  -     amoxicillin-clavulanate (AUGMENTIN) 875-125 MG per tablet; Take 1 tablet by mouth 2 times daily for 10 days  -     predniSONE (DELTASONE) 10 MG tablet; Take 4 tablets by mouth once daily for 5 days    Supportive care measures provided  Recommended follow-up with PCP if symptoms do not improve    Time: EV2 - 11-20 minutes were spent on the digital evaluation and management of this patient.  14 min     JENNIFER Grant - CNP

## 2022-11-28 ENCOUNTER — HOSPITAL ENCOUNTER (EMERGENCY)
Age: 52
Discharge: HOME | End: 2022-11-28
Payer: MEDICARE

## 2022-11-28 ENCOUNTER — APPOINTMENT (OUTPATIENT)
Dept: GENERAL RADIOLOGY | Age: 52
End: 2022-11-28
Payer: MEDICARE

## 2022-11-28 ENCOUNTER — APPOINTMENT (OUTPATIENT)
Dept: CT IMAGING | Age: 52
End: 2022-11-28
Payer: MEDICARE

## 2022-11-28 VITALS
DIASTOLIC BLOOD PRESSURE: 59 MMHG | OXYGEN SATURATION: 96 % | BODY MASS INDEX: 30 KG/M2 | HEART RATE: 102 BPM | WEIGHT: 164 LBS | RESPIRATION RATE: 14 BRPM | TEMPERATURE: 99.3 F | SYSTOLIC BLOOD PRESSURE: 123 MMHG

## 2022-11-28 DIAGNOSIS — R51.9 NONINTRACTABLE HEADACHE, UNSPECIFIED CHRONICITY PATTERN, UNSPECIFIED HEADACHE TYPE: Primary | ICD-10-CM

## 2022-11-28 DIAGNOSIS — J11.1 INFLUENZA WITH RESPIRATORY MANIFESTATION OTHER THAN PNEUMONIA: ICD-10-CM

## 2022-11-28 LAB
A/G RATIO: 1.4 (ref 1.1–2.2)
ALBUMIN SERPL-MCNC: 4 G/DL (ref 3.4–5)
ALP BLD-CCNC: 86 U/L (ref 40–129)
ALT SERPL-CCNC: 22 U/L (ref 10–40)
ANION GAP SERPL CALCULATED.3IONS-SCNC: 5 MMOL/L (ref 3–16)
AST SERPL-CCNC: 25 U/L (ref 15–37)
BASOPHILS ABSOLUTE: 0 K/UL (ref 0–0.2)
BASOPHILS RELATIVE PERCENT: 0.7 %
BILIRUB SERPL-MCNC: <0.2 MG/DL (ref 0–1)
BUN BLDV-MCNC: 11 MG/DL (ref 7–20)
CALCIUM SERPL-MCNC: 9.3 MG/DL (ref 8.3–10.6)
CHLORIDE BLD-SCNC: 99 MMOL/L (ref 99–110)
CO2: 35 MMOL/L (ref 21–32)
CREAT SERPL-MCNC: 0.7 MG/DL (ref 0.6–1.1)
EOSINOPHILS ABSOLUTE: 0.1 K/UL (ref 0–0.6)
EOSINOPHILS RELATIVE PERCENT: 1.7 %
GFR SERPL CREATININE-BSD FRML MDRD: >60 ML/MIN/{1.73_M2}
GLUCOSE BLD-MCNC: 90 MG/DL (ref 70–99)
HCT VFR BLD CALC: 38.5 % (ref 36–48)
HEMOGLOBIN: 12.7 G/DL (ref 12–16)
INFLUENZA A: DETECTED
INFLUENZA B: NOT DETECTED
LYMPHOCYTES ABSOLUTE: 0.8 K/UL (ref 1–5.1)
LYMPHOCYTES RELATIVE PERCENT: 14.3 %
MCH RBC QN AUTO: 32.7 PG (ref 26–34)
MCHC RBC AUTO-ENTMCNC: 32.9 G/DL (ref 31–36)
MCV RBC AUTO: 99.3 FL (ref 80–100)
MONOCYTES ABSOLUTE: 0.5 K/UL (ref 0–1.3)
MONOCYTES RELATIVE PERCENT: 9.6 %
NEUTROPHILS ABSOLUTE: 3.9 K/UL (ref 1.7–7.7)
NEUTROPHILS RELATIVE PERCENT: 73.7 %
PDW BLD-RTO: 12.5 % (ref 12.4–15.4)
PLATELET # BLD: 195 K/UL (ref 135–450)
PMV BLD AUTO: 7.3 FL (ref 5–10.5)
POTASSIUM SERPL-SCNC: 4.1 MMOL/L (ref 3.5–5.1)
PRO-BNP: 159 PG/ML (ref 0–124)
RBC # BLD: 3.88 M/UL (ref 4–5.2)
SARS-COV-2 RNA, RT PCR: NOT DETECTED
SODIUM BLD-SCNC: 139 MMOL/L (ref 136–145)
TOTAL PROTEIN: 6.8 G/DL (ref 6.4–8.2)
TROPONIN: <0.01 NG/ML
WBC # BLD: 5.3 K/UL (ref 4–11)

## 2022-11-28 PROCEDURE — 6360000004 HC RX CONTRAST MEDICATION: Performed by: PHYSICIAN ASSISTANT

## 2022-11-28 PROCEDURE — 87636 SARSCOV2 & INF A&B AMP PRB: CPT

## 2022-11-28 PROCEDURE — 99285 EMERGENCY DEPT VISIT HI MDM: CPT

## 2022-11-28 PROCEDURE — 83880 ASSAY OF NATRIURETIC PEPTIDE: CPT

## 2022-11-28 PROCEDURE — 84484 ASSAY OF TROPONIN QUANT: CPT

## 2022-11-28 PROCEDURE — 6370000000 HC RX 637 (ALT 250 FOR IP): Performed by: PHYSICIAN ASSISTANT

## 2022-11-28 PROCEDURE — 96374 THER/PROPH/DIAG INJ IV PUSH: CPT

## 2022-11-28 PROCEDURE — 71045 X-RAY EXAM CHEST 1 VIEW: CPT

## 2022-11-28 PROCEDURE — 71260 CT THORAX DX C+: CPT | Performed by: PHYSICIAN ASSISTANT

## 2022-11-28 PROCEDURE — 96375 TX/PRO/DX INJ NEW DRUG ADDON: CPT

## 2022-11-28 PROCEDURE — 80053 COMPREHEN METABOLIC PANEL: CPT

## 2022-11-28 PROCEDURE — 6360000002 HC RX W HCPCS: Performed by: PHYSICIAN ASSISTANT

## 2022-11-28 PROCEDURE — 85025 COMPLETE CBC W/AUTO DIFF WBC: CPT

## 2022-11-28 PROCEDURE — 2580000003 HC RX 258: Performed by: PHYSICIAN ASSISTANT

## 2022-11-28 RX ORDER — GUAIFENESIN 600 MG/1
600 TABLET, EXTENDED RELEASE ORAL 2 TIMES DAILY
Qty: 30 TABLET | Refills: 0 | Status: SHIPPED | OUTPATIENT
Start: 2022-11-28 | End: 2022-12-13

## 2022-11-28 RX ORDER — KETOROLAC TROMETHAMINE 30 MG/ML
30 INJECTION, SOLUTION INTRAMUSCULAR; INTRAVENOUS ONCE
Status: COMPLETED | OUTPATIENT
Start: 2022-11-28 | End: 2022-11-28

## 2022-11-28 RX ORDER — BENZONATATE 100 MG/1
100 CAPSULE ORAL 3 TIMES DAILY PRN
Qty: 15 CAPSULE | Refills: 0 | Status: SHIPPED | OUTPATIENT
Start: 2022-11-28 | End: 2022-12-01

## 2022-11-28 RX ORDER — DIPHENHYDRAMINE HYDROCHLORIDE 50 MG/ML
25 INJECTION INTRAMUSCULAR; INTRAVENOUS ONCE
Status: COMPLETED | OUTPATIENT
Start: 2022-11-28 | End: 2022-11-28

## 2022-11-28 RX ORDER — ONDANSETRON 4 MG/1
4 TABLET, ORALLY DISINTEGRATING ORAL EVERY 8 HOURS PRN
Qty: 30 TABLET | Refills: 0 | Status: SHIPPED | OUTPATIENT
Start: 2022-11-28

## 2022-11-28 RX ORDER — METOCLOPRAMIDE HYDROCHLORIDE 5 MG/ML
10 INJECTION INTRAMUSCULAR; INTRAVENOUS ONCE
Status: COMPLETED | OUTPATIENT
Start: 2022-11-28 | End: 2022-11-28

## 2022-11-28 RX ORDER — CODEINE PHOSPHATE AND GUAIFENESIN 10; 100 MG/5ML; MG/5ML
5 SOLUTION ORAL ONCE
Status: COMPLETED | OUTPATIENT
Start: 2022-11-28 | End: 2022-11-28

## 2022-11-28 RX ORDER — 0.9 % SODIUM CHLORIDE 0.9 %
1000 INTRAVENOUS SOLUTION INTRAVENOUS ONCE
Status: COMPLETED | OUTPATIENT
Start: 2022-11-28 | End: 2022-11-28

## 2022-11-28 RX ADMIN — METOCLOPRAMIDE HYDROCHLORIDE 10 MG: 5 INJECTION INTRAMUSCULAR; INTRAVENOUS at 12:13

## 2022-11-28 RX ADMIN — DIPHENHYDRAMINE HYDROCHLORIDE 25 MG: 50 INJECTION, SOLUTION INTRAMUSCULAR; INTRAVENOUS at 12:14

## 2022-11-28 RX ADMIN — GUAIFENESIN AND CODEINE PHOSPHATE 5 ML: 10; 100 LIQUID ORAL at 12:17

## 2022-11-28 RX ADMIN — KETOROLAC TROMETHAMINE 30 MG: 30 INJECTION, SOLUTION INTRAMUSCULAR; INTRAVENOUS at 12:11

## 2022-11-28 RX ADMIN — SODIUM CHLORIDE 1000 ML: 9 INJECTION, SOLUTION INTRAVENOUS at 12:09

## 2022-11-28 RX ADMIN — IOPAMIDOL 75 ML: 755 INJECTION, SOLUTION INTRAVENOUS at 13:41

## 2022-11-28 ASSESSMENT — PAIN DESCRIPTION - LOCATION: LOCATION: HEAD

## 2022-11-28 ASSESSMENT — PAIN SCALES - GENERAL
PAINLEVEL_OUTOF10: 10
PAINLEVEL_OUTOF10: 4
PAINLEVEL_OUTOF10: 10

## 2022-11-28 ASSESSMENT — PAIN - FUNCTIONAL ASSESSMENT: PAIN_FUNCTIONAL_ASSESSMENT: 0-10

## 2022-11-28 ASSESSMENT — PAIN DESCRIPTION - DESCRIPTORS: DESCRIPTORS: ACHING

## 2022-11-28 NOTE — ED NOTES
Ambulate patient around 100 feet.  02 [id number]% and pulse was around 115     Tyrel Ket  11/28/22 1301

## 2022-11-28 NOTE — ED PROVIDER NOTES
[facility] Emergency Department    CHIEF COMPLAINT  Illness and Shortness of Breath (Pt reports SOB, congestion, cough, bilat ear pain, sore throat since Thursday. Pt on O2 at home as needed but hasn't used because she is so congested. Pt appears SOB in triage but O2 95%)      SHARED SERVICE VISIT  Evaluated by FAB. My supervising physician was available for consultation. EKG interpretation provided by attending physician. HISTORY OF PRESENT ILLNESS  Vanessa Richard is a 50 y.o. female who presents to the ED complaining of several day history of URI symptoms. Patient accompanied by boyfriend today for evaluation. Patient states that since Thursday she has had flulike symptoms. Reports headache without dizziness or confusion. History of migraines which does feel similar although having difficulty controlling symptoms. She denies any visual changes or disturbances. No difficulty speaking or swallowing. Has had some nasal congestion. Mild sore throat. No difficulty swallowing. Bilateral ear pressure. She has a non-smoker. History of asthma and COPD. Has been using nebulizers at home with transient improvement of symptoms. Subjective fevers and chills. States that she has also had some chest discomfort and tightness associated with a nonproductive cough. No hemoptysis. She denies pain with deep inspiration. Has had some generalized abdominal discomfort along with some nausea and vomiting. Difficulty keeping things down. Diarrhea. No constipation. No black or bloody stools. She denies any urinary symptoms. No leg pain or swelling. No recent travel, trauma or surgery. She denies sick contacts. No other complaints, modifying factors or associated symptoms. Nursing notes reviewed.    Past Medical History:   Diagnosis Date    Anxiety     Asthma     Bipolar 1 disorder ([facility] [address]     Pt is willing to see psych after 7/15 for confirmation of dx    Chronic bronchitis ([facility] [address]     Chronic GERD 2020    COPD (chronic obstructive pulmonary disease) ([facility] [address]     Dr Mekhi Valdovinos    Depression     Emphysema of lung ([facility] [address]     Macromastia     Migraine     Migraines     Mixed hyperlipidemia 2020    MRSA infection within last 3 months     axillary    Narcolepsy     Dr Krista Long    Obesity     Pneumonia     Restless leg syndrome     Sleep apnea     did not tolerate cpap, uses O2 qhs    Tobacco abuse     Tobacco use disorder 2011    Urinary incontinence      Past Surgical History:   Procedure Laterality Date    ABSCESS DRAINAGE      L axilla MRSA, hosp for 7 days    ADENOIDECTOMY      BLADDER SUSPENSION  5/10    Mesh removed 1/9/15 in [facility]    BREAST BIOPSY      BREAST REDUCTION SURGERY Bilateral 2022    BILATERAL BREAST REDUCTION performed by Trena Stroud MD at [address] [place] [address]      lumpectomy, ducts removed    FINGER SURGERY      right thumb    HYSTERECTOMY (CERVIX STATUS UNKNOWN)   dysplasia, endometriosis, cysts, MYLENE BSO    NASAL SEPTUM SURGERY      PELVIC LAPAROSCOPY      endometriosis    SLEEVE GASTRECTOMY N/A 2020    LAPAROSCOPIC SLEEVE GASTRECTOMY - ETHICON performed by Wetzel Schilder, MD at [address] [address] (CERVIX REMOVED)      Dysplasia, endometriosis    TONSILLECTOMY      UPPER GASTROINTESTINAL ENDOSCOPY N/A 7/10/2020    EGD BIOPSY performed by Wetzel Schilder, MD at [address] [place] [address]     Family History   Problem Relation Age of Onset    Depression Mother     Breast Cancer Mother     Stroke Mother         cva x 3    Hypertension Mother     Elevated Lipids Mother     Diabetes Mother     Coronary Art Dis Father         mi  age 43    Schizophrenia Father     Hypertension Father     Elevated Lipids Father     Diabetes Father     Birth Defects Son     Other Son         odd, autism    Asthma Son     Diabetes Son     Other Brother         colitis    Mental Illness Sister     Heart Failure Paternal Grandmother         CHF    Breast Cancer Paternal Grandmother     Emphysema Maternal Grandfather     Cancer Paternal Grandfather      Social History     Socioeconomic History    Marital status:      Spouse name: Not on file    Number of children: 2    Years of education: Not on file    Highest education level: Not on file   Occupational History    Occupation: unemployed   Tobacco Use    Smoking status: Former     Packs/day: 0.25     Years: 22.00     Pack years: 5.50     Types: Cigarettes     Quit date: 2020     Years since quittin.8    Smokeless tobacco: Never   Vaping Use    Vaping Use: Never used   Substance and Sexual Activity    Alcohol use: Yes     Comment: occasional    Drug use: No     Comment: Quit cocaine and meth 2005    Sexual activity: Yes     Partners: Male     Birth control/protection: Surgical     Comment: hysterectomy   Other Topics Concern    Not on file   Social History Narrative    Not on file     Social Determinants of Health     Financial Resource Strain: Not on file   Food Insecurity: Not on file   Transportation Needs: Not on file   Physical Activity: Not on file   Stress: Not on file   Social Connections: Not on file   Intimate Partner Violence: Not on file   Housing Stability: Not on file     Current Facility-Administered Medications   Medication Dose Route Frequency Provider Last Rate Last Admin    0.9 % sodium chloride bolus  1,000 mL IntraVENous Once Shubham Morales, [address]        diphenhydrAMINE (BENADRYL) injection 25 mg  25 mg IntraVENous Once CAT Posadas        guaiFENesin-codeine (GUAIFENESIN AC) 100-10 MG/5ML liquid 5 mL  5 mL Oral Once CAT Posadas        metoclopramide (REGLAN) injection 10 mg  10 mg IntraVENous Once Shubham Morales [address]        ketorolac (TORADOL) injection 30 mg  30 mg IntraVENous Once Shubham Morales [address]         Current Outpatient Medications   Medication Sig Dispense Refill    butalbital-acetaminophen-caffeine (FIORICET, ESGIC) -40 MG per tablet TAKE 1 TABLET BY MOUTH 3 TIMES DAILY AS NEEDED FOR MIGRAINE 30 tablet 0    amitriptyline (ELAVIL) 75 MG tablet TAKE 1 TABLET (75 MG) BY MOUTH NIGHTLY 30 tablet 5    potassium chloride (KLOR-CON M) 20 MEQ extended release tablet TAKE 1 TABLET (20 MEQ) BY MOUTH DAILY 30 tablet 5    varenicline (CHANTIX) 1 MG tablet TAKE 1 TABLET BY MOUTH TWICE DAILY TAKE AFTER EATING WITH A FULL GLASS OF WATER 56 tablet 2    clonazePAM (KLONOPIN) 1 MG tablet TAKE 1 TABLET BY MOUTH 2 TIMES DAILY AS NEEDED FOR ANXIETY FOR UP TO 30 DAYS 60 tablet 0    promethazine (PHENERGAN) 25 MG tablet TAKE 1 TABLET BY MOUTH EVERY 6 HOURS AS NEEDED FOR NAUSEA 30 tablet 2    tiZANidine (ZANAFLEX) 2 MG tablet TAKE 1 TABLET (2 MG) BY MOUTH 3 TIMES DAILY AS NEEDED (BACK PAIN) 30 tablet 5    naproxen (NAPROSYN) 500 MG tablet TAKE 1 TABLET BY MOUTH DAILY AS NEEDED FOR PAIN TAKE WITH FOOD 30 tablet 5    NURTEC 75 MG TBDP PLACE 1 TABLET UNDER THE TONGUE DAILY AS NEEDED (MIGRAINE) MAX 1/24 HR, TO REPLACE MAXALT 8 tablet 5    furosemide (LASIX) 40 MG tablet TAKE 1 TABLET BY MOUTH EVERY DAY TO TWICE DAILY AS NEEDED SWELLING 60 tablet 5    famotidine (PEPCID) 20 MG tablet TAKE 1 TABLET BY MOUTH NIGHTLY 30 tablet 5    gabapentin (NEURONTIN) 400 MG capsule TAKE 1 CAPSULE BY MOUTH AT BEDTIME      FLUoxetine (PROZAC) 20 MG capsule TAKE 3 CAPSULES (60 MG) BY MOUTH DAILY 90 capsule 5    loratadine-pseudoephedrine (LORATADINE-D 12HR) 5-120 MG per extended release tablet TAKE 1 TABLET BY MOUTH EVERY MORNING AS NEEDED FOR ALLERGY 30 tablet 5    omeprazole (PRILOSEC) 20 MG delayed release capsule TAKE 1 CAPSULE BY MOUTH DAILY 30 capsule 3    topiramate (TOPAMAX) 100 MG tablet TAKE 1 TABLET BY MOUTH 2 TIMES DAILY 180 tablet 1    Cholecalciferol 50 MCG (2000 UT) TABS Take 1 tablet by mouth daily Take 1 tablet by mouth daily. 90 tablet 2    methylphenidate (RITALIN) 20 MG tablet Take 20 mg by mouth 3 times daily.       albuterol (PROVENTIL) (2.5 MG/3ML) 0.083% nebulizer solution Take 2.5 mg by nebulization every 6 hours as needed for Wheezing      VENTOLIN  (90 Base) MCG/ACT inhaler INHALE 2 PUFFS INTO THE LUNGS 4 TIMES DAILY AS NEEDED. 18 g 5    amphetamine-dextroamphetamine (ADDERALL XR) 20 MG extended release capsule TAKE 1 CAPSULE BY MOUTH EVERY DAY  0    Respiratory Therapy Supplies (NEBULIZER/TUBING/MOUTHPIECE) KIT 1 kit by Does not apply route daily as needed 1 kit 0    OXYGEN Inhale 2 L/min into the lungs continuous. Regulate with portability at home (Patient taking differently: Inhale 2 L/min into the lungs as needed) 1 Container 0     Allergies   Allergen Reactions    Ambien [Zolpidem Tartrate] Other (See Comments)     Shakey, anxious    Dilaudid [Hydromorphone Hcl] Other (See Comments)     Feels like skin is on fire. Open wound on face and arm    Fentanyl Itching     Burning, skin blistered    Imitrex [Sumatriptan]      Face hot, felt sob    Morphine Other (See Comments)     Feels like skin is on fire. Tolerates Percocet. REVIEW OF SYSTEMS  10 systems reviewed, pertinent positives per HPI otherwise noted to be negative    PHYSICAL EXAM  /79   Pulse (!) 103   Temp 99.3 °F (37.4 °C) (Oral)   Resp 20   Wt 164 lb (74.4 kg)   LMP  (LMP Unknown) Comment: age 36 total  SpO2 95%   BMI 30.00 kg/m²   GENERAL APPEARANCE: Awake and alert. Cooperative. No acute distress. HEAD: Normocephalic. Atraumatic. EYES: PERRL. EOM's grossly intact. ENT: Mucous membranes are moist.  Oropharynx patent. Controlling secretions. No vesicles, blistering or sloughing. Floor the mouth soft and nonraised. No trismus appreciated. Patient controlling secretions appropriately. LYMPH: No periauricular, submental, or cervical lymphadenopathy. NECK: Supple. No meningismus. No JVD. No tracheal tenderness or deviation. No crepitus. HEART: RRR. No murmurs. LUNGS: Respirations unlabored. CTAB. Good air exchange. Speaking comfortably in full sentences.   No wheezing, rhonchi, rales. ABDOMEN: Soft. Non-distended. Non-tender. No guarding or rebound. Negative Heath's, McBurney's and Rovsing's. No fluids or ascites. No hernias or masses. Bowel sounds normal in all quadrants. No CVA tenderness. No midline pulsatile mass. EXTREMITIES: No peripheral edema. No unilateral calf pain, redness or swelling. Moves all extremities equally. All extremities neurovascularly intact. SKIN: Warm and dry. No acute rashes. NEUROLOGICAL: Alert and oriented. CN's 2-12 intact. No gross facial drooping. Strength 5/5, sensation intact. PSYCHIATRIC: Normal mood and affect. RADIOLOGY  XR CHEST PORTABLE    Result Date: 11/28/2022  EXAMINATION: ONE XRAY VIEW OF THE CHEST 11/28/2022 11:56 am COMPARISON: 01/10/2019 HISTORY: ORDERING SYSTEM PROVIDED HISTORY: cough/sob TECHNOLOGIST PROVIDED HISTORY: Reason for exam:->cough/sob Reason for Exam:  sob FINDINGS: The heart and pulmonary vascularity are within normal limits. There are no focal areas of consolidation or pleural effusion. The osseous structures are intact. No acute abnormality     CT CHEST PULMONARY EMBOLISM W CONTRAST    Result Date: 11/28/2022  EXAMINATION: CTA OF THE CHEST 11/28/2022 1:30 pm TECHNIQUE: CTA of the chest was performed after the administration of intravenous contrast.  Multiplanar reformatted images are provided for review. MIP images are provided for review. Automated exposure control, iterative reconstruction, and/or weight based adjustment of the mA/kV was utilized to reduce the radiation dose to as low as reasonably achievable.  COMPARISON: 08/21/2018 HISTORY: ORDERING SYSTEM PROVIDED HISTORY: tachy/sob TECHNOLOGIST PROVIDED HISTORY: Reason for exam:->tachy/sob Decision Support Exception - unselect if not a suspected or confirmed emergency medical condition->Emergency Medical Condition (MA) Reason for Exam: tachy; sob x 4 days; coughing, chest pain Additional signs and symptoms: r/o pe FINDINGS: Pulmonary Arteries: The central pulmonary arteries are normal in caliber. There are no emboli identified, with opacification to the subsegmental levels. Mediastinum: There is no mediastinal or hilar adenopathy. No pericardial effusion. Ascending thoracic aorta is 34 mm and otherwise unremarkable. Lungs/pleura: Old granulomatous disease is noted. There is mild subpleural atelectasis in the right upper lobe. There is no consolidation or pleural effusion. Upper Abdomen: Adrenal glands are incompletely visualized and unremarkable. Gastric bypass is noted. Soft Tissues/Bones: No acute osseous abnormality. No lytic or blastic bone lesions. Linear areas of air lucency are noted in the breast.  No breast abscess or localized inflammation is otherwise noted. No pulmonary embolus is identified. Air lucencies in the left breast.  Intravenous air, related to the intravenous injection is possible, although there is lack of intravenous gas otherwise noted. Recent surgery or trauma are other considerations. Loculated gas is possible noting previous breast reduction in 07/29/2022, fairly remote. Infection is broadly in the differential, although no source of infection such as focus of cellulitis or abscess is evident. ED COURSE  Patient received Benadryl, Reglan and Toradol along with a dose of guaifenesin with codeine for pain and cough, with good relief. Triage vitals stable. Patient tested positive for influenza type A.  CBC without leukocytosis or anemia. CMP was unremarkable. Troponin less than 0.01. BNP approximately 150. Stable portable chest x-ray. Patient ambulatory slight tachycardia. No hypoxia. Did obtain CT of chest which demonstrates no acute pulmonary embolus. Given that patient does have oxygen at home she will use it there. Have discussed return precautions and recommendations for follow-up otherwise and patient in agreement and comfortable at discharge.   A discussion was had with Ms. Kyle regarding headache, cough, shortness of breath, ED findings, recommendations for follow-up. Risk management discussed and shared decision making had with patient and/or surrogate. All questions were answered. Patient will follow up with PCP in 2 to 3 days for further evaluation/treatment. All questions answered. Patient will return to ED for new/worsening symptoms. Patient was sent home with a prescription for Zofran, Tessalon, Mucinex, and guaifenesin with codeine. CRITICAL CARE TIME  30 Minutes of critical care time spent not including separately billable procedures.     MDM  Results for orders placed or performed during the hospital encounter of 11/28/22   COVID-19 & Influenza Combo    Specimen: Nasopharyngeal Swab   Result Value Ref Range    SARS-CoV-2 RNA, RT PCR NOT DETECTED NOT DETECTED    INFLUENZA A DETECTED (A) NOT DETECTED    INFLUENZA B NOT DETECTED NOT DETECTED   CBC with Auto Differential   Result Value Ref Range    WBC 5.3 4.0 - 11.0 K/uL    RBC 3.88 (L) 4.00 - 5.20 M/uL    Hemoglobin 12.7 12.0 - 16.0 g/dL    Hematocrit 38.5 36.0 - 48.0 %    MCV 99.3 80.0 - 100.0 fL    MCH 32.7 26.0 - 34.0 pg    MCHC 32.9 31.0 - 36.0 g/dL    RDW 12.5 12.4 - 15.4 %    Platelets 930 939 - 499 K/uL    MPV 7.3 5.0 - 10.5 fL    Neutrophils % 73.7 %    Lymphocytes % 14.3 %    Monocytes % 9.6 %    Eosinophils % 1.7 %    Basophils % 0.7 %    Neutrophils Absolute 3.9 1.7 - 7.7 K/uL    Lymphocytes Absolute 0.8 (L) 1.0 - 5.1 K/uL    Monocytes Absolute 0.5 0.0 - 1.3 K/uL    Eosinophils Absolute 0.1 0.0 - 0.6 K/uL    Basophils Absolute 0.0 0.0 - 0.2 K/uL   Comprehensive Metabolic Panel   Result Value Ref Range    Sodium 139 136 - 145 mmol/L    Potassium 4.1 3.5 - 5.1 mmol/L    Chloride 99 99 - 110 mmol/L    CO2 35 (H) 21 - 32 mmol/L    Anion Gap 5 3 - 16    Glucose 90 70 - 99 mg/dL    BUN 11 7 - 20 mg/dL    Creatinine 0.7 0.6 - 1.1 mg/dL    Est, Glom Filt Rate >60 >60    Calcium 9.3 8.3 - 10.6 mg/dL    Total Protein 6.8 6.4 - 8.2 g/dL    Albumin 4.0 3.4 - 5.0 g/dL    Albumin/Globulin Ratio 1.4 1.1 - 2.2    Total Bilirubin <0.2 0.0 - 1.0 mg/dL    Alkaline Phosphatase 86 40 - 129 U/L    ALT 22 10 - 40 U/L    AST 25 15 - 37 U/L   Troponin   Result Value Ref Range    Troponin <0.01 <0.01 ng/mL   Brain Natriuretic Peptide   Result Value Ref Range    Pro- (H) 0 - 124 pg/mL     I estimate there is LOW risk for ACUTE CORONARY SYNDROME, INTRACRANIAL HEMORRHAGE, MALIGNANT DYSRHYTHMIA or HYPERTENSION, PULMONARY EMBOLISM, SEPSIS, SUBARACHNOID HEMORRHAGE, SUBDURAL HEMATOMA, STROKE, or THORACIC AORTIC DISSECTION, thus I consider the discharge disposition reasonable. Josh Eller [id number] and I have discussed the diagnosis and risks, and we agree with discharging home to follow-up with their primary doctor. We also discussed returning to the Emergency Department immediately if new or worsening symptoms occur. We have discussed the symptoms which are most concerning (e.g., bloody sputum, fever, worsening pain or shortness of breath, vomiting, weakness) that necessitate immediate return. Final Impression  1. Nonintractable headache, unspecified chronicity pattern, unspecified headache type    2. Influenza with respiratory manifestation other than pneumonia      Blood pressure (!) 123/59, pulse (!) 102, temperature 99.3 °F (37.4 °C), temperature source Oral, resp. rate 14, weight 164 lb (74.4 kg), SpO2 96 %, not currently breastfeeding. DISPOSITION  Patient was discharged to home in good condition.          Trinity Lazcanoma  11/28/22 1345

## 2022-11-30 ENCOUNTER — CARE COORDINATION (OUTPATIENT)
Dept: CARE COORDINATION | Age: 52
End: 2022-11-30

## 2022-11-30 NOTE — CARE COORDINATION
ACM attempted outreach for ED follow up. Unable to leave message as mailbox is full and can not accept messages. Will attempt at a later date.

## 2022-12-03 DIAGNOSIS — G43.111 INTRACTABLE MIGRAINE WITH AURA WITH STATUS MIGRAINOSUS: ICD-10-CM

## 2022-12-03 DIAGNOSIS — F41.9 ANXIETY: ICD-10-CM

## 2022-12-05 ENCOUNTER — TELEPHONE (OUTPATIENT)
Dept: PULMONOLOGY | Age: 52
End: 2022-12-05

## 2022-12-05 RX ORDER — FLUOXETINE HYDROCHLORIDE 20 MG/1
CAPSULE ORAL
Qty: 90 CAPSULE | Refills: 5 | Status: SHIPPED | OUTPATIENT
Start: 2022-12-05

## 2022-12-05 RX ORDER — BUTALBITAL, ACETAMINOPHEN AND CAFFEINE 50; 325; 40 MG/1; MG/1; MG/1
TABLET ORAL
Qty: 30 TABLET | Refills: 0 | Status: SHIPPED | OUTPATIENT
Start: 2022-12-05

## 2022-12-05 RX ORDER — CLONAZEPAM 1 MG/1
1 TABLET ORAL 2 TIMES DAILY PRN
Qty: 60 TABLET | Refills: 0 | Status: SHIPPED | OUTPATIENT
Start: 2022-12-05 | End: 2023-01-04

## 2022-12-05 NOTE — TELEPHONE ENCOUNTER
Last Office Visit  -  7/11/22- NB  Next Office Visit  -  none    Last Filled  -  10/18/22  Last UDS -  3/24/21  Contract -  11/10/2016

## 2022-12-05 NOTE — TELEPHONE ENCOUNTER
Nodule Navigator Evelyn Bon called indicating that pt is scheduled for CT lung screen today but pt had a CT Chest Pulmonary Embolism 11/28/22. CT lung screen will need to be cancelled, cancelled via appt desk. Pt can keep PFT scheduled for today. Called pt to inform that CT lung screen is cancelled, but no answer, VM is full.

## 2022-12-31 DIAGNOSIS — G43.109 MIGRAINE WITH AURA AND WITHOUT STATUS MIGRAINOSUS, NOT INTRACTABLE: ICD-10-CM

## 2022-12-31 DIAGNOSIS — R11.0 NAUSEA: ICD-10-CM

## 2022-12-31 DIAGNOSIS — K21.9 CHRONIC GERD: ICD-10-CM

## 2022-12-31 DIAGNOSIS — G43.111 INTRACTABLE MIGRAINE WITH AURA WITH STATUS MIGRAINOSUS: ICD-10-CM

## 2022-12-31 DIAGNOSIS — F41.9 ANXIETY: ICD-10-CM

## 2023-01-02 RX ORDER — FUROSEMIDE 40 MG/1
TABLET ORAL
Qty: 60 TABLET | Refills: 5 | Status: SHIPPED | OUTPATIENT
Start: 2023-01-02

## 2023-01-02 RX ORDER — PROMETHAZINE HYDROCHLORIDE 25 MG/1
TABLET ORAL
Qty: 30 TABLET | Refills: 2 | Status: SHIPPED | OUTPATIENT
Start: 2023-01-02

## 2023-01-02 RX ORDER — BUTALBITAL, ACETAMINOPHEN AND CAFFEINE 50; 325; 40 MG/1; MG/1; MG/1
TABLET ORAL
Qty: 30 TABLET | Refills: 0 | Status: SHIPPED | OUTPATIENT
Start: 2023-01-02 | End: 2023-01-30

## 2023-01-02 RX ORDER — FAMOTIDINE 20 MG/1
TABLET, FILM COATED ORAL
Qty: 30 TABLET | Refills: 5 | Status: SHIPPED | OUTPATIENT
Start: 2023-01-02

## 2023-01-02 RX ORDER — RIMEGEPANT SULFATE 75 MG/75MG
TABLET, ORALLY DISINTEGRATING ORAL
Qty: 8 TABLET | Refills: 5 | Status: SHIPPED | OUTPATIENT
Start: 2023-01-02

## 2023-01-02 RX ORDER — CLONAZEPAM 1 MG/1
1 TABLET ORAL 2 TIMES DAILY PRN
Qty: 60 TABLET | Refills: 0 | Status: SHIPPED | OUTPATIENT
Start: 2023-01-02 | End: 2023-02-13 | Stop reason: SDUPTHER

## 2023-01-27 DIAGNOSIS — Z72.0 TOBACCO ABUSE: ICD-10-CM

## 2023-01-27 DIAGNOSIS — G43.111 INTRACTABLE MIGRAINE WITH AURA WITH STATUS MIGRAINOSUS: ICD-10-CM

## 2023-01-30 RX ORDER — VARENICLINE TARTRATE 1 MG/1
TABLET, FILM COATED ORAL
Qty: 56 TABLET | Refills: 2 | Status: SHIPPED | OUTPATIENT
Start: 2023-01-30

## 2023-01-30 RX ORDER — BUTALBITAL, ACETAMINOPHEN AND CAFFEINE 50; 325; 40 MG/1; MG/1; MG/1
TABLET ORAL
Qty: 30 TABLET | Refills: 0 | Status: SHIPPED | OUTPATIENT
Start: 2023-01-30 | End: 2023-02-27

## 2023-01-31 ENCOUNTER — TELEPHONE (OUTPATIENT)
Dept: ADMINISTRATIVE | Age: 53
End: 2023-01-31

## 2023-01-31 NOTE — TELEPHONE ENCOUNTER
Submitted PA for Amitriptyline Via CM Key: [id number] STATUS:  The patient currently has access to the requested medication and a Prior Authorization is not needed for the patient/medication.

## 2023-02-13 ENCOUNTER — E-VISIT (OUTPATIENT)
Dept: FAMILY MEDICINE CLINIC | Age: 53
End: 2023-02-13
Payer: MEDICARE

## 2023-02-13 DIAGNOSIS — F41.9 ANXIETY: ICD-10-CM

## 2023-02-13 PROCEDURE — 99421 OL DIG E/M SVC 5-10 MIN: CPT | Performed by: FAMILY MEDICINE

## 2023-02-13 RX ORDER — CLONAZEPAM 1 MG/1
1 TABLET ORAL 2 TIMES DAILY PRN
Qty: 60 TABLET | Refills: 0 | Status: SHIPPED | OUTPATIENT
Start: 2023-02-13 | End: 2023-03-15

## 2023-02-13 ASSESSMENT — ANXIETY QUESTIONNAIRES
5. BEING SO RESTLESS THAT IT IS HARD TO SIT STILL: SEVERAL DAYS
4. TROUBLE RELAXING: 1
GAD7 TOTAL SCORE: 6
1. FEELING NERVOUS, ANXIOUS, OR ON EDGE: SEVERAL DAYS
GAD7 TOTAL SCORE: 6
7. FEELING AFRAID AS IF SOMETHING AWFUL MIGHT HAPPEN: NOT AT ALL
5. BEING SO RESTLESS THAT IT IS HARD TO SIT STILL: 1
3. WORRYING TOO MUCH ABOUT DIFFERENT THINGS: 1
2. NOT BEING ABLE TO STOP OR CONTROL WORRYING: SEVERAL DAYS
4. TROUBLE RELAXING: SEVERAL DAYS
1. FEELING NERVOUS, ANXIOUS, OR ON EDGE: 1
6. BECOMING EASILY ANNOYED OR IRRITABLE: 1
6. BECOMING EASILY ANNOYED OR IRRITABLE: SEVERAL DAYS
7. FEELING AFRAID AS IF SOMETHING AWFUL MIGHT HAPPEN: 0
IF YOU CHECKED OFF ANY PROBLEMS ON THIS QUESTIONNAIRE, HOW DIFFICULT HAVE THESE PROBLEMS MADE IT FOR YOU TO DO YOUR WORK, TAKE CARE OF THINGS AT HOME, OR GET ALONG WITH OTHER PEOPLE: SOMEWHAT DIFFICULT
IF YOU CHECKED OFF ANY PROBLEMS ON THIS QUESTIONNAIRE, HOW DIFFICULT HAVE THESE PROBLEMS MADE IT FOR YOU TO DO YOUR WORK, TAKE CARE OF THINGS AT HOME, OR GET ALONG WITH OTHER PEOPLE: SOMEWHAT DIFFICULT
2. NOT BEING ABLE TO STOP OR CONTROL WORRYING: 1
3. WORRYING TOO MUCH ABOUT DIFFERENT THINGS: SEVERAL DAYS

## 2023-02-13 ASSESSMENT — PATIENT HEALTH QUESTIONNAIRE - GENERAL
HAVE YOU BEEN EXPERIENCING AN UNUSUALLY DRY MOUTH: N
HAVE YOU BEEN EXPERIENCING LIGHT HEADEDNESS, WOOZINESS, OR DIZZINESS, ESPECIALLY UPON STANDING FROM SITTING OR LYING POSITIONS: N
HAVE YOU BEEN EXPERIENCING NEW OR WORSENING DIFFICULTY WITH URINATION OR CHANGE IN URINARY PATTERN: N
HAVE YOU BEEN EXPERIENCING UNEXPLAINED HIGH FEVERS OR EXCESSIVE SWEATING: N
SINCE YOUR LAST VISIT, HAVE YOU EXPERIENCED EPISODES OF FAINTING OR NEAR FAINTING: N
HAVE YOU BEEN EXPERIENCING RACING THOUGHTS OR IMPULSIVE BEHAVIOR: N
DO YOU HAVE ANY NEW RASHES OR UNEXPLAINED ITCHING OR SWELLING: N
HAVE YOU BEEN EXPERIENCING NEW OR WORSENING VISUAL CHANGES: N
HAVE YOU BEEN EXPERIENCING NEW OR WORSENING PROBLEMS WITH YOUR SEXUAL FUNCTION: N
HAVE YOU BEEN EXPERIENCING ABDOMINAL DISCOMFORT, NAUSEA OR CHANGES IN YOUR BOWEL PATTERN: N
HAVE YOU BEEN EXPERIENCING NEW OR WORSENING MUSCLE TWITCHING, SHAKINESS, OR OTHER UNUSUAL CHANGES IN YOUR MUSCLE MOVEMENT: N
HAVE YOU BEEN EXPERIENCING VERY LOW OR VERY HIGH MOODS: N
HAVE YOU BEEN EXPERIENCING INVOLUNTARY WEIGHT GAIN OR LOSS: N
HAVE YOU BEEN EXPERIENCING VIVID DREAMS OR NIGHTMARES THAT INTERFERE WITH YOUR SLEEP: N
HAVE YOU BEEN EXPERIENCING NEW OR WORSENING HEADACHES: N
DO YOU HAVE A FASTER HEART RATE THAN USUAL, DOES YOUR HEART RATE FEEL IRREGULAR OR DO YOU FEEL LIKE YOUR HEART IS POUNDING AT REST: N
HAVE YOU BEEN EXPERIENCING HALLUCINATIONS OR CONFUSION: N

## 2023-02-13 ASSESSMENT — PATIENT HEALTH QUESTIONNAIRE - PHQ9
SUM OF ALL RESPONSES TO PHQ QUESTIONS 1-9: 8
3. TROUBLE FALLING OR STAYING ASLEEP: SEVERAL DAYS
5. POOR APPETITE OR OVEREATING: 1
1. LITTLE INTEREST OR PLEASURE IN DOING THINGS: MORE THAN HALF THE DAYS
2. FEELING DOWN, DEPRESSED OR HOPELESS: 1
8. MOVING OR SPEAKING SO SLOWLY THAT OTHER PEOPLE COULD HAVE NOTICED. OR THE OPPOSITE, BEING SO FIGETY OR RESTLESS THAT YOU HAVE BEEN MOVING AROUND A LOT MORE THAN USUAL: 0
9. THOUGHTS THAT YOU WOULD BE BETTER OFF DEAD, OR OF HURTING YOURSELF: NOT AT ALL
SUM OF ALL RESPONSES TO PHQ QUESTIONS 1-9: 8
6. FEELING BAD ABOUT YOURSELF - OR THAT YOU ARE A FAILURE OR HAVE LET YOURSELF OR YOUR FAMILY DOWN: SEVERAL DAYS
5. POOR APPETITE OR OVEREATING: SEVERAL DAYS
3. TROUBLE FALLING OR STAYING ASLEEP: 1
6. FEELING BAD ABOUT YOURSELF - OR THAT YOU ARE A FAILURE OR HAVE LET YOURSELF OR YOUR FAMILY DOWN: 1
7. TROUBLE CONCENTRATING ON THINGS, SUCH AS READING THE NEWSPAPER OR WATCHING TELEVISION: SEVERAL DAYS
SUM OF ALL RESPONSES TO PHQ9 QUESTIONS 1 & 2: 3
4. FEELING TIRED OR HAVING LITTLE ENERGY: 1
9. THOUGHTS THAT YOU WOULD BE BETTER OFF DEAD, OR OF HURTING YOURSELF: 0
1. LITTLE INTEREST OR PLEASURE IN DOING THINGS: 2
10. IF YOU CHECKED OFF ANY PROBLEMS, HOW DIFFICULT HAVE THESE PROBLEMS MADE IT FOR YOU TO DO YOUR WORK, TAKE CARE OF THINGS AT HOME, OR GET ALONG WITH OTHER PEOPLE: 1
10. IF YOU CHECKED OFF ANY PROBLEMS, HOW DIFFICULT HAVE THESE PROBLEMS MADE IT FOR YOU TO DO YOUR WORK, TAKE CARE OF THINGS AT HOME, OR GET ALONG WITH OTHER PEOPLE: SOMEWHAT DIFFICULT
2. FEELING DOWN, DEPRESSED OR HOPELESS: SEVERAL DAYS
4. FEELING TIRED OR HAVING LITTLE ENERGY: SEVERAL DAYS
7. TROUBLE CONCENTRATING ON THINGS, SUCH AS READING THE NEWSPAPER OR WATCHING TELEVISION: 1
SUM OF ALL RESPONSES TO PHQ QUESTIONS 1-9: 8
8. MOVING OR SPEAKING SO SLOWLY THAT OTHER PEOPLE COULD HAVE NOTICED. OR THE OPPOSITE - BEING SO FIDGETY OR RESTLESS THAT YOU HAVE BEEN MOVING AROUND A LOT MORE THAN USUAL: NOT AT ALL

## 2023-02-14 NOTE — PROGRESS NOTES
Diagnoses and all orders for this visit:    Anxiety  -     clonazePAM (KLONOPIN) 1 MG tablet; Take 1 tablet by mouth 2 times daily as needed for Anxiety for up to 30 days. 5-10 minutes were spent on the digital evaluation and management of this patient.

## 2023-02-16 ENCOUNTER — E-VISIT (OUTPATIENT)
Dept: PRIMARY CARE CLINIC | Age: 53
End: 2023-02-16
Payer: MEDICARE

## 2023-02-16 DIAGNOSIS — J06.9 UPPER RESPIRATORY TRACT INFECTION, UNSPECIFIED TYPE: Primary | ICD-10-CM

## 2023-02-16 PROCEDURE — 99422 OL DIG E/M SVC 11-20 MIN: CPT | Performed by: NURSE PRACTITIONER

## 2023-02-16 RX ORDER — AMOXICILLIN AND CLAVULANATE POTASSIUM 875; 125 MG/1; MG/1
1 TABLET, FILM COATED ORAL 2 TIMES DAILY
Qty: 20 TABLET | Refills: 0 | Status: SHIPPED | OUTPATIENT
Start: 2023-02-16 | End: 2023-02-26

## 2023-02-16 RX ORDER — PREDNISONE 20 MG/1
20 TABLET ORAL 2 TIMES DAILY
Qty: 10 TABLET | Refills: 0 | Status: SHIPPED | OUTPATIENT
Start: 2023-02-16 | End: 2023-02-21

## 2023-02-16 ASSESSMENT — LIFESTYLE VARIABLES
SMOKING_STATUS: NO, I'M A FORMER SMOKER
SMOKING_YEARS: 30
PACKS_PER_DAY: 1

## 2023-02-16 NOTE — PROGRESS NOTES
Reviewed questionnaire    Reviewed meds/allergies    Dx URI    Plan Rx given for augmentin and prednisone, follow up with PCP if no improvement    Time spent on visit 11 min

## 2023-02-25 DIAGNOSIS — G43.111 INTRACTABLE MIGRAINE WITH AURA WITH STATUS MIGRAINOSUS: ICD-10-CM

## 2023-02-27 ENCOUNTER — TELEPHONE (OUTPATIENT)
Dept: ADMINISTRATIVE | Age: 53
End: 2023-02-27

## 2023-02-27 RX ORDER — BUTALBITAL, ACETAMINOPHEN AND CAFFEINE 50; 325; 40 MG/1; MG/1; MG/1
TABLET ORAL
Qty: 30 TABLET | Refills: 0 | Status: SHIPPED | OUTPATIENT
Start: 2023-02-27

## 2023-02-27 NOTE — TELEPHONE ENCOUNTER
Submitted PA for Maximo Dub [address] (Key: [id number]: APPROVED  FROM 1/1/23-2/27/24.     APPROVAL letter scanned into the chart

## 2023-03-18 DIAGNOSIS — F41.9 ANXIETY: ICD-10-CM

## 2023-03-20 RX ORDER — CLONAZEPAM 1 MG/1
1 TABLET ORAL 2 TIMES DAILY PRN
Qty: 60 TABLET | Refills: 0 | Status: SHIPPED | OUTPATIENT
Start: 2023-03-20 | End: 2023-04-19

## 2023-03-20 NOTE — TELEPHONE ENCOUNTER
Last Office Visit  -  2/13/23  Next Office Visit  -  n/a    Last Filled  -  2/13/23  Last UDS -  3/24/21  Contract -  n/a

## 2023-03-29 DIAGNOSIS — G43.111 INTRACTABLE MIGRAINE WITH AURA WITH STATUS MIGRAINOSUS: ICD-10-CM

## 2023-03-29 DIAGNOSIS — R11.0 NAUSEA: ICD-10-CM

## 2023-03-29 DIAGNOSIS — N62 MACROMASTIA: ICD-10-CM

## 2023-03-29 RX ORDER — PROMETHAZINE HYDROCHLORIDE 25 MG/1
TABLET ORAL
Qty: 30 TABLET | Refills: 2 | Status: SHIPPED | OUTPATIENT
Start: 2023-03-29

## 2023-03-29 RX ORDER — NAPROXEN 500 MG/1
500 TABLET ORAL DAILY PRN
Qty: 30 TABLET | Refills: 5 | Status: SHIPPED | OUTPATIENT
Start: 2023-03-29

## 2023-03-29 RX ORDER — TIZANIDINE 2 MG/1
TABLET ORAL
Qty: 30 TABLET | Refills: 5 | Status: SHIPPED | OUTPATIENT
Start: 2023-03-29

## 2023-03-29 RX ORDER — BUTALBITAL, ACETAMINOPHEN AND CAFFEINE 50; 325; 40 MG/1; MG/1; MG/1
TABLET ORAL
Qty: 30 TABLET | Refills: 0 | Status: SHIPPED | OUTPATIENT
Start: 2023-03-29 | End: 2023-04-24

## 2023-03-29 NOTE — TELEPHONE ENCOUNTER
Last Office Visit  -  02/13/2023 (E-visit)  Next Office Visit  -  n/a    Last Filled  -    Last UDS -    Contract -

## 2023-04-24 DIAGNOSIS — Z72.0 TOBACCO ABUSE: ICD-10-CM

## 2023-04-24 DIAGNOSIS — F41.9 ANXIETY: ICD-10-CM

## 2023-04-24 DIAGNOSIS — E87.6 HYPOKALEMIA: ICD-10-CM

## 2023-04-24 DIAGNOSIS — G43.111 INTRACTABLE MIGRAINE WITH AURA WITH STATUS MIGRAINOSUS: ICD-10-CM

## 2023-04-24 RX ORDER — AMITRIPTYLINE HYDROCHLORIDE 75 MG/1
TABLET, FILM COATED ORAL
Qty: 30 TABLET | Refills: 5 | Status: SHIPPED | OUTPATIENT
Start: 2023-04-24

## 2023-04-24 RX ORDER — POTASSIUM CHLORIDE 20 MEQ/1
TABLET, EXTENDED RELEASE ORAL
Qty: 30 TABLET | Refills: 5 | Status: SHIPPED | OUTPATIENT
Start: 2023-04-24

## 2023-04-24 RX ORDER — CLONAZEPAM 1 MG/1
1 TABLET ORAL 2 TIMES DAILY PRN
Qty: 60 TABLET | Refills: 0 | Status: SHIPPED | OUTPATIENT
Start: 2023-04-24 | End: 2023-05-24

## 2023-04-24 RX ORDER — BUTALBITAL, ACETAMINOPHEN AND CAFFEINE 50; 325; 40 MG/1; MG/1; MG/1
TABLET ORAL
Qty: 30 TABLET | Refills: 0 | Status: SHIPPED | OUTPATIENT
Start: 2023-04-24

## 2023-04-24 RX ORDER — VARENICLINE TARTRATE 1 MG/1
TABLET, FILM COATED ORAL
Qty: 56 TABLET | Refills: 2 | Status: SHIPPED | OUTPATIENT
Start: 2023-04-24

## 2023-04-24 NOTE — TELEPHONE ENCOUNTER
Last Office Visit  -  2/13/23  Next Office Visit  -  n/a    Last Filled  -  3/20/23  Last UDS -  3/24/21  Contract -  n/a

## 2023-04-24 NOTE — TELEPHONE ENCOUNTER
Last Office Visit  -  2/13/23  Next Office Visit  -  n/a    Last Filled  -  3/29/23  Last UDS -  3/24/21  Contract -  n/a

## 2023-04-25 ENCOUNTER — TELEPHONE (OUTPATIENT)
Dept: ADMINISTRATIVE | Age: 53
End: 2023-04-25

## 2023-04-25 NOTE — TELEPHONE ENCOUNTER
Submitted PA for Promethazine HCl 25MG tablets, Key: [id number]. The patient currently has access to the requested medication and a Prior Authorization is not needed for the patient/medication.

## 2023-05-22 DIAGNOSIS — G43.111 INTRACTABLE MIGRAINE WITH AURA WITH STATUS MIGRAINOSUS: ICD-10-CM

## 2023-05-22 DIAGNOSIS — F41.9 ANXIETY: ICD-10-CM

## 2023-05-22 RX ORDER — BUTALBITAL, ACETAMINOPHEN AND CAFFEINE 50; 325; 40 MG/1; MG/1; MG/1
TABLET ORAL
Qty: 30 TABLET | Refills: 0 | Status: SHIPPED | OUTPATIENT
Start: 2023-05-22

## 2023-05-22 RX ORDER — CLONAZEPAM 1 MG/1
1 TABLET ORAL 2 TIMES DAILY PRN
Qty: 60 TABLET | Refills: 0 | Status: SHIPPED | OUTPATIENT
Start: 2023-05-22 | End: 2023-06-21

## 2023-05-22 RX ORDER — FLUOXETINE HYDROCHLORIDE 20 MG/1
CAPSULE ORAL
Qty: 90 CAPSULE | Refills: 0 | Status: SHIPPED | OUTPATIENT
Start: 2023-05-22

## 2023-05-22 NOTE — TELEPHONE ENCOUNTER
Last Office Visit  -  2/20/23  Next Office Visit  -      Last Filled  -  4/24/23  Last UDS -    Contract -

## 2023-06-01 ENCOUNTER — TELEPHONE (OUTPATIENT)
Dept: FAMILY MEDICINE CLINIC | Age: 53
End: 2023-06-01

## 2023-06-01 DIAGNOSIS — N63.10 MASS OF RIGHT BREAST, UNSPECIFIED QUADRANT: Primary | ICD-10-CM

## 2023-06-01 NOTE — TELEPHONE ENCOUNTER
Pt is having a mammogram tomorrow ,they are requesting new orders for a breast U/S for both breast and a diagnostic mammogram lump in the right breast.

## 2023-06-02 ENCOUNTER — HOSPITAL ENCOUNTER (OUTPATIENT)
Dept: MAMMOGRAPHY | Age: 53
Discharge: HOME | End: 2023-06-07

## 2023-06-02 DIAGNOSIS — Z12.31 VISIT FOR SCREENING MAMMOGRAM: ICD-10-CM

## 2023-06-05 ENCOUNTER — TELEPHONE (OUTPATIENT)
Dept: FAMILY MEDICINE CLINIC | Age: 53
End: 2023-06-05

## 2023-06-05 NOTE — TELEPHONE ENCOUNTER
Pls tell Matthew Hahn that I deleted L breast u/s order. Due to her R breast mass, I have kept the order for the b/l diagnostic mammo and the R breast u/s. Is this what they need me to do?

## 2023-06-05 NOTE — TELEPHONE ENCOUNTER
Spoke with Gil and she states Medicare does not want to pay for mammo and US due to the ICD code being used. N63.1 may be covered. Also needs to know if RS wants right and left ultrasound if so the left ultrasound needs corrected because it has it as right mass.  Pt is scheduled for 6/14/23

## 2023-06-06 NOTE — TELEPHONE ENCOUNTER
Just sheliai that N63.1 is the same as N63.10. I'm sure we'll get a call back if a problem persists.

## 2023-06-06 NOTE — TELEPHONE ENCOUNTER
Forgot to get call back number. She just suggested using ICD N63.1 instead of N63.10. She is not exactly sure what codes would cover it for medicare but that was her suggestion.

## 2023-06-19 DIAGNOSIS — K21.9 CHRONIC GERD: ICD-10-CM

## 2023-06-19 DIAGNOSIS — F41.9 ANXIETY: ICD-10-CM

## 2023-06-19 RX ORDER — FAMOTIDINE 20 MG/1
TABLET, FILM COATED ORAL
Qty: 30 TABLET | Refills: 5 | Status: SHIPPED | OUTPATIENT
Start: 2023-06-19

## 2023-06-19 RX ORDER — FLUOXETINE HYDROCHLORIDE 20 MG/1
CAPSULE ORAL
Qty: 90 CAPSULE | Refills: 0 | Status: SHIPPED | OUTPATIENT
Start: 2023-06-19

## 2023-06-19 RX ORDER — CLONAZEPAM 1 MG/1
1 TABLET ORAL 2 TIMES DAILY PRN
Qty: 60 TABLET | Refills: 0 | Status: SHIPPED | OUTPATIENT
Start: 2023-06-19 | End: 2023-07-19

## 2023-06-19 RX ORDER — FUROSEMIDE 40 MG/1
TABLET ORAL
Qty: 60 TABLET | Refills: 5 | Status: SHIPPED | OUTPATIENT
Start: 2023-06-19

## 2023-07-17 DIAGNOSIS — F41.9 ANXIETY: ICD-10-CM

## 2023-07-17 DIAGNOSIS — Z72.0 TOBACCO ABUSE: ICD-10-CM

## 2023-07-17 DIAGNOSIS — R11.0 NAUSEA: ICD-10-CM

## 2023-07-17 DIAGNOSIS — G43.109 MIGRAINE WITH AURA AND WITHOUT STATUS MIGRAINOSUS, NOT INTRACTABLE: ICD-10-CM

## 2023-07-17 DIAGNOSIS — G43.111 INTRACTABLE MIGRAINE WITH AURA WITH STATUS MIGRAINOSUS: ICD-10-CM

## 2023-07-17 RX ORDER — RIMEGEPANT SULFATE 75 MG/75MG
TABLET, ORALLY DISINTEGRATING ORAL
Qty: 8 TABLET | Refills: 5 | OUTPATIENT
Start: 2023-07-17

## 2023-07-17 RX ORDER — BUTALBITAL, ACETAMINOPHEN AND CAFFEINE 50; 325; 40 MG/1; MG/1; MG/1
TABLET ORAL
Qty: 30 TABLET | Refills: 0 | Status: SHIPPED | OUTPATIENT
Start: 2023-07-17 | End: 2023-08-17 | Stop reason: SDUPTHER

## 2023-07-17 RX ORDER — CLONAZEPAM 1 MG/1
1 TABLET ORAL 2 TIMES DAILY PRN
Qty: 60 TABLET | Refills: 0 | OUTPATIENT
Start: 2023-07-17 | End: 2023-08-16

## 2023-07-17 RX ORDER — VARENICLINE TARTRATE 1 MG/1
TABLET, FILM COATED ORAL
Qty: 56 TABLET | Refills: 2 | Status: SHIPPED | OUTPATIENT
Start: 2023-07-17

## 2023-07-17 RX ORDER — PROMETHAZINE HYDROCHLORIDE 25 MG/1
TABLET ORAL
Qty: 30 TABLET | Refills: 2 | Status: SHIPPED | OUTPATIENT
Start: 2023-07-17

## 2023-07-17 RX ORDER — FLUOXETINE HYDROCHLORIDE 20 MG/1
CAPSULE ORAL
Qty: 90 CAPSULE | Refills: 5 | Status: SHIPPED | OUTPATIENT
Start: 2023-07-17

## 2023-07-17 RX ORDER — RIMEGEPANT SULFATE 75 MG/75MG
TABLET, ORALLY DISINTEGRATING ORAL
Qty: 8 TABLET | Refills: 5 | Status: SHIPPED | OUTPATIENT
Start: 2023-07-17

## 2023-07-17 NOTE — TELEPHONE ENCOUNTER
Last Office Visit  -  2/13/23  Next Office Visit  -  n/a    Last Filled  -  6/19/23  Last UDS -  3/24/21  Contract -  11/15/16

## 2023-08-17 ENCOUNTER — OFFICE VISIT (OUTPATIENT)
Dept: FAMILY MEDICINE CLINIC | Age: 53
End: 2023-08-17
Payer: MEDICARE

## 2023-08-17 VITALS
SYSTOLIC BLOOD PRESSURE: 120 MMHG | BODY MASS INDEX: 29.63 KG/M2 | DIASTOLIC BLOOD PRESSURE: 76 MMHG | OXYGEN SATURATION: 93 % | HEART RATE: 108 BPM | WEIGHT: 161 LBS | HEIGHT: 62 IN

## 2023-08-17 DIAGNOSIS — J30.2 SEASONAL ALLERGIC RHINITIS, UNSPECIFIED TRIGGER: ICD-10-CM

## 2023-08-17 DIAGNOSIS — G43.109 MIGRAINE WITH AURA AND WITHOUT STATUS MIGRAINOSUS, NOT INTRACTABLE: ICD-10-CM

## 2023-08-17 DIAGNOSIS — F41.9 ANXIETY: Primary | ICD-10-CM

## 2023-08-17 DIAGNOSIS — K21.9 CHRONIC GERD: ICD-10-CM

## 2023-08-17 PROCEDURE — G8427 DOCREV CUR MEDS BY ELIG CLIN: HCPCS | Performed by: NURSE PRACTITIONER

## 2023-08-17 PROCEDURE — 3017F COLORECTAL CA SCREEN DOC REV: CPT | Performed by: NURSE PRACTITIONER

## 2023-08-17 PROCEDURE — 99214 OFFICE O/P EST MOD 30 MIN: CPT | Performed by: NURSE PRACTITIONER

## 2023-08-17 PROCEDURE — G8417 CALC BMI ABV UP PARAM F/U: HCPCS | Performed by: NURSE PRACTITIONER

## 2023-08-17 PROCEDURE — 1036F TOBACCO NON-USER: CPT | Performed by: NURSE PRACTITIONER

## 2023-08-17 RX ORDER — BUTALBITAL, ACETAMINOPHEN AND CAFFEINE 50; 325; 40 MG/1; MG/1; MG/1
TABLET ORAL
Qty: 30 TABLET | Refills: 0 | Status: SHIPPED | OUTPATIENT
Start: 2023-08-17

## 2023-08-17 RX ORDER — GABAPENTIN 600 MG/1
600 TABLET ORAL
COMMUNITY
Start: 2023-06-19

## 2023-08-17 RX ORDER — OMEPRAZOLE 20 MG/1
20 CAPSULE, DELAYED RELEASE ORAL DAILY
Qty: 30 CAPSULE | Refills: 5 | Status: SHIPPED | OUTPATIENT
Start: 2023-08-17

## 2023-08-17 RX ORDER — CLONAZEPAM 1 MG/1
1 TABLET ORAL 2 TIMES DAILY PRN
Qty: 60 TABLET | Refills: 2 | Status: SHIPPED | OUTPATIENT
Start: 2023-08-17 | End: 2023-11-15

## 2023-08-17 RX ORDER — TOPIRAMATE 100 MG/1
100 TABLET, FILM COATED ORAL 2 TIMES DAILY
Qty: 180 TABLET | Refills: 1 | Status: SHIPPED | OUTPATIENT
Start: 2023-08-17

## 2023-08-17 RX ORDER — ALBUTEROL SULFATE 90 UG/1
AEROSOL, METERED RESPIRATORY (INHALATION)
Qty: 18 G | Refills: 5 | Status: SHIPPED | OUTPATIENT
Start: 2023-08-17

## 2023-08-17 RX ORDER — LORATADINE, PSEUDOEPHEDRINE SULFATE 5; 120 MG/1; MG/1
TABLET, FILM COATED, EXTENDED RELEASE ORAL
Qty: 30 TABLET | Refills: 5 | Status: SHIPPED | OUTPATIENT
Start: 2023-08-17

## 2023-08-17 RX ORDER — ONDANSETRON 4 MG/1
4 TABLET, ORALLY DISINTEGRATING ORAL EVERY 8 HOURS PRN
Qty: 30 TABLET | Refills: 0 | Status: SHIPPED | OUTPATIENT
Start: 2023-08-17

## 2023-08-17 ASSESSMENT — PATIENT HEALTH QUESTIONNAIRE - PHQ9
7. TROUBLE CONCENTRATING ON THINGS, SUCH AS READING THE NEWSPAPER OR WATCHING TELEVISION: 2
5. POOR APPETITE OR OVEREATING: 2
4. FEELING TIRED OR HAVING LITTLE ENERGY: 3
10. IF YOU CHECKED OFF ANY PROBLEMS, HOW DIFFICULT HAVE THESE PROBLEMS MADE IT FOR YOU TO DO YOUR WORK, TAKE CARE OF THINGS AT HOME, OR GET ALONG WITH OTHER PEOPLE: 2
3. TROUBLE FALLING OR STAYING ASLEEP: 1
9. THOUGHTS THAT YOU WOULD BE BETTER OFF DEAD, OR OF HURTING YOURSELF: 0
SUM OF ALL RESPONSES TO PHQ9 QUESTIONS 1 & 2: 2
SUM OF ALL RESPONSES TO PHQ QUESTIONS 1-9: 15
1. LITTLE INTEREST OR PLEASURE IN DOING THINGS: 1
SUM OF ALL RESPONSES TO PHQ QUESTIONS 1-9: 15
SUM OF ALL RESPONSES TO PHQ QUESTIONS 1-9: 15
8. MOVING OR SPEAKING SO SLOWLY THAT OTHER PEOPLE COULD HAVE NOTICED. OR THE OPPOSITE, BEING SO FIGETY OR RESTLESS THAT YOU HAVE BEEN MOVING AROUND A LOT MORE THAN USUAL: 3
6. FEELING BAD ABOUT YOURSELF - OR THAT YOU ARE A FAILURE OR HAVE LET YOURSELF OR YOUR FAMILY DOWN: 2
SUM OF ALL RESPONSES TO PHQ QUESTIONS 1-9: 15
2. FEELING DOWN, DEPRESSED OR HOPELESS: 1

## 2023-08-17 ASSESSMENT — ANXIETY QUESTIONNAIRES
6. BECOMING EASILY ANNOYED OR IRRITABLE: 3
2. NOT BEING ABLE TO STOP OR CONTROL WORRYING: 3
IF YOU CHECKED OFF ANY PROBLEMS ON THIS QUESTIONNAIRE, HOW DIFFICULT HAVE THESE PROBLEMS MADE IT FOR YOU TO DO YOUR WORK, TAKE CARE OF THINGS AT HOME, OR GET ALONG WITH OTHER PEOPLE: VERY DIFFICULT
GAD7 TOTAL SCORE: 18
1. FEELING NERVOUS, ANXIOUS, OR ON EDGE: 3
5. BEING SO RESTLESS THAT IT IS HARD TO SIT STILL: 2
7. FEELING AFRAID AS IF SOMETHING AWFUL MIGHT HAPPEN: 1
4. TROUBLE RELAXING: 3
3. WORRYING TOO MUCH ABOUT DIFFERENT THINGS: 3

## 2023-08-17 ASSESSMENT — ENCOUNTER SYMPTOMS
GASTROINTESTINAL NEGATIVE: 1
RESPIRATORY NEGATIVE: 1

## 2023-08-17 NOTE — PROGRESS NOTES
mouth.      butalbital-acetaminophen-caffeine (FIORICET, ESGIC) -40 MG per tablet TAKE 1 TABLET BY MOUTH 3 TIMES DAILY AS NEEDED FOR MIGRAINE 30 tablet 0    loratadine-pseudoephedrine (LORATADINE-D 12HR) 5-120 MG per extended release tablet TAKE 1 TABLET BY MOUTH EVERY MORNING AS NEEDED FOR ALLERGY 30 tablet 5    omeprazole (PRILOSEC) 20 MG delayed release capsule Take 1 capsule by mouth Daily 30 capsule 5    ondansetron (ZOFRAN ODT) 4 MG disintegrating tablet Take 1 tablet by mouth every 8 hours as needed for Nausea or Vomiting 30 tablet 0    topiramate (TOPAMAX) 100 MG tablet Take 1 tablet by mouth 2 times daily 180 tablet 1    VENTOLIN  (90 Base) MCG/ACT inhaler INHALE 2 PUFFS INTO THE LUNGS 4 TIMES DAILY AS NEEDED. 18 g 5    clonazePAM (KLONOPIN) 1 MG tablet Take 1 tablet by mouth 2 times daily as needed for Anxiety for up to 90 days.  Max Daily Amount: 2 mg 60 tablet 2    promethazine (PHENERGAN) 25 MG tablet TAKE 1 TABLET BY MOUTH EVERY 6 HOURS AS NEEDED FOR NAUSEA 30 tablet 2    Rimegepant Sulfate (NURTEC) 75 MG TBDP PLACE 1 TABLET UNDER THE TONGUE DAILY AS NEEDED (MIGRAINE) MAX 1/24 HOUR TO REPLACE MAXALT 8 tablet 5    FLUoxetine (PROZAC) 20 MG capsule TAKE 3 CAPSULES (60 MG) BY MOUTH DAILY 90 capsule 5    furosemide (LASIX) 40 MG tablet TAKE 1 TABLET BY MOUTH EVERY DAY TO TWICE DAILY AS NEEDED SWELLING MAY BE ADVISEABLE TO EAT A BANANA DAILY WHILE TAKING THIS MEDICATION 60 tablet 5    famotidine (PEPCID) 20 MG tablet TAKE 1 TABLET BY MOUTH NIGHTLY 30 tablet 5    amitriptyline (ELAVIL) 75 MG tablet TAKE 1 TABLET (75 MG) BY MOUTH NIGHTLY 30 tablet 5    potassium chloride (KLOR-CON M) 20 MEQ extended release tablet TAKE 1 TABLET (20 MEQ) BY MOUTH DAILY WITH FOOD AND (MEDICATION SHOULD BE TAKEN WITH WATER THROUGHOUT THE DAY) 30 tablet 5    naproxen (NAPROSYN) 500 MG tablet TAKE 1 TABLET BY MOUTH DAILY AS NEEDED FOR PAIN TAKE WITH FOOD 30 tablet 5    Cholecalciferol 50 MCG (2000 UT) TABS Take 1

## 2023-09-11 ENCOUNTER — HOSPITAL ENCOUNTER (OUTPATIENT)
Dept: WOMENS IMAGING | Age: 53
Discharge: HOME | End: 2023-09-11
Payer: MEDICARE

## 2023-09-11 DIAGNOSIS — R92.8 ABNORMAL MAMMOGRAM: ICD-10-CM

## 2023-09-11 PROCEDURE — 76642 ULTRASOUND BREAST LIMITED: CPT

## 2023-09-18 RX ORDER — ONDANSETRON 4 MG/1
4 TABLET, ORALLY DISINTEGRATING ORAL EVERY 8 HOURS PRN
Qty: 30 TABLET | Refills: 0 | Status: SHIPPED | OUTPATIENT
Start: 2023-09-18

## 2023-10-12 ENCOUNTER — TELEPHONE (OUTPATIENT)
Dept: FAMILY MEDICINE CLINIC | Age: 53
End: 2023-10-12

## 2023-10-12 NOTE — TELEPHONE ENCOUNTER
Pt called back and was rescheduled    Future Appointments   Date Time Provider [address]   11/13/2023  9:00 AM MD YAIR Díaz   3/11/2024  9:00 AM ZAINAB Byrd

## 2023-10-12 NOTE — TELEPHONE ENCOUNTER
Called pt to reschedule appt that she has for today 10.12.23. Could not leave a message as the mailbox was full.

## 2023-10-12 NOTE — TELEPHONE ENCOUNTER
----- Message from [email or web address] sent at 10/12/2023 11:05 AM EDT -----  Subject: Appointment Request    Reason for Call: Established Patient Appointment needed: Routine Physical   Exam    QUESTIONS    Reason for appointment request? No appointments available during search     Additional Information for Provider? Patient called to r/s PE appt. Not   showing any openings.  Please return call.  ---------------------------------------------------------------------------  --------------  Valery Montoya INFO  [phone]; OK to leave message on voicemail  ---------------------------------------------------------------------------  --------------  SCRIPT ANSWERS

## 2023-10-13 DIAGNOSIS — F41.9 ANXIETY: ICD-10-CM

## 2023-10-13 RX ORDER — ONDANSETRON 4 MG/1
4 TABLET, ORALLY DISINTEGRATING ORAL EVERY 8 HOURS PRN
Qty: 30 TABLET | Refills: 0 | OUTPATIENT
Start: 2023-10-13

## 2023-10-13 RX ORDER — CLONAZEPAM 1 MG/1
1 TABLET ORAL 2 TIMES DAILY PRN
Qty: 60 TABLET | Refills: 0 | Status: SHIPPED | OUTPATIENT
Start: 2023-10-13 | End: 2024-01-11

## 2023-10-13 NOTE — TELEPHONE ENCOUNTER
Last Office Visit  -  8/17/23  Next Office Visit  -  11/13/23    Last Filled  -  8/17/23  Last UDS -  3/24/21  Contract -  11/15/16

## 2023-10-13 NOTE — TELEPHONE ENCOUNTER
Last Office Visit  -  8/17/23  Next Office Visit  -  11/13/23    Last Filled  -  9/18/23  Last UDS -    Contract -

## 2023-10-29 ENCOUNTER — E-VISIT (OUTPATIENT)
Dept: PRIMARY CARE CLINIC | Age: 53
End: 2023-10-29
Payer: MEDICARE

## 2023-10-29 DIAGNOSIS — J06.9 UPPER RESPIRATORY TRACT INFECTION, UNSPECIFIED TYPE: Primary | ICD-10-CM

## 2023-10-29 PROCEDURE — 99422 OL DIG E/M SVC 11-20 MIN: CPT | Performed by: NURSE PRACTITIONER

## 2023-10-29 RX ORDER — METHYLPREDNISOLONE 4 MG/1
TABLET ORAL
Qty: 1 KIT | Refills: 0 | Status: SHIPPED | OUTPATIENT
Start: 2023-10-29 | End: 2023-11-04

## 2023-10-29 ASSESSMENT — LIFESTYLE VARIABLES
SMOKING_STATUS: NO, I'M A FORMER SMOKER
PACKS_PER_DAY: 2
SMOKING_YEARS: 30

## 2023-11-07 DIAGNOSIS — R11.0 NAUSEA: ICD-10-CM

## 2023-11-07 DIAGNOSIS — Z72.0 TOBACCO ABUSE: ICD-10-CM

## 2023-11-07 DIAGNOSIS — G43.111 INTRACTABLE MIGRAINE WITH AURA WITH STATUS MIGRAINOSUS: ICD-10-CM

## 2023-11-07 DIAGNOSIS — E87.6 HYPOKALEMIA: ICD-10-CM

## 2023-11-07 NOTE — TELEPHONE ENCOUNTER
Last Office Visit  -  8/17/23  Next Office Visit  -  11/13/23    Last Filled  -    Last UDS -    Contract -

## 2023-11-08 NOTE — TELEPHONE ENCOUNTER
Pt requested rf for both zofran and phenergan. Pls let pt know that it's best if she uses zofran OR phenergan for occasional nausea, but I wouldn't recommend both. Zofran typically is a bit more effective. Would she prefer a rx for zofran?

## 2023-11-09 RX ORDER — POTASSIUM CHLORIDE 20 MEQ/1
TABLET, EXTENDED RELEASE ORAL
Qty: 30 TABLET | Refills: 5 | Status: SHIPPED | OUTPATIENT
Start: 2023-11-09

## 2023-11-09 RX ORDER — ONDANSETRON 4 MG/1
4 TABLET, ORALLY DISINTEGRATING ORAL EVERY 8 HOURS PRN
Qty: 30 TABLET | Refills: 0 | OUTPATIENT
Start: 2023-11-09

## 2023-11-09 RX ORDER — PROMETHAZINE HYDROCHLORIDE 25 MG/1
TABLET ORAL
Qty: 30 TABLET | Refills: 2 | Status: SHIPPED | OUTPATIENT
Start: 2023-11-09

## 2023-11-09 RX ORDER — VARENICLINE TARTRATE 1 MG/1
TABLET, FILM COATED ORAL
Qty: 56 TABLET | Refills: 2 | Status: SHIPPED | OUTPATIENT
Start: 2023-11-09

## 2023-11-09 RX ORDER — AMITRIPTYLINE HYDROCHLORIDE 75 MG/1
TABLET ORAL
Qty: 30 TABLET | Refills: 5 | Status: SHIPPED | OUTPATIENT
Start: 2023-11-09

## 2023-11-10 RX ORDER — ONDANSETRON 4 MG/1
4 TABLET, ORALLY DISINTEGRATING ORAL EVERY 8 HOURS PRN
Qty: 30 TABLET | Refills: 0 | Status: SHIPPED | OUTPATIENT
Start: 2023-11-10

## 2023-12-08 DIAGNOSIS — K21.9 CHRONIC GERD: ICD-10-CM

## 2023-12-08 DIAGNOSIS — F41.9 ANXIETY: ICD-10-CM

## 2023-12-08 RX ORDER — ONDANSETRON 4 MG/1
4 TABLET, ORALLY DISINTEGRATING ORAL EVERY 8 HOURS PRN
Qty: 30 TABLET | Refills: 0 | Status: SHIPPED | OUTPATIENT
Start: 2023-12-08

## 2023-12-08 NOTE — TELEPHONE ENCOUNTER
Last Office Visit  -  8/17/2023  Next Office Visit  -  no future apt  Last Filled  -  10/13/2023  Last UDS -    Contract -

## 2023-12-08 NOTE — TELEPHONE ENCOUNTER
Last Office Visit  -  8/17/23  Next Office Visit  -  n/a    Last Filled  -  11/10/23  Last UDS -    Contract -

## 2023-12-11 RX ORDER — FAMOTIDINE 20 MG/1
TABLET, FILM COATED ORAL
Qty: 30 TABLET | Refills: 5 | Status: SHIPPED | OUTPATIENT
Start: 2023-12-11

## 2023-12-11 RX ORDER — ONDANSETRON 4 MG/1
4 TABLET, ORALLY DISINTEGRATING ORAL EVERY 8 HOURS PRN
Qty: 30 TABLET | Refills: 0 | Status: SHIPPED | OUTPATIENT
Start: 2023-12-11

## 2023-12-11 RX ORDER — CLONAZEPAM 1 MG/1
1 TABLET ORAL 2 TIMES DAILY PRN
Qty: 60 TABLET | Refills: 0 | Status: SHIPPED | OUTPATIENT
Start: 2023-12-11 | End: 2024-01-10

## 2023-12-11 RX ORDER — FUROSEMIDE 40 MG/1
TABLET ORAL
Qty: 60 TABLET | Refills: 5 | Status: SHIPPED | OUTPATIENT
Start: 2023-12-11

## 2023-12-11 NOTE — TELEPHONE ENCOUNTER
Last Office Visit  -  8/17/23  Next Office Visit  -  n/a    Last Filled  -  12/8/23  Last UDS -    Contract -
no

## 2023-12-27 ENCOUNTER — E-VISIT (OUTPATIENT)
Dept: PRIMARY CARE CLINIC | Age: 53
End: 2023-12-27
Payer: MEDICARE

## 2023-12-27 DIAGNOSIS — J06.9 UPPER RESPIRATORY TRACT INFECTION, UNSPECIFIED TYPE: Primary | ICD-10-CM

## 2023-12-27 PROCEDURE — 99422 OL DIG E/M SVC 11-20 MIN: CPT | Performed by: NURSE PRACTITIONER

## 2023-12-27 ASSESSMENT — LIFESTYLE VARIABLES
SMOKING_YEARS: 30
SMOKING_STATUS: NO, I'M A FORMER SMOKER
PACKS_PER_DAY: 1

## 2023-12-28 RX ORDER — DOXYCYCLINE HYCLATE 100 MG
100 TABLET ORAL 2 TIMES DAILY
Qty: 20 TABLET | Refills: 0 | Status: SHIPPED | OUTPATIENT
Start: 2023-12-28 | End: 2024-01-07

## 2023-12-28 RX ORDER — PREDNISONE 10 MG/1
TABLET ORAL
Qty: 20 TABLET | Refills: 0 | Status: SHIPPED | OUTPATIENT
Start: 2023-12-28 | End: 2024-01-07

## 2023-12-28 NOTE — PROGRESS NOTES
Cristiane Hale Ramsey Garcia (1970) initiated an asynchronous digital communication through [address] [place] [address]. HPI: per patient questionnaire     Exam: not applicable    Diagnoses and all orders for this visit:  Diagnoses and all orders for this visit:    Upper respiratory tract infection, unspecified type    Other orders  -     predniSONE (DELTASONE) 10 MG tablet; Take 4 tablets by mouth once daily for 5 days  -     doxycycline hyclate (VIBRA-TABS) 100 MG tablet; Take 1 tablet by mouth 2 times daily for 10 days      10/29/23 e visit URI medrol dose pack     Medication sent. Supportive care. F/u with pcp as needed     Time: EV2 - 11-20 minutes were spent on the digital evaluation and management of this patient.  18 min     JENNIFER Phillips - CNP

## 2024-01-08 DIAGNOSIS — R11.0 NAUSEA: ICD-10-CM

## 2024-01-08 DIAGNOSIS — K21.9 CHRONIC GERD: ICD-10-CM

## 2024-01-08 DIAGNOSIS — G43.109 MIGRAINE WITH AURA AND WITHOUT STATUS MIGRAINOSUS, NOT INTRACTABLE: ICD-10-CM

## 2024-01-08 DIAGNOSIS — F41.9 ANXIETY: ICD-10-CM

## 2024-01-08 DIAGNOSIS — Z72.0 TOBACCO ABUSE: ICD-10-CM

## 2024-01-08 RX ORDER — ALBUTEROL SULFATE 90 UG/1
AEROSOL, METERED RESPIRATORY (INHALATION)
Qty: 18 G | Refills: 5 | Status: SHIPPED | OUTPATIENT
Start: 2024-01-08

## 2024-01-08 RX ORDER — RIMEGEPANT SULFATE 75 MG/75MG
TABLET, ORALLY DISINTEGRATING ORAL
Qty: 8 TABLET | Refills: 5 | Status: SHIPPED | OUTPATIENT
Start: 2024-01-08

## 2024-01-08 RX ORDER — OMEPRAZOLE 20 MG/1
20 CAPSULE, DELAYED RELEASE ORAL DAILY
Qty: 30 CAPSULE | Refills: 5 | Status: SHIPPED | OUTPATIENT
Start: 2024-01-08

## 2024-01-08 RX ORDER — FLUOXETINE HYDROCHLORIDE 20 MG/1
CAPSULE ORAL
Qty: 90 CAPSULE | Refills: 5 | Status: SHIPPED | OUTPATIENT
Start: 2024-01-08

## 2024-01-08 RX ORDER — CLONAZEPAM 1 MG/1
1 TABLET ORAL 2 TIMES DAILY PRN
Qty: 60 TABLET | Refills: 0 | Status: SHIPPED | OUTPATIENT
Start: 2024-01-10 | End: 2024-02-09

## 2024-01-08 RX ORDER — VARENICLINE TARTRATE 1 MG/1
TABLET, FILM COATED ORAL
Qty: 56 TABLET | Refills: 2 | Status: SHIPPED | OUTPATIENT
Start: 2024-01-08

## 2024-01-08 RX ORDER — PROMETHAZINE HYDROCHLORIDE 25 MG/1
TABLET ORAL
Qty: 30 TABLET | Refills: 2 | Status: SHIPPED | OUTPATIENT
Start: 2024-01-08

## 2024-01-08 NOTE — TELEPHONE ENCOUNTER
Last Office Visit  -  8/17/23  Next Office Visit  -  n/a    Last Filled  -  12/11/23  Last UDS -  3/24/21  Contract -  11/15/16

## 2024-01-08 NOTE — TELEPHONE ENCOUNTER
Last Office Visit  -  8/17/23  Next Office Visit  -  n/a    Last Filled  -  8/17/23  Last UDS -    Contract -

## 2024-02-08 DIAGNOSIS — F41.9 ANXIETY: ICD-10-CM

## 2024-02-08 RX ORDER — CLONAZEPAM 1 MG/1
1 TABLET ORAL 2 TIMES DAILY PRN
Qty: 60 TABLET | Refills: 0 | Status: SHIPPED | OUTPATIENT
Start: 2024-02-09 | End: 2024-03-10

## 2024-02-08 NOTE — TELEPHONE ENCOUNTER
Last Office Visit  -  8/17/23  Next Office Visit  -  n/a    Last Filled  -  1/10/24  Last UDS -  3/24/21  Contract -  11/15/16

## 2024-02-19 RX ORDER — BUTALBITAL, ACETAMINOPHEN AND CAFFEINE 50; 325; 40 MG/1; MG/1; MG/1
TABLET ORAL
Qty: 30 TABLET | Refills: 0 | Status: SHIPPED | OUTPATIENT
Start: 2024-02-19

## 2024-03-14 ENCOUNTER — E-VISIT (OUTPATIENT)
Dept: FAMILY MEDICINE CLINIC | Age: 54
End: 2024-03-14

## 2024-03-14 DIAGNOSIS — F41.9 ANXIETY: ICD-10-CM

## 2024-03-14 RX ORDER — TIZANIDINE 2 MG/1
TABLET ORAL
Qty: 30 TABLET | Refills: 5 | Status: SHIPPED | OUTPATIENT
Start: 2024-03-14

## 2024-03-14 ASSESSMENT — ANXIETY QUESTIONNAIRES
2. NOT BEING ABLE TO STOP OR CONTROL WORRYING: 0
2. NOT BEING ABLE TO STOP OR CONTROL WORRYING: NOT AT ALL
4. TROUBLE RELAXING: MORE THAN HALF THE DAYS
1. FEELING NERVOUS, ANXIOUS, OR ON EDGE: 2
GAD7 TOTAL SCORE: 4
GAD7 TOTAL SCORE: 4
6. BECOMING EASILY ANNOYED OR IRRITABLE: 0
7. FEELING AFRAID AS IF SOMETHING AWFUL MIGHT HAPPEN: NOT AT ALL
IF YOU CHECKED OFF ANY PROBLEMS ON THIS QUESTIONNAIRE, HOW DIFFICULT HAVE THESE PROBLEMS MADE IT FOR YOU TO DO YOUR WORK, TAKE CARE OF THINGS AT HOME, OR GET ALONG WITH OTHER PEOPLE: NOT DIFFICULT AT ALL
3. WORRYING TOO MUCH ABOUT DIFFERENT THINGS: NOT AT ALL
6. BECOMING EASILY ANNOYED OR IRRITABLE: NOT AT ALL
5. BEING SO RESTLESS THAT IT IS HARD TO SIT STILL: 0
IF YOU CHECKED OFF ANY PROBLEMS ON THIS QUESTIONNAIRE, HOW DIFFICULT HAVE THESE PROBLEMS MADE IT FOR YOU TO DO YOUR WORK, TAKE CARE OF THINGS AT HOME, OR GET ALONG WITH OTHER PEOPLE: NOT DIFFICULT AT ALL
3. WORRYING TOO MUCH ABOUT DIFFERENT THINGS: 0
7. FEELING AFRAID AS IF SOMETHING AWFUL MIGHT HAPPEN: 0
5. BEING SO RESTLESS THAT IT IS HARD TO SIT STILL: NOT AT ALL
4. TROUBLE RELAXING: 2
1. FEELING NERVOUS, ANXIOUS, OR ON EDGE: MORE THAN HALF THE DAYS

## 2024-03-14 ASSESSMENT — PATIENT HEALTH QUESTIONNAIRE - GENERAL
DO YOU HAVE ANY NEW RASHES OR UNEXPLAINED ITCHING OR SWELLING: N
HAVE YOU BEEN EXPERIENCING RACING THOUGHTS OR IMPULSIVE BEHAVIOR: N
HAVE YOU BEEN EXPERIENCING NEW OR WORSENING PROBLEMS WITH YOUR SEXUAL FUNCTION: N
HAVE YOU BEEN EXPERIENCING ABDOMINAL DISCOMFORT, NAUSEA OR CHANGES IN YOUR BOWEL PATTERN: N
HAVE YOU BEEN EXPERIENCING VERY LOW OR VERY HIGH MOODS: N
HAVE YOU BEEN EXPERIENCING AN UNUSUALLY DRY MOUTH: N
HAVE YOU BEEN EXPERIENCING LIGHT HEADEDNESS, WOOZINESS, OR DIZZINESS, ESPECIALLY UPON STANDING FROM SITTING OR LYING POSITIONS: N
HAVE YOU BEEN EXPERIENCING NEW OR WORSENING VISUAL CHANGES: N
SINCE YOUR LAST VISIT, HAVE YOU EXPERIENCED EPISODES OF FAINTING OR NEAR FAINTING: N
HAVE YOU BEEN EXPERIENCING NEW OR WORSENING MUSCLE TWITCHING, SHAKINESS, OR OTHER UNUSUAL CHANGES IN YOUR MUSCLE MOVEMENT: N
HAVE YOU BEEN EXPERIENCING UNEXPLAINED HIGH FEVERS OR EXCESSIVE SWEATING: N
HAVE YOU BEEN EXPERIENCING HALLUCINATIONS OR CONFUSION: N
DO YOU HAVE A FASTER HEART RATE THAN USUAL, DOES YOUR HEART RATE FEEL IRREGULAR OR DO YOU FEEL LIKE YOUR HEART IS POUNDING AT REST: N
HAVE YOU BEEN EXPERIENCING NEW OR WORSENING HEADACHES: N
HAVE YOU BEEN EXPERIENCING VIVID DREAMS OR NIGHTMARES THAT INTERFERE WITH YOUR SLEEP: N
HAVE YOU BEEN EXPERIENCING NEW OR WORSENING DIFFICULTY WITH URINATION OR CHANGE IN URINARY PATTERN: N
HAVE YOU BEEN EXPERIENCING INVOLUNTARY WEIGHT GAIN OR LOSS: N

## 2024-03-14 ASSESSMENT — PATIENT HEALTH QUESTIONNAIRE - PHQ9
2. FEELING DOWN, DEPRESSED OR HOPELESS: 2
9. THOUGHTS THAT YOU WOULD BE BETTER OFF DEAD, OR OF HURTING YOURSELF: 0
10. IF YOU CHECKED OFF ANY PROBLEMS, HOW DIFFICULT HAVE THESE PROBLEMS MADE IT FOR YOU TO DO YOUR WORK, TAKE CARE OF THINGS AT HOME, OR GET ALONG WITH OTHER PEOPLE: NOT DIFFICULT AT ALL
SUM OF ALL RESPONSES TO PHQ QUESTIONS 1-9: 2
1. LITTLE INTEREST OR PLEASURE IN DOING THINGS: 0
3. TROUBLE FALLING OR STAYING ASLEEP: 0
SUM OF ALL RESPONSES TO PHQ QUESTIONS 1-9: 2
7. TROUBLE CONCENTRATING ON THINGS, SUCH AS READING THE NEWSPAPER OR WATCHING TELEVISION: NOT AT ALL
SUM OF ALL RESPONSES TO PHQ QUESTIONS 1-9: 2
6. FEELING BAD ABOUT YOURSELF - OR THAT YOU ARE A FAILURE OR HAVE LET YOURSELF OR YOUR FAMILY DOWN: 0
4. FEELING TIRED OR HAVING LITTLE ENERGY: 0
7. TROUBLE CONCENTRATING ON THINGS, SUCH AS READING THE NEWSPAPER OR WATCHING TELEVISION: 0
6. FEELING BAD ABOUT YOURSELF - OR THAT YOU ARE A FAILURE OR HAVE LET YOURSELF OR YOUR FAMILY DOWN: NOT AT ALL
1. LITTLE INTEREST OR PLEASURE IN DOING THINGS: NOT AT ALL
5. POOR APPETITE OR OVEREATING: NOT AT ALL
SUM OF ALL RESPONSES TO PHQ QUESTIONS 1-9: 2
SUM OF ALL RESPONSES TO PHQ9 QUESTIONS 1 & 2: 2
8. MOVING OR SPEAKING SO SLOWLY THAT OTHER PEOPLE COULD HAVE NOTICED. OR THE OPPOSITE, BEING SO FIGETY OR RESTLESS THAT YOU HAVE BEEN MOVING AROUND A LOT MORE THAN USUAL: 0
3. TROUBLE FALLING OR STAYING ASLEEP: NOT AT ALL
10. IF YOU CHECKED OFF ANY PROBLEMS, HOW DIFFICULT HAVE THESE PROBLEMS MADE IT FOR YOU TO DO YOUR WORK, TAKE CARE OF THINGS AT HOME, OR GET ALONG WITH OTHER PEOPLE: 0
4. FEELING TIRED OR HAVING LITTLE ENERGY: NOT AT ALL
SUM OF ALL RESPONSES TO PHQ QUESTIONS 1-9: 2
8. MOVING OR SPEAKING SO SLOWLY THAT OTHER PEOPLE COULD HAVE NOTICED. OR THE OPPOSITE - BEING SO FIDGETY OR RESTLESS THAT YOU HAVE BEEN MOVING AROUND A LOT MORE THAN USUAL: NOT AT ALL
9. THOUGHTS THAT YOU WOULD BE BETTER OFF DEAD, OR OF HURTING YOURSELF: NOT AT ALL
5. POOR APPETITE OR OVEREATING: 0
2. FEELING DOWN, DEPRESSED OR HOPELESS: MORE THAN HALF THE DAYS

## 2024-03-14 NOTE — TELEPHONE ENCOUNTER
Last Office Visit  -  8/17/23  Next Office Visit  -  n/a    Last Filled  -  2/9/24  Last UDS -  3/24/21  Contract -  11/15/16

## 2024-03-14 NOTE — TELEPHONE ENCOUNTER
Refill Request     Last Seen: 8/17/2023    Last Written: 10/16/23    Next Appointment:   Future Appointments   Date Time Provider Department Center   3/25/2024  9:00 AM [facility]             Requested Prescriptions     Pending Prescriptions Disp Refills    tiZANidine (ZANAFLEX) 2 MG tablet [Pharmacy Med Name: TIZANIDINE HCL 2 MG ORAL TABLET] 30 tablet 5     Sig: TAKE 1 TABLET (2 MG) BY MOUTH 3 TIMES DAILY AS NEEDED (BACK PAIN)

## 2024-03-14 NOTE — TELEPHONE ENCOUNTER
Pls let pt she is due for a 6 mo check in. An e-visit for anxiety, and at the bottom, she can can remind me she needs butalbital refilled as well. Thx.

## 2024-03-15 ENCOUNTER — TELEPHONE (OUTPATIENT)
Dept: FAMILY MEDICINE CLINIC | Age: 54
End: 2024-03-15

## 2024-03-15 RX ORDER — CLONAZEPAM 1 MG/1
1 TABLET ORAL 2 TIMES DAILY PRN
Qty: 60 TABLET | Refills: 0 | Status: SHIPPED | OUTPATIENT
Start: 2024-03-15 | End: 2024-04-14

## 2024-03-15 NOTE — TELEPHONE ENCOUNTER
Pt contacted the office and states that her insurance is no longer covering Nurtec and they would like a new medication sent into the pharmacy for pt that is covered. Pt states that she is okay with getting back on maxalt if PCP is okay with this. Insurance does cover maxalt. Pt notified pcp would see message on monday

## 2024-03-15 NOTE — PROGRESS NOTES
Diagnoses and all orders for this visit:    Anxiety  -     clonazePAM (KLONOPIN) 1 MG tablet; Take 1 tablet by mouth 2 times daily as needed for Anxiety for up to 30 days. Max Daily Amount: 2 mg      5-10 minutes were spent on the digital evaluation and management of this patient.

## 2024-03-17 NOTE — TELEPHONE ENCOUNTER
Called patient to discuss results. Patient states that she has been using what she believed was \"nicotine free level zero vapes\". Discussed with patient that unfortunately, there is still some nicotine level even in those. Patient is going to work hard at abstaining.
.

## 2024-03-18 RX ORDER — BUTALBITAL, ACETAMINOPHEN AND CAFFEINE 50; 325; 40 MG/1; MG/1; MG/1
TABLET ORAL
Qty: 30 TABLET | Refills: 0 | OUTPATIENT
Start: 2024-03-18

## 2024-03-18 RX ORDER — RIZATRIPTAN BENZOATE 10 MG/1
TABLET ORAL
Qty: 6 TABLET | Refills: 12 | Status: SHIPPED | OUTPATIENT
Start: 2024-03-18

## 2024-03-18 RX ORDER — CLONAZEPAM 1 MG/1
1 TABLET ORAL 2 TIMES DAILY PRN
Qty: 60 TABLET | Refills: 0 | OUTPATIENT
Start: 2024-03-18 | End: 2024-04-17

## 2024-04-08 ENCOUNTER — TELEMEDICINE (OUTPATIENT)
Dept: FAMILY MEDICINE CLINIC | Age: 54
End: 2024-04-08
Payer: MEDICARE

## 2024-04-08 DIAGNOSIS — F31.9 BIPOLAR 1 DISORDER (HCC): ICD-10-CM

## 2024-04-08 DIAGNOSIS — J44.9 MODERATE COPD (CHRONIC OBSTRUCTIVE PULMONARY DISEASE) (HCC): ICD-10-CM

## 2024-04-08 DIAGNOSIS — F13.99 SEDATIVE, HYPNOTIC OR ANXIOLYTIC USE, UNSPECIFIED WITH UNSPECIFIED SEDATIVE, HYPNOTIC OR ANXIOLYTIC-INDUCED DISORDER (HCC): ICD-10-CM

## 2024-04-08 DIAGNOSIS — R92.8 ABNORMAL ULTRASOUND OF BREAST: Primary | ICD-10-CM

## 2024-04-08 DIAGNOSIS — J01.90 ACUTE BACTERIAL SINUSITIS: ICD-10-CM

## 2024-04-08 DIAGNOSIS — J44.1 COPD EXACERBATION (HCC): ICD-10-CM

## 2024-04-08 DIAGNOSIS — B96.89 ACUTE BACTERIAL SINUSITIS: ICD-10-CM

## 2024-04-08 DIAGNOSIS — J96.11 CHRONIC RESPIRATORY FAILURE WITH HYPOXIA (HCC): ICD-10-CM

## 2024-04-08 PROCEDURE — 1036F TOBACCO NON-USER: CPT | Performed by: FAMILY MEDICINE

## 2024-04-08 PROCEDURE — 99214 OFFICE O/P EST MOD 30 MIN: CPT | Performed by: FAMILY MEDICINE

## 2024-04-08 PROCEDURE — 3017F COLORECTAL CA SCREEN DOC REV: CPT | Performed by: FAMILY MEDICINE

## 2024-04-08 PROCEDURE — G8417 CALC BMI ABV UP PARAM F/U: HCPCS | Performed by: FAMILY MEDICINE

## 2024-04-08 PROCEDURE — G8427 DOCREV CUR MEDS BY ELIG CLIN: HCPCS | Performed by: FAMILY MEDICINE

## 2024-04-08 PROCEDURE — 3023F SPIROM DOC REV: CPT | Performed by: FAMILY MEDICINE

## 2024-04-08 RX ORDER — AMOXICILLIN AND CLAVULANATE POTASSIUM 875; 125 MG/1; MG/1
1 TABLET, FILM COATED ORAL 2 TIMES DAILY
Qty: 14 TABLET | Refills: 0 | Status: SHIPPED | OUTPATIENT
Start: 2024-04-08 | End: 2024-04-15

## 2024-04-08 RX ORDER — PREDNISONE 20 MG/1
TABLET ORAL
Qty: 20 TABLET | Refills: 0 | Status: SHIPPED | OUTPATIENT
Start: 2024-04-08

## 2024-04-08 SDOH — ECONOMIC STABILITY: HOUSING INSECURITY
IN THE LAST 12 MONTHS, WAS THERE A TIME WHEN YOU DID NOT HAVE A STEADY PLACE TO SLEEP OR SLEPT IN A SHELTER (INCLUDING NOW)?: NO

## 2024-04-08 SDOH — ECONOMIC STABILITY: FOOD INSECURITY: WITHIN THE PAST 12 MONTHS, YOU WORRIED THAT YOUR FOOD WOULD RUN OUT BEFORE YOU GOT MONEY TO BUY MORE.: NEVER TRUE

## 2024-04-08 SDOH — ECONOMIC STABILITY: FOOD INSECURITY: WITHIN THE PAST 12 MONTHS, THE FOOD YOU BOUGHT JUST DIDN'T LAST AND YOU DIDN'T HAVE MONEY TO GET MORE.: NEVER TRUE

## 2024-04-08 SDOH — ECONOMIC STABILITY: INCOME INSECURITY: HOW HARD IS IT FOR YOU TO PAY FOR THE VERY BASICS LIKE FOOD, HOUSING, MEDICAL CARE, AND HEATING?: SOMEWHAT HARD

## 2024-04-08 SDOH — ECONOMIC STABILITY: TRANSPORTATION INSECURITY
IN THE PAST 12 MONTHS, HAS LACK OF TRANSPORTATION KEPT YOU FROM MEETINGS, WORK, OR FROM GETTING THINGS NEEDED FOR DAILY LIVING?: YES

## 2024-04-08 NOTE — PROGRESS NOTES
2024    TELEHEALTH EVALUATION -- Audio/Visual (During COVID-19 public health emergency)    HPI:    Vanessa Garcia (:  1970) has requested an audio/video evaluation for the following concern(s):    Pt with copd, migraines, anx/dep, narcolepsy, BPD1, gerd, hld is s/p sleeve gastrectomy 20 and b/l breast reduction 2022. Has lump in R breast. Has had u/s x 2, likely benign. Pt describes a golf ball sized lesion persists.    Pt remains nonsmoker since just prior to breast reduction.     Pt describes 3 wk of cough, congestion, sinus pressure. On 3/24/24, pt did e-visit. Was tx'd with medrol dose pack and doxycycline. Pt has copd, feels it has flared. Has been wheezing, feels more sob than usual. Uses O2 ~3 x per wk when not feeling ill but lately needs nightly. Is using nebulized alb    SaO2 89-90% on RA.     Review of Systems  As above    Prior to Visit Medications    Medication Sig Taking? Authorizing Provider   predniSONE (DELTASONE) 20 MG tablet 3 po qd x 3d, 2 po qd x 3d, 1 po qd x 3d, 1/2 po qd x 4d. Take with food. Yes Mounika Hopper MD   amoxicillin-clavulanate (AUGMENTIN) 875-125 MG per tablet Take 1 tablet by mouth 2 times daily for 7 days Yes Mounika Hopper MD   rizatriptan (MAXALT) 10 MG tablet 1 po at onset of migraine. May repeat once in 2 hours if needed. Max 2/24 hr. Yes Mounika Hopper MD   tiZANidine (ZANAFLEX) 2 MG tablet TAKE 1 TABLET (2 MG) BY MOUTH 3 TIMES DAILY AS NEEDED (BACK PAIN) Yes Mounika Hopper MD   butalbital-acetaminophen-caffeine (FIORICET, ESGIC) -40 MG per tablet TAKE 1 TABLET BY MOUTH 3 (THREE) TIMES DAILY AS NEEDED FOR MIGRAINE Yes Mounika Hopper MD   omeprazole (PRILOSEC) 20 MG delayed release capsule TAKE 1 CAPSULE BY MOUTH ONCE DAILY Yes Mounika Hopper MD   VENTOLIN  (90 Base) MCG/ACT inhaler INHALE 2 PUFFS INTO THE LUNGS 4 TIMES DAILY AS NEEDED. Yes Mounika Hopper MD   NURTEC 75 MG TBDP PLACE 1 TABLET UNDER THE TONGUE DAILY AS

## 2024-04-10 DIAGNOSIS — Z72.0 TOBACCO ABUSE: ICD-10-CM

## 2024-04-10 DIAGNOSIS — R11.0 NAUSEA: ICD-10-CM

## 2024-04-10 DIAGNOSIS — F41.9 ANXIETY: ICD-10-CM

## 2024-04-10 DIAGNOSIS — G43.111 INTRACTABLE MIGRAINE WITH AURA WITH STATUS MIGRAINOSUS: ICD-10-CM

## 2024-04-10 DIAGNOSIS — E87.6 HYPOKALEMIA: ICD-10-CM

## 2024-04-11 ENCOUNTER — TELEPHONE (OUTPATIENT)
Dept: FAMILY MEDICINE CLINIC | Age: 54
End: 2024-04-11

## 2024-04-11 DIAGNOSIS — G43.109 MIGRAINE WITH AURA AND WITHOUT STATUS MIGRAINOSUS, NOT INTRACTABLE: ICD-10-CM

## 2024-04-11 RX ORDER — POTASSIUM CHLORIDE 20 MEQ/1
TABLET, EXTENDED RELEASE ORAL
Qty: 30 TABLET | Refills: 5 | Status: SHIPPED | OUTPATIENT
Start: 2024-04-11

## 2024-04-11 RX ORDER — TOPIRAMATE 100 MG/1
100 TABLET, FILM COATED ORAL 2 TIMES DAILY
Qty: 180 TABLET | Refills: 3 | Status: SHIPPED | OUTPATIENT
Start: 2024-04-11

## 2024-04-11 RX ORDER — CLONAZEPAM 1 MG/1
1 TABLET ORAL 2 TIMES DAILY PRN
Qty: 60 TABLET | Refills: 0 | Status: SHIPPED | OUTPATIENT
Start: 2024-04-11 | End: 2024-05-11

## 2024-04-11 RX ORDER — VARENICLINE TARTRATE 1 MG/1
TABLET, FILM COATED ORAL
Qty: 56 TABLET | Refills: 2 | Status: SHIPPED | OUTPATIENT
Start: 2024-04-11

## 2024-04-11 RX ORDER — PROMETHAZINE HYDROCHLORIDE 25 MG/1
TABLET ORAL
Qty: 30 TABLET | Refills: 2 | Status: SHIPPED | OUTPATIENT
Start: 2024-04-11

## 2024-04-11 RX ORDER — AMITRIPTYLINE HYDROCHLORIDE 75 MG/1
TABLET ORAL
Qty: 30 TABLET | Refills: 5 | Status: SHIPPED | OUTPATIENT
Start: 2024-04-11

## 2024-04-11 NOTE — TELEPHONE ENCOUNTER
Last Office Visit  -  4/8/24  Next Office Visit  -  n/a    Last Filled  -  3/15/24  Last UDS -  3/24/21  Contract -  11/15/16

## 2024-04-11 NOTE — TELEPHONE ENCOUNTER
Last Office Visit  -  4/8/24  Next Office Visit  -  n/a    Last Filled  -  8/17/23  Last UDS -    Contract -

## 2024-04-11 NOTE — TELEPHONE ENCOUNTER
[facility] req refill for patient on  Topiramate 100 mg tabs  Last visit 4/8/24  No future  [facility]

## 2024-04-25 ENCOUNTER — OFFICE VISIT (OUTPATIENT)
Dept: FAMILY MEDICINE CLINIC | Age: 54
End: 2024-04-25

## 2024-04-25 VITALS
HEIGHT: 62 IN | BODY MASS INDEX: 29.08 KG/M2 | OXYGEN SATURATION: 93 % | WEIGHT: 158 LBS | SYSTOLIC BLOOD PRESSURE: 94 MMHG | HEART RATE: 97 BPM | DIASTOLIC BLOOD PRESSURE: 70 MMHG

## 2024-04-25 DIAGNOSIS — R05.1 ACUTE COUGH: ICD-10-CM

## 2024-04-25 DIAGNOSIS — J30.2 SEASONAL ALLERGIC RHINITIS, UNSPECIFIED TRIGGER: ICD-10-CM

## 2024-04-25 DIAGNOSIS — J44.1 COPD EXACERBATION (HCC): Primary | ICD-10-CM

## 2024-04-25 RX ORDER — FLUTICASONE FUROATE, UMECLIDINIUM BROMIDE AND VILANTEROL TRIFENATATE 200; 62.5; 25 UG/1; UG/1; UG/1
1 POWDER RESPIRATORY (INHALATION) DAILY
Qty: 1 EACH | Refills: 5 | Status: SHIPPED | OUTPATIENT
Start: 2024-04-25

## 2024-04-25 RX ORDER — DEXTROMETHORPHAN HYDROBROMIDE AND PROMETHAZINE HYDROCHLORIDE 15; 6.25 MG/5ML; MG/5ML
5 SYRUP ORAL 4 TIMES DAILY PRN
Qty: 140 ML | Refills: 1 | Status: SHIPPED | OUTPATIENT
Start: 2024-04-25

## 2024-04-25 RX ORDER — METHYLPREDNISOLONE ACETATE 80 MG/ML
80 INJECTION, SUSPENSION INTRA-ARTICULAR; INTRALESIONAL; INTRAMUSCULAR; SOFT TISSUE ONCE
Status: COMPLETED | OUTPATIENT
Start: 2024-04-25 | End: 2024-04-25

## 2024-04-25 RX ADMIN — METHYLPREDNISOLONE ACETATE 80 MG: 80 INJECTION, SUSPENSION INTRA-ARTICULAR; INTRALESIONAL; INTRAMUSCULAR; SOFT TISSUE at 16:58

## 2024-04-25 NOTE — PROGRESS NOTES
lungs daily 1 each 5    promethazine-dextromethorphan (PROMETHAZINE-DM) 6.25-15 MG/5ML syrup Take 5 mLs by mouth 4 times daily as needed for Cough 140 mL 1    clonazePAM (KLONOPIN) 1 MG tablet Take 1 tablet by mouth 2 times daily as needed for Anxiety for up to 30 days. Max Daily Amount: 2 mg 60 tablet 0    promethazine (PHENERGAN) 25 MG tablet TAKE 1 TABLET BY MOUTH EVERY 6 HOURS AS NEEDED FOR NAUSEA 30 tablet 2    varenicline (CHANTIX) 1 MG tablet TAKE 1 TABLET BY MOUTH TWICE DAILY TAKE AFTER EATING WITH A FULL GLASS OF WATER 56 tablet 2    amitriptyline (ELAVIL) 75 MG tablet TAKE 1 TABLET (75 MG) BY MOUTH NIGHTLY 30 tablet 5    potassium chloride (KLOR-CON M) 20 MEQ extended release tablet TAKE 1 TABLET (20 MEQ) BY MOUTH ONCE DAILY WITH FOOD AND (MEDICATION SHOULD BE TAKEN WITH WATER THROUGHOUT THE DAY) 30 tablet 5    topiramate (TOPAMAX) 100 MG tablet Take 1 tablet by mouth 2 times daily 180 tablet 3    rizatriptan (MAXALT) 10 MG tablet 1 po at onset of migraine. May repeat once in 2 hours if needed. Max 2/24 hr. 6 tablet 12    tiZANidine (ZANAFLEX) 2 MG tablet TAKE 1 TABLET (2 MG) BY MOUTH 3 TIMES DAILY AS NEEDED (BACK PAIN) 30 tablet 5    butalbital-acetaminophen-caffeine (FIORICET, ESGIC) -40 MG per tablet TAKE 1 TABLET BY MOUTH 3 (THREE) TIMES DAILY AS NEEDED FOR MIGRAINE 30 tablet 0    omeprazole (PRILOSEC) 20 MG delayed release capsule TAKE 1 CAPSULE BY MOUTH ONCE DAILY 30 capsule 5    VENTOLIN  (90 Base) MCG/ACT inhaler INHALE 2 PUFFS INTO THE LUNGS 4 TIMES DAILY AS NEEDED. 18 g 5    NURTEC 75 MG TBDP PLACE 1 TABLET UNDER THE TONGUE DAILY AS NEEDED (MIGRAINE) MAX 1/24 HOUR TO REPLACE MAXALT 8 tablet 5    FLUoxetine (PROZAC) 20 MG capsule TAKE 3 CAPSULES (60 MG) BY MOUTH ONCE DAILY 90 capsule 5    furosemide (LASIX) 40 MG tablet TAKE 1 TABLET BY MOUTH ONCE DAILY TO TWICE DAILY AS NEEDED SWELLING MAY BE ADVISEABLE TO EAT A BANANA DAILY WHILE TAKING THIS MEDICATION 60 tablet 5    famotidine

## 2024-04-29 ENCOUNTER — HOSPITAL ENCOUNTER (OUTPATIENT)
Dept: GENERAL RADIOLOGY | Age: 54
Discharge: HOME | End: 2024-04-29
Payer: MEDICARE

## 2024-04-29 ENCOUNTER — HOSPITAL ENCOUNTER (OUTPATIENT)
Age: 54
Discharge: HOME | End: 2024-04-29
Payer: MEDICARE

## 2024-04-29 DIAGNOSIS — J44.1 COPD EXACERBATION (HCC): ICD-10-CM

## 2024-04-29 DIAGNOSIS — R05.1 ACUTE COUGH: ICD-10-CM

## 2024-04-29 PROCEDURE — 71046 X-RAY EXAM CHEST 2 VIEWS: CPT

## 2024-05-08 DIAGNOSIS — F41.9 ANXIETY: ICD-10-CM

## 2024-05-08 NOTE — TELEPHONE ENCOUNTER
Last Office Visit  -  4/25/24  Next Office Visit  -     Last Filled  -  4/11/24   Last UDS -  3/24/21  Contract -  11/15/16

## 2024-05-09 RX ORDER — CLONAZEPAM 1 MG/1
1 TABLET ORAL 2 TIMES DAILY PRN
Qty: 60 TABLET | Refills: 0 | Status: SHIPPED | OUTPATIENT
Start: 2024-05-10 | End: 2024-06-09

## 2024-05-13 RX ORDER — BUTALBITAL, ACETAMINOPHEN AND CAFFEINE 50; 325; 40 MG/1; MG/1; MG/1
TABLET ORAL
Qty: 30 TABLET | Refills: 0 | Status: SHIPPED | OUTPATIENT
Start: 2024-05-13

## 2024-05-13 NOTE — TELEPHONE ENCOUNTER
Last Office Visit  -  4/25/24  Next Office Visit  -  n/a    Last Filled  -  2/19/24  Last UDS -    Contract -

## 2024-06-13 ENCOUNTER — TELEPHONE (OUTPATIENT)
Dept: FAMILY MEDICINE CLINIC | Age: 54
End: 2024-06-13

## 2024-06-13 DIAGNOSIS — F41.9 ANXIETY: ICD-10-CM

## 2024-06-13 RX ORDER — CLONAZEPAM 1 MG/1
1 TABLET ORAL 2 TIMES DAILY PRN
Qty: 60 TABLET | Refills: 0 | Status: SHIPPED | OUTPATIENT
Start: 2024-06-13 | End: 2024-07-09

## 2024-06-13 NOTE — TELEPHONE ENCOUNTER
Last Office Visit  -  4/25/2024  Next Office Visit  -      Last Filled  -  5/10/2024  Last UDS -    Contract -

## 2024-06-13 NOTE — TELEPHONE ENCOUNTER
[facility] req refill for patient on  Clonazepam 1 mg tabs  Last visit 4/25/24  No future  [facility]

## 2024-06-17 ENCOUNTER — HOSPITAL ENCOUNTER (INPATIENT)
Age: 54
LOS: 4 days | Discharge: HOME | End: 2024-06-21
Attending: STUDENT IN AN ORGANIZED HEALTH CARE EDUCATION/TRAINING PROGRAM | Admitting: INTERNAL MEDICINE
Payer: MEDICARE

## 2024-06-17 ENCOUNTER — APPOINTMENT (OUTPATIENT)
Dept: GENERAL RADIOLOGY | Age: 54
End: 2024-06-17
Payer: MEDICARE

## 2024-06-17 DIAGNOSIS — J44.1 COPD WITH ACUTE EXACERBATION (HCC): Primary | ICD-10-CM

## 2024-06-17 LAB
ALBUMIN SERPL-MCNC: 3.8 G/DL (ref 3.4–5)
ALBUMIN/GLOB SERPL: 1.5 {RATIO} (ref 1.1–2.2)
ALP SERPL-CCNC: 96 U/L (ref 40–129)
ALT SERPL-CCNC: 14 U/L (ref 10–40)
ANION GAP SERPL CALCULATED.3IONS-SCNC: 8 MMOL/L (ref 3–16)
AST SERPL-CCNC: 16 U/L (ref 15–37)
BASOPHILS # BLD: 0 K/UL (ref 0–0.2)
BASOPHILS NFR BLD: 0.5 %
BILIRUB SERPL-MCNC: <0.2 MG/DL (ref 0–1)
BUN SERPL-MCNC: 13 MG/DL (ref 7–20)
CALCIUM SERPL-MCNC: 8.7 MG/DL (ref 8.3–10.6)
CHLORIDE SERPL-SCNC: 107 MMOL/L (ref 99–110)
CO2 SERPL-SCNC: 29 MMOL/L (ref 21–32)
CREAT SERPL-MCNC: 0.8 MG/DL (ref 0.6–1.1)
DEPRECATED RDW RBC AUTO: 12.4 % (ref 12.4–15.4)
EKG ATRIAL RATE: 96 BPM
EKG DIAGNOSIS: NORMAL
EKG P AXIS: 66 DEGREES
EKG P-R INTERVAL: 132 MS
EKG Q-T INTERVAL: 378 MS
EKG QRS DURATION: 80 MS
EKG QTC CALCULATION (BAZETT): 477 MS
EKG R AXIS: 52 DEGREES
EKG T AXIS: 60 DEGREES
EKG VENTRICULAR RATE: 96 BPM
EOSINOPHIL # BLD: 0.1 K/UL (ref 0–0.6)
EOSINOPHIL NFR BLD: 1.8 %
GFR SERPLBLD CREATININE-BSD FMLA CKD-EPI: 89 ML/MIN/{1.73_M2}
GLUCOSE SERPL-MCNC: 88 MG/DL (ref 70–99)
HCT VFR BLD AUTO: 38 % (ref 36–48)
HGB BLD-MCNC: 13.2 G/DL (ref 12–16)
LACTATE BLDV-SCNC: 1.3 MMOL/L (ref 0.4–1.9)
LACTATE BLDV-SCNC: 1.9 MMOL/L (ref 0.4–1.9)
LACTATE BLDV-SCNC: 2.1 MMOL/L (ref 0.4–1.9)
LACTATE BLDV-SCNC: 2.5 MMOL/L (ref 0.4–1.9)
LYMPHOCYTES # BLD: 2.5 K/UL (ref 1–5.1)
LYMPHOCYTES NFR BLD: 42.9 %
MCH RBC QN AUTO: 33.9 PG (ref 26–34)
MCHC RBC AUTO-ENTMCNC: 34.7 G/DL (ref 31–36)
MCV RBC AUTO: 97.7 FL (ref 80–100)
MONOCYTES # BLD: 0.4 K/UL (ref 0–1.3)
MONOCYTES NFR BLD: 7.2 %
NEUTROPHILS # BLD: 2.8 K/UL (ref 1.7–7.7)
NEUTROPHILS NFR BLD: 47.6 %
NT-PROBNP SERPL-MCNC: 105 PG/ML (ref 0–124)
PLATELET # BLD AUTO: 227 K/UL (ref 135–450)
PMV BLD AUTO: 7.4 FL (ref 5–10.5)
POTASSIUM SERPL-SCNC: 4.3 MMOL/L (ref 3.5–5.1)
PROCALCITONIN SERPL IA-MCNC: 0.02 NG/ML (ref 0–0.15)
PROT SERPL-MCNC: 6.3 G/DL (ref 6.4–8.2)
RBC # BLD AUTO: 3.89 M/UL (ref 4–5.2)
SODIUM SERPL-SCNC: 144 MMOL/L (ref 136–145)
TROPONIN, HIGH SENSITIVITY: <6 NG/L (ref 0–14)
WBC # BLD AUTO: 5.8 K/UL (ref 4–11)

## 2024-06-17 PROCEDURE — 1200000000 HC SEMI PRIVATE

## 2024-06-17 PROCEDURE — 99223 1ST HOSP IP/OBS HIGH 75: CPT

## 2024-06-17 PROCEDURE — 6360000002 HC RX W HCPCS

## 2024-06-17 PROCEDURE — 87040 BLOOD CULTURE FOR BACTERIA: CPT

## 2024-06-17 PROCEDURE — 80053 COMPREHEN METABOLIC PANEL: CPT

## 2024-06-17 PROCEDURE — 71045 X-RAY EXAM CHEST 1 VIEW: CPT

## 2024-06-17 PROCEDURE — 99285 EMERGENCY DEPT VISIT HI MDM: CPT

## 2024-06-17 PROCEDURE — 85025 COMPLETE CBC W/AUTO DIFF WBC: CPT

## 2024-06-17 PROCEDURE — 87070 CULTURE OTHR SPECIMN AEROBIC: CPT

## 2024-06-17 PROCEDURE — 83605 ASSAY OF LACTIC ACID: CPT

## 2024-06-17 PROCEDURE — 2580000003 HC RX 258: Performed by: PHYSICIAN ASSISTANT

## 2024-06-17 PROCEDURE — 93005 ELECTROCARDIOGRAM TRACING: CPT | Performed by: STUDENT IN AN ORGANIZED HEALTH CARE EDUCATION/TRAINING PROGRAM

## 2024-06-17 PROCEDURE — 6370000000 HC RX 637 (ALT 250 FOR IP): Performed by: INTERNAL MEDICINE

## 2024-06-17 PROCEDURE — 94640 AIRWAY INHALATION TREATMENT: CPT

## 2024-06-17 PROCEDURE — 84145 PROCALCITONIN (PCT): CPT

## 2024-06-17 PROCEDURE — 87205 SMEAR GRAM STAIN: CPT

## 2024-06-17 PROCEDURE — 83880 ASSAY OF NATRIURETIC PEPTIDE: CPT

## 2024-06-17 PROCEDURE — 94761 N-INVAS EAR/PLS OXIMETRY MLT: CPT

## 2024-06-17 PROCEDURE — 2700000000 HC OXYGEN THERAPY PER DAY

## 2024-06-17 PROCEDURE — 84484 ASSAY OF TROPONIN QUANT: CPT

## 2024-06-17 PROCEDURE — 36415 COLL VENOUS BLD VENIPUNCTURE: CPT

## 2024-06-17 PROCEDURE — 6360000002 HC RX W HCPCS: Performed by: PHYSICIAN ASSISTANT

## 2024-06-17 PROCEDURE — 93010 ELECTROCARDIOGRAM REPORT: CPT | Performed by: INTERNAL MEDICINE

## 2024-06-17 PROCEDURE — 6370000000 HC RX 637 (ALT 250 FOR IP)

## 2024-06-17 PROCEDURE — 2580000003 HC RX 258

## 2024-06-17 RX ORDER — ALBUTEROL SULFATE 2.5 MG/3ML
5 SOLUTION RESPIRATORY (INHALATION) ONCE
Status: COMPLETED | OUTPATIENT
Start: 2024-06-17 | End: 2024-06-17

## 2024-06-17 RX ORDER — ONDANSETRON 2 MG/ML
INJECTION INTRAMUSCULAR; INTRAVENOUS
Status: COMPLETED
Start: 2024-06-17 | End: 2024-06-17

## 2024-06-17 RX ORDER — PANTOPRAZOLE SODIUM 40 MG/1
40 TABLET, DELAYED RELEASE ORAL
Status: DISCONTINUED | OUTPATIENT
Start: 2024-06-18 | End: 2024-06-21 | Stop reason: HOSPADM

## 2024-06-17 RX ORDER — ENOXAPARIN SODIUM 100 MG/ML
40 INJECTION SUBCUTANEOUS DAILY
Status: DISCONTINUED | OUTPATIENT
Start: 2024-06-17 | End: 2024-06-21 | Stop reason: HOSPADM

## 2024-06-17 RX ORDER — ACETAMINOPHEN 325 MG/1
650 TABLET ORAL EVERY 6 HOURS PRN
Status: DISCONTINUED | OUTPATIENT
Start: 2024-06-17 | End: 2024-06-21 | Stop reason: HOSPADM

## 2024-06-17 RX ORDER — PREDNISONE 20 MG/1
40 TABLET ORAL DAILY
Status: DISCONTINUED | OUTPATIENT
Start: 2024-06-19 | End: 2024-06-21 | Stop reason: HOSPADM

## 2024-06-17 RX ORDER — IPRATROPIUM BROMIDE AND ALBUTEROL SULFATE 2.5; .5 MG/3ML; MG/3ML
1 SOLUTION RESPIRATORY (INHALATION) EVERY 4 HOURS PRN
Status: DISCONTINUED | OUTPATIENT
Start: 2024-06-17 | End: 2024-06-21 | Stop reason: HOSPADM

## 2024-06-17 RX ORDER — AZITHROMYCIN 250 MG/1
500 TABLET, FILM COATED ORAL DAILY
Status: DISCONTINUED | OUTPATIENT
Start: 2024-06-17 | End: 2024-06-17

## 2024-06-17 RX ORDER — ONDANSETRON 4 MG/1
4 TABLET, ORALLY DISINTEGRATING ORAL EVERY 8 HOURS PRN
Status: DISCONTINUED | OUTPATIENT
Start: 2024-06-17 | End: 2024-06-21 | Stop reason: HOSPADM

## 2024-06-17 RX ORDER — FAMOTIDINE 20 MG/1
20 TABLET, FILM COATED ORAL NIGHTLY
Status: DISCONTINUED | OUTPATIENT
Start: 2024-06-17 | End: 2024-06-21 | Stop reason: HOSPADM

## 2024-06-17 RX ORDER — TOPIRAMATE 100 MG/1
100 TABLET, FILM COATED ORAL 2 TIMES DAILY
Status: DISCONTINUED | OUTPATIENT
Start: 2024-06-17 | End: 2024-06-21 | Stop reason: HOSPADM

## 2024-06-17 RX ORDER — IPRATROPIUM BROMIDE AND ALBUTEROL SULFATE 2.5; .5 MG/3ML; MG/3ML
1 SOLUTION RESPIRATORY (INHALATION)
Status: DISCONTINUED | OUTPATIENT
Start: 2024-06-17 | End: 2024-06-17

## 2024-06-17 RX ORDER — ONDANSETRON 2 MG/ML
4 INJECTION INTRAMUSCULAR; INTRAVENOUS EVERY 6 HOURS PRN
Status: DISCONTINUED | OUTPATIENT
Start: 2024-06-17 | End: 2024-06-21 | Stop reason: HOSPADM

## 2024-06-17 RX ORDER — POLYETHYLENE GLYCOL 3350 17 G/17G
17 POWDER, FOR SOLUTION ORAL DAILY PRN
Status: DISCONTINUED | OUTPATIENT
Start: 2024-06-17 | End: 2024-06-21 | Stop reason: HOSPADM

## 2024-06-17 RX ORDER — ACETAMINOPHEN 650 MG/1
650 SUPPOSITORY RECTAL EVERY 6 HOURS PRN
Status: DISCONTINUED | OUTPATIENT
Start: 2024-06-17 | End: 2024-06-21 | Stop reason: HOSPADM

## 2024-06-17 RX ORDER — GABAPENTIN 300 MG/1
600 CAPSULE ORAL DAILY
Status: DISCONTINUED | OUTPATIENT
Start: 2024-06-17 | End: 2024-06-18

## 2024-06-17 RX ORDER — ALBUTEROL SULFATE 90 UG/1
2 AEROSOL, METERED RESPIRATORY (INHALATION) EVERY 6 HOURS PRN
COMMUNITY

## 2024-06-17 RX ORDER — SODIUM CHLORIDE 0.9 % (FLUSH) 0.9 %
5-40 SYRINGE (ML) INJECTION EVERY 12 HOURS SCHEDULED
Status: DISCONTINUED | OUTPATIENT
Start: 2024-06-17 | End: 2024-06-21 | Stop reason: HOSPADM

## 2024-06-17 RX ORDER — CLONAZEPAM 1 MG/1
1 TABLET ORAL 2 TIMES DAILY PRN
Status: DISCONTINUED | OUTPATIENT
Start: 2024-06-17 | End: 2024-06-21 | Stop reason: HOSPADM

## 2024-06-17 RX ORDER — ONDANSETRON 2 MG/ML
4 INJECTION INTRAMUSCULAR; INTRAVENOUS ONCE
Status: COMPLETED | OUTPATIENT
Start: 2024-06-17 | End: 2024-06-17

## 2024-06-17 RX ORDER — IPRATROPIUM BROMIDE AND ALBUTEROL SULFATE 2.5; .5 MG/3ML; MG/3ML
1 SOLUTION RESPIRATORY (INHALATION)
Status: DISCONTINUED | OUTPATIENT
Start: 2024-06-18 | End: 2024-06-21 | Stop reason: HOSPADM

## 2024-06-17 RX ORDER — SODIUM CHLORIDE 9 MG/ML
INJECTION, SOLUTION INTRAVENOUS PRN
Status: DISCONTINUED | OUTPATIENT
Start: 2024-06-17 | End: 2024-06-21 | Stop reason: HOSPADM

## 2024-06-17 RX ORDER — AZITHROMYCIN 250 MG/1
500 TABLET, FILM COATED ORAL DAILY
Status: DISCONTINUED | OUTPATIENT
Start: 2024-06-18 | End: 2024-06-18

## 2024-06-17 RX ORDER — BUDESONIDE AND FORMOTEROL FUMARATE DIHYDRATE 160; 4.5 UG/1; UG/1
2 AEROSOL RESPIRATORY (INHALATION)
Status: DISCONTINUED | OUTPATIENT
Start: 2024-06-17 | End: 2024-06-21 | Stop reason: HOSPADM

## 2024-06-17 RX ORDER — TIZANIDINE 4 MG/1
2 TABLET ORAL EVERY 8 HOURS PRN
Status: DISCONTINUED | OUTPATIENT
Start: 2024-06-17 | End: 2024-06-21 | Stop reason: HOSPADM

## 2024-06-17 RX ORDER — SODIUM CHLORIDE 0.9 % (FLUSH) 0.9 %
5-40 SYRINGE (ML) INJECTION PRN
Status: DISCONTINUED | OUTPATIENT
Start: 2024-06-17 | End: 2024-06-21 | Stop reason: HOSPADM

## 2024-06-17 RX ADMIN — AMITRIPTYLINE HYDROCHLORIDE 75 MG: 50 TABLET, FILM COATED ORAL at 20:10

## 2024-06-17 RX ADMIN — Medication 2 PUFF: at 20:24

## 2024-06-17 RX ADMIN — GABAPENTIN 600 MG: 300 CAPSULE ORAL at 20:08

## 2024-06-17 RX ADMIN — IPRATROPIUM BROMIDE AND ALBUTEROL SULFATE 1 DOSE: 2.5; .5 SOLUTION RESPIRATORY (INHALATION) at 20:24

## 2024-06-17 RX ADMIN — WATER 40 MG: 1 INJECTION INTRAMUSCULAR; INTRAVENOUS; SUBCUTANEOUS at 20:10

## 2024-06-17 RX ADMIN — SODIUM CHLORIDE, PRESERVATIVE FREE 5 ML: 5 INJECTION INTRAVENOUS at 20:12

## 2024-06-17 RX ADMIN — ENOXAPARIN SODIUM 40 MG: 100 INJECTION SUBCUTANEOUS at 20:11

## 2024-06-17 RX ADMIN — ONDANSETRON 4 MG: 2 INJECTION INTRAMUSCULAR; INTRAVENOUS at 17:04

## 2024-06-17 RX ADMIN — TOPIRAMATE 100 MG: 100 TABLET, FILM COATED ORAL at 20:28

## 2024-06-17 RX ADMIN — FAMOTIDINE 20 MG: 20 TABLET, FILM COATED ORAL at 20:10

## 2024-06-17 RX ADMIN — AZITHROMYCIN MONOHYDRATE 500 MG: 500 INJECTION, POWDER, LYOPHILIZED, FOR SOLUTION INTRAVENOUS at 16:57

## 2024-06-17 RX ADMIN — ALBUTEROL SULFATE 5 MG: 2.5 SOLUTION RESPIRATORY (INHALATION) at 15:22

## 2024-06-17 RX ADMIN — IPRATROPIUM BROMIDE AND ALBUTEROL SULFATE 1 DOSE: 2.5; .5 SOLUTION RESPIRATORY (INHALATION) at 23:09

## 2024-06-17 ASSESSMENT — ENCOUNTER SYMPTOMS
STRIDOR: 0
COUGH: 1
DIARRHEA: 0
APNEA: 0
SHORTNESS OF BREATH: 1
COLOR CHANGE: 0
CHOKING: 0
NAUSEA: 0
ABDOMINAL PAIN: 0
PHOTOPHOBIA: 0
WHEEZING: 1
CONSTIPATION: 0
VOMITING: 0
CHEST TIGHTNESS: 1
BACK PAIN: 0

## 2024-06-17 ASSESSMENT — PAIN SCALES - GENERAL: PAINLEVEL_OUTOF10: 7

## 2024-06-17 ASSESSMENT — PAIN - FUNCTIONAL ASSESSMENT: PAIN_FUNCTIONAL_ASSESSMENT: 0-10

## 2024-06-17 NOTE — ED PROVIDER NOTES
I have reviewed the below EKG. I was not otherwise involved in this patient's care.      EKG  The Ekg interpreted by myself  normal sinus rhythm with a rate of 96  Axis is   Normal  QTc is  normal  Intervals and Durations are unremarkable.      No specific ST-T wave changes appreciated.  No evidence of acute ischemia.         Lanny Mancilla MD  06/17/24 1999

## 2024-06-17 NOTE — ED PROVIDER NOTES
[facility] ED  EMERGENCY DEPARTMENT ENCOUNTER        Pt Name: Vanessa Garcia  MRN: [id number]  Birthdate 1970  Date of evaluation: 6/17/2024  Provider: [facility]  PCP: Mounika Hopper MD  Note Started: 4:45 PM EDT 6/17/24       I have seen and evaluated this patient with my supervising physician Power Roy MD.      CHIEF COMPLAINT       Chief Complaint   Patient presents with    Shortness of Breath     Pt has been sob for 10 days.  Got bad today and caled ems.  Pt received 2 duonebs and 60 mg solumedrol ivp       HISTORY OF PRESENT ILLNESS: 1 or more Elements     History From: Patient  Limitations to history : None    Vanessa Garcia is a 53 y.o. female with past medical Struve COPD, GERD, ox dependence at 3 to 4 L nasal cannula oxygen chronically who presents ED with complaint of shortness of breath.  She reports the past 10 days she has been sick.  Reports has had sore throat, nonproductive cough, rhinorrhea and congestion.  She reports increasing shortness of breath.  Has been trying to treat with her home oxygen and breathing treatments at home.  She reports she has not gotten any relief.  Feels like she is worsening.  She called EMS to come to the ED for further evaluation and treatment.  She denies any sick contacts or recent travel.  Denies any chest pain or pleuritic pain.  Denies any hemoptysis.  Denies fever or chills.  Denies abdominal pain, nausea/vomiting, rashes/lesions, headache or lightheadedness/dizziness.  Did receive DuoNeb and route x 2 and IV Solu-Medrol.     Nursing Notes were all reviewed and agreed with or any disagreements were addressed in the HPI.    REVIEW OF SYSTEMS :      Review of Systems   Constitutional:  Negative for activity change, appetite change, chills, diaphoresis, fatigue and fever.   Eyes:  Negative for photophobia and visual disturbance.   Respiratory:  Positive for cough, chest tightness, shortness of breath and

## 2024-06-17 NOTE — H&P
Hospital Medicine History & Physical      PCP: Mounika Hopper MD    Date of Admission: 6/17/2024    Date of Service: Pt seen/examined on 06/17/24    Chief Complaint:    Chief Complaint   Patient presents with    Shortness of Breath     Pt has been sob for 10 days.  Got bad today and caled ems.  Pt received 2 duonebs and 60 mg solumedrol ivp         History Of Present Illness:      The patient is a 53 y.o. female with pmhx as below who presents to [facility] with shortness of breath ongoing for the last 10 days. Pt states she has been SOB for a couple of weeks now. She states that she wears 3-4L O2 NC chronically. She states she has also had a sore throat and a productive cough with brown sputum. She states that she has been doing breathing tx at home without any alleviation of her sx. She denies any CP, fever, abd pain, n/v, dysuria, or dizziness.     Past Medical History:        Diagnosis Date    Anxiety     Asthma     Bipolar 1 disorder ([facility])     Pt is willing to see psych after 7/15 for confirmation of dx    Chronic bronchitis ([facility])     Chronic GERD 06/11/2020    COPD (chronic obstructive pulmonary disease) ([facility])     Dr Monet    Depression     Emphysema of lung ([facility])     Macromastia     Migraine     Migraines     Mixed hyperlipidemia 06/25/2020    MRSA infection within last 3 months 2012    axillary    Narcolepsy 2004    Dr Russo    Obesity     Pneumonia     Restless leg syndrome     Restless legs syndrome     Sleep apnea     did not tolerate cpap, uses O2 qhs    Tobacco abuse     Tobacco use disorder 01/14/2011    Urinary incontinence        Past Surgical History:        Procedure Laterality Date    ABSCESS DRAINAGE  2012    L axilla MRSA, hosp for 7 days    ADENOIDECTOMY      BLADDER SUSPENSION  05/2010    Mesh removed 1/9/15 in [place], [place]    BREAST BIOPSY      BREAST REDUCTION SURGERY Bilateral 07/29/2022    BILATERAL BREAST REDUCTION performed by Ember Thomason MD at [facility] OR    BREAST SURGERY

## 2024-06-17 NOTE — ED PROVIDER NOTES
I independently performed a history and physical on Vanessa Garcia.     I have discussed the case with the FAB/resident at 1630 and approve / take responsibility for the initial management plan and anticipated disposition as documented below.     In summary the patient presents with apparent COPD exacerbation.  On my evaluation the patient is afebrile and hemodynamically stable and little acute distress.  She is slowly improving her clinical condition after treatment in the emerged department however remains with some increased work of breathing.  Breath sounds are quite diminished poor tidal volumes.  Abdomen soft nontender nondistended extremities are warm and well-perfused.  Given her persistent symptoms we will admit for further management of COPD exacerbation.      I interpreted the following studies:  na    I personally discussed the patient's management with the following:  na         For further details of Vanessa Garcia's emergency department encounter, please see the FAB/resident's documentation. Please note the signature time recorded here indicates the limit of my supervision of this case and should the patient require further management prior to disposition I have signed the case out to my colleague Dr. john Roy, Power WAN MD  06/18/24 0017

## 2024-06-18 ENCOUNTER — APPOINTMENT (OUTPATIENT)
Dept: CT IMAGING | Age: 54
End: 2024-06-18
Payer: MEDICARE

## 2024-06-18 LAB
ANION GAP SERPL CALCULATED.3IONS-SCNC: 10 MMOL/L (ref 3–16)
BASOPHILS # BLD: 0 K/UL (ref 0–0.2)
BASOPHILS NFR BLD: 0.1 %
BUN SERPL-MCNC: 17 MG/DL (ref 7–20)
CALCIUM SERPL-MCNC: 9.3 MG/DL (ref 8.3–10.6)
CHLORIDE SERPL-SCNC: 104 MMOL/L (ref 99–110)
CO2 SERPL-SCNC: 24 MMOL/L (ref 21–32)
CREAT SERPL-MCNC: 0.6 MG/DL (ref 0.6–1.1)
D-DIMER QUANTITATIVE: 0.28 UG/ML FEU (ref 0–0.6)
DEPRECATED RDW RBC AUTO: 12.6 % (ref 12.4–15.4)
EOSINOPHIL # BLD: 0 K/UL (ref 0–0.6)
EOSINOPHIL NFR BLD: 0 %
GFR SERPLBLD CREATININE-BSD FMLA CKD-EPI: >90 ML/MIN/{1.73_M2}
GLUCOSE SERPL-MCNC: 131 MG/DL (ref 70–99)
HCT VFR BLD AUTO: 39.2 % (ref 36–48)
HGB BLD-MCNC: 13.4 G/DL (ref 12–16)
LYMPHOCYTES # BLD: 0.8 K/UL (ref 1–5.1)
LYMPHOCYTES NFR BLD: 9.5 %
MCH RBC QN AUTO: 33.9 PG (ref 26–34)
MCHC RBC AUTO-ENTMCNC: 34.1 G/DL (ref 31–36)
MCV RBC AUTO: 99.2 FL (ref 80–100)
MONOCYTES # BLD: 0.4 K/UL (ref 0–1.3)
MONOCYTES NFR BLD: 5.2 %
NEUTROPHILS # BLD: 7.4 K/UL (ref 1.7–7.7)
NEUTROPHILS NFR BLD: 85.2 %
PLATELET # BLD AUTO: 265 K/UL (ref 135–450)
PMV BLD AUTO: 7.5 FL (ref 5–10.5)
POTASSIUM SERPL-SCNC: 4.2 MMOL/L (ref 3.5–5.1)
RBC # BLD AUTO: 3.95 M/UL (ref 4–5.2)
SODIUM SERPL-SCNC: 138 MMOL/L (ref 136–145)
WBC # BLD AUTO: 8.6 K/UL (ref 4–11)

## 2024-06-18 PROCEDURE — 6370000000 HC RX 637 (ALT 250 FOR IP): Performed by: NURSE PRACTITIONER

## 2024-06-18 PROCEDURE — 2700000000 HC OXYGEN THERAPY PER DAY

## 2024-06-18 PROCEDURE — 99223 1ST HOSP IP/OBS HIGH 75: CPT | Performed by: INTERNAL MEDICINE

## 2024-06-18 PROCEDURE — 71250 CT THORAX DX C-: CPT

## 2024-06-18 PROCEDURE — 6360000002 HC RX W HCPCS

## 2024-06-18 PROCEDURE — 85379 FIBRIN DEGRADATION QUANT: CPT

## 2024-06-18 PROCEDURE — 6370000000 HC RX 637 (ALT 250 FOR IP)

## 2024-06-18 PROCEDURE — 6360000002 HC RX W HCPCS: Performed by: INTERNAL MEDICINE

## 2024-06-18 PROCEDURE — 6370000000 HC RX 637 (ALT 250 FOR IP): Performed by: INTERNAL MEDICINE

## 2024-06-18 PROCEDURE — 94010 BREATHING CAPACITY TEST: CPT

## 2024-06-18 PROCEDURE — 85025 COMPLETE CBC W/AUTO DIFF WBC: CPT

## 2024-06-18 PROCEDURE — 1200000000 HC SEMI PRIVATE

## 2024-06-18 PROCEDURE — 80048 BASIC METABOLIC PNL TOTAL CA: CPT

## 2024-06-18 PROCEDURE — 99232 SBSQ HOSP IP/OBS MODERATE 35: CPT | Performed by: INTERNAL MEDICINE

## 2024-06-18 PROCEDURE — 94761 N-INVAS EAR/PLS OXIMETRY MLT: CPT

## 2024-06-18 PROCEDURE — 2580000003 HC RX 258

## 2024-06-18 PROCEDURE — 36415 COLL VENOUS BLD VENIPUNCTURE: CPT

## 2024-06-18 PROCEDURE — 94640 AIRWAY INHALATION TREATMENT: CPT

## 2024-06-18 RX ORDER — BUTALBITAL, ACETAMINOPHEN AND CAFFEINE 50; 325; 40 MG/1; MG/1; MG/1
1 TABLET ORAL 3 TIMES DAILY PRN
Status: DISCONTINUED | OUTPATIENT
Start: 2024-06-18 | End: 2024-06-21 | Stop reason: HOSPADM

## 2024-06-18 RX ORDER — KETOROLAC TROMETHAMINE 15 MG/ML
15 INJECTION, SOLUTION INTRAMUSCULAR; INTRAVENOUS ONCE
Status: COMPLETED | OUTPATIENT
Start: 2024-06-18 | End: 2024-06-18

## 2024-06-18 RX ORDER — GABAPENTIN 300 MG/1
600 CAPSULE ORAL NIGHTLY
Status: DISCONTINUED | OUTPATIENT
Start: 2024-06-18 | End: 2024-06-21 | Stop reason: HOSPADM

## 2024-06-18 RX ORDER — LEVOFLOXACIN 500 MG/1
500 TABLET, FILM COATED ORAL DAILY
Status: DISCONTINUED | OUTPATIENT
Start: 2024-06-18 | End: 2024-06-21 | Stop reason: HOSPADM

## 2024-06-18 RX ADMIN — PANTOPRAZOLE SODIUM 40 MG: 40 TABLET, DELAYED RELEASE ORAL at 05:14

## 2024-06-18 RX ADMIN — CLONAZEPAM 1 MG: 1 TABLET ORAL at 22:33

## 2024-06-18 RX ADMIN — AZITHROMYCIN 500 MG: 250 TABLET, FILM COATED ORAL at 09:20

## 2024-06-18 RX ADMIN — ENOXAPARIN SODIUM 40 MG: 100 INJECTION SUBCUTANEOUS at 09:20

## 2024-06-18 RX ADMIN — BUTALBITAL, ACETAMINOPHEN AND CAFFEINE 1 TABLET: 325; 50; 40 TABLET ORAL at 12:02

## 2024-06-18 RX ADMIN — CLONAZEPAM 1 MG: 1 TABLET ORAL at 12:07

## 2024-06-18 RX ADMIN — Medication 2 PUFF: at 19:14

## 2024-06-18 RX ADMIN — SODIUM CHLORIDE, PRESERVATIVE FREE 10 ML: 5 INJECTION INTRAVENOUS at 09:21

## 2024-06-18 RX ADMIN — TIOTROPIUM BROMIDE INHALATION SPRAY 2 PUFF: 3.12 SPRAY, METERED RESPIRATORY (INHALATION) at 11:46

## 2024-06-18 RX ADMIN — TOPIRAMATE 100 MG: 100 TABLET, FILM COATED ORAL at 22:13

## 2024-06-18 RX ADMIN — ONDANSETRON 4 MG: 2 INJECTION INTRAMUSCULAR; INTRAVENOUS at 12:07

## 2024-06-18 RX ADMIN — Medication 2 PUFF: at 11:46

## 2024-06-18 RX ADMIN — ONDANSETRON 4 MG: 2 INJECTION INTRAMUSCULAR; INTRAVENOUS at 19:26

## 2024-06-18 RX ADMIN — WATER 40 MG: 1 INJECTION INTRAMUSCULAR; INTRAVENOUS; SUBCUTANEOUS at 22:15

## 2024-06-18 RX ADMIN — AMITRIPTYLINE HYDROCHLORIDE 75 MG: 50 TABLET, FILM COATED ORAL at 22:13

## 2024-06-18 RX ADMIN — BUTALBITAL, ACETAMINOPHEN AND CAFFEINE 1 TABLET: 325; 50; 40 TABLET ORAL at 17:21

## 2024-06-18 RX ADMIN — WATER 40 MG: 1 INJECTION INTRAMUSCULAR; INTRAVENOUS; SUBCUTANEOUS at 09:20

## 2024-06-18 RX ADMIN — KETOROLAC TROMETHAMINE 15 MG: 15 INJECTION, SOLUTION INTRAMUSCULAR; INTRAVENOUS at 17:32

## 2024-06-18 RX ADMIN — PHENOL 1 SPRAY: 1.5 LIQUID ORAL at 05:31

## 2024-06-18 RX ADMIN — SODIUM CHLORIDE, PRESERVATIVE FREE 10 ML: 5 INJECTION INTRAVENOUS at 22:14

## 2024-06-18 RX ADMIN — FAMOTIDINE 20 MG: 20 TABLET, FILM COATED ORAL at 22:13

## 2024-06-18 RX ADMIN — IPRATROPIUM BROMIDE AND ALBUTEROL SULFATE 1 DOSE: 2.5; .5 SOLUTION RESPIRATORY (INHALATION) at 11:45

## 2024-06-18 RX ADMIN — GABAPENTIN 600 MG: 300 CAPSULE ORAL at 22:13

## 2024-06-18 RX ADMIN — LEVOFLOXACIN 500 MG: 500 TABLET, FILM COATED ORAL at 11:32

## 2024-06-18 RX ADMIN — IPRATROPIUM BROMIDE AND ALBUTEROL SULFATE 1 DOSE: 2.5; .5 SOLUTION RESPIRATORY (INHALATION) at 19:13

## 2024-06-18 RX ADMIN — TOPIRAMATE 100 MG: 100 TABLET, FILM COATED ORAL at 09:20

## 2024-06-18 ASSESSMENT — PAIN DESCRIPTION - DIRECTION: RADIATING_TOWARDS: DENIES

## 2024-06-18 ASSESSMENT — COPD QUESTIONNAIRES
QUESTION4_WALKINCLINE: 3
CAT_TOTALSCORE: 29
QUESTION6_LEAVINGHOUSE: 3
QUESTION5_HOMEACTIVITIES: 5
QUESTION1_COUGHFREQUENCY: 4
QUESTION8_ENERGYLEVEL: 5
QUESTION2_CHESTPHLEGM: 3
QUESTION3_CHESTTIGHTNESS: 3
QUESTION7_SLEEPQUALITY: 3

## 2024-06-18 ASSESSMENT — PAIN DESCRIPTION - ORIENTATION: ORIENTATION: POSTERIOR

## 2024-06-18 ASSESSMENT — PAIN SCALES - GENERAL
PAINLEVEL_OUTOF10: 7
PAINLEVEL_OUTOF10: 7
PAINLEVEL_OUTOF10: 6
PAINLEVEL_OUTOF10: 6
PAINLEVEL_OUTOF10: 9
PAINLEVEL_OUTOF10: 6

## 2024-06-18 ASSESSMENT — PAIN DESCRIPTION - DESCRIPTORS
DESCRIPTORS: ACHING
DESCRIPTORS: DISCOMFORT;SHARP

## 2024-06-18 ASSESSMENT — PAIN DESCRIPTION - LOCATION
LOCATION: HEAD
LOCATION: THROAT

## 2024-06-18 ASSESSMENT — PAIN DESCRIPTION - ONSET: ONSET: ON-GOING

## 2024-06-18 ASSESSMENT — PAIN - FUNCTIONAL ASSESSMENT: PAIN_FUNCTIONAL_ASSESSMENT: ACTIVITIES ARE NOT PREVENTED

## 2024-06-18 ASSESSMENT — PAIN DESCRIPTION - PAIN TYPE: TYPE: ACUTE PAIN

## 2024-06-18 ASSESSMENT — PAIN DESCRIPTION - FREQUENCY: FREQUENCY: CONTINUOUS

## 2024-06-18 NOTE — FLOWSHEET NOTE
06/18/24 0915   Vital Signs   Temp 98.2 °F (36.8 °C)   Temp Source Oral   Pulse 92   Heart Rate Source Monitor   Respirations 18   BP Location Left upper arm   BP Method Automatic   Pain Assessment   Pain Assessment None - Denies Pain   Oxygen Therapy   SpO2 100 %   O2 Device Nasal cannula   O2 Flow Rate (L/min) 4 L/min     AM assessment completed. No complaints of pain or discomfort voiced.  No signs of symptoms of distress noted. Patient tolerated medications well. Respirations easy and even. Bed in lowest position, bed alarm in place and functioning properly, SR up x 2 and bed in low position. Call light within reach.     Bedside Mobility Assessment Tool (BMAT):     Assessment Level 1- Sit and Shake    1. From a semi-reclined position, ask patient to sit up and rotate to a seated position at the side of the bed. Can use the bedrail.    2. Ask patient to reach out and grab your hand and shake making sure patient reaches across his/her midline.   Pass- Patient is able to come to a seated position, maintain core strength. Maintains seated balance while reaching across midline. Move on to Assessment Level 2.     Assessment Level 2- Stretch and Point   1. With patient in seated position at the side of the bed, have patient place both feet on the floor (or stool) with knees no higher than hips.    2. Ask patient to stretch one leg and straighten the knee, then bend the ankle/flex and point the toes. If appropriate, repeat with the other leg.   Pass- Patient is able to demonstrate appropriate quad strength on intended weight bearing limb(s). Move onto Assessment Level 3.     Assessment Level 3- Stand   1. Ask patient to elevate off the bed or chair (seated to standing) using an assistive device (cane, bedrail).    2. Patient should be able to raise buttocks off be and hold for a count of five. May repeat once.   Pass- Patient maintains standing stability for at least 5 seconds, proceed to assessment level

## 2024-06-18 NOTE — CARE COORDINATION
Case Management Assessment  Initial Evaluation    Date/Time of Evaluation: 6/18/2024 3:35 PM  Assessment Completed by: Mary Jane Hancock RN    If patient is discharged prior to next notation, then this note serves as note for discharge by case management.    Patient Name: Vanessa Garcia                   YOB: 1970  Diagnosis: COPD exacerbation (HCC) [J44.1]  COPD with acute exacerbation (HCC) [J44.1]                   Date / Time: 6/17/2024  2:21 PM    Patient Admission Status: Inpatient   Readmission Risk (Low < 19, Mod (19-27), High > 27): Readmission Risk Score: 9.8    Current PCP: Mounika Hopper MD  PCP verified by CM? Yes ([facility])    Chart Reviewed: {YES / NO:19727}      History Provided by: Patient  Patient Orientation: Alert and Oriented    Patient Cognition: Alert    Hospitalization in the last 30 days (Readmission):  {YES / NO:19726}    If yes, Readmission Assessment in CM Navigator will be completed.    Advance Directives:      Code Status: Full Code   Patient's Primary Decision Maker is: Legal Next of Kin      Discharge Planning:    Patient lives with: Children Type of Home: House  Primary Care Giver: Self  Patient Support Systems include: Children, Friends/Neighbors   Current Financial resources: Medicare  Current community resources: None  Current services prior to admission: Durable Medical Equipment            Current DME: Oxygen Therapy (Comment), Home Aerosol ([facility] for home O2,)            Type of Home Care services:  None    ADLS  Prior functional level: Independent in ADLs/IADLs  Current functional level: Independent in ADLs/IADLs    PT AM-PAC:   /24  OT AM-PAC:   /24    Family can provide assistance at DC: No  Would you like Case Management to discuss the discharge plan with any other family members/significant others, and if so, who? No  Plans to Return to Present Housing: Yes  Other Identified Issues/Barriers to RETURNING to current housing: ***  Potential  Assistance needed at discharge: N/A            Potential DME:    Patient expects to discharge to: House  Plan for transportation at discharge:      Financial    Payor: MEDICARE / Plan: MEDICARE PART A AND B / Product Type: *No Product type* /     Does insurance require precert for SNF: {Yes/No:32390}    Potential assistance Purchasing Medications: No  Meds-to-Beds request: Yes      [facility] [place] [facility] [address] - P [phone] - F [phone]  [address]  [address]  [facility] [address]  Phone: [phone] Fax: [phone]      Notes:    Factors facilitating achievement of predicted outcomes: {Patient Strengths:892422880}    Barriers to discharge: {Barriers:125681511}    Additional Case Management Notes: ***    The Plan for Transition of Care is related to the following treatment goals of COPD exacerbation (HCC) [J44.1]  COPD with acute exacerbation (HCC) [J44.1]    IF APPLICABLE: The Patient and/or patient representative Vanessa and her family were provided with a choice of provider and agrees with the discharge plan. Freedom of choice list with basic dialogue that supports the patient's individualized plan of care/goals and shares the quality data associated with the providers was provided to:     Patient Representative Name:       The Patient and/or Patient Representative Agree with the Discharge Plan?      Mary Jane Hancock RN  Case Management Department  Ph: *** Fax: ***

## 2024-06-18 NOTE — PROGRESS NOTES
[facility]   COPD PROGRAM      NAME:  Vanessa Garcia  AGE: 53 y.o.   GENDER: female  : 1970  TODAY'S DATE:  2024    Subjective:     VISIT TYPE: Education    ADMIT DATE: 2024    PAST MEDICAL HISTORY:      Diagnosis Date    Anxiety     Arthritis     Asthma     Bipolar 1 disorder ([facility])     Pt is willing to see psych after 7/15 for confirmation of dx    Chronic bronchitis ([facility])     Chronic GERD 2020    COPD (chronic obstructive pulmonary disease) ([facility])     Dr Monet    Depression     Emphysema of lung ([facility])     Macromastia     Migraine     Migraines     Mixed hyperlipidemia 2020    MRSA infection within last 3 months     axillary    Narcolepsy     Dr Russo    Obesity     Pneumonia     Restless leg syndrome     Restless legs syndrome     Sleep apnea     did not tolerate cpap, uses O2 qhs    Tobacco abuse     Tobacco use disorder 2011    Urinary incontinence      HOME MEDICATIONS:  Prior to Admission medications    Medication Sig Start Date End Date Taking? Authorizing Provider   albuterol sulfate HFA (VENTOLIN HFA) 108 (90 Base) MCG/ACT inhaler Inhale 2 puffs into the lungs every 6 hours as needed for Wheezing   Yes Provider, MD Bessie   clonazePAM (KLONOPIN) 1 MG tablet Take 1 tablet by mouth 2 times daily as needed for Anxiety for up to 30 days. Max Daily Amount: 2 mg 24  Mounika Hopper MD   butalbital-acetaminophen-caffeine (FIORICET, ESGIC) -40 MG per tablet TAKE 1 TABLET BY MOUTH 3 (THREE) TIMES DAILY AS NEEDED FOR MIGRAINE 24   Mounika Hopper MD   fluticasone-umeclidin-vilant (TRELEGY ELLIPTA) 200-62.5-25 MCG/ACT AEPB inhaler Inhale 1 puff into the lungs daily 24   Mounika Hopper MD   promethazine (PHENERGAN) 25 MG tablet TAKE 1 TABLET BY MOUTH EVERY 6 HOURS AS NEEDED FOR NAUSEA 24   Mounika Hopper MD   varenicline (CHANTIX) 1 MG tablet TAKE 1 TABLET BY MOUTH TWICE DAILY TAKE AFTER EATING WITH A    [x]  Provided recommendations on breathing techniques / positions  [x]  Provided recommendations on managing stress and anxiety   [x]  Provided information on Pulmonary Rehabilitation   [x]  Provided information on Lung Cancer Screening  [x]  Provided recommendations for smoking cessation programs      EDUCATIONAL MATERIALS PROVIDED:    [x]  [place]: Managing your COPD Booklet  [x]  Follow-up Appointment Reminder  [x]  ALA: Getting the Most Out of Medication Delivery Devices  [x]  ALA: My COPD Action Plan  [x]  Better [facility]reathers [facility]: [facility] Cardiopulmonary Rehabilitation   [x]  Smoking Cessation Classes  [x]  Magnet: Signs of COPD    PATIENT/CAREGIVER TEACHING:   Level of patient/caregiver understanding able to:   [x] Verbalize understanding   [] Demonstrate understanding       [] Teach back        [] Needs reinforcement     []  Other:      TEACHING TIME:  20 minutes       Recommendations:     [x]  Encourage to call COPD Nurse Resource Line [phone] with any questions or concerns.  [x]  Encourage follow-up appointment compliance. Next Appointment: 6/25 at 1300  [x]  Continue to educate on signs and symptoms of COPD exacerbation.   [x]  Review COPD Action Plan Zones: Green, Yellow, Red.   [x]  Encourage Pulmonary Rehab consult/referral for patient with gabby FORD.   [x]  Encourage Smoking Cessation   []  Other:            Electronically signed by Kiara Rosado, MSN, RN  on 6/18/2024 at 10:22 AM

## 2024-06-18 NOTE — PLAN OF CARE
Problem: Discharge Planning  Goal: Discharge to home or other facility with appropriate resources  Outcome: Progressing  Flowsheets (Taken 6/17/2024 2026)  Discharge to home or other facility with appropriate resources: Identify barriers to discharge with patient and caregiver     Problem: Safety - Adult  Goal: Free from fall injury  Outcome: Progressing     Problem: Respiratory - Adult  Goal: Achieves optimal ventilation and oxygenation  Reactivated

## 2024-06-18 NOTE — CONSULTS
famotidine  20 mg Oral Nightly    amitriptyline  75 mg Oral Nightly    budesonide-formoterol  2 puff Inhalation BID RT    tiotropium  2 puff Inhalation Daily RT    azithromycin  500 mg Oral Daily    ipratropium 0.5 mg-albuterol 2.5 mg  1 Dose Inhalation 4x Daily RT       PRN Meds:  sodium chloride flush, sodium chloride, ondansetron **OR** ondansetron, polyethylene glycol, acetaminophen **OR** acetaminophen, phenol, tiZANidine, clonazePAM, ipratropium 0.5 mg-albuterol 2.5 mg    Results:  CBC:   Recent Labs     06/17/24  1504 06/18/24  0539   WBC 5.8 8.6   HGB 13.2 13.4   HCT 38.0 39.2   MCV 97.7 99.2    265       BMP:   Recent Labs     06/17/24  1504 06/18/24  0539    138   K 4.3 4.2    104   CO2 29 24   BUN 13 17   CREATININE 0.8 0.6       LIVER PROFILE:   Recent Labs     06/17/24  1504   AST 16   ALT 14   BILITOT <0.2   ALKPHOS 96           Cultures:  sent    Films:  CXR reviewed by me and it showed omar cute process  IMPRESSION:  No acute airspace disease identified.  Sequela of old granulomatous process.     Assessment:       -Acute COPD exacerbation   -Moderate hypoxia   -possible bronchitis       Plan:        *- O2 goal 92-95   *-IV steroids ,wean To PO when feel better ,and taper over 7 days   *-IV abx Rocephin and Azithromycin Day 1   *-check viral panel /pneumonia panel   *- Sputum culture ,sending sample if she has it   *_If in distress ,will use CPAP/BiPAP  *- procalcitonin  *-BD  ICS if wheezing   Incentive spirmetery and flutter valve   Avoid fluid over load   CT chest :will get CT for better evaluation   Check o2 at ambulation before discharge and if sat less than 88 ,call for O2 at home   May needed NIMV down the road ,will assess as OP as she has Sleep apea    *-  *-  *-   *_  *-   *-      Thank you very much for allowing me to participate in the care of this pleasant patient , should you have any questions ,please do not hesitate to contact me      Liu Elliott

## 2024-06-18 NOTE — PROGRESS NOTES
Progress Note    Admit Date:  6/17/2024    53 y.o. female with pmhx as below who presents to [facility] with shortness of breath.  Admitted for COPD exacerbation    Subjective:  Ms. Michelle Garcia still feels short of breath.   Wears 3-4 L O2 at baseline.  Currently on 4 L.  Used to f/w pulmonary.  Now PCP manages. Recently started on Trelegy.    Objective:   Patient Vitals for the past 4 hrs:   Temp Temp src Pulse Resp SpO2   06/18/24 0915 98.2 °F (36.8 °C) Oral 92 18 100 %          Intake/Output Summary (Last 24 hours) at 6/18/2024 1012  Last data filed at 6/18/2024 0914  Gross per 24 hour   Intake 320 ml   Output --   Net 320 ml       Physical Exam:  Gen: No distress. Alert.   Eyes: PERRL. No sclera icterus. No conjunctival injection.   ENT: No discharge. Pharynx clear.   Neck: No JVD.  Trachea midline.  Resp: No accessory muscle use. No crackles. + wheezes. No rhonchi. +decreased breath sounds bilat  CV: Regular rate. Regular rhythm. No murmur.  No rub. No edema.   GI: Non-tender. Non-distended.  Normal bowel sounds.  Skin: Warm and dry. No nodule on exposed extremities. No rash on exposed extremities.   M/S: No cyanosis. No joint deformity. No clubbing.   Neuro: Awake. Grossly nonfocal    Psych: Oriented x 3. No anxiety or agitation.        Data:  CBC:   Recent Labs     06/17/24  1504 06/18/24  0539   WBC 5.8 8.6   HGB 13.2 13.4   HCT 38.0 39.2   MCV 97.7 99.2    265     BMP:   Recent Labs     06/17/24  1504 06/18/24  0539    138   K 4.3 4.2    104   CO2 29 24   BUN 13 17   CREATININE 0.8 0.6     LIVER PROFILE:   Recent Labs     06/17/24  1504   AST 16   ALT 14   BILITOT <0.2   ALKPHOS 96     PT/INR: No results for input(s): \"PROTIME\", \"INR\" in the last 72 hours.    CULTURES  Sputum: pending  Blood: pending     RADIOLOGY  XR CHEST PORTABLE   Final Result   No acute abnormality.             I PARAMESWARAN LUIS, MD have reviewed the chart on Vanessa Garcia and personally

## 2024-06-18 NOTE — PLAN OF CARE
Patient provided a COPD Educational Folder that includes the following materials:     [x]  [place] Booklet: Managing your COPD  [x]  ALA: Getting the Most Out of Medication Delivery Devices  [x]  ALA: My COPD Action Plan  [x]  [facility]: [facility]   [x]  Smoking Cessation Classes  [x]  Outpatient Spiritual Care Services  [x]  Magnet: Signs of COPD    PATIENT/CAREGIVER TEACHING:   Level of patient/caregiver understanding able to:   [x] Verbalize understanding   [] Demonstrate understanding       [] Teach back        [] Needs reinforcement     []  Other:     Electronically signed by James Jackson RN on 6/18/2024 at 1:07 AM

## 2024-06-18 NOTE — PROGRESS NOTES
Patient admitted to room 211 from ED. Side rails up x2. Alert and orientedx4. On O2 inhalation via nasal cannula at 3.5lpm. Bed is locked and in lowest position. Call light placed in patient reach. Patient explained the routine of the hospital, including but not limited to lab work, vital signs, hourly rounding, etc. Care plans and education updated, CHG wipes done at time of admission.     /67   Pulse (!) 109   Temp 97.9 °F (36.6 °C) (Oral)   Resp 18   Ht 1.575 m (5' 2\")   Wt 71.6 kg (157 lb 12.8 oz)   LMP  (LMP Unknown) Comment: age 31 total  SpO2 95%   BMI 28.86 kg/m²

## 2024-06-18 NOTE — PROGRESS NOTES

## 2024-06-18 NOTE — PROGRESS NOTES
4 Eyes Skin Assessment     NAME:  Vanessa Garcia  YOB: 1970  MEDICAL RECORD NUMBER:  [id number]    The patient is being assessed for  Admission    I agree that at least one RN has performed a thorough Head to Toe Skin Assessment on the patient. ALL assessment sites listed below have been assessed.      Areas assessed by both nurses:    Head, Face, Ears, Shoulders, Back, Chest, Arms, Elbows, Hands, Sacrum. Buttock, Coccyx, Ischium, and Legs. Feet and Heels    Ecchymosis and bruises at right hip area.        Does the Patient have a Wound? No noted wound(s)       Higinio Prevention initiated by RN: No  Wound Care Orders initiated by RN: No    Pressure Injury (Stage 3,4, Unstageable, DTI, NWPT, and Complex wounds) if present, place Wound referral order by RN under : No    New Ostomies, if present place, Ostomy referral order under : No     Nurse 1 eSignature: Electronically signed by James Jackson RN on 6/17/24 at 11:12 PM EDT    **SHARE this note so that the co-signing nurse can place an eSignature**    Nurse 2 eSignature: Electronically signed by Sue Barrera RN on 6/18/24 at 2:52 AM EDT

## 2024-06-18 NOTE — PROGRESS NOTES
Handed over to Miriam ALMARAZ. Pt in bed with eyes closed.  No signs of distress noted.  Call light within reach.

## 2024-06-18 NOTE — PLAN OF CARE
Problem: Discharge Planning  Goal: Discharge to home or other facility with appropriate resources  6/18/2024 0958 by Miriam Restrepo RN  Outcome: Progressing  6/18/2024 0100 by James Jackson RN  Outcome: Progressing  Flowsheets (Taken 6/17/2024 2026)  Discharge to home or other facility with appropriate resources: Identify barriers to discharge with patient and caregiver     Problem: Safety - Adult  Goal: Free from fall injury  6/18/2024 0958 by Miriam Restrepo RN  Outcome: Progressing  6/18/2024 0100 by James Jackson RN  Outcome: Progressing  Flowsheets (Taken 6/17/2024 2219)  Free From Fall Injury: Instruct family/caregiver on patient safety     Problem: Respiratory - Adult  Goal: Achieves optimal ventilation and oxygenation  6/18/2024 0958 by Miriam Restrepo RN  Outcome: Progressing  6/18/2024 0100 by James Jackson RN  Reactivated  Flowsheets (Taken 6/17/2024 2026)  Achieves optimal ventilation and oxygenation: Assess for changes in respiratory status     Problem: Pain  Goal: Verbalizes/displays adequate comfort level or baseline comfort level  Outcome: Progressing

## 2024-06-19 LAB
ANION GAP SERPL CALCULATED.3IONS-SCNC: 8 MMOL/L (ref 3–16)
BASOPHILS # BLD: 0 K/UL (ref 0–0.2)
BASOPHILS NFR BLD: 0 %
BUN SERPL-MCNC: 18 MG/DL (ref 7–20)
CALCIUM SERPL-MCNC: 9.4 MG/DL (ref 8.3–10.6)
CHLORIDE SERPL-SCNC: 108 MMOL/L (ref 99–110)
CO2 SERPL-SCNC: 26 MMOL/L (ref 21–32)
CREAT SERPL-MCNC: 0.6 MG/DL (ref 0.6–1.1)
DEPRECATED RDW RBC AUTO: 12.8 % (ref 12.4–15.4)
EOSINOPHIL # BLD: 0 K/UL (ref 0–0.6)
EOSINOPHIL NFR BLD: 0 %
GFR SERPLBLD CREATININE-BSD FMLA CKD-EPI: >90 ML/MIN/{1.73_M2}
GLUCOSE SERPL-MCNC: 146 MG/DL (ref 70–99)
HCT VFR BLD AUTO: 37.4 % (ref 36–48)
HGB BLD-MCNC: 12.8 G/DL (ref 12–16)
LYMPHOCYTES # BLD: 1 K/UL (ref 1–5.1)
LYMPHOCYTES NFR BLD: 10.6 %
MCH RBC QN AUTO: 33.7 PG (ref 26–34)
MCHC RBC AUTO-ENTMCNC: 34.1 G/DL (ref 31–36)
MCV RBC AUTO: 98.9 FL (ref 80–100)
MONOCYTES # BLD: 0.2 K/UL (ref 0–1.3)
MONOCYTES NFR BLD: 2.6 %
NEUTROPHILS # BLD: 7.9 K/UL (ref 1.7–7.7)
NEUTROPHILS NFR BLD: 86.8 %
PLATELET # BLD AUTO: 223 K/UL (ref 135–450)
PMV BLD AUTO: 7.8 FL (ref 5–10.5)
POTASSIUM SERPL-SCNC: 4.2 MMOL/L (ref 3.5–5.1)
RBC # BLD AUTO: 3.79 M/UL (ref 4–5.2)
SODIUM SERPL-SCNC: 142 MMOL/L (ref 136–145)
WBC # BLD AUTO: 9.1 K/UL (ref 4–11)

## 2024-06-19 PROCEDURE — 99232 SBSQ HOSP IP/OBS MODERATE 35: CPT | Performed by: INTERNAL MEDICINE

## 2024-06-19 PROCEDURE — 6370000000 HC RX 637 (ALT 250 FOR IP)

## 2024-06-19 PROCEDURE — 6370000000 HC RX 637 (ALT 250 FOR IP): Performed by: INTERNAL MEDICINE

## 2024-06-19 PROCEDURE — 6370000000 HC RX 637 (ALT 250 FOR IP): Performed by: NURSE PRACTITIONER

## 2024-06-19 PROCEDURE — 6360000002 HC RX W HCPCS

## 2024-06-19 PROCEDURE — 94761 N-INVAS EAR/PLS OXIMETRY MLT: CPT

## 2024-06-19 PROCEDURE — 36415 COLL VENOUS BLD VENIPUNCTURE: CPT

## 2024-06-19 PROCEDURE — 2580000003 HC RX 258

## 2024-06-19 PROCEDURE — 1200000000 HC SEMI PRIVATE

## 2024-06-19 PROCEDURE — 94640 AIRWAY INHALATION TREATMENT: CPT

## 2024-06-19 PROCEDURE — 2700000000 HC OXYGEN THERAPY PER DAY

## 2024-06-19 PROCEDURE — 85025 COMPLETE CBC W/AUTO DIFF WBC: CPT

## 2024-06-19 PROCEDURE — 99233 SBSQ HOSP IP/OBS HIGH 50: CPT | Performed by: INTERNAL MEDICINE

## 2024-06-19 PROCEDURE — 80048 BASIC METABOLIC PNL TOTAL CA: CPT

## 2024-06-19 PROCEDURE — 6360000002 HC RX W HCPCS: Performed by: INTERNAL MEDICINE

## 2024-06-19 RX ORDER — KETOROLAC TROMETHAMINE 30 MG/ML
30 INJECTION, SOLUTION INTRAMUSCULAR; INTRAVENOUS ONCE
Status: COMPLETED | OUTPATIENT
Start: 2024-06-19 | End: 2024-06-19

## 2024-06-19 RX ADMIN — IPRATROPIUM BROMIDE AND ALBUTEROL SULFATE 1 DOSE: 2.5; .5 SOLUTION RESPIRATORY (INHALATION) at 11:59

## 2024-06-19 RX ADMIN — BUTALBITAL, ACETAMINOPHEN AND CAFFEINE 1 TABLET: 325; 50; 40 TABLET ORAL at 22:31

## 2024-06-19 RX ADMIN — PANTOPRAZOLE SODIUM 40 MG: 40 TABLET, DELAYED RELEASE ORAL at 05:40

## 2024-06-19 RX ADMIN — BUTALBITAL, ACETAMINOPHEN AND CAFFEINE 1 TABLET: 325; 50; 40 TABLET ORAL at 12:21

## 2024-06-19 RX ADMIN — SODIUM CHLORIDE, PRESERVATIVE FREE 10 ML: 5 INJECTION INTRAVENOUS at 21:15

## 2024-06-19 RX ADMIN — LEVOFLOXACIN 500 MG: 500 TABLET, FILM COATED ORAL at 07:51

## 2024-06-19 RX ADMIN — ONDANSETRON 4 MG: 2 INJECTION INTRAMUSCULAR; INTRAVENOUS at 12:21

## 2024-06-19 RX ADMIN — Medication 2 PUFF: at 09:08

## 2024-06-19 RX ADMIN — WATER 40 MG: 1 INJECTION INTRAMUSCULAR; INTRAVENOUS; SUBCUTANEOUS at 07:52

## 2024-06-19 RX ADMIN — ONDANSETRON 4 MG: 2 INJECTION INTRAMUSCULAR; INTRAVENOUS at 05:42

## 2024-06-19 RX ADMIN — GABAPENTIN 600 MG: 300 CAPSULE ORAL at 21:12

## 2024-06-19 RX ADMIN — SODIUM CHLORIDE, PRESERVATIVE FREE 10 ML: 5 INJECTION INTRAVENOUS at 07:53

## 2024-06-19 RX ADMIN — TIOTROPIUM BROMIDE INHALATION SPRAY 2 PUFF: 3.12 SPRAY, METERED RESPIRATORY (INHALATION) at 09:07

## 2024-06-19 RX ADMIN — ENOXAPARIN SODIUM 40 MG: 100 INJECTION SUBCUTANEOUS at 07:52

## 2024-06-19 RX ADMIN — CLONAZEPAM 1 MG: 1 TABLET ORAL at 21:12

## 2024-06-19 RX ADMIN — IPRATROPIUM BROMIDE AND ALBUTEROL SULFATE 1 DOSE: 2.5; .5 SOLUTION RESPIRATORY (INHALATION) at 22:32

## 2024-06-19 RX ADMIN — Medication 2 PUFF: at 18:48

## 2024-06-19 RX ADMIN — FAMOTIDINE 20 MG: 20 TABLET, FILM COATED ORAL at 21:12

## 2024-06-19 RX ADMIN — IPRATROPIUM BROMIDE AND ALBUTEROL SULFATE 1 DOSE: 2.5; .5 SOLUTION RESPIRATORY (INHALATION) at 18:48

## 2024-06-19 RX ADMIN — TOPIRAMATE 100 MG: 100 TABLET, FILM COATED ORAL at 21:12

## 2024-06-19 RX ADMIN — PREDNISONE 40 MG: 20 TABLET ORAL at 21:12

## 2024-06-19 RX ADMIN — KETOROLAC TROMETHAMINE 30 MG: 30 INJECTION, SOLUTION INTRAMUSCULAR at 14:23

## 2024-06-19 RX ADMIN — IPRATROPIUM BROMIDE AND ALBUTEROL SULFATE 1 DOSE: 2.5; .5 SOLUTION RESPIRATORY (INHALATION) at 16:03

## 2024-06-19 RX ADMIN — TOPIRAMATE 100 MG: 100 TABLET, FILM COATED ORAL at 07:51

## 2024-06-19 RX ADMIN — IPRATROPIUM BROMIDE AND ALBUTEROL SULFATE 1 DOSE: 2.5; .5 SOLUTION RESPIRATORY (INHALATION) at 09:05

## 2024-06-19 RX ADMIN — IPRATROPIUM BROMIDE AND ALBUTEROL SULFATE 1 DOSE: 2.5; .5 SOLUTION RESPIRATORY (INHALATION) at 04:51

## 2024-06-19 RX ADMIN — BUTALBITAL, ACETAMINOPHEN AND CAFFEINE 1 TABLET: 325; 50; 40 TABLET ORAL at 05:40

## 2024-06-19 RX ADMIN — AMITRIPTYLINE HYDROCHLORIDE 75 MG: 50 TABLET, FILM COATED ORAL at 21:11

## 2024-06-19 RX ADMIN — ONDANSETRON 4 MG: 2 INJECTION INTRAMUSCULAR; INTRAVENOUS at 22:34

## 2024-06-19 ASSESSMENT — PAIN DESCRIPTION - ONSET: ONSET: ON-GOING

## 2024-06-19 ASSESSMENT — PAIN SCALES - GENERAL
PAINLEVEL_OUTOF10: 7
PAINLEVEL_OUTOF10: 0
PAINLEVEL_OUTOF10: 0
PAINLEVEL_OUTOF10: 8
PAINLEVEL_OUTOF10: 7
PAINLEVEL_OUTOF10: 4
PAINLEVEL_OUTOF10: 7

## 2024-06-19 ASSESSMENT — PAIN - FUNCTIONAL ASSESSMENT
PAIN_FUNCTIONAL_ASSESSMENT: PREVENTS OR INTERFERES SOME ACTIVE ACTIVITIES AND ADLS
PAIN_FUNCTIONAL_ASSESSMENT: PREVENTS OR INTERFERES SOME ACTIVE ACTIVITIES AND ADLS

## 2024-06-19 ASSESSMENT — PAIN DESCRIPTION - FREQUENCY: FREQUENCY: INTERMITTENT

## 2024-06-19 ASSESSMENT — PAIN DESCRIPTION - DESCRIPTORS
DESCRIPTORS: ACHING
DESCRIPTORS: ACHING
DESCRIPTORS: OTHER (COMMENT)
DESCRIPTORS: STABBING;THROBBING

## 2024-06-19 ASSESSMENT — PAIN DESCRIPTION - ORIENTATION
ORIENTATION: RIGHT
ORIENTATION: RIGHT
ORIENTATION: RIGHT;ANTERIOR

## 2024-06-19 ASSESSMENT — PAIN DESCRIPTION - LOCATION
LOCATION: HEAD

## 2024-06-19 ASSESSMENT — PAIN DESCRIPTION - DIRECTION: RADIATING_TOWARDS: DENIES

## 2024-06-19 ASSESSMENT — PAIN DESCRIPTION - PAIN TYPE
TYPE: CHRONIC PAIN
TYPE: ACUTE PAIN
TYPE: CHRONIC PAIN

## 2024-06-19 NOTE — PLAN OF CARE
Problem: Discharge Planning  Goal: Discharge to home or other facility with appropriate resources  6/19/2024 1032 by Miriam Restrepo RN  Outcome: Progressing  6/19/2024 0211 by Cj Morgan RN  Outcome: Progressing  Flowsheets (Taken 6/18/2024 2211)  Discharge to home or other facility with appropriate resources: Identify barriers to discharge with patient and caregiver     Problem: Safety - Adult  Goal: Free from fall injury  6/19/2024 1032 by Mriiam Restrepo RN  Outcome: Progressing  6/19/2024 0211 by Cj Morgan RN  Outcome: Progressing     Problem: Respiratory - Adult  Goal: Achieves optimal ventilation and oxygenation  6/19/2024 1032 by Miriam Restrepo RN  Outcome: Progressing  6/19/2024 0211 by Cj Morgan RN  Outcome: Progressing  Flowsheets (Taken 6/18/2024 2211)  Achieves optimal ventilation and oxygenation: Assess for changes in respiratory status     Problem: Pain  Goal: Verbalizes/displays adequate comfort level or baseline comfort level  6/19/2024 1032 by Miriam Restrepo RN  Outcome: Progressing  6/19/2024 0211 by Cj Morgan RN  Outcome: Progressing

## 2024-06-19 NOTE — FLOWSHEET NOTE
06/19/24 0530   Pain Assessment   Pain Assessment 0-10   Pain Level 7   Patient's Stated Pain Goal 0 - No pain   Pain Location Head   Pain Orientation Right;Anterior   Pain Descriptors Other (Comment)  (MIGRAINE)   Functional Pain Assessment Prevents or interferes some active activities and ADLs   Pain Type Acute pain   Pain Radiating Towards DENIES   Pain Frequency Intermittent   Pain Onset On-going   Non-Pharmaceutical Pain Intervention(s) Declines     Patient complained of headache also was nauseated, PRN Fioricet and Zofran given as ordered. Refer to MAR.

## 2024-06-19 NOTE — PROGRESS NOTES
Transferred care to RUFINA Pineda. Face to face bedside report given. Pt awake in bed.  Denies needs. Call light within reach.

## 2024-06-19 NOTE — PROGRESS NOTES
/69   Pulse 77   Temp 98 °F (36.7 °C) (Oral)   Resp 17   Ht 1.575 m (5' 2\")   Wt 72.3 kg (159 lb 8 oz)   LMP  (LMP Unknown) Comment: age 31 total  SpO2 98%   BMI 29.17 kg/m²     Patient alert and oriented resting in bed,with O2 at 4pm via nasal cannula. Assessment completed. Respiration easy, even and unlabored. Patient tolerated night meds well. Call light and bedside table within reach. Bed at lowest position, locked, side rails x2, bed alarm on. Pt denies needs at this time.

## 2024-06-19 NOTE — FLOWSHEET NOTE
06/19/24 0745   Vital Signs   Temp 97.7 °F (36.5 °C)   Temp Source Oral   Pulse 75   Heart Rate Source Monitor   Respirations 20   /66   MAP (Calculated) 78   BP Location Right upper arm   BP Method Automatic   Patient Position High fowlers   Pain Assessment   Pain Assessment None - Denies Pain   Oxygen Therapy   SpO2 99 %   O2 Device Nasal cannula   O2 Flow Rate (L/min) 4 L/min     AM assessment completed. No complaints of pain or discomfort voiced.  No signs of symptoms of distress noted. Patient tolerated medications well. Respirations easy and even. Bed in lowest position, bed alarm in place and functioning properly, SR up x 2 and bed in low position. Call light within reach.     Bedside Mobility Assessment Tool (BMAT):     Assessment Level 1- Sit and Shake    1. From a semi-reclined position, ask patient to sit up and rotate to a seated position at the side of the bed. Can use the bedrail.    2. Ask patient to reach out and grab your hand and shake making sure patient reaches across his/her midline.   Pass- Patient is able to come to a seated position, maintain core strength. Maintains seated balance while reaching across midline. Move on to Assessment Level 2.     Assessment Level 2- Stretch and Point   1. With patient in seated position at the side of the bed, have patient place both feet on the floor (or stool) with knees no higher than hips.    2. Ask patient to stretch one leg and straighten the knee, then bend the ankle/flex and point the toes. If appropriate, repeat with the other leg.   Pass- Patient is able to demonstrate appropriate quad strength on intended weight bearing limb(s). Move onto Assessment Level 3.     Assessment Level 3- Stand   1. Ask patient to elevate off the bed or chair (seated to standing) using an assistive device (cane, bedrail).    2. Patient should be able to raise buttocks off be and hold for a count of five. May repeat once.   Pass- Patient maintains standing  stability for at least 5 seconds, proceed to assessment level 4.    Assessment Level 4- Walk   1. Ask patient to march in place at bedside.    2. Then ask patient to advance step and return each foot. Some medical conditions may render a patient from stepping backwards, use your best clinical judgement.   Pass- Patient demonstrates balance while shifting weight and ability to step, takes independent steps, does not use assistive device patient is MOBILITY LEVEL 4.      Mobility Level- 4

## 2024-06-19 NOTE — PROGRESS NOTES
RT Inhaler-Nebulizer Bronchodilator Protocol Note    There is a bronchodilator order in the chart from a provider indicating to follow the RT Bronchodilator Protocol and there is an “Initiate RT Inhaler-Nebulizer Bronchodilator Protocol” order as well (see protocol at bottom of note).    CXR Findings:  No results found.    The findings from the last RT Protocol Assessment were as follows:   History Pulmonary Disease: Chronic pulmonary disease  Respiratory Pattern: Dyspnea on exertion or RR 21-25 bpm  Breath Sounds: Slightly diminished and/or crackles  Cough: Strong, spontaneous, non-productive  Indication for Bronchodilator Therapy: Decreased or absent breath sounds  Bronchodilator Assessment Score: 6    Aerosolized bronchodilator medication orders have been revised according to the RT Inhaler-Nebulizer Bronchodilator Protocol below.    Respiratory Therapist to perform RT Therapy Protocol Assessment initially then follow the protocol.  Repeat RT Therapy Protocol Assessment PRN for score 0-3 or on second treatment, BID, and PRN for scores above 3.    No Indications - adjust the frequency to every 6 hours PRN wheezing or bronchospasm, if no treatments needed after 48 hours then discontinue using Per Protocol order mode.     If indication present, adjust the RT bronchodilator orders based on the Bronchodilator Assessment Score as indicated below.  Use Inhaler orders unless patient has one or more of the following: on home nebulizer, not able to hold breath for 10 seconds, is not alert and oriented, cannot activate and use MDI correctly, or respiratory rate 25 breaths per minute or more, then use the equivalent nebulizer order(s) with same Frequency and PRN reasons based on the score.  If a patient is on this medication at home then do not decrease Frequency below that used at home.    0-3 - enter or revise RT bronchodilator order(s) to equivalent RT Bronchodilator order with Frequency of every 4 hours PRN for wheezing  or increased work of breathing using Per Protocol order mode.        4-6 - enter or revise RT Bronchodilator order(s) to two equivalent RT bronchodilator orders with one order with BID Frequency and one order with Frequency of every 4 hours PRN wheezing or increased work of breathing using Per Protocol order mode.        7-10 - enter or revise RT Bronchodilator order(s) to two equivalent RT bronchodilator orders with one order with TID Frequency and one order with Frequency of every 4 hours PRN wheezing or increased work of breathing using Per Protocol order mode.       11-13 - enter or revise RT Bronchodilator order(s) to one equivalent RT bronchodilator order with QID Frequency and an Albuterol order with Frequency of every 4 hours PRN wheezing or increased work of breathing using Per Protocol order mode.      Greater than 13 - enter or revise RT Bronchodilator order(s) to one equivalent RT bronchodilator order with every 4 hours Frequency and an Albuterol order with Frequency of every 2 hours PRN wheezing or increased work of breathing using Per Protocol order mode.       Electronically signed by Ilia Adler RCP on 6/19/2024 at 6:52 PM

## 2024-06-19 NOTE — PROGRESS NOTES
RT Inhaler-Nebulizer Bronchodilator Protocol Note    There is a bronchodilator order in the chart from a provider indicating to follow the RT Bronchodilator Protocol and there is an “Initiate RT Inhaler-Nebulizer Bronchodilator Protocol” order as well (see protocol at bottom of note).    CXR Findings:  XR CHEST PORTABLE    Result Date: 6/17/2024  No acute abnormality.       The findings from the last RT Protocol Assessment were as follows:   History Pulmonary Disease: Chronic pulmonary disease  Respiratory Pattern: Dyspnea on exertion or RR 21-25 bpm  Breath Sounds: Slightly diminished and/or crackles  Cough: Strong, spontaneous, non-productive  Indication for Bronchodilator Therapy: Decreased or absent breath sounds  Bronchodilator Assessment Score: 6    Aerosolized bronchodilator medication orders have been revised according to the RT Inhaler-Nebulizer Bronchodilator Protocol below.    Respiratory Therapist to perform RT Therapy Protocol Assessment initially then follow the protocol.  Repeat RT Therapy Protocol Assessment PRN for score 0-3 or on second treatment, BID, and PRN for scores above 3.    No Indications - adjust the frequency to every 6 hours PRN wheezing or bronchospasm, if no treatments needed after 48 hours then discontinue using Per Protocol order mode.     If indication present, adjust the RT bronchodilator orders based on the Bronchodilator Assessment Score as indicated below.  Use Inhaler orders unless patient has one or more of the following: on home nebulizer, not able to hold breath for 10 seconds, is not alert and oriented, cannot activate and use MDI correctly, or respiratory rate 25 breaths per minute or more, then use the equivalent nebulizer order(s) with same Frequency and PRN reasons based on the score.  If a patient is on this medication at home then do not decrease Frequency below that used at home.    0-3 - enter or revise RT bronchodilator order(s) to equivalent RT Bronchodilator  order with Frequency of every 4 hours PRN for wheezing or increased work of breathing using Per Protocol order mode.        4-6 - enter or revise RT Bronchodilator order(s) to two equivalent RT bronchodilator orders with one order with BID Frequency and one order with Frequency of every 4 hours PRN wheezing or increased work of breathing using Per Protocol order mode.        7-10 - enter or revise RT Bronchodilator order(s) to two equivalent RT bronchodilator orders with one order with TID Frequency and one order with Frequency of every 4 hours PRN wheezing or increased work of breathing using Per Protocol order mode.       11-13 - enter or revise RT Bronchodilator order(s) to one equivalent RT bronchodilator order with QID Frequency and an Albuterol order with Frequency of every 4 hours PRN wheezing or increased work of breathing using Per Protocol order mode.      Greater than 13 - enter or revise RT Bronchodilator order(s) to one equivalent RT bronchodilator order with every 4 hours Frequency and an Albuterol order with Frequency of every 2 hours PRN wheezing or increased work of breathing using Per Protocol order mode.     Electronically signed by STEPHON MCLEAN RCP on 6/19/2024 at 9:11 AM

## 2024-06-19 NOTE — PLAN OF CARE
Problem: Discharge Planning  Goal: Discharge to home or other facility with appropriate resources  Outcome: Progressing  Flowsheets (Taken 6/18/2024 2211)  Discharge to home or other facility with appropriate resources: Identify barriers to discharge with patient and caregiver     Problem: Safety - Adult  Goal: Free from fall injury  Outcome: Progressing     Problem: Respiratory - Adult  Goal: Achieves optimal ventilation and oxygenation  Outcome: Progressing  Flowsheets (Taken 6/18/2024 2211)  Achieves optimal ventilation and oxygenation: Assess for changes in respiratory status     Problem: Pain  Goal: Verbalizes/displays adequate comfort level or baseline comfort level  Outcome: Progressing

## 2024-06-19 NOTE — PROGRESS NOTES
Progress Note    Admit Date:  6/17/2024    53 y.o. female with pmhx as below who presents to [facility] with shortness of breath.  Admitted for COPD exacerbation    Subjective:  Ms. Michelle Garcia is some better.   Wears 3-4 L O2 at baseline.  Currently on 4 L.  Used to f/w pulmonary.  Now PCP manages. Recently started on Trelegy.    Objective:   Patient Vitals for the past 4 hrs:   BP Temp Temp src Pulse Resp SpO2   06/19/24 1415 100/68 98.5 °F (36.9 °C) Oral 88 18 98 %   06/19/24 1200 -- -- -- -- -- 99 %            Intake/Output Summary (Last 24 hours) at 6/19/2024 1557  Last data filed at 6/19/2024 0908  Gross per 24 hour   Intake 442 ml   Output --   Net 442 ml         Physical Exam:  Gen: No distress. Alert.   Eyes: PERRL. No sclera icterus. No conjunctival injection.   ENT: No discharge. Pharynx clear.   Neck: No JVD.  Trachea midline.  Resp: No accessory muscle use. No crackles. + wheezes. No rhonchi. +decreased breath sounds bilat  CV: Regular rate. Regular rhythm. No murmur.  No rub. No edema.   GI: Non-tender. Non-distended.  Normal bowel sounds.  Skin: Warm and dry. No nodule on exposed extremities. No rash on exposed extremities.   M/S: No cyanosis. No joint deformity. No clubbing.   Neuro: Awake. Grossly nonfocal    Psych: Oriented x 3. No anxiety or agitation.        Data:  CBC:   Recent Labs     06/17/24  1504 06/18/24  0539 06/19/24  0535   WBC 5.8 8.6 9.1   HGB 13.2 13.4 12.8   HCT 38.0 39.2 37.4   MCV 97.7 99.2 98.9    265 223       BMP:   Recent Labs     06/17/24  1504 06/18/24  0539 06/19/24  0534    138 142   K 4.3 4.2 4.2    104 108   CO2 29 24 26   BUN 13 17 18   CREATININE 0.8 0.6 0.6       LIVER PROFILE:   Recent Labs     06/17/24  1504   AST 16   ALT 14   BILITOT <0.2   ALKPHOS 96       PT/INR: No results for input(s): \"PROTIME\", \"INR\" in the last 72 hours.    CULTURES  Sputum: pending  Blood: pending     RADIOLOGY  CT CHEST WO CONTRAST   Final Result   No acute  airspace disease identified.  Sequela of old granulomatous process.         XR CHEST PORTABLE   Final Result   No acute abnormality.                         Assessment/Plan:  #COPD AE  #Chronic hypoxic respiratory failure  #CATHY   - baseline 3-4 L NC, currently on 4 L.   - CXR without acute abnormality  - Zithromax D#3  - Duoneb  - Solumedrol for now, transition to prednisone when able  - check cultures   - Pulmonology consulted     #HLD  - not on statin      #Bipolar disorder  #Anixety  #Depression   - continue home regimen      #Hx of Migraines   - continue home regimen      #GERD  - on PPI and Pepcid      Note above makes patient higher risk for morbidity and mortality requiring testing and treatment.      DVT Prophylaxis: Lovenox   Diet: ADULT DIET; Regular  Code Status: Full Code        LILIAM SMITH MD 6/19/2024 3:57 PM

## 2024-06-20 LAB
ANION GAP SERPL CALCULATED.3IONS-SCNC: 8 MMOL/L (ref 3–16)
BACTERIA SPEC RESP CULT: NORMAL
BASOPHILS # BLD: 0 K/UL (ref 0–0.2)
BASOPHILS NFR BLD: 0.1 %
BUN SERPL-MCNC: 19 MG/DL (ref 7–20)
CALCIUM SERPL-MCNC: 8.8 MG/DL (ref 8.3–10.6)
CHLORIDE SERPL-SCNC: 108 MMOL/L (ref 99–110)
CO2 SERPL-SCNC: 25 MMOL/L (ref 21–32)
CREAT SERPL-MCNC: 0.6 MG/DL (ref 0.6–1.1)
DEPRECATED RDW RBC AUTO: 12.7 % (ref 12.4–15.4)
EOSINOPHIL # BLD: 0 K/UL (ref 0–0.6)
EOSINOPHIL NFR BLD: 0 %
GFR SERPLBLD CREATININE-BSD FMLA CKD-EPI: >90 ML/MIN/{1.73_M2}
GLUCOSE SERPL-MCNC: 119 MG/DL (ref 70–99)
GRAM STN SPEC: NORMAL
HCT VFR BLD AUTO: 35.1 % (ref 36–48)
HGB BLD-MCNC: 12 G/DL (ref 12–16)
LYMPHOCYTES # BLD: 0.7 K/UL (ref 1–5.1)
LYMPHOCYTES NFR BLD: 10.5 %
MCH RBC QN AUTO: 34.1 PG (ref 26–34)
MCHC RBC AUTO-ENTMCNC: 34 G/DL (ref 31–36)
MCV RBC AUTO: 100.2 FL (ref 80–100)
MONOCYTES # BLD: 0.3 K/UL (ref 0–1.3)
MONOCYTES NFR BLD: 4.8 %
NEUTROPHILS # BLD: 5.8 K/UL (ref 1.7–7.7)
NEUTROPHILS NFR BLD: 84.6 %
PLATELET # BLD AUTO: 217 K/UL (ref 135–450)
PMV BLD AUTO: 7.8 FL (ref 5–10.5)
POTASSIUM SERPL-SCNC: 4.3 MMOL/L (ref 3.5–5.1)
RBC # BLD AUTO: 3.51 M/UL (ref 4–5.2)
SODIUM SERPL-SCNC: 141 MMOL/L (ref 136–145)
WBC # BLD AUTO: 6.8 K/UL (ref 4–11)

## 2024-06-20 PROCEDURE — 6370000000 HC RX 637 (ALT 250 FOR IP): Performed by: NURSE PRACTITIONER

## 2024-06-20 PROCEDURE — 6370000000 HC RX 637 (ALT 250 FOR IP): Performed by: INTERNAL MEDICINE

## 2024-06-20 PROCEDURE — 99232 SBSQ HOSP IP/OBS MODERATE 35: CPT | Performed by: INTERNAL MEDICINE

## 2024-06-20 PROCEDURE — 94761 N-INVAS EAR/PLS OXIMETRY MLT: CPT

## 2024-06-20 PROCEDURE — 85025 COMPLETE CBC W/AUTO DIFF WBC: CPT

## 2024-06-20 PROCEDURE — 94640 AIRWAY INHALATION TREATMENT: CPT

## 2024-06-20 PROCEDURE — 80048 BASIC METABOLIC PNL TOTAL CA: CPT

## 2024-06-20 PROCEDURE — 6360000002 HC RX W HCPCS

## 2024-06-20 PROCEDURE — 6360000002 HC RX W HCPCS: Performed by: INTERNAL MEDICINE

## 2024-06-20 PROCEDURE — 2700000000 HC OXYGEN THERAPY PER DAY

## 2024-06-20 PROCEDURE — 2580000003 HC RX 258

## 2024-06-20 PROCEDURE — 36415 COLL VENOUS BLD VENIPUNCTURE: CPT

## 2024-06-20 PROCEDURE — 6370000000 HC RX 637 (ALT 250 FOR IP)

## 2024-06-20 PROCEDURE — 1200000000 HC SEMI PRIVATE

## 2024-06-20 PROCEDURE — 99233 SBSQ HOSP IP/OBS HIGH 50: CPT | Performed by: INTERNAL MEDICINE

## 2024-06-20 RX ORDER — KETOROLAC TROMETHAMINE 15 MG/ML
15 INJECTION, SOLUTION INTRAMUSCULAR; INTRAVENOUS ONCE
Status: COMPLETED | OUTPATIENT
Start: 2024-06-20 | End: 2024-06-20

## 2024-06-20 RX ADMIN — PANTOPRAZOLE SODIUM 40 MG: 40 TABLET, DELAYED RELEASE ORAL at 06:50

## 2024-06-20 RX ADMIN — IPRATROPIUM BROMIDE AND ALBUTEROL SULFATE 1 DOSE: 2.5; .5 SOLUTION RESPIRATORY (INHALATION) at 11:31

## 2024-06-20 RX ADMIN — ONDANSETRON 4 MG: 2 INJECTION INTRAMUSCULAR; INTRAVENOUS at 15:25

## 2024-06-20 RX ADMIN — FAMOTIDINE 20 MG: 20 TABLET, FILM COATED ORAL at 22:36

## 2024-06-20 RX ADMIN — BUTALBITAL, ACETAMINOPHEN AND CAFFEINE 1 TABLET: 325; 50; 40 TABLET ORAL at 23:25

## 2024-06-20 RX ADMIN — ENOXAPARIN SODIUM 40 MG: 100 INJECTION SUBCUTANEOUS at 10:17

## 2024-06-20 RX ADMIN — BUTALBITAL, ACETAMINOPHEN AND CAFFEINE 1 TABLET: 325; 50; 40 TABLET ORAL at 15:22

## 2024-06-20 RX ADMIN — TOPIRAMATE 100 MG: 100 TABLET, FILM COATED ORAL at 22:36

## 2024-06-20 RX ADMIN — SODIUM CHLORIDE, PRESERVATIVE FREE 10 ML: 5 INJECTION INTRAVENOUS at 10:16

## 2024-06-20 RX ADMIN — TOPIRAMATE 100 MG: 100 TABLET, FILM COATED ORAL at 10:14

## 2024-06-20 RX ADMIN — IPRATROPIUM BROMIDE AND ALBUTEROL SULFATE 1 DOSE: 2.5; .5 SOLUTION RESPIRATORY (INHALATION) at 07:47

## 2024-06-20 RX ADMIN — LEVOFLOXACIN 500 MG: 500 TABLET, FILM COATED ORAL at 10:15

## 2024-06-20 RX ADMIN — IPRATROPIUM BROMIDE AND ALBUTEROL SULFATE 1 DOSE: 2.5; .5 SOLUTION RESPIRATORY (INHALATION) at 19:19

## 2024-06-20 RX ADMIN — IPRATROPIUM BROMIDE AND ALBUTEROL SULFATE 1 DOSE: 2.5; .5 SOLUTION RESPIRATORY (INHALATION) at 15:13

## 2024-06-20 RX ADMIN — CLONAZEPAM 1 MG: 1 TABLET ORAL at 22:36

## 2024-06-20 RX ADMIN — Medication 2 PUFF: at 19:19

## 2024-06-20 RX ADMIN — GABAPENTIN 600 MG: 300 CAPSULE ORAL at 22:36

## 2024-06-20 RX ADMIN — ONDANSETRON 4 MG: 2 INJECTION INTRAMUSCULAR; INTRAVENOUS at 23:25

## 2024-06-20 RX ADMIN — PREDNISONE 40 MG: 20 TABLET ORAL at 10:14

## 2024-06-20 RX ADMIN — KETOROLAC TROMETHAMINE 15 MG: 15 INJECTION, SOLUTION INTRAMUSCULAR; INTRAVENOUS at 23:25

## 2024-06-20 RX ADMIN — TIOTROPIUM BROMIDE INHALATION SPRAY 2 PUFF: 3.12 SPRAY, METERED RESPIRATORY (INHALATION) at 07:48

## 2024-06-20 RX ADMIN — SODIUM CHLORIDE, PRESERVATIVE FREE 10 ML: 5 INJECTION INTRAVENOUS at 22:37

## 2024-06-20 RX ADMIN — Medication 2 PUFF: at 07:48

## 2024-06-20 RX ADMIN — AMITRIPTYLINE HYDROCHLORIDE 75 MG: 50 TABLET, FILM COATED ORAL at 22:36

## 2024-06-20 ASSESSMENT — PAIN DESCRIPTION - DESCRIPTORS
DESCRIPTORS: ACHING;DISCOMFORT
DESCRIPTORS: STABBING;THROBBING

## 2024-06-20 ASSESSMENT — PAIN - FUNCTIONAL ASSESSMENT
PAIN_FUNCTIONAL_ASSESSMENT: ACTIVITIES ARE NOT PREVENTED
PAIN_FUNCTIONAL_ASSESSMENT: ACTIVITIES ARE NOT PREVENTED

## 2024-06-20 ASSESSMENT — PAIN DESCRIPTION - FREQUENCY
FREQUENCY: CONTINUOUS
FREQUENCY: INTERMITTENT

## 2024-06-20 ASSESSMENT — PAIN DESCRIPTION - PAIN TYPE
TYPE: CHRONIC PAIN
TYPE: CHRONIC PAIN

## 2024-06-20 ASSESSMENT — PAIN DESCRIPTION - ONSET
ONSET: GRADUAL
ONSET: ON-GOING

## 2024-06-20 ASSESSMENT — PAIN DESCRIPTION - ORIENTATION: ORIENTATION: RIGHT

## 2024-06-20 ASSESSMENT — PAIN SCALES - GENERAL
PAINLEVEL_OUTOF10: 7
PAINLEVEL_OUTOF10: 5
PAINLEVEL_OUTOF10: 7

## 2024-06-20 ASSESSMENT — PAIN DESCRIPTION - LOCATION
LOCATION: HEAD
LOCATION: HEAD

## 2024-06-20 ASSESSMENT — PAIN SCALES - WONG BAKER: WONGBAKER_NUMERICALRESPONSE: NO HURT

## 2024-06-20 NOTE — PROGRESS NOTES
the patient's Past Medical History, Past Surgical History, Medications, and Allergies.     Physical exam:    /73   Pulse 85   Temp 97.6 °F (36.4 °C) (Oral)   Resp 16   Ht 1.575 m (5' 2\")   Wt 77.4 kg (170 lb 9.6 oz)   LMP  (LMP Unknown) Comment: age 31 total  SpO2 98%   BMI 31.20 kg/m²     Gen: No distress. Alert.   Eyes: PERRL. No sclera icterus. No conjunctival injection.   ENT: No discharge. Pharynx clear.   Neck: Trachea midline. Normal thyroid.   Resp: No accessory muscle use. No crackles. mild wheezes. No rhonchi. No dullness on percussion.  CV: Regular rate. Regular rhythm. No murmur or rub. No edema.            Assessment/Plan:    #COPD AE  #Chronic hypoxic respiratory failure  #CATHY   - baseline 3-4 L NC, stable on this    - CXR without acute abnormality  - CT chest with no mass or sequelae of old granulomatosis disease   - completed 2 days of Zithromax, changed to Levaquin D#3  - Duoneb  - solumedrol transitioned to prednisone   - check cultures -> sputum showing gram + cocci   - Pulmonology consulted  - ambulate and check SpO2    #HLD  - not on statin      #Bipolar disorder  #Anixety  #Depression   - continue home regimen      #Hx of Migraines   - continue home regimen      #GERD  - on PPI and Pepcid          Note above makes patient higher risk for morbidity and mortality requiring testing and treatment.      DVT Prophylaxis: Lovenox   Diet: ADULT DIET; Regular  Code Status: Full Code    Dispo -> discharge planning     JENNIFER Rice - CNP 6/20/2024 8:49 AM   06/20/24  8:53 AM      LILIAM SMITH MD 6/20/2024 10:41 AM

## 2024-06-20 NOTE — PROGRESS NOTES
Patient is able to demonstrate the ability to move from a reclining position to an upright position within the recliner.    Bedside Mobility Assessment Tool (BMAT):     Assessment Level 1- Sit and Shake    1. From a semi-reclined position, ask patient to sit up and rotate to a seated position at the side of the bed. Can use the bedrail.    2. Ask patient to reach out and grab your hand and shake making sure patient reaches across his/her midline.   Pass- Patient is able to come to a seated position, maintain core strength. Maintains seated balance while reaching across midline. Move on to Assessment Level 2.     Assessment Level 2- Stretch and Point   1. With patient in seated position at the side of the bed, have patient place both feet on the floor (or stool) with knees no higher than hips.    2. Ask patient to stretch one leg and straighten the knee, then bend the ankle/flex and point the toes. If appropriate, repeat with the other leg.   Pass- Patient is able to demonstrate appropriate quad strength on intended weight bearing limb(s). Move onto Assessment Level 3.     Assessment Level 3- Stand   1. Ask patient to elevate off the bed or chair (seated to standing) using an assistive device (cane, bedrail).    2. Patient should be able to raise buttocks off be and hold for a count of five. May repeat once.   Pass- Patient maintains standing stability for at least 5 seconds, proceed to assessment level 4.    Assessment Level 4- Walk   1. Ask patient to march in place at bedside.    2. Then ask patient to advance step and return each foot. Some medical conditions may render a patient from stepping backwards, use your best clinical judgement.   Pass- Patient demonstrates balance while shifting weight and ability to step, takes independent steps, does not use assistive device patient is MOBILITY LEVEL 4.      Mobility Level- 4

## 2024-06-20 NOTE — PROGRESS NOTES
[facility]                                 Pulmonary Consult /Progress Note :                                                                  CHIEF COMPLAINT: worsening SOB       Reason for consult acute COPD exacerbation      HPI:   Doing slightly better today  Shortness of breath and cough has improved with less with  No fever or chills noted  Some KINCAID  Get SOB when ambulate even bath room  Less tight   Some congestion    Objective:   PHYSICAL EXAM:    Blood pressure 104/70, pulse 92, temperature 97.8 °F (36.6 °C), temperature source Oral, resp. rate 16, height 1.575 m (5' 2\"), weight 77.4 kg (170 lb 9.6 oz), SpO2 95 %, not currently breastfeeding.' on RA  Gen: No distress.   Eyes: PERRL. No sclera icterus. No conjunctival injection.   ENT: No discharge. Pharynx clear.   Neck: Trachea midline. No obvious mass.   Resp:scattered rhonchi   bilateral   CV: Regular rate. Regular rhythm. No murmur or rub. No edema.   GI: Non-tender. Non-distended. No hernia.   Skin: Warm and dry. No nodule on exposed extremities.   Lymph: No cervical LAD. No supraclavicular LAD.   M/S: No cyanosis. No joint deformity. No clubbing.   Neuro: Awake. Alert. Moves all four extremities.   Psych: Oriented x 3. No anxiety.             LABS/IMAGING:    CBC:  Lab Results   Component Value Date    WBC 6.8 06/20/2024    HGB 12.0 06/20/2024    HCT 35.1 (L) 06/20/2024    .2 (H) 06/20/2024     06/20/2024    LYMPHOPCT 10.5 06/20/2024    RBC 3.51 (L) 06/20/2024    MCH 34.1 (H) 06/20/2024    MCHC 34.0 06/20/2024    RDW 12.7 06/20/2024    NEUTOPHILPCT 84.6 06/20/2024    MONOPCT 4.8 06/20/2024    EOSPCT 0.0 06/20/2024    BASOPCT 0.1 06/20/2024    NEUTROABS 5.8 06/20/2024    MONOSABS 0.3 06/20/2024    EOSABS 0.0 06/20/2024    BASOSABS 0.0 06/20/2024       Recent Labs     06/20/24  0535 06/19/24  0535 06/18/24  0539   WBC 6.8 9.1 8.6   HGB 12.0 12.8 13.4   HCT 35.1* 37.4 39.2   MCV

## 2024-06-20 NOTE — PROGRESS NOTES
Pt up frequently to void. Standby.  No injury this shift. Pt calls for assistance with call light.

## 2024-06-20 NOTE — PROGRESS NOTES
RT Inhaler-Nebulizer Bronchodilator Protocol Note    There is a bronchodilator order in the chart from a provider indicating to follow the RT Bronchodilator Protocol and there is an “Initiate RT Inhaler-Nebulizer Bronchodilator Protocol” order as well (see protocol at bottom of note).    CXR Findings:  No results found.    The findings from the last RT Protocol Assessment were as follows:   History Pulmonary Disease: Chronic pulmonary disease  Respiratory Pattern: Dyspnea on exertion or RR 21-25 bpm  Breath Sounds: Slightly diminished and/or crackles  Cough: Strong, spontaneous, non-productive  Indication for Bronchodilator Therapy: Decreased or absent breath sounds  Bronchodilator Assessment Score: 6    Aerosolized bronchodilator medication orders have been revised according to the RT Inhaler-Nebulizer Bronchodilator Protocol below.    Respiratory Therapist to perform RT Therapy Protocol Assessment initially then follow the protocol.  Repeat RT Therapy Protocol Assessment PRN for score 0-3 or on second treatment, BID, and PRN for scores above 3.    No Indications - adjust the frequency to every 6 hours PRN wheezing or bronchospasm, if no treatments needed after 48 hours then discontinue using Per Protocol order mode.     If indication present, adjust the RT bronchodilator orders based on the Bronchodilator Assessment Score as indicated below.  Use Inhaler orders unless patient has one or more of the following: on home nebulizer, not able to hold breath for 10 seconds, is not alert and oriented, cannot activate and use MDI correctly, or respiratory rate 25 breaths per minute or more, then use the equivalent nebulizer order(s) with same Frequency and PRN reasons based on the score.  If a patient is on this medication at home then do not decrease Frequency below that used at home.    0-3 - enter or revise RT bronchodilator order(s) to equivalent RT Bronchodilator order with Frequency of every 4 hours PRN for wheezing  or increased work of breathing using Per Protocol order mode.        4-6 - enter or revise RT Bronchodilator order(s) to two equivalent RT bronchodilator orders with one order with BID Frequency and one order with Frequency of every 4 hours PRN wheezing or increased work of breathing using Per Protocol order mode.        7-10 - enter or revise RT Bronchodilator order(s) to two equivalent RT bronchodilator orders with one order with TID Frequency and one order with Frequency of every 4 hours PRN wheezing or increased work of breathing using Per Protocol order mode.       11-13 - enter or revise RT Bronchodilator order(s) to one equivalent RT bronchodilator order with QID Frequency and an Albuterol order with Frequency of every 4 hours PRN wheezing or increased work of breathing using Per Protocol order mode.      Greater than 13 - enter or revise RT Bronchodilator order(s) to one equivalent RT bronchodilator order with every 4 hours Frequency and an Albuterol order with Frequency of every 2 hours PRN wheezing or increased work of breathing using Per Protocol order mode.       Electronically signed by Susannah Brower RCP on 6/20/2024 at 7:50 AM

## 2024-06-20 NOTE — FLOWSHEET NOTE
06/20/24 1005   Vital Signs   Temp 97.6 °F (36.4 °C)   Temp Source Oral   Pulse 85   Heart Rate Source Monitor   Respirations 16   /73   MAP (Calculated) 84   BP Location Left upper arm   BP Method Automatic   Patient Position Sitting   Pain Assessment   Pain Assessment None - Denies Pain   Oxygen Therapy   SpO2 98 %   O2 Device Nasal cannula   O2 Flow Rate (L/min) 3 L/min     AM assessment completed, see flow sheet. Pt is alert and oriented. Vital signs are WNL. Respirations are even & easy. No complaints voiced. Pt denies needs at this time. SR up x 2, and bed in low position. Call light is within reach.

## 2024-06-20 NOTE — PLAN OF CARE
Problem: Discharge Planning  Goal: Discharge to home or other facility with appropriate resources  6/20/2024 1145 by Arleth Black RN  Outcome: Progressing  6/20/2024 0304 by Marcelina Gregory RN  Outcome: Progressing     Problem: Safety - Adult  Goal: Free from fall injury  6/20/2024 1145 by Arleth Black RN  Outcome: Progressing  6/20/2024 0304 by Marcelina Gregory RN  Outcome: Progressing     Problem: Respiratory - Adult  Goal: Achieves optimal ventilation and oxygenation  6/20/2024 1145 by Arleth Black RN  Outcome: Progressing  6/20/2024 0304 by Marcelina Gregory RN  Outcome: Progressing     Problem: Pain  Goal: Verbalizes/displays adequate comfort level or baseline comfort level  6/20/2024 1145 by Arleth Black RN  Outcome: Progressing  6/20/2024 0304 by Marcelina Gregory RN  Outcome: Progressing

## 2024-06-21 ENCOUNTER — HOSPITAL ENCOUNTER (EMERGENCY)
Age: 54
Discharge: HOME | End: 2024-06-21
Attending: STUDENT IN AN ORGANIZED HEALTH CARE EDUCATION/TRAINING PROGRAM
Payer: MEDICARE

## 2024-06-21 VITALS
DIASTOLIC BLOOD PRESSURE: 77 MMHG | RESPIRATION RATE: 20 BRPM | TEMPERATURE: 98.4 F | HEART RATE: 78 BPM | OXYGEN SATURATION: 99 % | SYSTOLIC BLOOD PRESSURE: 124 MMHG

## 2024-06-21 VITALS
OXYGEN SATURATION: 99 % | BODY MASS INDEX: 31.39 KG/M2 | RESPIRATION RATE: 16 BRPM | HEART RATE: 85 BPM | HEIGHT: 62 IN | TEMPERATURE: 97.3 F | WEIGHT: 170.6 LBS | DIASTOLIC BLOOD PRESSURE: 81 MMHG | SYSTOLIC BLOOD PRESSURE: 97 MMHG

## 2024-06-21 DIAGNOSIS — J44.9 CHRONIC OBSTRUCTIVE PULMONARY DISEASE, UNSPECIFIED COPD TYPE (HCC): ICD-10-CM

## 2024-06-21 DIAGNOSIS — R06.00 DYSPNEA, UNSPECIFIED TYPE: Primary | ICD-10-CM

## 2024-06-21 LAB
BACTERIA BLD CULT ORG #2: NORMAL
BACTERIA BLD CULT: NORMAL

## 2024-06-21 PROCEDURE — 99232 SBSQ HOSP IP/OBS MODERATE 35: CPT | Performed by: INTERNAL MEDICINE

## 2024-06-21 PROCEDURE — 99238 HOSP IP/OBS DSCHRG MGMT 30/<: CPT | Performed by: INTERNAL MEDICINE

## 2024-06-21 PROCEDURE — 94640 AIRWAY INHALATION TREATMENT: CPT

## 2024-06-21 PROCEDURE — 6370000000 HC RX 637 (ALT 250 FOR IP)

## 2024-06-21 PROCEDURE — 6370000000 HC RX 637 (ALT 250 FOR IP): Performed by: STUDENT IN AN ORGANIZED HEALTH CARE EDUCATION/TRAINING PROGRAM

## 2024-06-21 PROCEDURE — 6360000002 HC RX W HCPCS

## 2024-06-21 PROCEDURE — 6370000000 HC RX 637 (ALT 250 FOR IP): Performed by: INTERNAL MEDICINE

## 2024-06-21 PROCEDURE — 94761 N-INVAS EAR/PLS OXIMETRY MLT: CPT

## 2024-06-21 PROCEDURE — 2700000000 HC OXYGEN THERAPY PER DAY

## 2024-06-21 PROCEDURE — 99283 EMERGENCY DEPT VISIT LOW MDM: CPT

## 2024-06-21 RX ORDER — LEVOFLOXACIN 500 MG/1
500 TABLET, FILM COATED ORAL DAILY
Qty: 3 TABLET | Refills: 0 | Status: SHIPPED | OUTPATIENT
Start: 2024-06-21 | End: 2024-06-24

## 2024-06-21 RX ORDER — PREDNISONE 10 MG/1
TABLET ORAL
Qty: 30 TABLET | Refills: 0 | Status: SHIPPED | OUTPATIENT
Start: 2024-06-21 | End: 2024-07-03

## 2024-06-21 RX ADMIN — Medication 2 PUFF: at 07:51

## 2024-06-21 RX ADMIN — LEVOFLOXACIN 500 MG: 500 TABLET, FILM COATED ORAL at 09:23

## 2024-06-21 RX ADMIN — PREDNISONE 40 MG: 20 TABLET ORAL at 09:23

## 2024-06-21 RX ADMIN — ENOXAPARIN SODIUM 40 MG: 100 INJECTION SUBCUTANEOUS at 09:23

## 2024-06-21 RX ADMIN — TIOTROPIUM BROMIDE INHALATION SPRAY 2 PUFF: 3.12 SPRAY, METERED RESPIRATORY (INHALATION) at 07:51

## 2024-06-21 RX ADMIN — PREDNISONE 50 MG: 20 TABLET ORAL at 22:10

## 2024-06-21 RX ADMIN — TOPIRAMATE 100 MG: 100 TABLET, FILM COATED ORAL at 09:23

## 2024-06-21 RX ADMIN — PANTOPRAZOLE SODIUM 40 MG: 40 TABLET, DELAYED RELEASE ORAL at 06:46

## 2024-06-21 RX ADMIN — IPRATROPIUM BROMIDE AND ALBUTEROL SULFATE 1 DOSE: 2.5; .5 SOLUTION RESPIRATORY (INHALATION) at 11:40

## 2024-06-21 NOTE — PROGRESS NOTES
Pt sleeping, and will contact RT when wakes for treatment. Advised breakfast was at bedside. Pt still would like to sleep.

## 2024-06-21 NOTE — PROGRESS NOTES
Patient awake in bed. C/o migraine pain. PRN Fioricet, Toradol and Zofran given. Patient reports this is the combination of meds that relieves the migraine pain. No other needs noted at this time. Patient reports that she walks and talks in her sleep. Bed alarm in place. Call light and bedside table within reach.    Clear

## 2024-06-21 NOTE — PLAN OF CARE
Problem: Discharge Planning  Goal: Discharge to home or other facility with appropriate resources  6/20/2024 2238 by Osvaldo Gonzalez RN  Outcome: Progressing  6/20/2024 1145 by Arleth Black RN  Outcome: Progressing     Problem: Safety - Adult  Goal: Free from fall injury  6/20/2024 2238 by Osvaldo Gonzalez RN  Outcome: Progressing  6/20/2024 1145 by Arleth Black RN  Outcome: Progressing     Problem: Respiratory - Adult  Goal: Achieves optimal ventilation and oxygenation  6/20/2024 2238 by Osvaldo Gonzalez RN  Outcome: Progressing  6/20/2024 1145 by Arleth Black RN  Outcome: Progressing     Problem: Pain  Goal: Verbalizes/displays adequate comfort level or baseline comfort level  6/20/2024 2238 by Osvaldo Gonzalez RN  Outcome: Progressing  6/20/2024 1145 by Arleth Blakc RN  Outcome: Progressing     Problem: Skin/Tissue Integrity  Goal: Absence of new skin breakdown  Description: 1.  Monitor for areas of redness and/or skin breakdown  2.  Assess vascular access sites hourly  3.  Every 4-6 hours minimum:  Change oxygen saturation probe site  4.  Every 4-6 hours:  If on nasal continuous positive airway pressure, respiratory therapy assess nares and determine need for appliance change or resting period.  Outcome: Progressing

## 2024-06-21 NOTE — PROGRESS NOTES
[facility]                                 Pulmonary Consult /Progress Note :                                                                  CHIEF COMPLAINT: worsening SOB       Reason for consult acute COPD exacerbation      HPI:   Doing much better  SOB has improved  No fever or chills  No chest pain       Objective:   PHYSICAL EXAM:    Blood pressure (!) 98/58, pulse 71, temperature 97.9 °F (36.6 °C), temperature source Oral, resp. rate 16, height 1.575 m (5' 2\"), weight 77.4 kg (170 lb 9.6 oz), SpO2 98 %, not currently breastfeeding.' on RA  Gen: No distress.   Eyes: PERRL. No sclera icterus. No conjunctival injection.   ENT: No discharge. Pharynx clear.   Neck: Trachea midline. No obvious mass.   Resp:scattered rhonchi   bilateral   CV: Regular rate. Regular rhythm. No murmur or rub. No edema.   GI: Non-tender. Non-distended. No hernia.   Skin: Warm and dry. No nodule on exposed extremities.   Lymph: No cervical LAD. No supraclavicular LAD.   M/S: No cyanosis. No joint deformity. No clubbing.   Neuro: Awake. Alert. Moves all four extremities.   Psych: Oriented x 3. No anxiety.             LABS/IMAGING:    CBC:  Lab Results   Component Value Date    WBC 6.8 06/20/2024    HGB 12.0 06/20/2024    HCT 35.1 (L) 06/20/2024    .2 (H) 06/20/2024     06/20/2024    LYMPHOPCT 10.5 06/20/2024    RBC 3.51 (L) 06/20/2024    MCH 34.1 (H) 06/20/2024    MCHC 34.0 06/20/2024    RDW 12.7 06/20/2024    NEUTOPHILPCT 84.6 06/20/2024    MONOPCT 4.8 06/20/2024    EOSPCT 0.0 06/20/2024    BASOPCT 0.1 06/20/2024    NEUTROABS 5.8 06/20/2024    MONOSABS 0.3 06/20/2024    EOSABS 0.0 06/20/2024    BASOSABS 0.0 06/20/2024       Recent Labs     06/20/24  0535 06/19/24  0535 06/18/24  0539   WBC 6.8 9.1 8.6   HGB 12.0 12.8 13.4   HCT 35.1* 37.4 39.2   .2* 98.9 99.2    223 265         BMP:   Recent Labs     06/19/24  0534 06/20/24  0535    141   K  4.2 4.3    108   CO2 26 25   BUN 18 19   CREATININE 0.6 0.6         MG:   Lab Results   Component Value Date/Time    MG 1.80 08/21/2018 03:26 PM     Ca/Phos:   Lab Results   Component Value Date    CALCIUM 8.8 06/20/2024    PHOS 2.2 (L) 08/20/2018     LIVER PROFILE:   No results for input(s): \"AST\", \"ALT\", \"LIPASE\", \"AMYLASE\", \"BILIDIR\", \"BILITOT\", \"ALKPHOS\" in the last 72 hours.    Invalid input(s): \"ALB\"      PT/INR: No results for input(s): \"PROTIME\", \"INR\" in the last 72 hours.  APTT: No results for input(s): \"APTT\" in the last 72 hours.        MV Settings:     / / /     IV:   sodium chloride             Medications:  Scheduled Meds:   gabapentin  600 mg Oral Nightly    levoFLOXacin  500 mg Oral Daily    sodium chloride flush  5-40 mL IntraVENous 2 times per day    enoxaparin  40 mg SubCUTAneous Daily    predniSONE  40 mg Oral Daily    topiramate  100 mg Oral BID    pantoprazole  40 mg Oral QAM AC    famotidine  20 mg Oral Nightly    amitriptyline  75 mg Oral Nightly    budesonide-formoterol  2 puff Inhalation BID RT    tiotropium  2 puff Inhalation Daily RT    ipratropium 0.5 mg-albuterol 2.5 mg  1 Dose Inhalation 4x Daily RT       PRN Meds:  butalbital-acetaminophen-caffeine, sodium chloride flush, sodium chloride, ondansetron **OR** ondansetron, polyethylene glycol, acetaminophen **OR** acetaminophen, phenol, tiZANidine, clonazePAM, ipratropium 0.5 mg-albuterol 2.5 mg    Results:  CBC:   Recent Labs     06/19/24  0535 06/20/24  0535   WBC 9.1 6.8   HGB 12.8 12.0   HCT 37.4 35.1*   MCV 98.9 100.2*    217       BMP:   Recent Labs     06/19/24  0534 06/20/24  0535    141   K 4.2 4.3    108   CO2 26 25   BUN 18 19   CREATININE 0.6 0.6       LIVER PROFILE:   No results for input(s): \"AST\", \"ALT\", \"LIPASE\", \"AMYLASE\", \"BILIDIR\", \"BILITOT\", \"ALKPHOS\" in the last 72 hours.    Invalid input(s): \"ALB\"        Cultures:  sent    Films:  CXR reviewed by me and it showed omar cute

## 2024-06-21 NOTE — FLOWSHEET NOTE
Shift assessment complete. See doc flow. Nightly medications given see MAR. A/O x4. Hx of COPD. Patient at baseline O2 3L-4L via nc. Productive cough. Patient gets SOB with exertion, recovers at rest. No other needs noted at this time. Bed alarm in place. Call light and bedside table within easy reach.    06/20/24 2228   Vital Signs   Temp 98.5 °F (36.9 °C)   Temp Source Oral   Pulse 81   Heart Rate Source Monitor   Respirations 18   /67   MAP (Calculated) 79   BP Location Right upper arm   BP Method Automatic   Patient Position Semi fowlers   Oxygen Therapy   SpO2 97 %   O2 Device Nasal cannula   O2 Flow Rate (L/min) 3 L/min

## 2024-06-21 NOTE — PROGRESS NOTES
Pt leaving via private vehicle to home. Discharge instructions given. Pt voiced understanding. Copy of discharge and scripts with patient. Iv removed. CP and PE completed. No further needs at discharge. Pt leaving with all personal belonging.

## 2024-06-21 NOTE — PLAN OF CARE
Problem: Discharge Planning  Goal: Discharge to home or other facility with appropriate resources  6/21/2024 0951 by Archie Fernández RN  Outcome: Completed  6/20/2024 2238 by Osvaldo Gonzalez RN  Outcome: Progressing     Problem: Safety - Adult  Goal: Free from fall injury  6/21/2024 0951 by Archie Fernández RN  Outcome: Completed  6/20/2024 2238 by Osvaldo Gonzalez RN  Outcome: Progressing     Problem: Respiratory - Adult  Goal: Achieves optimal ventilation and oxygenation  6/21/2024 0951 by Archie Fernández RN  Outcome: Completed  6/20/2024 2238 by Osvaldo Gonzalez RN  Outcome: Progressing     Problem: Pain  Goal: Verbalizes/displays adequate comfort level or baseline comfort level  6/21/2024 0951 by Archie Fernández RN  Outcome: Completed  6/20/2024 2238 by Osvaldo Gonzalez RN  Outcome: Progressing     Problem: Skin/Tissue Integrity  Goal: Absence of new skin breakdown  Description: 1.  Monitor for areas of redness and/or skin breakdown  2.  Assess vascular access sites hourly  3.  Every 4-6 hours minimum:  Change oxygen saturation probe site  4.  Every 4-6 hours:  If on nasal continuous positive airway pressure, respiratory therapy assess nares and determine need for appliance change or resting period.  6/21/2024 0951 by Archie Fernández RN  Outcome: Completed  6/20/2024 2238 by Osvaldo Gonzalez RN  Outcome: Progressing

## 2024-06-21 NOTE — PROGRESS NOTES
BP 97/81   Pulse 85   Temp 97.3 °F (36.3 °C) (Oral)   Resp 18   Ht 1.575 m (5' 2\")   Wt 77.4 kg (170 lb 9.6 oz)   LMP  (LMP Unknown) Comment: age 31 total  SpO2 98%   BMI 31.20 kg/m²     Assessment complete. Meds passed. Pt denies needs at this time. Planning discharge later. On baseline 3l. Eating breakfast, lungs sound diminished. Drowsy, but follows commands. Just woke up        Bedside Mobility Assessment Tool (BMAT):     Assessment Level 1- Sit and Shake    1. From a semi-reclined position, ask patient to sit up and rotate to a seated position at the side of the bed. Can use the bedrail.    2. Ask patient to reach out and grab your hand and shake making sure patient reaches across his/her midline.   Pass- Patient is able to come to a seated position, maintain core strength. Maintains seated balance while reaching across midline. Move on to Assessment Level 2.     Assessment Level 2- Stretch and Point   1. With patient in seated position at the side of the bed, have patient place both feet on the floor (or stool) with knees no higher than hips.    2. Ask patient to stretch one leg and straighten the knee, then bend the ankle/flex and point the toes. If appropriate, repeat with the other leg.   Pass- Patient is able to demonstrate appropriate quad strength on intended weight bearing limb(s). Move onto Assessment Level 3.     Assessment Level 3- Stand   1. Ask patient to elevate off the bed or chair (seated to standing) using an assistive device (cane, bedrail).    2. Patient should be able to raise buttocks off be and hold for a count of five. May repeat once.   Pass- Patient maintains standing stability for at least 5 seconds, proceed to assessment level 4.    Assessment Level 4- Walk   1. Ask patient to march in place at bedside.    2. Then ask patient to advance step and return each foot. Some medical conditions may render a patient from stepping backwards, use your best clinical judgement.   Pass-

## 2024-06-21 NOTE — DISCHARGE INSTR - COC
Continuity of Care Form    Patient Name: Vanessa Garcia   :  1970  MRN:  [id number]    Admit date:  2024  Discharge date:  ***    Code Status Order: Full Code   Advance Directives:     Admitting Physician:  Mike Lancaster MD  PCP: Mounika Hopper MD    Discharging Nurse: ***  Discharging Hospital Unit/Room#: 0211/0211-02  Discharging Unit Phone Number: ***    Emergency Contact:   Extended Emergency Contact Information  Primary Emergency Contact: Colten Garcia   [facility]  Home Phone: [phone]  Mobile Phone: [phone]  Relation: Child    Past Surgical History:  Past Surgical History:   Procedure Laterality Date    ABSCESS DRAINAGE      L axilla MRSA, hosp for 7 days    ADENOIDECTOMY      BLADDER SUSPENSION  2010    Mesh removed 1/9/15 in [place], [place]    BREAST BIOPSY      BREAST REDUCTION SURGERY Bilateral 2022    BILATERAL BREAST REDUCTION performed by Ember Thomason MD at [facility] OR    BREAST SURGERY  2009    lumpectomy, ducts removed    FINGER SURGERY      right thumb    HYSTERECTOMY (CERVIX STATUS UNKNOWN)      2/2 dysplasia, endometriosis, cysts, MYLENE BSO    HYSTERECTOMY, TOTAL ABDOMINAL (CERVIX REMOVED)  2000    NASAL SEPTUM SURGERY      PELVIC LAPAROSCOPY      endometriosis    SLEEVE GASTRECTOMY N/A 2020    LAPAROSCOPIC SLEEVE GASTRECTOMY - ETHICON performed by Gautam Carrion MD at [facility] OR    [facility] AND BSO (CERVIX REMOVED)      Dysplasia, endometriosis    TONSILLECTOMY      UPPER GASTROINTESTINAL ENDOSCOPY N/A 07/10/2020    EGD BIOPSY performed by Gautam Carrion MD at [facility] ENDOSCOPY       Immunization History:   Immunization History   Administered Date(s) Administered    COVID-19, MODERNA BLUE border, Primary or Immunocompromised, (age 12y+), IM, 100 mcg/0.5mL 2021, 2021    FLUZONE 3 YEARS AND OVER 2015    Influenza, AFLURIA (age 3 yrs+), FLUZONE, (age 6 mo+), MDV, 0.5mL 2020    Influenza, FLUCELVAX, (age 6

## 2024-06-21 NOTE — CARE COORDINATION
DISCHARGE ORDER  Date/Time 2024 1:31 PM  Completed by: Mayr Jane Hancock RN, Case Management    Patient Name: Vanessa Garcia      : 1970  Admitting Diagnosis: COPD exacerbation (HCC) [J44.1]  COPD with acute exacerbation (HCC) [J44.1]      Admit order Date and Status:24 inpt  (verify MD's last order for status of admission)      Noted discharge order.   If applicable PT/OT recommendation at Discharge: N/A  DME recommendation by PT/OT:N/A  Confirmed discharge plan  : Yes  with whom patient  If pt confirmed DC plan does family need to be contacted by CM Yes   Discharge Plan: Order for dc noted. Spoke with pt who cont plan to return home. Discussed HHC and pt declines. [facility] has home O2 concentrator thru [employer]. [facility] has HHN machine but it is not working. Pt issued new nebulizer. Spoke with [employer] who states pt is not active but was with them and now owns concentrator. Pt informed that she now owns concentrator but can call [employer] with concerns. Chart reviewed and pt given E tank for transport home. No other dc needs identified.    Date of Last IMM Given: 24    Reviewed chart.  Role of discharge planner explained and patient verbalized understanding. Discharge order is noted.    Has Home O2 in place on admit:  Yes  Informed of need to bring portable home O2 tank on day of discharge for nursing to connect prior to leaving:   Yes  Verbalized agreement/Understanding:   Yes  Pt is being d/c'd to home today. Pt's O2 sats are 99% on 3L NC.    Discharge timeout done with nsg, CM and pt. All discharge needs and concerns addressed.

## 2024-06-21 NOTE — DISCHARGE SUMMARY
Name:  Vanessa Garcia  Room:  0211/0211-02  MRN:    [id number]    Discharge Summary      This discharge summary is in conjunction with a complete physical exam done on the day of discharge.    Discharging Physician: Dr. Beck      Admit: 6/17/2024  Discharge:  06/21/24    HPI taken from admission H&P:      The patient is a 53 y.o. female with pmhx as below who presents to [facility] with shortness of breath ongoing for the last 10 days. Pt states she has been SOB for a couple of weeks now. She states that she wears 3-4L O2 NC chronically. She states she has also had a sore throat and a productive cough with brown sputum. She states that she has been doing breathing tx at home without any alleviation of her sx. She denies any CP, fever, abd pain, n/v, dysuria, or dizziness.      Diagnoses this Admission and Hospital Course     #COPD AE  #Chronic hypoxic respiratory failure  #CATHY   - baseline 3-4 L NC, stable on this    - CXR without acute abnormality  - CT chest with no mass or sequelae of old granulomatosis disease   - completed 2 days of Zithromax, changed to Levaquin D#4, to complete 7 days on discharge   - Duoneb  - solumedrol transitioned to prednisone   - check cultures -> sputum showing gram + cocci   - Pulmonology consulted  - ambulate and check SpO2 -> stable      #HLD  - not on statin      #Bipolar disorder  #Anixety  #Depression   - continue home regimen      #Hx of Migraines   - continue home regimen      #GERD  - on PPI and Pepcid           Procedures (Please Review Full Report for Details)  N/a    Consults    Pulmonology       Physical Exam at Discharge:    BP (!) 98/58   Pulse 71   Temp 97.9 °F (36.6 °C) (Oral)   Resp 16   Ht 1.575 m (5' 2\")   Wt 77.4 kg (170 lb 9.6 oz)   LMP  (LMP Unknown) Comment: age 31 total  SpO2 98%   BMI 31.20 kg/m²     Gen: No distress. Alert.   Eyes: PERRL. No sclera icterus. No conjunctival injection.   ENT: No discharge. Pharynx clear.   Neck: No JVD.

## 2024-06-21 NOTE — PROGRESS NOTES
RT Inhaler-Nebulizer Bronchodilator Protocol Note    There is a bronchodilator order in the chart from a provider indicating to follow the RT Bronchodilator Protocol and there is an “Initiate RT Inhaler-Nebulizer Bronchodilator Protocol” order as well (see protocol at bottom of note).    CXR Findings:  No results found.    The findings from the last RT Protocol Assessment were as follows:   History Pulmonary Disease: Chronic pulmonary disease  Respiratory Pattern: Dyspnea on exertion or RR 21-25 bpm  Breath Sounds: Slightly diminished and/or crackles  Cough: Strong, productive  Indication for Bronchodilator Therapy: Wheezing associated with pulm disorder  Bronchodilator Assessment Score: 7    Aerosolized bronchodilator medication orders have been revised according to the RT Inhaler-Nebulizer Bronchodilator Protocol below.    Respiratory Therapist to perform RT Therapy Protocol Assessment initially then follow the protocol.  Repeat RT Therapy Protocol Assessment PRN for score 0-3 or on second treatment, BID, and PRN for scores above 3.    No Indications - adjust the frequency to every 6 hours PRN wheezing or bronchospasm, if no treatments needed after 48 hours then discontinue using Per Protocol order mode.     If indication present, adjust the RT bronchodilator orders based on the Bronchodilator Assessment Score as indicated below.  Use Inhaler orders unless patient has one or more of the following: on home nebulizer, not able to hold breath for 10 seconds, is not alert and oriented, cannot activate and use MDI correctly, or respiratory rate 25 breaths per minute or more, then use the equivalent nebulizer order(s) with same Frequency and PRN reasons based on the score.  If a patient is on this medication at home then do not decrease Frequency below that used at home.    0-3 - enter or revise RT bronchodilator order(s) to equivalent RT Bronchodilator order with Frequency of every 4 hours PRN for wheezing or increased

## 2024-06-22 ENCOUNTER — TELEPHONE (OUTPATIENT)
Dept: PULMONOLOGY | Age: 54
End: 2024-06-22

## 2024-06-22 NOTE — ED NOTES
2137 Ambulated Pt with O2 monitor. Pt was 92%  when arriving at bedside Pt needed one break during ambulation.

## 2024-06-22 NOTE — PROGRESS NOTES
Patient called [employer] with concern about not having functioning O2 concentrator. Writer contacted [employer] and they are not able to provide services tonight or this weekend since she has not met qualifications with them.  Writer [facility] and they are unable to qualify based on the notes and insurance/financial needs tonight. But can have it supplied tomorrow, Saturday if she is readmitted tonight.  Writer spoke with patient and updated her with [employer] needs, and recommend she come in for admission tonight since safe O2 supply is not available at her residence. Nocturnist updated of situation.   Gwendolyn Olmos,RN Clinical

## 2024-06-22 NOTE — ED NOTES
Patient ambulated in the department on room air, the pulse oximeter reading was 88%. Provider notifeied

## 2024-06-22 NOTE — ED PROVIDER NOTES
Medications administered this visit:  (None if blank)  Medications   predniSONE (DELTASONE) tablet 50 mg (has no administration in time range)         Responses to treatment:       PROCEDURES: (None if blank)  Procedures:       CRITICAL CARE: (None if blank)        DISCHARGE PRESCRIPTIONS: (None if blank)  New Prescriptions    No medications on file         I am the primary clinician of record.     FINAL IMPRESSION      1. Dyspnea, unspecified type    2. Chronic obstructive pulmonary disease, unspecified COPD type (HCC)            DISPOSITION/PLAN   DISPOSITION Discharge - Pending Orders Complete 06/21/2024 10:09:21 PM      OUTPATIENT FOLLOW UP THE PATIENT:  Mounika Hopper MD  [address] [address]  [phone]      Call to set up an appointment in the next few days        Electronically Signed: Heri Presley MD, 06/21/24, 10:10 PM    This report has been produced using speech recognition software and may contain errors related to that system including errors in grammar, punctuation, and spelling, as well as words and phrases that may be inappropriate. If there are any questions or concerns please feel free to contact the dictating provider for clarification.       Heri Presley MD  06/21/24 1489

## 2024-06-22 NOTE — DISCHARGE INSTRUCTIONS
Take your medications as prescribed.  Follow-up with your doctors as previously discussed.  Return if you develop any new or worsening symptoms as we discussed today.

## 2024-06-22 NOTE — ED NOTES
Pt 92% at rest   Pt 88% in room air with exertion  Pt 95% on 3 lpm  Gwendolyn OlmosRN Clinical      Ambulatory

## 2024-06-22 NOTE — TELEPHONE ENCOUNTER
Inpatient Notes  Received: Yesterday  Liu Delatorre MD Headrick, Alicia, MA  To have follow up 7/3    Attempted to contact pt with appt of 7/3/24 at 3:15.  Mailbox is full, will call back monday

## 2024-06-22 NOTE — ED NOTES
Oxygen set up with [facility]. Pt states she already has pulmonologist appointment to follow up on continue POC. Gwendolyn Olmos RN Clinical

## 2024-06-24 ENCOUNTER — TELEPHONE (OUTPATIENT)
Dept: FAMILY MEDICINE CLINIC | Age: 54
End: 2024-06-24

## 2024-06-24 NOTE — TELEPHONE ENCOUNTER
Care Transitions Initial Follow Up Call    Outreach made within 2 business days of discharge: Yes    Patient: Vanessa Garcia Patient : 1970   MRN: [id number]  Reason for Admission: There are no discharge diagnoses documented for the most recent discharge.  Discharge Date: 24       Spoke with: no answer, pt already has appt scheduled

## 2024-06-25 ENCOUNTER — TELEPHONE (OUTPATIENT)
Dept: FAMILY MEDICINE CLINIC | Age: 54
End: 2024-06-25

## 2024-06-25 NOTE — TELEPHONE ENCOUNTER
MA reached out to patient regarding no show / missed appt.    No answer-no voice mail left, unable to reach patient

## 2024-07-01 ENCOUNTER — FOLLOWUP TELEPHONE ENCOUNTER (OUTPATIENT)
Dept: ADMINISTRATIVE | Age: 54
End: 2024-07-01

## 2024-07-01 NOTE — TELEPHONE ENCOUNTER
COPD Follow-Up Call:    Patient: Vanessa Garcia   Patient : 1970   MRN: [id number]    Date of discharge:     [facility]    Discharge Disposition: Home    RARS: Readmission Risk Score: 9.9    Spoke with: Vanessa Mendez is not feeling well today. She is losing her voice. Follows up with Pulmonology on 7/3 at 3515 with Dr. Delatorre. Patient finished abx and prednisone medications. New nebulizer is working. Stable on 4/L of oxygen through [employer]. Denied any further needs. Provided COPD Nurse number at [phone] for any further questions or assistance with resources.     Follow Up  Future Appointments   Date Time Provider Department Center   7/3/2024  3:15 PM Liu Delatorre MD [facility]       Electronically signed by Kiara Rosado MSN, RN  on 2024 at 1:35 PM

## 2024-07-03 ENCOUNTER — OFFICE VISIT (OUTPATIENT)
Dept: PULMONOLOGY | Age: 54
End: 2024-07-03
Payer: MEDICARE

## 2024-07-03 VITALS
WEIGHT: 157.6 LBS | OXYGEN SATURATION: 93 % | DIASTOLIC BLOOD PRESSURE: 78 MMHG | BODY MASS INDEX: 29 KG/M2 | HEIGHT: 62 IN | HEART RATE: 105 BPM | SYSTOLIC BLOOD PRESSURE: 105 MMHG

## 2024-07-03 DIAGNOSIS — J44.9 CHRONIC OBSTRUCTIVE PULMONARY DISEASE, UNSPECIFIED COPD TYPE (HCC): Primary | ICD-10-CM

## 2024-07-03 PROCEDURE — 1111F DSCHRG MED/CURRENT MED MERGE: CPT | Performed by: INTERNAL MEDICINE

## 2024-07-03 PROCEDURE — G8427 DOCREV CUR MEDS BY ELIG CLIN: HCPCS | Performed by: INTERNAL MEDICINE

## 2024-07-03 PROCEDURE — 3023F SPIROM DOC REV: CPT | Performed by: INTERNAL MEDICINE

## 2024-07-03 PROCEDURE — 1036F TOBACCO NON-USER: CPT | Performed by: INTERNAL MEDICINE

## 2024-07-03 PROCEDURE — 3017F COLORECTAL CA SCREEN DOC REV: CPT | Performed by: INTERNAL MEDICINE

## 2024-07-03 PROCEDURE — 99214 OFFICE O/P EST MOD 30 MIN: CPT | Performed by: INTERNAL MEDICINE

## 2024-07-03 PROCEDURE — G8417 CALC BMI ABV UP PARAM F/U: HCPCS | Performed by: INTERNAL MEDICINE

## 2024-07-03 RX ORDER — ROFLUMILAST 250 UG/1
250 TABLET ORAL DAILY
Qty: 28 TABLET | Refills: 2 | Status: SHIPPED | OUTPATIENT
Start: 2024-07-03

## 2024-07-03 RX ORDER — BUDESONIDE, GLYCOPYRROLATE, AND FORMOTEROL FUMARATE 160; 9; 4.8 UG/1; UG/1; UG/1
2 AEROSOL, METERED RESPIRATORY (INHALATION) 2 TIMES DAILY
Qty: 1 EACH | Refills: 2 | Status: SHIPPED | OUTPATIENT
Start: 2024-07-03

## 2024-07-03 RX ORDER — BUDESONIDE, GLYCOPYRROLATE, AND FORMOTEROL FUMARATE 160; 9; 4.8 UG/1; UG/1; UG/1
2 AEROSOL, METERED RESPIRATORY (INHALATION) 2 TIMES DAILY
Qty: 1 EACH | Refills: 0 | COMMUNITY
Start: 2024-07-03

## 2024-07-03 NOTE — PROGRESS NOTES
HYSTERECTOMY (CERVIX STATUS UNKNOWN)  2001 2/2 dysplasia, endometriosis, cysts, MYLENE BSO    HYSTERECTOMY, TOTAL ABDOMINAL (CERVIX REMOVED)  06/2000    NASAL SEPTUM SURGERY      PELVIC LAPAROSCOPY      endometriosis    SLEEVE GASTRECTOMY N/A 11/16/2020    LAPAROSCOPIC SLEEVE GASTRECTOMY - ETHICON performed by Gautam Carrion MD at [facility] OR    [facility] AND BSO (CERVIX REMOVED)  2001    Dysplasia, endometriosis    TONSILLECTOMY      UPPER GASTROINTESTINAL ENDOSCOPY N/A 07/10/2020    EGD BIOPSY performed by Gautam Carrion MD at [facility] ENDOSCOPY       FAMILY HISTORY:   Lung cancer:  DVT or PE     REVIEW OF SYSTEMS:  Constitutional: General health is good . There has been no weight changes. No fevers, fatigue or weakness.   Head: Patient denies any history of trauma, convulsive disorder or syncope.    Skin:  Patient denies history of changes in pigmentation, eruptions or pruritus.  No easy bruising or bleeding.  EENT: no nasal congestion   Cardiovascular ,No chest pain ,No edema ,  Respiration:SOB:+  ,KINCAID :+  Gastrointestinal:No GI bleed ,no melena  ,no hematemesis    Musculoskeletal: no joint pain ,no swelling  Neurological:no , syncope.  Denies twitching, convulsions, loss of consciousness or memory.     Endocrine:  . No history of goiter, exophthalmos or dryness of skin.  The patient has no history of diabetes.    Hematopoietic:  No history of bleeding disorders or easy bruising.  Rheumatic:  No connective tissue disease history or polyarthritis/inflammatory joint disease.      PHYSICAL EXAMINATION:  Vitals:    07/03/24 0851   BP: 105/78   Pulse: (!) 105   SpO2: 93%     Constitutional: This is a well developed, well nourished 53 y.o. year old female who is alert, oriented, cooperative and in no apparent distress.  Head was normocephalic and atraumatic.    EENT: Mallampati class :  Extraocular muscles intact.   External canals are patent and no discharge was appreciated.  Septum was midline,   mucosa was without

## 2024-07-09 ENCOUNTER — HOSPITAL ENCOUNTER (OUTPATIENT)
Age: 54
Discharge: HOME | End: 2024-07-09
Payer: MEDICARE

## 2024-07-09 DIAGNOSIS — F41.9 ANXIETY: ICD-10-CM

## 2024-07-09 DIAGNOSIS — J44.9 CHRONIC OBSTRUCTIVE PULMONARY DISEASE, UNSPECIFIED COPD TYPE (HCC): ICD-10-CM

## 2024-07-09 PROCEDURE — 36415 COLL VENOUS BLD VENIPUNCTURE: CPT

## 2024-07-09 PROCEDURE — 82803 BLOOD GASES ANY COMBINATION: CPT

## 2024-07-09 RX ORDER — BUTALBITAL, ACETAMINOPHEN AND CAFFEINE 50; 325; 40 MG/1; MG/1; MG/1
TABLET ORAL
Qty: 30 TABLET | Refills: 0 | Status: SHIPPED | OUTPATIENT
Start: 2024-07-13 | End: 2024-08-09

## 2024-07-09 RX ORDER — CLONAZEPAM 1 MG/1
TABLET ORAL
Qty: 60 TABLET | Refills: 0 | Status: SHIPPED | OUTPATIENT
Start: 2024-07-13 | End: 2024-08-09

## 2024-07-09 NOTE — TELEPHONE ENCOUNTER
Last Office Visit  -  4/25/24  Next Office Visit  -  n/a    Last Filled  -  6/13/24  Last UDS -  3/24/21  Contract -  11/15/16

## 2024-07-10 LAB
BASE EXCESS BLDA CALC-SCNC: 2.7 MMOL/L (ref -3–3)
CO2 BLDA-SCNC: 28.4 MMOL/L
COHGB MFR BLDA: 0.3 % (ref 0–1.5)
HCO3 BLDA-SCNC: 27.1 MMOL/L (ref 21–29)
HGB BLDA-MCNC: 13.5 G/DL (ref 12–16)
METHGB MFR BLDA: 0.3 %
O2 THERAPY: ABNORMAL
PCO2 BLDA: 41 MMHG (ref 35–45)
PH BLDA: 7.44 [PH] (ref 7.35–7.45)
PO2 BLDA: 116.4 MMHG (ref 75–108)
SAO2 % BLDA: 98.4 %

## 2024-07-11 DIAGNOSIS — R11.0 NAUSEA: ICD-10-CM

## 2024-07-11 RX ORDER — PROMETHAZINE HYDROCHLORIDE 25 MG/1
TABLET ORAL
Qty: 30 TABLET | Refills: 2 | Status: SHIPPED | OUTPATIENT
Start: 2024-07-11

## 2024-07-11 NOTE — TELEPHONE ENCOUNTER
Last Office Visit  -  4/25/24  Next Office Visit  -  n/a    Last Filled  -  4/11/24  Last UDS -    Contract -

## 2024-07-13 DIAGNOSIS — K21.9 CHRONIC GERD: ICD-10-CM

## 2024-07-15 RX ORDER — FAMOTIDINE 20 MG/1
TABLET, FILM COATED ORAL
Qty: 30 TABLET | Refills: 5 | Status: SHIPPED | OUTPATIENT
Start: 2024-07-15

## 2024-08-08 DIAGNOSIS — R11.0 NAUSEA: ICD-10-CM

## 2024-08-08 DIAGNOSIS — G43.109 MIGRAINE WITH AURA AND WITHOUT STATUS MIGRAINOSUS, NOT INTRACTABLE: Primary | ICD-10-CM

## 2024-08-08 DIAGNOSIS — F41.9 ANXIETY: ICD-10-CM

## 2024-08-08 RX ORDER — PROMETHAZINE HYDROCHLORIDE 25 MG/1
TABLET ORAL
Qty: 30 TABLET | Refills: 2 | Status: SHIPPED | OUTPATIENT
Start: 2024-08-08

## 2024-08-08 NOTE — TELEPHONE ENCOUNTER
Last Office Visit  -  4/25/24  Next Office Visit  -  n/a    Last Filled  -  7/13/24  Last UDS -  3/24/21  Contract -  11/15/16

## 2024-08-08 NOTE — TELEPHONE ENCOUNTER
Last Office Visit  -  4/25/24  Next Office Visit  -  n/a    Last Filled  -  7/11/24  Last UDS -    Contract -

## 2024-08-09 RX ORDER — CLONAZEPAM 1 MG/1
TABLET ORAL
Qty: 60 TABLET | Refills: 0 | Status: SHIPPED | OUTPATIENT
Start: 2024-08-12 | End: 2024-09-11

## 2024-08-09 RX ORDER — BUTALBITAL, ACETAMINOPHEN AND CAFFEINE 50; 325; 40 MG/1; MG/1; MG/1
TABLET ORAL
Qty: 30 TABLET | Refills: 0 | Status: SHIPPED | OUTPATIENT
Start: 2024-08-12 | End: 2024-09-11

## 2024-08-19 DIAGNOSIS — K21.9 CHRONIC GERD: ICD-10-CM

## 2024-08-19 RX ORDER — OMEPRAZOLE 20 MG/1
20 CAPSULE, DELAYED RELEASE ORAL DAILY
Qty: 30 CAPSULE | Refills: 5 | Status: SHIPPED | OUTPATIENT
Start: 2024-08-19

## 2024-08-19 NOTE — TELEPHONE ENCOUNTER
Last Office Visit  -  4/25/24  Next Office Visit  -  n/a    Last Filled  -  1/8/24  Last UDS -    Contract -

## 2024-09-03 ENCOUNTER — OFFICE VISIT (OUTPATIENT)
Dept: PULMONOLOGY | Age: 54
End: 2024-09-03

## 2024-09-03 VITALS
OXYGEN SATURATION: 100 % | SYSTOLIC BLOOD PRESSURE: 100 MMHG | WEIGHT: 151 LBS | DIASTOLIC BLOOD PRESSURE: 60 MMHG | RESPIRATION RATE: 17 BRPM | HEART RATE: 93 BPM | BODY MASS INDEX: 27.79 KG/M2 | HEIGHT: 62 IN

## 2024-09-03 DIAGNOSIS — J44.9 CHRONIC OBSTRUCTIVE PULMONARY DISEASE, UNSPECIFIED COPD TYPE (HCC): Primary | ICD-10-CM

## 2024-09-03 RX ORDER — AZITHROMYCIN 250 MG/1
250 TABLET, FILM COATED ORAL DAILY
Qty: 7 TABLET | Refills: 0 | Status: SHIPPED | OUTPATIENT
Start: 2024-09-03 | End: 2024-09-10

## 2024-09-03 RX ORDER — ROFLUMILAST 250 UG/1
250 TABLET ORAL DAILY
COMMUNITY

## 2024-09-03 RX ORDER — PREDNISONE 20 MG/1
20 TABLET ORAL DAILY
Qty: 5 TABLET | Refills: 0 | Status: SHIPPED | OUTPATIENT
Start: 2024-09-03 | End: 2024-09-08

## 2024-09-03 RX ORDER — ROFLUMILAST 500 UG/1
500 TABLET ORAL DAILY
Qty: 30 TABLET | Refills: 2 | Status: SHIPPED | OUTPATIENT
Start: 2024-09-03

## 2024-09-03 NOTE — PROGRESS NOTES
P  [facility]                                               Pulmonary Clinic Consult     I had the pleasure of seeing  Vanessa Garcia     Chief Complaint   Patient presents with    Follow-up     2 month F/U       HISTORY OF PRESENT ILLNESS:    Vanessa Garcia is a 53 y.o. year old  Who start smoking at age 17  And increase gradually 1 pack and she quit 2020 so has 25 PPY smoking       The Patient comes in with SOB that has been going on for the last few years however it got better after quit smoking . Associated with  cough with clear sputum usually She  states that it get worse with exercise or walking long distance and he can walk .1 block And go 1flight of stairs before get short winded      She lost 79 pounds  She use O2 occasionally when O2 sat less than 90  Had bariatrics surgery 11/2020   Sleep apnea and she could not use mask and no way she want it understanding risk   Today visit     She has  been having worse last few weeks with heat   On Breztri and seems helping more than Trelegy   She stopped smoking and on chantix  She has nebulizer   ALLERGIES:    Allergies   Allergen Reactions    Ambien [Zolpidem Tartrate] Other (See Comments)     Shakey, anxious    Dilaudid [Hydromorphone Hcl] Other (See Comments)     Feels like skin is on fire. Open wound on face and arm    Fentanyl Itching     Burning, skin blistered    Imitrex [Sumatriptan]      Face hot, felt sob    Morphine Other (See Comments)     Feels like skin is on fire.  Tolerates Percocet.       PAST MEDICAL HISTORY:       Diagnosis Date    Anxiety     Arthritis     Asthma     Bipolar 1 disorder ([facility])     Pt is willing to see psych after 7/15 for confirmation of dx    Chronic bronchitis ([facility])     Chronic GERD 06/11/2020    COPD (chronic obstructive pulmonary disease) ([facility])     Dr Monet    Depression     Emphysema of lung ([facility])     Macromastia     Migraine     Migraines     Mixed hyperlipidemia

## 2024-09-06 ENCOUNTER — TELEPHONE (OUTPATIENT)
Dept: FAMILY MEDICINE CLINIC | Age: 54
End: 2024-09-06

## 2024-09-06 ENCOUNTER — TELEPHONE (OUTPATIENT)
Dept: PULMONOLOGY | Age: 54
End: 2024-09-06

## 2024-09-06 DIAGNOSIS — J44.9 CHRONIC OBSTRUCTIVE PULMONARY DISEASE, UNSPECIFIED COPD TYPE (HCC): Primary | ICD-10-CM

## 2024-09-06 RX ORDER — FLUCONAZOLE 150 MG/1
150 TABLET ORAL ONCE
Qty: 1 TABLET | Refills: 0 | Status: SHIPPED | OUTPATIENT
Start: 2024-09-06 | End: 2024-09-12

## 2024-09-06 NOTE — TELEPHONE ENCOUNTER
Patient called the office stating the abx she was given by Dr. Delatorre have caused a vaginal yeast infection. She was asking for a script for diflucan. Advised patient to contact her PCP office as Dr. Delatorre is out of office for the day and this is a residual effect from medication, not directly pulmonary related.

## 2024-09-09 RX ORDER — ROFLUMILAST 250 UG/1
250 TABLET ORAL DAILY
Qty: 28 TABLET | Refills: 2 | Status: SHIPPED | OUTPATIENT
Start: 2024-09-09

## 2024-09-09 RX ORDER — BUDESONIDE, GLYCOPYRROLATE, AND FORMOTEROL FUMARATE 160; 9; 4.8 UG/1; UG/1; UG/1
2 AEROSOL, METERED RESPIRATORY (INHALATION) 2 TIMES DAILY
Qty: 10.7 G | Refills: 3 | Status: SHIPPED | OUTPATIENT
Start: 2024-09-09

## 2024-09-09 RX ORDER — TIZANIDINE 2 MG/1
TABLET ORAL
Qty: 30 TABLET | Refills: 5 | Status: SHIPPED | OUTPATIENT
Start: 2024-09-09

## 2024-09-11 DIAGNOSIS — F41.9 ANXIETY: ICD-10-CM

## 2024-09-12 RX ORDER — CLONAZEPAM 1 MG/1
TABLET ORAL
Qty: 60 TABLET | Refills: 0 | Status: SHIPPED | OUTPATIENT
Start: 2024-09-12 | End: 2024-10-12

## 2024-09-12 RX ORDER — FLUCONAZOLE 150 MG/1
150 TABLET ORAL ONCE
Qty: 1 TABLET | Refills: 1 | Status: SHIPPED | OUTPATIENT
Start: 2024-09-12 | End: 2024-09-12

## 2024-09-22 ENCOUNTER — E-VISIT (OUTPATIENT)
Dept: PRIMARY CARE CLINIC | Age: 54
End: 2024-09-22

## 2024-09-22 DIAGNOSIS — J06.9 UPPER RESPIRATORY TRACT INFECTION, UNSPECIFIED TYPE: Primary | ICD-10-CM

## 2024-09-22 ASSESSMENT — LIFESTYLE VARIABLES
PACKS_PER_DAY: 1
SMOKING_STATUS: NO, I'M A FORMER SMOKER
SMOKING_YEARS: 30

## 2024-10-04 DIAGNOSIS — G43.109 MIGRAINE WITH AURA AND WITHOUT STATUS MIGRAINOSUS, NOT INTRACTABLE: ICD-10-CM

## 2024-10-04 DIAGNOSIS — F41.9 ANXIETY: ICD-10-CM

## 2024-10-04 DIAGNOSIS — R11.0 NAUSEA: ICD-10-CM

## 2024-10-04 DIAGNOSIS — G43.111 INTRACTABLE MIGRAINE WITH AURA WITH STATUS MIGRAINOSUS: ICD-10-CM

## 2024-10-04 RX ORDER — BUTALBITAL, ACETAMINOPHEN AND CAFFEINE 50; 325; 40 MG/1; MG/1; MG/1
TABLET ORAL
Qty: 30 TABLET | Refills: 0 | Status: SHIPPED | OUTPATIENT
Start: 2024-10-04

## 2024-10-04 RX ORDER — PROMETHAZINE HYDROCHLORIDE 25 MG/1
TABLET ORAL
Qty: 30 TABLET | Refills: 2 | Status: SHIPPED | OUTPATIENT
Start: 2024-10-04

## 2024-10-04 RX ORDER — CLONAZEPAM 1 MG/1
TABLET ORAL
Qty: 60 TABLET | Refills: 0 | Status: SHIPPED | OUTPATIENT
Start: 2024-10-04 | End: 2024-11-03

## 2024-10-04 RX ORDER — AMITRIPTYLINE HYDROCHLORIDE 75 MG/1
TABLET ORAL
Qty: 90 TABLET | Refills: 0 | Status: SHIPPED | OUTPATIENT
Start: 2024-10-04

## 2024-10-04 RX ORDER — BUTALBITAL, ACETAMINOPHEN AND CAFFEINE 50; 325; 40 MG/1; MG/1; MG/1
TABLET ORAL
Qty: 30 TABLET | OUTPATIENT
Start: 2024-10-04

## 2024-10-04 NOTE — TELEPHONE ENCOUNTER
Last Office Visit  -  04/25/2024  Next Office Visit  -  n/a    Last Filled  -    Last UDS -    Contract -

## 2024-10-04 NOTE — TELEPHONE ENCOUNTER
Last Office Visit  -  4/25/24  Next Office Visit  -  n/a    Last Filled  -  9/12/24  Last UDS -  3/24/21  Contract -  11/15/16

## 2024-11-02 DIAGNOSIS — G43.109 MIGRAINE WITH AURA AND WITHOUT STATUS MIGRAINOSUS, NOT INTRACTABLE: ICD-10-CM

## 2024-11-02 DIAGNOSIS — F41.9 ANXIETY: ICD-10-CM

## 2024-11-02 DIAGNOSIS — J44.9 CHRONIC OBSTRUCTIVE PULMONARY DISEASE, UNSPECIFIED COPD TYPE (HCC): Primary | ICD-10-CM

## 2024-11-04 RX ORDER — BUTALBITAL, ACETAMINOPHEN AND CAFFEINE 50; 325; 40 MG/1; MG/1; MG/1
TABLET ORAL
Qty: 30 TABLET | Refills: 0 | Status: SHIPPED | OUTPATIENT
Start: 2024-11-04

## 2024-11-04 NOTE — TELEPHONE ENCOUNTER
Last Office Visit  -  4/25/24  Next Office Visit  -  n/a    Last Filled  -  10/4/24  Last UDS -    Contract -

## 2024-11-05 RX ORDER — ROFLUMILAST 500 UG/1
TABLET ORAL
Qty: 30 TABLET | Refills: 2 | Status: SHIPPED | OUTPATIENT
Start: 2024-11-05

## 2024-11-12 ENCOUNTER — TELEMEDICINE (OUTPATIENT)
Dept: FAMILY MEDICINE CLINIC | Age: 54
End: 2024-11-12

## 2024-11-12 DIAGNOSIS — Z00.00 MEDICARE ANNUAL WELLNESS VISIT, SUBSEQUENT: Primary | ICD-10-CM

## 2024-11-12 DIAGNOSIS — F41.9 ANXIETY: ICD-10-CM

## 2024-11-12 ASSESSMENT — PATIENT HEALTH QUESTIONNAIRE - PHQ9
4. FEELING TIRED OR HAVING LITTLE ENERGY: NOT AT ALL
SUM OF ALL RESPONSES TO PHQ QUESTIONS 1-9: 0
9. THOUGHTS THAT YOU WOULD BE BETTER OFF DEAD, OR OF HURTING YOURSELF: NOT AT ALL
1. LITTLE INTEREST OR PLEASURE IN DOING THINGS: NOT AT ALL
SUM OF ALL RESPONSES TO PHQ QUESTIONS 1-9: 0
7. TROUBLE CONCENTRATING ON THINGS, SUCH AS READING THE NEWSPAPER OR WATCHING TELEVISION: NOT AT ALL
5. POOR APPETITE OR OVEREATING: NOT AT ALL
3. TROUBLE FALLING OR STAYING ASLEEP: NOT AT ALL
10. IF YOU CHECKED OFF ANY PROBLEMS, HOW DIFFICULT HAVE THESE PROBLEMS MADE IT FOR YOU TO DO YOUR WORK, TAKE CARE OF THINGS AT HOME, OR GET ALONG WITH OTHER PEOPLE: NOT DIFFICULT AT ALL
SUM OF ALL RESPONSES TO PHQ QUESTIONS 1-9: 0
6. FEELING BAD ABOUT YOURSELF - OR THAT YOU ARE A FAILURE OR HAVE LET YOURSELF OR YOUR FAMILY DOWN: NOT AT ALL
2. FEELING DOWN, DEPRESSED OR HOPELESS: NOT AT ALL
SUM OF ALL RESPONSES TO PHQ QUESTIONS 1-9: 0
SUM OF ALL RESPONSES TO PHQ9 QUESTIONS 1 & 2: 0
8. MOVING OR SPEAKING SO SLOWLY THAT OTHER PEOPLE COULD HAVE NOTICED. OR THE OPPOSITE, BEING SO FIGETY OR RESTLESS THAT YOU HAVE BEEN MOVING AROUND A LOT MORE THAN USUAL: NOT AT ALL

## 2024-11-12 ASSESSMENT — LIFESTYLE VARIABLES
HOW OFTEN DO YOU HAVE A DRINK CONTAINING ALCOHOL: MONTHLY OR LESS
HOW MANY STANDARD DRINKS CONTAINING ALCOHOL DO YOU HAVE ON A TYPICAL DAY: 1 OR 2

## 2024-11-12 NOTE — PATIENT INSTRUCTIONS
Please read the healthy family handout that you were given and share it with your family.       Please compare this printed medication list with your medications at home to be sure they are the same.  If you have any medications that are different please contact us immediately at [phone].     Also review your allergies that we have listed, these may also include medications that you have not been able to tolerate, make sure everything listed is correct. If you have any allergies that are different please contact us immediately at [phone].     You may receive a survey in the mail or by email asking about your experience during your visit today. Please complete and return to us so we know how we are serving you.         Preventing Falls: Care Instructions  Injuries and health problems such as trouble walking or poor eyesight can increase your risk of falling. So can some medicines. But there are things you can do to help prevent falls. You can exercise to get stronger. You can also arrange your home to make it safer.    Talk to your doctor about the medicines you take. Ask if any of them increase the risk of falls and whether they can be changed or stopped.   Try to exercise regularly. It can help improve your strength and balance. This can help lower your risk of falling.         Practice fall safety and prevention.   Wear low-heeled shoes that fit well and give your feet good support. Talk to your doctor if you have foot problems that make this hard.  Carry a cellphone or wear a medical alert device that you can use to call for help.  Use stepladders instead of chairs to reach high objects. Don't climb if you're at risk for falls. Ask for help, if needed.  Wear the correct eyeglasses, if you need them.        Make your home safer.   Remove rugs, cords, clutter, and furniture from walkways.  Keep your house well lit. Use night-lights in hallways and bathrooms.  Install and use sturdy handrails on stairways.  Wear

## 2024-11-12 NOTE — PROGRESS NOTES
Medicare Annual Wellness Visit    Vanessa Garcia is here for Medicare AWV    Assessment & Plan   Medicare annual wellness visit, subsequent    Recommendations for Preventive Services Due: see orders and patient instructions/AVS.  Recommended screening schedule for the next 5-10 years is provided to the patient in written form: see Patient Instructions/AVS.     Return in 1 year (on 11/12/2025) for Medicare AWV.     Subjective   Patient presents today for annual Medicare wellness visit.    Patient's complete Health Risk Assessment and screening values have been reviewed and are found in Flowsheets. The following problems were reviewed today and where indicated follow up appointments were made and/or referrals ordered.    Positive Risk Factor Screenings with Interventions:    Fall Risk:  Do you feel unsteady or are you worried about falling? : (!) yes  2 or more falls in past year?: no  Fall with injury in past year?: no     Interventions:    Reviewed medications, home hazards, visual acuity, and co-morbidities that can increase risk for falls                Dentist Screen:  Have you seen the dentist within the past year?: (!) No (no insurance)    Intervention:  Patient declines any further evaluation or treatment   Patient denies any coverage and will follow up once she does.      Vision Screen:  Do you have difficulty driving, watching TV, or doing any of your daily activities because of your eyesight?: No  Have you had an eye exam within the past year?: (!) No (no insurance)  Interventions:   Patient declines any further evaluation or treatment  Patient denies any coverage and will follow up once she does.       Advanced Directives:  Do you have a Living Will?: (!) No    Intervention:  has NO advanced directive  - referred to ACP Coordinator             Advance Care Planning   Discussed the patient’s choices for care and treatment preferences in case of a health event that adversely affects decision-making

## 2024-11-12 NOTE — TELEPHONE ENCOUNTER
Last Office Visit  - 4/25/2024  Next Office Visit  -  AWV today    Last Filled  -  10/4/2024  Last UDS -    Contract -      Pt aware RS is out and will send in the am.

## 2024-11-13 ENCOUNTER — CLINICAL DOCUMENTATION (OUTPATIENT)
Dept: SPIRITUAL SERVICES | Age: 54
End: 2024-11-13

## 2024-11-13 RX ORDER — CLONAZEPAM 1 MG/1
TABLET ORAL
Qty: 60 TABLET | Refills: 0 | OUTPATIENT
Start: 2024-11-13 | End: 2024-12-13

## 2024-11-13 NOTE — ACP (ADVANCE CARE PLANNING)
Advance Care Planning   Ambulatory ACP Specialist Patient Outreach    Date:  11/13/2024    ACP Specialist:  Jeannie Pratt LPN    Outreach call to patient in follow-up to ACP Specialist referral from:Mounika Hopper MD    [x] PCP  [] Provider   [] Ambulatory Care Management [] Other     For:                  [x] Advance Directive Assistance              [x] Complete Portable DNR order              [] Complete POST/POLST/MOST              [x] Code Status Discussion             [x] Discuss Goals of Care             [x] Early ACP Decision-Making              [] Other (Specify)    Date Referral Received:11/12/24    Next Step:   [] ACP scheduled conversation  [x] Outreach again in one week               [] Email / Mail ACP Info Sheets  [] Email / Mail Advance Directive   [] Closing referral.  Routing closure to referring provider/staff and to ACP Specialist .    [] Closure letter mailed to patient with invitation to contact ACP Specialist if / when ready.   [] Other (Specify here):       [x] At this time, Healthcare Decision Maker Is:     Primary Decision Maker: Colten Garcia - [facility] - [phone]    [] Primary agent named in scanned advance directive.    [x] Legal Next of Kin.     [] Unable to determine legal decision maker at this time.    Outreaches:       [x] 1st -  Date:  11/13/24               Intervention:  [] Spoke with Patient   [] Left Voice mail [] Email / Mail    [] MyChart  [x] Other (Specify) :     Outcomes:Writer attempted ACP outreach to patient's preferred number ([phone]).  Unable to leave a message.  Voicemail is not set up.  MyChart message sent.  ACP outreach will be attempted in about one week if return call not received.            [] 2nd -  Date:                 Intervention:  [] Spoke with Patient  [] Left Voice mail [] Email / Mail    [] MyChart  [] Other (Specify) :              Outcomes:                [] 3rd -  Date:                Intervention:  [] Spoke with Patient

## 2024-11-13 NOTE — TELEPHONE ENCOUNTER
Do you know if we have an e-visit option for anxiety med refills? It's been more than 6 mo since pt was seen. If not, I'll refill med and add msg to Rx that she is due for f/u appt. Thx.

## 2024-11-14 ENCOUNTER — E-VISIT (OUTPATIENT)
Dept: FAMILY MEDICINE CLINIC | Age: 54
End: 2024-11-14
Payer: MEDICARE

## 2024-11-14 DIAGNOSIS — F41.9 ANXIETY: ICD-10-CM

## 2024-11-14 PROCEDURE — 99421 OL DIG E/M SVC 5-10 MIN: CPT | Performed by: FAMILY MEDICINE

## 2024-11-14 RX ORDER — CLONAZEPAM 1 MG/1
1 TABLET ORAL 2 TIMES DAILY PRN
Qty: 60 TABLET | Refills: 0 | Status: SHIPPED | OUTPATIENT
Start: 2024-11-14 | End: 2024-12-14

## 2024-11-14 ASSESSMENT — ANXIETY QUESTIONNAIRES
3. WORRYING TOO MUCH ABOUT DIFFERENT THINGS: SEVERAL DAYS
GAD7 TOTAL SCORE: 6
1. FEELING NERVOUS, ANXIOUS, OR ON EDGE: SEVERAL DAYS
6. BECOMING EASILY ANNOYED OR IRRITABLE: SEVERAL DAYS
5. BEING SO RESTLESS THAT IT IS HARD TO SIT STILL: SEVERAL DAYS
3. WORRYING TOO MUCH ABOUT DIFFERENT THINGS: SEVERAL DAYS
7. FEELING AFRAID AS IF SOMETHING AWFUL MIGHT HAPPEN: NOT AT ALL
4. TROUBLE RELAXING: SEVERAL DAYS
IF YOU CHECKED OFF ANY PROBLEMS ON THIS QUESTIONNAIRE, HOW DIFFICULT HAVE THESE PROBLEMS MADE IT FOR YOU TO DO YOUR WORK, TAKE CARE OF THINGS AT HOME, OR GET ALONG WITH OTHER PEOPLE: NOT DIFFICULT AT ALL
1. FEELING NERVOUS, ANXIOUS, OR ON EDGE: SEVERAL DAYS
5. BEING SO RESTLESS THAT IT IS HARD TO SIT STILL: SEVERAL DAYS
2. NOT BEING ABLE TO STOP OR CONTROL WORRYING: SEVERAL DAYS
6. BECOMING EASILY ANNOYED OR IRRITABLE: SEVERAL DAYS
GAD7 TOTAL SCORE: 6
7. FEELING AFRAID AS IF SOMETHING AWFUL MIGHT HAPPEN: NOT AT ALL
IF YOU CHECKED OFF ANY PROBLEMS ON THIS QUESTIONNAIRE, HOW DIFFICULT HAVE THESE PROBLEMS MADE IT FOR YOU TO DO YOUR WORK, TAKE CARE OF THINGS AT HOME, OR GET ALONG WITH OTHER PEOPLE: NOT DIFFICULT AT ALL
4. TROUBLE RELAXING: SEVERAL DAYS
2. NOT BEING ABLE TO STOP OR CONTROL WORRYING: SEVERAL DAYS

## 2024-11-14 ASSESSMENT — PATIENT HEALTH QUESTIONNAIRE - GENERAL
HAVE YOU BEEN EXPERIENCING HALLUCINATIONS OR CONFUSION: N
HAVE YOU BEEN EXPERIENCING VERY LOW OR VERY HIGH MOODS: N
HAVE YOU BEEN EXPERIENCING NEW OR WORSENING HEADACHES: N
HAVE YOU BEEN EXPERIENCING LIGHT HEADEDNESS, WOOZINESS, OR DIZZINESS, ESPECIALLY UPON STANDING FROM SITTING OR LYING POSITIONS: N
HAVE YOU BEEN EXPERIENCING NEW OR WORSENING DIFFICULTY WITH URINATION OR CHANGE IN URINARY PATTERN: N
DO YOU HAVE A FASTER HEART RATE THAN USUAL, DOES YOUR HEART RATE FEEL IRREGULAR OR DO YOU FEEL LIKE YOUR HEART IS POUNDING AT REST: N
HAVE YOU BEEN EXPERIENCING NEW OR WORSENING VISUAL CHANGES: N
HAVE YOU BEEN EXPERIENCING RACING THOUGHTS OR IMPULSIVE BEHAVIOR: N
HAVE YOU BEEN EXPERIENCING AN UNUSUALLY DRY MOUTH: N
SINCE YOUR LAST VISIT, HAVE YOU EXPERIENCED EPISODES OF FAINTING OR NEAR FAINTING: N
HAVE YOU BEEN EXPERIENCING ABDOMINAL DISCOMFORT, NAUSEA OR CHANGES IN YOUR BOWEL PATTERN: N
HAVE YOU BEEN EXPERIENCING NEW OR WORSENING MUSCLE TWITCHING, SHAKINESS, OR OTHER UNUSUAL CHANGES IN YOUR MUSCLE MOVEMENT: N
HAVE YOU BEEN EXPERIENCING UNEXPLAINED HIGH FEVERS OR EXCESSIVE SWEATING: N
HAVE YOU BEEN EXPERIENCING NEW OR WORSENING PROBLEMS WITH YOUR SEXUAL FUNCTION: N
HAVE YOU BEEN EXPERIENCING VIVID DREAMS OR NIGHTMARES THAT INTERFERE WITH YOUR SLEEP: N
HAVE YOU BEEN EXPERIENCING INVOLUNTARY WEIGHT GAIN OR LOSS: N
DO YOU HAVE ANY NEW RASHES OR UNEXPLAINED ITCHING OR SWELLING: N

## 2024-11-14 ASSESSMENT — PATIENT HEALTH QUESTIONNAIRE - PHQ9
3. TROUBLE FALLING OR STAYING ASLEEP: SEVERAL DAYS
7. TROUBLE CONCENTRATING ON THINGS, SUCH AS READING THE NEWSPAPER OR WATCHING TELEVISION: SEVERAL DAYS
8. MOVING OR SPEAKING SO SLOWLY THAT OTHER PEOPLE COULD HAVE NOTICED. OR THE OPPOSITE - BEING SO FIDGETY OR RESTLESS THAT YOU HAVE BEEN MOVING AROUND A LOT MORE THAN USUAL: NOT AT ALL
3. TROUBLE FALLING OR STAYING ASLEEP: SEVERAL DAYS
10. IF YOU CHECKED OFF ANY PROBLEMS, HOW DIFFICULT HAVE THESE PROBLEMS MADE IT FOR YOU TO DO YOUR WORK, TAKE CARE OF THINGS AT HOME, OR GET ALONG WITH OTHER PEOPLE: SOMEWHAT DIFFICULT
8. MOVING OR SPEAKING SO SLOWLY THAT OTHER PEOPLE COULD HAVE NOTICED. OR THE OPPOSITE, BEING SO FIGETY OR RESTLESS THAT YOU HAVE BEEN MOVING AROUND A LOT MORE THAN USUAL: NOT AT ALL
6. FEELING BAD ABOUT YOURSELF - OR THAT YOU ARE A FAILURE OR HAVE LET YOURSELF OR YOUR FAMILY DOWN: SEVERAL DAYS
SUM OF ALL RESPONSES TO PHQ QUESTIONS 1-9: 6
6. FEELING BAD ABOUT YOURSELF - OR THAT YOU ARE A FAILURE OR HAVE LET YOURSELF OR YOUR FAMILY DOWN: SEVERAL DAYS
SUM OF ALL RESPONSES TO PHQ QUESTIONS 1-9: 6
1. LITTLE INTEREST OR PLEASURE IN DOING THINGS: SEVERAL DAYS
7. TROUBLE CONCENTRATING ON THINGS, SUCH AS READING THE NEWSPAPER OR WATCHING TELEVISION: SEVERAL DAYS
2. FEELING DOWN, DEPRESSED OR HOPELESS: SEVERAL DAYS
2. FEELING DOWN, DEPRESSED OR HOPELESS: SEVERAL DAYS
9. THOUGHTS THAT YOU WOULD BE BETTER OFF DEAD, OR OF HURTING YOURSELF: NOT AT ALL
9. THOUGHTS THAT YOU WOULD BE BETTER OFF DEAD, OR OF HURTING YOURSELF: NOT AT ALL
SUM OF ALL RESPONSES TO PHQ QUESTIONS 1-9: 6
SUM OF ALL RESPONSES TO PHQ9 QUESTIONS 1 & 2: 2
5. POOR APPETITE OR OVEREATING: NOT AT ALL
5. POOR APPETITE OR OVEREATING: NOT AT ALL
SUM OF ALL RESPONSES TO PHQ QUESTIONS 1-9: 6
1. LITTLE INTEREST OR PLEASURE IN DOING THINGS: SEVERAL DAYS
4. FEELING TIRED OR HAVING LITTLE ENERGY: SEVERAL DAYS
10. IF YOU CHECKED OFF ANY PROBLEMS, HOW DIFFICULT HAVE THESE PROBLEMS MADE IT FOR YOU TO DO YOUR WORK, TAKE CARE OF THINGS AT HOME, OR GET ALONG WITH OTHER PEOPLE: SOMEWHAT DIFFICULT
SUM OF ALL RESPONSES TO PHQ QUESTIONS 1-9: 6

## 2024-11-29 DIAGNOSIS — K21.9 CHRONIC GERD: ICD-10-CM

## 2024-12-02 RX ORDER — FAMOTIDINE 20 MG/1
TABLET, FILM COATED ORAL
Qty: 30 TABLET | Refills: 5 | Status: SHIPPED | OUTPATIENT
Start: 2024-12-02

## 2024-12-27 DIAGNOSIS — K21.9 CHRONIC GERD: ICD-10-CM

## 2024-12-27 DIAGNOSIS — G43.109 MIGRAINE WITH AURA AND WITHOUT STATUS MIGRAINOSUS, NOT INTRACTABLE: ICD-10-CM

## 2024-12-27 DIAGNOSIS — F41.9 ANXIETY: ICD-10-CM

## 2024-12-27 DIAGNOSIS — R11.0 NAUSEA: ICD-10-CM

## 2024-12-27 DIAGNOSIS — G43.111 INTRACTABLE MIGRAINE WITH AURA WITH STATUS MIGRAINOSUS: ICD-10-CM

## 2024-12-27 RX ORDER — AMITRIPTYLINE HYDROCHLORIDE 75 MG/1
TABLET ORAL
Qty: 90 TABLET | Refills: 0 | Status: SHIPPED | OUTPATIENT
Start: 2024-12-27

## 2024-12-27 RX ORDER — BUTALBITAL, ACETAMINOPHEN AND CAFFEINE 50; 325; 40 MG/1; MG/1; MG/1
TABLET ORAL
Qty: 30 TABLET | Refills: 0 | Status: SHIPPED | OUTPATIENT
Start: 2024-12-27

## 2024-12-27 RX ORDER — PROMETHAZINE HYDROCHLORIDE 25 MG/1
TABLET ORAL
Qty: 30 TABLET | Refills: 2 | Status: SHIPPED | OUTPATIENT
Start: 2024-12-27

## 2024-12-27 RX ORDER — CLONAZEPAM 1 MG/1
1 TABLET ORAL 2 TIMES DAILY PRN
Qty: 60 TABLET | Refills: 0 | Status: SHIPPED | OUTPATIENT
Start: 2024-12-27 | End: 2025-01-26

## 2024-12-27 NOTE — TELEPHONE ENCOUNTER
Last Office Visit  -  11/14/24 evisit  Next Office Visit  -  n/a    Last Filled  -  11/14/24  Last UDS -  3/24/21  Contract -  11/15/16

## 2024-12-27 NOTE — TELEPHONE ENCOUNTER
Last Office Visit  -  11/14/24 evisit  Next Office Visit  -  n/a    Last Filled  -  8/19/24  Last UDS -    Contract -

## 2024-12-27 NOTE — TELEPHONE ENCOUNTER
Last Office Visit  -  11/14/24  Next Office Visit  -  n/a    Last Filled  -  10/4/24  Last UDS -    Contract -

## 2025-01-04 ENCOUNTER — E-VISIT (OUTPATIENT)
Dept: PRIMARY CARE CLINIC | Age: 55
End: 2025-01-04

## 2025-01-04 DIAGNOSIS — J06.9 UPPER RESPIRATORY TRACT INFECTION, UNSPECIFIED TYPE: Primary | ICD-10-CM

## 2025-01-04 ASSESSMENT — LIFESTYLE VARIABLES
SMOKING_STATUS: NO, I'M A FORMER SMOKER
PACKS_PER_DAY: 1
SMOKING_YEARS: 30

## 2025-01-09 ENCOUNTER — TELEPHONE (OUTPATIENT)
Dept: FAMILY MEDICINE CLINIC | Age: 55
End: 2025-01-09

## 2025-01-09 NOTE — TELEPHONE ENCOUNTER
[facility] in [place] called. Pt had vv on 1/4 with virtualist, augmentin was called in to [facility], which is now closed. Can we have script sent to [facility] in [place]?  amoxicillin-clavulanate (AUGMENTIN) 875-125 MG per tablet [

## 2025-02-06 ENCOUNTER — TELEPHONE (OUTPATIENT)
Dept: FAMILY MEDICINE CLINIC | Age: 55
End: 2025-02-06

## 2025-02-06 DIAGNOSIS — F41.9 ANXIETY: ICD-10-CM

## 2025-02-06 RX ORDER — CLONAZEPAM 1 MG/1
1 TABLET ORAL 2 TIMES DAILY PRN
Qty: 60 TABLET | Refills: 0 | Status: SHIPPED | OUTPATIENT
Start: 2025-02-06 | End: 2025-03-08

## 2025-02-06 NOTE — TELEPHONE ENCOUNTER
Patient contacted the office req refills on  clonazePAM (KLONOPIN) 1 MG tablet   Last visit 4/25/24  No future  Eddie Mcdaniel

## 2025-02-06 NOTE — TELEPHONE ENCOUNTER
Last Office Visit  -  4/25/24  Next Office Visit  -  n/a    Last Filled  -  12/27/24  Last UDS -  n/a  Contract -  n/a

## 2025-02-18 ENCOUNTER — E-VISIT (OUTPATIENT)
Dept: PRIMARY CARE CLINIC | Age: 55
End: 2025-02-18
Payer: MEDICARE

## 2025-02-18 ENCOUNTER — E-VISIT (OUTPATIENT)
Dept: FAMILY MEDICINE CLINIC | Age: 55
End: 2025-02-18
Payer: MEDICARE

## 2025-02-18 DIAGNOSIS — J44.1 COPD WITH ACUTE EXACERBATION (HCC): Primary | ICD-10-CM

## 2025-02-18 DIAGNOSIS — J06.9 UPPER RESPIRATORY TRACT INFECTION, UNSPECIFIED TYPE: Primary | ICD-10-CM

## 2025-02-18 PROCEDURE — 99422 OL DIG E/M SVC 11-20 MIN: CPT | Performed by: NURSE PRACTITIONER

## 2025-02-18 PROCEDURE — 99421 OL DIG E/M SVC 5-10 MIN: CPT | Performed by: FAMILY MEDICINE

## 2025-02-18 ASSESSMENT — LIFESTYLE VARIABLES
SMOKING_YEARS: 30
SMOKING_STATUS: NO, I'M A FORMER SMOKER
SMOKING_STATUS: NO, I'M A FORMER SMOKER
PACKS_PER_DAY: 1
PACKS_PER_DAY: 1
SMOKING_YEARS: 30

## 2025-02-19 RX ORDER — DOXYCYCLINE HYCLATE 100 MG
100 TABLET ORAL 2 TIMES DAILY
Qty: 14 TABLET | Refills: 0 | Status: SHIPPED | OUTPATIENT
Start: 2025-02-19 | End: 2025-02-26

## 2025-02-19 RX ORDER — PREDNISONE 20 MG/1
TABLET ORAL
Qty: 20 TABLET | Refills: 0 | Status: SHIPPED | OUTPATIENT
Start: 2025-02-19

## 2025-02-19 NOTE — PROGRESS NOTES
Diagnoses and all orders for this visit:    COPD with acute exacerbation (HCC)  -     predniSONE (DELTASONE) 20 MG tablet; 3 po qd x 3d, 2 po qd x 3d, 1 po qd x 3d, 1/2 po qd x 4d. Take with food.  -     doxycycline hyclate (VIBRA-TABS) 100 MG tablet; Take 1 tablet by mouth 2 times daily for 7 days      5-10 minutes were spent on the digital evaluation and management of this patient.

## 2025-02-19 NOTE — PROGRESS NOTES
Vanessa Garcia (1970) initiated an asynchronous digital communication through [employer]    HPI: per patient questionnaire     Exam: not applicable    Diagnoses and all orders for this visit:  Diagnoses and all orders for this visit:    Upper respiratory tract infection, unspecified type        Patient was just treated via e-visit about a month ago.  Requesting additional round of antibiotic and steroids.  Recommend being seen in person.  Supportive care suggestions provided   Time: EV2 - 11-20 minutes were spent on the digital evaluation and management of this patient.   18 min   JENNIFER Meade - CNP

## 2025-03-11 DIAGNOSIS — J44.9 CHRONIC OBSTRUCTIVE PULMONARY DISEASE, UNSPECIFIED COPD TYPE (HCC): ICD-10-CM

## 2025-03-12 RX ORDER — ROFLUMILAST 500 UG/1
500 TABLET ORAL DAILY
Qty: 30 TABLET | Refills: 0 | Status: SHIPPED | OUTPATIENT
Start: 2025-03-12

## 2025-03-24 DIAGNOSIS — G43.109 MIGRAINE WITH AURA AND WITHOUT STATUS MIGRAINOSUS, NOT INTRACTABLE: ICD-10-CM

## 2025-03-24 DIAGNOSIS — F41.9 ANXIETY: ICD-10-CM

## 2025-03-24 RX ORDER — RIZATRIPTAN BENZOATE 10 MG/1
TABLET ORAL
Qty: 6 TABLET | Refills: 12 | Status: SHIPPED | OUTPATIENT
Start: 2025-03-24

## 2025-03-24 RX ORDER — CLONAZEPAM 1 MG/1
1 TABLET ORAL 2 TIMES DAILY PRN
Qty: 60 TABLET | Refills: 0 | Status: SHIPPED | OUTPATIENT
Start: 2025-03-24 | End: 2025-04-23

## 2025-03-24 RX ORDER — BUTALBITAL, ACETAMINOPHEN AND CAFFEINE 50; 325; 40 MG/1; MG/1; MG/1
TABLET ORAL
Qty: 30 TABLET | Refills: 0 | Status: SHIPPED | OUTPATIENT
Start: 2025-03-24

## 2025-03-24 NOTE — TELEPHONE ENCOUNTER
Last Office Visit  -  2/18/25 e-visit  Next Office Visit  -  n/a    Last Filled  -  2/6/25  Last UDS -  3/24/21  Contract -  11/15/16

## 2025-03-24 NOTE — TELEPHONE ENCOUNTER
Patient is requesting a rx for     butalbital-acetaminophen-caffeine (FIORICET, ESGIC) -40 MG per tablet     rizatriptan (MAXALT) 10 MG tablet     clonazePAM (KLONOPIN) 1 MG tablet.    Last seen 02/18/2025  Next visit none    Please send to [facility]

## 2025-04-07 ENCOUNTER — E-VISIT (OUTPATIENT)
Dept: PRIMARY CARE CLINIC | Age: 55
End: 2025-04-07
Payer: MEDICARE

## 2025-04-07 DIAGNOSIS — J06.9 UPPER RESPIRATORY TRACT INFECTION, UNSPECIFIED TYPE: Primary | ICD-10-CM

## 2025-04-07 PROCEDURE — 99423 OL DIG E/M SVC 21+ MIN: CPT | Performed by: NURSE PRACTITIONER

## 2025-04-07 ASSESSMENT — LIFESTYLE VARIABLES
SMOKING_YEARS: 30
SMOKING_STATUS: NO, I'M A FORMER SMOKER
PACKS_PER_DAY: 1

## 2025-04-07 NOTE — PROGRESS NOTES
Vanessa Garcia (1970) initiated an asynchronous digital communication through [employer].    HPI: per patient questionnaire     Exam: not applicable    Diagnoses and all orders for this visit:  Diagnoses and all orders for this visit:    Upper respiratory tract infection, unspecified type    Other orders  -     amoxicillin-clavulanate (AUGMENTIN) 875-125 MG per tablet; Take 1 tablet by mouth 2 times daily for 10 days    Last treated in February. She did 2 evisits on 2/18. One recommend fu with pcp d/t recent antibiotics. Then submitted a second to pcp who sent antibiotics and steroids. Hx of copd. Antibiotic sent. Supportive care measures provided       23 minutes were spent on the digital evaluation and management of this patient.    Susannah Hayes, APRN - CNP

## 2025-04-08 DIAGNOSIS — K21.9 CHRONIC GERD: ICD-10-CM

## 2025-04-08 RX ORDER — OMEPRAZOLE 20 MG/1
CAPSULE, DELAYED RELEASE ORAL DAILY
Qty: 90 CAPSULE | Refills: 0 | Status: SHIPPED | OUTPATIENT
Start: 2025-04-08

## 2025-04-18 DIAGNOSIS — J44.9 CHRONIC OBSTRUCTIVE PULMONARY DISEASE, UNSPECIFIED COPD TYPE (HCC): ICD-10-CM

## 2025-04-18 RX ORDER — ROFLUMILAST 500 UG/1
500 TABLET ORAL DAILY
Qty: 30 TABLET | Refills: 0 | Status: SHIPPED | OUTPATIENT
Start: 2025-04-18

## 2025-04-24 DIAGNOSIS — F41.9 ANXIETY: ICD-10-CM

## 2025-04-24 RX ORDER — CLONAZEPAM 1 MG/1
1 TABLET ORAL 2 TIMES DAILY PRN
Qty: 60 TABLET | Refills: 0 | Status: SHIPPED | OUTPATIENT
Start: 2025-04-24 | End: 2025-05-24

## 2025-04-24 NOTE — TELEPHONE ENCOUNTER
Last Office Visit  -  2/18/2025  Next Office Visit  -      Last Filled  -  3/24/2025  Last UDS -    Contract -

## 2025-04-26 DIAGNOSIS — G43.111 INTRACTABLE MIGRAINE WITH AURA WITH STATUS MIGRAINOSUS: ICD-10-CM

## 2025-04-27 RX ORDER — AMITRIPTYLINE HYDROCHLORIDE 75 MG/1
75 TABLET ORAL NIGHTLY
Qty: 90 TABLET | Refills: 0 | Status: SHIPPED | OUTPATIENT
Start: 2025-04-27

## 2025-05-16 DIAGNOSIS — G43.109 MIGRAINE WITH AURA AND WITHOUT STATUS MIGRAINOSUS, NOT INTRACTABLE: ICD-10-CM

## 2025-05-16 DIAGNOSIS — F41.9 ANXIETY: ICD-10-CM

## 2025-05-16 RX ORDER — BUTALBITAL, ACETAMINOPHEN AND CAFFEINE 50; 325; 40 MG/1; MG/1; MG/1
TABLET ORAL
Qty: 30 TABLET | Refills: 0 | Status: SHIPPED | OUTPATIENT
Start: 2025-05-16

## 2025-05-20 DIAGNOSIS — J44.9 CHRONIC OBSTRUCTIVE PULMONARY DISEASE, UNSPECIFIED COPD TYPE (HCC): ICD-10-CM

## 2025-05-22 RX ORDER — ROFLUMILAST 500 UG/1
500 TABLET ORAL DAILY
Qty: 30 TABLET | Refills: 0 | Status: SHIPPED | OUTPATIENT
Start: 2025-05-22

## 2025-06-04 DIAGNOSIS — R11.0 NAUSEA: ICD-10-CM

## 2025-06-04 DIAGNOSIS — F41.9 ANXIETY: ICD-10-CM

## 2025-06-04 RX ORDER — PROMETHAZINE HYDROCHLORIDE 25 MG/1
TABLET ORAL
Qty: 30 TABLET | Refills: 1 | Status: SHIPPED | OUTPATIENT
Start: 2025-06-04

## 2025-06-04 RX ORDER — CLONAZEPAM 1 MG/1
1 TABLET ORAL 2 TIMES DAILY PRN
Qty: 60 TABLET | Refills: 0 | OUTPATIENT
Start: 2025-06-04 | End: 2025-07-04

## 2025-06-04 NOTE — TELEPHONE ENCOUNTER
Last Office Visit  -  Visit date not found  Next Office Visit  -  6/4/2025    Last Filled  -  12/27/2024  Last UDS -    Contract -

## 2025-06-13 DIAGNOSIS — F41.9 ANXIETY: ICD-10-CM

## 2025-06-13 RX ORDER — CLONAZEPAM 1 MG/1
1 TABLET ORAL 2 TIMES DAILY PRN
Qty: 60 TABLET | Refills: 0 | Status: SHIPPED | OUTPATIENT
Start: 2025-06-13 | End: 2025-07-13

## 2025-06-13 NOTE — TELEPHONE ENCOUNTER
Last Office Visit  -  2/18/25  Next Office Visit  -  n/a    Last Filled  -  4/24/25  Last UDS -  1/10/18  Contract -  11/10/16

## 2025-06-19 ENCOUNTER — E-VISIT (OUTPATIENT)
Dept: PRIMARY CARE CLINIC | Age: 55
End: 2025-06-19

## 2025-06-19 DIAGNOSIS — J06.9 UPPER RESPIRATORY TRACT INFECTION, UNSPECIFIED TYPE: Primary | ICD-10-CM

## 2025-06-19 ASSESSMENT — LIFESTYLE VARIABLES
SMOKING_YEARS: 30
SMOKING_STATUS: NO, I'M A FORMER SMOKER
PACKS_PER_DAY: 1

## 2025-06-20 ENCOUNTER — E-VISIT (OUTPATIENT)
Dept: FAMILY MEDICINE CLINIC | Age: 55
End: 2025-06-20

## 2025-06-20 DIAGNOSIS — B96.89 ACUTE BACTERIAL SINUSITIS: Primary | ICD-10-CM

## 2025-06-20 DIAGNOSIS — J01.90 ACUTE BACTERIAL SINUSITIS: Primary | ICD-10-CM

## 2025-06-20 RX ORDER — PREDNISONE 20 MG/1
20 TABLET ORAL DAILY
Qty: 5 TABLET | Refills: 0 | Status: SHIPPED | OUTPATIENT
Start: 2025-06-20 | End: 2025-06-25

## 2025-06-20 ASSESSMENT — LIFESTYLE VARIABLES
PACKS_PER_DAY: 1
SMOKING_STATUS: NO, I'M A FORMER SMOKER
SMOKING_YEARS: 30

## 2025-06-20 NOTE — PROGRESS NOTES
Vanessa Garcia (1970) initiated an asynchronous digital communication through [employer].    HPI: per patient questionnaire     Exam: not applicable    Diagnoses and all orders for this visit:  There are no diagnoses linked to this encounter.    Often treated via e visit with no in person exam. For safety reasons and its during the week where there is time to make an appt, recommend contacting pcp   18 minutes were spent on the digital evaluation and management of this patient.    Susannah Hayes, APRN - CNP

## 2025-06-20 NOTE — PROGRESS NOTES
Patient reviewed, consistent with sinus infection/possible start of COPD flareup.  Will treat with steroid antibiotic and patient will follow-up with no improvement.    Total time: 5 minutes.

## 2025-07-07 DIAGNOSIS — F41.9 ANXIETY: ICD-10-CM

## 2025-07-07 DIAGNOSIS — K21.9 CHRONIC GERD: ICD-10-CM

## 2025-07-07 DIAGNOSIS — J44.9 CHRONIC OBSTRUCTIVE PULMONARY DISEASE, UNSPECIFIED COPD TYPE (HCC): ICD-10-CM

## 2025-07-07 RX ORDER — CLONAZEPAM 1 MG/1
1 TABLET ORAL 2 TIMES DAILY PRN
Qty: 60 TABLET | Refills: 0 | Status: SHIPPED | OUTPATIENT
Start: 2025-07-07 | End: 2025-08-06

## 2025-07-07 RX ORDER — OMEPRAZOLE 20 MG/1
CAPSULE, DELAYED RELEASE ORAL DAILY
Qty: 90 CAPSULE | Refills: 1 | Status: SHIPPED | OUTPATIENT
Start: 2025-07-07

## 2025-07-07 RX ORDER — ROFLUMILAST 500 UG/1
500 TABLET ORAL DAILY
Qty: 30 TABLET | Refills: 0 | Status: SHIPPED | OUTPATIENT
Start: 2025-07-07

## 2025-07-07 NOTE — TELEPHONE ENCOUNTER
Last Office Visit  -  6/20/2025  Next Office Visit  -      Last Filled  -  6/13/2025  Last UDS -    Contract -

## 2025-07-29 DIAGNOSIS — G43.111 INTRACTABLE MIGRAINE WITH AURA WITH STATUS MIGRAINOSUS: ICD-10-CM

## 2025-07-29 RX ORDER — AMITRIPTYLINE HYDROCHLORIDE 75 MG/1
75 TABLET ORAL NIGHTLY
Qty: 90 TABLET | Refills: 0 | Status: SHIPPED | OUTPATIENT
Start: 2025-07-29

## 2025-08-05 DIAGNOSIS — J44.9 CHRONIC OBSTRUCTIVE PULMONARY DISEASE, UNSPECIFIED COPD TYPE (HCC): ICD-10-CM

## 2025-08-05 DIAGNOSIS — F41.9 ANXIETY: ICD-10-CM

## 2025-08-05 RX ORDER — ROFLUMILAST 500 UG/1
500 TABLET ORAL DAILY
Qty: 30 TABLET | Refills: 0 | Status: SHIPPED | OUTPATIENT
Start: 2025-08-05

## 2025-08-05 RX ORDER — CLONAZEPAM 1 MG/1
1 TABLET ORAL 2 TIMES DAILY PRN
Qty: 60 TABLET | Refills: 0 | Status: SHIPPED | OUTPATIENT
Start: 2025-08-05 | End: 2025-09-04

## 2025-09-03 DIAGNOSIS — F41.9 ANXIETY: ICD-10-CM

## 2025-09-03 RX ORDER — CLONAZEPAM 1 MG/1
1 TABLET ORAL 2 TIMES DAILY PRN
Qty: 60 TABLET | Refills: 0 | Status: SHIPPED | OUTPATIENT
Start: 2025-09-03 | End: 2025-10-03

## 2025-09-05 DIAGNOSIS — J44.9 CHRONIC OBSTRUCTIVE PULMONARY DISEASE, UNSPECIFIED COPD TYPE (HCC): ICD-10-CM

## 2025-09-07 RX ORDER — ROFLUMILAST 500 UG/1
500 TABLET ORAL DAILY
Qty: 30 TABLET | Refills: 0 | Status: SHIPPED | OUTPATIENT
Start: 2025-09-07

## (undated) DEVICE — SOLUTION IV IRRIG POUR BRL 0.9% SODIUM CHL 2F7124

## (undated) DEVICE — CONTROL SYRINGE LUER-LOCK TIP: Brand: MONOJECT

## (undated) DEVICE — GLOVE ORANGE PI 7 1/2   MSG9075

## (undated) DEVICE — TROCARS: Brand: KII® OPTICAL ACCESS SYSTEM

## (undated) DEVICE — GAUZE FLUFF 1 PLY: Brand: DEROYAL

## (undated) DEVICE — SET VLV 3 PC AWS DISPOSABLE GRDIAN SCOPEVALET

## (undated) DEVICE — BLANKET WRM W40.2XL55.9IN IORT LO BODY + MISTRAL AIR

## (undated) DEVICE — SYSTEM SKIN CLOSURE 42CM DERMABOND PRINEO

## (undated) DEVICE — PAD FOAM 11.75X7-7/8 IN RESTON LF

## (undated) DEVICE — APPLICATOR PREP 26ML 0.7% IOD POVACRYLEX 74% ISO ALC ST

## (undated) DEVICE — ARM CRADLE: Brand: DEVON

## (undated) DEVICE — PLASTIC MAJOR: Brand: MEDLINE INDUSTRIES, INC.

## (undated) DEVICE — LAPAROSCOPIC SCISSORS: Brand: EPIX LAPAROSCOPIC SCISSORS

## (undated) DEVICE — PLASMABLADE PS210-030S 3.0S LOCK: Brand: PLASMABLADE™

## (undated) DEVICE — MOUTHPIECE ENDOSCP L CTRL OPN AND SIDE PORTS DISP

## (undated) DEVICE — ADHESIVE SKIN CLSR 0.7ML TOP DERMBND ADV

## (undated) DEVICE — 3M™ TEGADERM™ TRANSPARENT FILM DRESSING FRAME STYLE, 1624W, 2-3/8 IN X 2-3/4 IN (6 CM X 7 CM), 100/CT 4CT/CASE: Brand: 3M™ TEGADERM™

## (undated) DEVICE — SHEET,DRAPE,40X58,STERILE: Brand: MEDLINE

## (undated) DEVICE — LOTION PREP REMV 5OZ IODO CLR TINC OF BENZ DURAPREP

## (undated) DEVICE — SUTURE MCRYL SZ 3-0 L27IN ABSRB UD L19MM PS-2 3/8 CIR PRIM Y427H

## (undated) DEVICE — MERCY FAIRFIELD TURNOVER KIT: Brand: MEDLINE INDUSTRIES, INC.

## (undated) DEVICE — APPLIER LIG CLP M L11IN TI STR RNG HNDL FOR 20 CLP DISP

## (undated) DEVICE — STAPLER SKIN H3.9MM WIRE DIA0.58MM CRWN 6.9MM 35 STPL ROT

## (undated) DEVICE — SUTURE PERMA-HAND 0 L18IN NONABSORBABLE BLK CT-1 L36MM 1/2 C021D

## (undated) DEVICE — SUTURE VCRL SZ 0 L54IN ABSRB UD POLYGLACTIN 910 COAT BRAID J608H

## (undated) DEVICE — AGENT HEMSTAT 3GM OXIDIZED REGENERATED CELOS ABSRB FOR CONT (ORDER MULTIPLES OF 5EA)

## (undated) DEVICE — DEVICE SUT W/ SZ 0 L48IN VLT POLYSRB SUT DISP ES-9 ENDO

## (undated) DEVICE — TRUE CONTENT TO BE POPULATED AS PART OF REBRANDING: Brand: ARGYLE

## (undated) DEVICE — DRESSING GERM DIA1IN CNTR H DIA7MM BLU CHG ANTIMIC PROTCT

## (undated) DEVICE — AIR SHEET,LAT,COMFORT GLIDE, BLEND 40X80: Brand: MEDLINE

## (undated) DEVICE — NEEDLE HYPO 22GA L1.5IN BLK POLYPR HUB S STL REG BVL STR

## (undated) DEVICE — CRADLE ANK AND FT ELEV FLAT END POLY FOAM W/ VENT H NEUT

## (undated) DEVICE — SUTURE VCRL SZ 4-0 L18IN ABSRB UD L19MM PS-2 3/8 CIR PRIM J496H

## (undated) DEVICE — TROCAR: Brand: KII OPTICAL ACCESS SYSTEM

## (undated) DEVICE — BOWL MED L 32OZ PLAS W/ MOLD GRAD EZ OPN PEEL PCH

## (undated) DEVICE — SYRINGE IRRIG 60ML SFT PLIABLE BLB EZ TO GRP 1 HND USE W/

## (undated) DEVICE — BLANKET WRM W29.9XL79.1IN UP BODY FORC AIR MISTRAL-AIR

## (undated) DEVICE — BW-412T DISP COMBO CLEANING BRUSH: Brand: SINGLE USE COMBINATION CLEANING BRUSH

## (undated) DEVICE — STAPLER SKIN L440MM 32MM LNG 12 FIRING B FRM PWR + GRIPPING

## (undated) DEVICE — LAPAROSCOPY PACK: Brand: MEDLINE INDUSTRIES, INC.

## (undated) DEVICE — PASSIVE LAPSCP FLTR W/ 1/4INX24 TBNG AND M LUER LCK FIT - U

## (undated) DEVICE — SUTURE STRATAFIX SPRL MCRYL + SZ 3-0 L18IN ABSRB UD PS-2 SXMP1B107

## (undated) DEVICE — TOWEL,STOP FLAG GOLD N-W: Brand: MEDLINE

## (undated) DEVICE — DEVICE SUT SHFT L34CM DIA 10MM 2 JAW LD UNIT ENDOSTCH

## (undated) DEVICE — TOWEL,OR,DSP,ST,BLUE,DLX,8/PK,10PK/CS: Brand: MEDLINE

## (undated) DEVICE — GOWN,AURORA,NONREINF,RAGLAN,XXL,STERILE: Brand: MEDLINE

## (undated) DEVICE — 3M™ STERI-STRIP™ BLEND TONE SKIN CLOSURES, B1557, TAN, 1/2 IN X 4 IN (12MM X 100MM), 6 STRIPS/ENVELOPE: Brand: 3M™ STERI-STRIP™

## (undated) DEVICE — RELOAD STPL H1.8-3.8MM REG THCK TISS G 6 ROW GRIPPING SURF

## (undated) DEVICE — DRAIN SURG 15FR L3 16IN DIA47MM SIL RND HUBLESS FULL FLUT

## (undated) DEVICE — PROCEDURE KIT ENDOSCP CUST

## (undated) DEVICE — NEEDLE INSUF L150MM DIA2MM DISP FOR PNEUMOPERI ENDOPATH

## (undated) DEVICE — INTENDED USE FOR SURGICAL MARKING ON INTACT SKIN, ALSO PROVIDES A PERMANENT METHOD OF IDENTIFYING OBJECTS IN THE OPERATING ROOM: Brand: WRITESITE® PLUS MINI PREP RESISTANT MARKER

## (undated) DEVICE — SOLUTION IV IRRIG WATER 500ML POUR BRL ST 2F7113

## (undated) DEVICE — DRAPE,LAP,CHOLE,W/TROUGHS,STERILE: Brand: MEDLINE

## (undated) DEVICE — RELOAD STPL H4.1X2MM DIA60MM THCK TISS GRN 6 ROW PWR GST B

## (undated) DEVICE — SHEET,DRAPE,53X77,STERILE: Brand: MEDLINE

## (undated) DEVICE — STERILE POLYISOPRENE POWDER-FREE SURGICAL GLOVES: Brand: PROTEXIS

## (undated) DEVICE — TROCAR: Brand: KII FIOS FIRST ENTRY

## (undated) DEVICE — Device

## (undated) DEVICE — FORCEPS BX L240CM WRK CHN 2.8MM STD CAP W/ NDL MIC MESH

## (undated) DEVICE — SHEARS ENDOSCP L36CM DIA5MM ULTRASONIC CRV TIP HARM

## (undated) DEVICE — TOTAL TRAY, DB, 100% SILI FOLEY, 16FR 10: Brand: MEDLINE

## (undated) DEVICE — 1010 S-DRAPE TOWEL DRAPE 10/BX: Brand: STERI-DRAPE™

## (undated) DEVICE — RELOAD STPL L60MM H1.5-3.6MM REG TISS BLU GRIPPING SURF B

## (undated) DEVICE — PMI DISPOSABLE PUNCTURE CLOSURE DEVICE / SUTURE GRASPER: Brand: PMI

## (undated) DEVICE — SPONGE,LAP,4"X18",XR,ST,5/PK,40PK/CS: Brand: MEDLINE INDUSTRIES, INC.

## (undated) DEVICE — 30977 SEE SHARP - ENHANCED INTRAOPERATIVE LAPAROSCOPE CLEANING & DEFOGGING: Brand: 30977 SEE SHARP - ENHANCED INTRAOPERATIVE LAPAROSCOPE CLEANING & DEFOGGING

## (undated) DEVICE — GLOVE ORANGE PI 8 1/2   MSG9085